# Patient Record
Sex: MALE | Race: WHITE | Employment: OTHER
[De-identification: names, ages, dates, MRNs, and addresses within clinical notes are randomized per-mention and may not be internally consistent; named-entity substitution may affect disease eponyms.]

---

## 2017-01-20 RX ORDER — NITROGLYCERIN 0.4 MG/1
TABLET SUBLINGUAL
Qty: 25 TABLET | Refills: 0 | Status: SHIPPED | OUTPATIENT
Start: 2017-01-20 | End: 2018-04-05 | Stop reason: SDUPTHER

## 2017-01-25 ENCOUNTER — CARE COORDINATION (OUTPATIENT)
Dept: CARE COORDINATION | Facility: CLINIC | Age: 72
End: 2017-01-25

## 2017-02-22 ENCOUNTER — OFFICE VISIT (OUTPATIENT)
Dept: INTERNAL MEDICINE | Facility: CLINIC | Age: 72
End: 2017-02-22

## 2017-02-22 ENCOUNTER — CARE COORDINATOR VISIT (OUTPATIENT)
Dept: CARE COORDINATION | Facility: CLINIC | Age: 72
End: 2017-02-22

## 2017-02-22 VITALS
HEART RATE: 80 BPM | RESPIRATION RATE: 18 BRPM | HEIGHT: 71 IN | WEIGHT: 233.2 LBS | TEMPERATURE: 100.8 F | SYSTOLIC BLOOD PRESSURE: 118 MMHG | DIASTOLIC BLOOD PRESSURE: 70 MMHG | BODY MASS INDEX: 32.65 KG/M2

## 2017-02-22 DIAGNOSIS — J02.9 SORE THROAT: ICD-10-CM

## 2017-02-22 DIAGNOSIS — R50.9 FEBRILE ILLNESS, ACUTE: Primary | ICD-10-CM

## 2017-02-22 LAB — S PYO AG THROAT QL: NORMAL

## 2017-02-22 PROCEDURE — 87880 STREP A ASSAY W/OPTIC: CPT | Performed by: NURSE PRACTITIONER

## 2017-02-22 PROCEDURE — 99213 OFFICE O/P EST LOW 20 MIN: CPT | Performed by: NURSE PRACTITIONER

## 2017-02-22 RX ORDER — OSELTAMIVIR PHOSPHATE 75 MG/1
75 CAPSULE ORAL 2 TIMES DAILY
Qty: 10 CAPSULE | Refills: 0 | Status: SHIPPED | OUTPATIENT
Start: 2017-02-22 | End: 2017-02-27

## 2017-02-22 ASSESSMENT — ENCOUNTER SYMPTOMS
COUGH: 1
VOMITING: 0
ABDOMINAL PAIN: 0
NAUSEA: 0
PHOTOPHOBIA: 0
CONSTIPATION: 0
SHORTNESS OF BREATH: 0
DIARRHEA: 0

## 2017-02-24 ASSESSMENT — ENCOUNTER SYMPTOMS
SORE THROAT: 1
RHINORRHEA: 1
TROUBLE SWALLOWING: 0

## 2017-02-28 ENCOUNTER — TELEPHONE (OUTPATIENT)
Dept: INTERNAL MEDICINE | Facility: CLINIC | Age: 72
End: 2017-02-28

## 2017-02-28 ENCOUNTER — HOSPITAL ENCOUNTER (OUTPATIENT)
Age: 72
Setting detail: OBSERVATION
Discharge: HOME | DRG: 191 | End: 2017-03-03
Attending: EMERGENCY MEDICINE | Admitting: INTERNAL MEDICINE
Payer: MEDICARE

## 2017-02-28 ENCOUNTER — APPOINTMENT (OUTPATIENT)
Dept: CT IMAGING | Age: 72
DRG: 191 | End: 2017-02-28
Payer: MEDICARE

## 2017-02-28 ENCOUNTER — APPOINTMENT (OUTPATIENT)
Dept: GENERAL RADIOLOGY | Age: 72
DRG: 191 | End: 2017-02-28
Payer: MEDICARE

## 2017-02-28 DIAGNOSIS — R09.89 RALES 1/3 WAY UP POSTERIOR CHEST WALL ON LEFT SIDE: ICD-10-CM

## 2017-02-28 DIAGNOSIS — J44.0 CHRONIC OBSTRUCTIVE PULMONARY DISEASE WITH ACUTE LOWER RESPIRATORY INFECTION (HCC): ICD-10-CM

## 2017-02-28 DIAGNOSIS — R06.02 SHORTNESS OF BREATH: Primary | ICD-10-CM

## 2017-02-28 DIAGNOSIS — R10.30 LOWER ABDOMINAL PAIN: ICD-10-CM

## 2017-02-28 LAB
ABSOLUTE EOS #: 0.1 K/UL (ref 0–0.4)
ABSOLUTE LYMPH #: 2.7 K/UL (ref 1–4.8)
ABSOLUTE MONO #: 0.9 K/UL (ref 0.1–1.3)
ALBUMIN SERPL-MCNC: 4.3 G/DL (ref 3.5–5.2)
ALBUMIN/GLOBULIN RATIO: ABNORMAL (ref 1–2.5)
ALP BLD-CCNC: 62 U/L (ref 40–129)
ALT SERPL-CCNC: 39 U/L (ref 5–41)
ANION GAP SERPL CALCULATED.3IONS-SCNC: 19 MMOL/L (ref 9–17)
AST SERPL-CCNC: 43 U/L
BASOPHILS # BLD: 0 % (ref 0–2)
BASOPHILS ABSOLUTE: 0 K/UL (ref 0–0.2)
BILIRUB SERPL-MCNC: 0.37 MG/DL (ref 0.3–1.2)
BILIRUBIN DIRECT: 0.12 MG/DL
BILIRUBIN, INDIRECT: 0.25 MG/DL (ref 0–1)
BNP INTERPRETATION: NORMAL
BUN BLDV-MCNC: 16 MG/DL (ref 8–23)
BUN/CREAT BLD: ABNORMAL (ref 9–20)
CALCIUM SERPL-MCNC: 9.4 MG/DL (ref 8.6–10.4)
CHLORIDE BLD-SCNC: 98 MMOL/L (ref 98–107)
CO2: 23 MMOL/L (ref 20–31)
CREAT SERPL-MCNC: 1.09 MG/DL (ref 0.7–1.2)
DIFFERENTIAL TYPE: ABNORMAL
EOSINOPHILS RELATIVE PERCENT: 1 % (ref 0–4)
GFR AFRICAN AMERICAN: >60 ML/MIN
GFR NON-AFRICAN AMERICAN: >60 ML/MIN
GFR SERPL CREATININE-BSD FRML MDRD: ABNORMAL ML/MIN/{1.73_M2}
GFR SERPL CREATININE-BSD FRML MDRD: ABNORMAL ML/MIN/{1.73_M2}
GLOBULIN: ABNORMAL G/DL (ref 1.5–3.8)
GLUCOSE BLD-MCNC: 138 MG/DL (ref 70–99)
GLUCOSE BLD-MCNC: 72 MG/DL (ref 75–110)
HCT VFR BLD CALC: 44.8 % (ref 41–53)
HEMOGLOBIN: 14.8 G/DL (ref 13.5–17.5)
LIPASE: 32 U/L (ref 13–60)
LYMPHOCYTES # BLD: 35 % (ref 24–44)
MCH RBC QN AUTO: 28.3 PG (ref 26–34)
MCHC RBC AUTO-ENTMCNC: 33.1 G/DL (ref 31–37)
MCV RBC AUTO: 85.4 FL (ref 80–100)
MONOCYTES # BLD: 11 % (ref 1–7)
MYOGLOBIN: 46 NG/ML (ref 28–72)
MYOGLOBIN: 46 NG/ML (ref 28–72)
PDW BLD-RTO: 13.5 % (ref 11.5–14.9)
PLATELET # BLD: 216 K/UL (ref 150–450)
PLATELET ESTIMATE: ABNORMAL
PMV BLD AUTO: 7.8 FL (ref 6–12)
POTASSIUM SERPL-SCNC: 3.4 MMOL/L (ref 3.7–5.3)
PRO-BNP: 225 PG/ML
RBC # BLD: 5.25 M/UL (ref 4.5–5.9)
RBC # BLD: ABNORMAL 10*6/UL
SEG NEUTROPHILS: 53 % (ref 36–66)
SEGMENTED NEUTROPHILS ABSOLUTE COUNT: 4.1 K/UL (ref 1.3–9.1)
SODIUM BLD-SCNC: 140 MMOL/L (ref 135–144)
TOTAL PROTEIN: 7.6 G/DL (ref 6.4–8.3)
TROPONIN INTERP: NORMAL
TROPONIN INTERP: NORMAL
TROPONIN T: <0.03 NG/ML
TROPONIN T: <0.03 NG/ML
WBC # BLD: 7.7 K/UL (ref 3.5–11)
WBC # BLD: ABNORMAL 10*3/UL

## 2017-02-28 PROCEDURE — 6360000002 HC RX W HCPCS: Performed by: INTERNAL MEDICINE

## 2017-02-28 PROCEDURE — 1200000000 HC SEMI PRIVATE

## 2017-02-28 PROCEDURE — 74176 CT ABD & PELVIS W/O CONTRAST: CPT

## 2017-02-28 PROCEDURE — 71020 XR CHEST STANDARD TWO VW: CPT | Performed by: RADIOLOGY

## 2017-02-28 PROCEDURE — 2580000003 HC RX 258: Performed by: INTERNAL MEDICINE

## 2017-02-28 PROCEDURE — 94664 DEMO&/EVAL PT USE INHALER: CPT

## 2017-02-28 PROCEDURE — 2580000003 HC RX 258: Performed by: STUDENT IN AN ORGANIZED HEALTH CARE EDUCATION/TRAINING PROGRAM

## 2017-02-28 PROCEDURE — 83690 ASSAY OF LIPASE: CPT

## 2017-02-28 PROCEDURE — 6370000000 HC RX 637 (ALT 250 FOR IP): Performed by: STUDENT IN AN ORGANIZED HEALTH CARE EDUCATION/TRAINING PROGRAM

## 2017-02-28 PROCEDURE — 94640 AIRWAY INHALATION TREATMENT: CPT

## 2017-02-28 PROCEDURE — 80048 BASIC METABOLIC PNL TOTAL CA: CPT

## 2017-02-28 PROCEDURE — 83874 ASSAY OF MYOGLOBIN: CPT

## 2017-02-28 PROCEDURE — 99285 EMERGENCY DEPT VISIT HI MDM: CPT

## 2017-02-28 PROCEDURE — 36415 COLL VENOUS BLD VENIPUNCTURE: CPT

## 2017-02-28 PROCEDURE — 93005 ELECTROCARDIOGRAM TRACING: CPT

## 2017-02-28 PROCEDURE — G0378 HOSPITAL OBSERVATION PER HR: HCPCS

## 2017-02-28 PROCEDURE — 96367 TX/PROPH/DG ADDL SEQ IV INF: CPT

## 2017-02-28 PROCEDURE — 83880 ASSAY OF NATRIURETIC PEPTIDE: CPT

## 2017-02-28 PROCEDURE — 87040 BLOOD CULTURE FOR BACTERIA: CPT

## 2017-02-28 PROCEDURE — 71020 XR CHEST STANDARD TWO VW: CPT

## 2017-02-28 PROCEDURE — 6370000000 HC RX 637 (ALT 250 FOR IP): Performed by: EMERGENCY MEDICINE

## 2017-02-28 PROCEDURE — 96372 THER/PROPH/DIAG INJ SC/IM: CPT

## 2017-02-28 PROCEDURE — 84484 ASSAY OF TROPONIN QUANT: CPT

## 2017-02-28 PROCEDURE — 96365 THER/PROPH/DIAG IV INF INIT: CPT

## 2017-02-28 PROCEDURE — 80076 HEPATIC FUNCTION PANEL: CPT

## 2017-02-28 PROCEDURE — 82947 ASSAY GLUCOSE BLOOD QUANT: CPT

## 2017-02-28 PROCEDURE — 85025 COMPLETE CBC W/AUTO DIFF WBC: CPT

## 2017-02-28 RX ORDER — GLIMEPIRIDE 4 MG/1
4 TABLET ORAL 2 TIMES DAILY
Status: DISCONTINUED | OUTPATIENT
Start: 2017-02-28 | End: 2017-03-03 | Stop reason: HOSPADM

## 2017-02-28 RX ORDER — GUAIFENESIN 600 MG/1
600 TABLET, EXTENDED RELEASE ORAL 2 TIMES DAILY
Status: DISCONTINUED | OUTPATIENT
Start: 2017-02-28 | End: 2017-03-01

## 2017-02-28 RX ORDER — OMEGA-3/DHA/EPA/FISH OIL 500-1000MG
2 CAPSULE ORAL DAILY
Status: ON HOLD | COMMUNITY
End: 2021-08-09

## 2017-02-28 RX ORDER — ACETAMINOPHEN 325 MG/1
650 TABLET ORAL EVERY 4 HOURS PRN
Status: DISCONTINUED | OUTPATIENT
Start: 2017-02-28 | End: 2017-03-03 | Stop reason: HOSPADM

## 2017-02-28 RX ORDER — PANTOPRAZOLE SODIUM 40 MG/1
40 TABLET, DELAYED RELEASE ORAL DAILY
Status: DISCONTINUED | OUTPATIENT
Start: 2017-02-28 | End: 2017-03-03 | Stop reason: HOSPADM

## 2017-02-28 RX ORDER — CLOPIDOGREL BISULFATE 75 MG/1
75 TABLET ORAL DAILY
Status: DISCONTINUED | OUTPATIENT
Start: 2017-02-28 | End: 2017-03-03 | Stop reason: HOSPADM

## 2017-02-28 RX ORDER — DOCUSATE SODIUM 100 MG/1
100 CAPSULE, LIQUID FILLED ORAL 2 TIMES DAILY
Status: DISCONTINUED | OUTPATIENT
Start: 2017-02-28 | End: 2017-03-03 | Stop reason: HOSPADM

## 2017-02-28 RX ORDER — POTASSIUM CHLORIDE 7.45 MG/ML
10 INJECTION INTRAVENOUS PRN
Status: DISCONTINUED | OUTPATIENT
Start: 2017-02-28 | End: 2017-03-03 | Stop reason: HOSPADM

## 2017-02-28 RX ORDER — OMEPRAZOLE 20 MG/1
20 CAPSULE, DELAYED RELEASE ORAL EVERY EVENING
Status: ON HOLD | COMMUNITY
End: 2021-08-10 | Stop reason: ALTCHOICE

## 2017-02-28 RX ORDER — AMLODIPINE BESYLATE 10 MG/1
10 TABLET ORAL DAILY
Status: DISCONTINUED | OUTPATIENT
Start: 2017-02-28 | End: 2017-03-03 | Stop reason: HOSPADM

## 2017-02-28 RX ORDER — SODIUM CHLORIDE 0.9 % (FLUSH) 0.9 %
10 SYRINGE (ML) INJECTION PRN
Status: DISCONTINUED | OUTPATIENT
Start: 2017-02-28 | End: 2017-03-03 | Stop reason: HOSPADM

## 2017-02-28 RX ORDER — POTASSIUM CHLORIDE 20 MEQ/1
40 TABLET, EXTENDED RELEASE ORAL PRN
Status: DISCONTINUED | OUTPATIENT
Start: 2017-02-28 | End: 2017-03-03 | Stop reason: HOSPADM

## 2017-02-28 RX ORDER — AMIODARONE HYDROCHLORIDE 200 MG/1
200 TABLET ORAL DAILY
Status: DISCONTINUED | OUTPATIENT
Start: 2017-02-28 | End: 2017-03-03 | Stop reason: HOSPADM

## 2017-02-28 RX ORDER — IPRATROPIUM BROMIDE AND ALBUTEROL SULFATE 2.5; .5 MG/3ML; MG/3ML
1 SOLUTION RESPIRATORY (INHALATION) EVERY 4 HOURS PRN
Status: DISCONTINUED | OUTPATIENT
Start: 2017-02-28 | End: 2017-03-03 | Stop reason: HOSPADM

## 2017-02-28 RX ORDER — ASPIRIN 81 MG/1
81 TABLET ORAL DAILY
Status: DISCONTINUED | OUTPATIENT
Start: 2017-02-28 | End: 2017-03-03 | Stop reason: HOSPADM

## 2017-02-28 RX ORDER — ALBUTEROL SULFATE 2.5 MG/3ML
5 SOLUTION RESPIRATORY (INHALATION)
Status: DISCONTINUED | OUTPATIENT
Start: 2017-02-28 | End: 2017-02-28

## 2017-02-28 RX ORDER — DIPHENHYDRAMINE HCL 25 MG
50 TABLET ORAL NIGHTLY PRN
Status: DISCONTINUED | OUTPATIENT
Start: 2017-03-01 | End: 2017-03-01

## 2017-02-28 RX ORDER — ONDANSETRON 2 MG/ML
4 INJECTION INTRAMUSCULAR; INTRAVENOUS EVERY 6 HOURS PRN
Status: DISCONTINUED | OUTPATIENT
Start: 2017-02-28 | End: 2017-03-03 | Stop reason: HOSPADM

## 2017-02-28 RX ORDER — LISINOPRIL 40 MG/1
40 TABLET ORAL DAILY
COMMUNITY

## 2017-02-28 RX ORDER — PRAVASTATIN SODIUM 40 MG
80 TABLET ORAL NIGHTLY
Status: DISCONTINUED | OUTPATIENT
Start: 2017-02-28 | End: 2017-03-03 | Stop reason: HOSPADM

## 2017-02-28 RX ORDER — IPRATROPIUM BROMIDE AND ALBUTEROL SULFATE 2.5; .5 MG/3ML; MG/3ML
1 SOLUTION RESPIRATORY (INHALATION)
Status: DISCONTINUED | OUTPATIENT
Start: 2017-02-28 | End: 2017-02-28

## 2017-02-28 RX ORDER — PRAVASTATIN SODIUM 80 MG/1
80 TABLET ORAL NIGHTLY
COMMUNITY
End: 2018-06-25 | Stop reason: ALTCHOICE

## 2017-02-28 RX ORDER — IPRATROPIUM BROMIDE AND ALBUTEROL SULFATE 2.5; .5 MG/3ML; MG/3ML
1 SOLUTION RESPIRATORY (INHALATION)
Status: DISCONTINUED | OUTPATIENT
Start: 2017-02-28 | End: 2017-03-03 | Stop reason: HOSPADM

## 2017-02-28 RX ORDER — SODIUM CHLORIDE 0.9 % (FLUSH) 0.9 %
10 SYRINGE (ML) INJECTION EVERY 12 HOURS SCHEDULED
Status: DISCONTINUED | OUTPATIENT
Start: 2017-02-28 | End: 2017-03-03 | Stop reason: HOSPADM

## 2017-02-28 RX ORDER — NITROGLYCERIN 0.4 MG/1
0.4 TABLET SUBLINGUAL PRN
Status: DISCONTINUED | OUTPATIENT
Start: 2017-02-28 | End: 2017-03-03 | Stop reason: HOSPADM

## 2017-02-28 RX ORDER — ALBUTEROL SULFATE 90 UG/1
2 AEROSOL, METERED RESPIRATORY (INHALATION)
Status: DISCONTINUED | OUTPATIENT
Start: 2017-02-28 | End: 2017-02-28

## 2017-02-28 RX ORDER — POTASSIUM CHLORIDE 20MEQ/15ML
40 LIQUID (ML) ORAL PRN
Status: DISCONTINUED | OUTPATIENT
Start: 2017-02-28 | End: 2017-03-03 | Stop reason: HOSPADM

## 2017-02-28 RX ORDER — CILOSTAZOL 100 MG/1
100 TABLET ORAL 2 TIMES DAILY
Status: DISCONTINUED | OUTPATIENT
Start: 2017-02-28 | End: 2017-03-03 | Stop reason: HOSPADM

## 2017-02-28 RX ORDER — LISINOPRIL 20 MG/1
40 TABLET ORAL DAILY
Status: DISCONTINUED | OUTPATIENT
Start: 2017-02-28 | End: 2017-03-03 | Stop reason: HOSPADM

## 2017-02-28 RX ORDER — SODIUM CHLORIDE 9 MG/ML
INJECTION, SOLUTION INTRAVENOUS CONTINUOUS
Status: DISCONTINUED | OUTPATIENT
Start: 2017-02-28 | End: 2017-03-03 | Stop reason: HOSPADM

## 2017-02-28 RX ORDER — BISACODYL 10 MG
10 SUPPOSITORY, RECTAL RECTAL DAILY PRN
Status: DISCONTINUED | OUTPATIENT
Start: 2017-02-28 | End: 2017-03-03 | Stop reason: HOSPADM

## 2017-02-28 RX ADMIN — PRAVASTATIN SODIUM 80 MG: 40 TABLET ORAL at 21:05

## 2017-02-28 RX ADMIN — GLIMEPIRIDE 4 MG: 4 TABLET ORAL at 21:05

## 2017-02-28 RX ADMIN — CILOSTAZOL 100 MG: 100 TABLET ORAL at 21:05

## 2017-02-28 RX ADMIN — IPRATROPIUM BROMIDE AND ALBUTEROL SULFATE 1 AMPULE: .5; 3 SOLUTION RESPIRATORY (INHALATION) at 15:49

## 2017-02-28 RX ADMIN — DOCUSATE SODIUM 100 MG: 100 CAPSULE, LIQUID FILLED ORAL at 21:05

## 2017-02-28 RX ADMIN — METOPROLOL TARTRATE 25 MG: 25 TABLET ORAL at 21:05

## 2017-02-28 RX ADMIN — SODIUM CHLORIDE: 9 INJECTION, SOLUTION INTRAVENOUS at 17:28

## 2017-02-28 RX ADMIN — POTASSIUM CHLORIDE 40 MEQ: 20 TABLET, EXTENDED RELEASE ORAL at 21:05

## 2017-02-28 RX ADMIN — GUAIFENESIN 600 MG: 600 TABLET, EXTENDED RELEASE ORAL at 21:05

## 2017-02-28 RX ADMIN — IPRATROPIUM BROMIDE AND ALBUTEROL SULFATE 1 AMPULE: .5; 3 SOLUTION RESPIRATORY (INHALATION) at 20:11

## 2017-02-28 RX ADMIN — ENOXAPARIN SODIUM 30 MG: 30 INJECTION SUBCUTANEOUS at 21:05

## 2017-02-28 RX ADMIN — AZITHROMYCIN MONOHYDRATE 500 MG: 500 INJECTION, POWDER, LYOPHILIZED, FOR SOLUTION INTRAVENOUS at 23:11

## 2017-02-28 RX ADMIN — CEFTRIAXONE SODIUM 1 G: 1 INJECTION, POWDER, FOR SOLUTION INTRAMUSCULAR; INTRAVENOUS at 20:50

## 2017-02-28 ASSESSMENT — PAIN SCALES - GENERAL
PAINLEVEL_OUTOF10: 8
PAINLEVEL_OUTOF10: 6
PAINLEVEL_OUTOF10: 5

## 2017-02-28 ASSESSMENT — ENCOUNTER SYMPTOMS
DIARRHEA: 0
SHORTNESS OF BREATH: 1
VOMITING: 0
SORE THROAT: 0
COUGH: 0
ABDOMINAL PAIN: 1
EYE PAIN: 0
BACK PAIN: 0
NAUSEA: 0

## 2017-02-28 ASSESSMENT — PAIN DESCRIPTION - PROGRESSION
CLINICAL_PROGRESSION: NOT CHANGED
CLINICAL_PROGRESSION: NOT CHANGED

## 2017-02-28 ASSESSMENT — PAIN DESCRIPTION - LOCATION
LOCATION: ABDOMEN
LOCATION: ABDOMEN

## 2017-02-28 ASSESSMENT — PAIN DESCRIPTION - DESCRIPTORS: DESCRIPTORS: STABBING

## 2017-02-28 ASSESSMENT — PAIN DESCRIPTION - PAIN TYPE
TYPE: ACUTE PAIN
TYPE: ACUTE PAIN

## 2017-02-28 ASSESSMENT — PAIN DESCRIPTION - FREQUENCY: FREQUENCY: INTERMITTENT

## 2017-02-28 ASSESSMENT — PAIN DESCRIPTION - ORIENTATION: ORIENTATION: LOWER;MID

## 2017-02-28 ASSESSMENT — PAIN DESCRIPTION - ONSET: ONSET: ON-GOING

## 2017-02-28 ASSESSMENT — ACTIVITIES OF DAILY LIVING (ADL): EFFECT OF PAIN ON DAILY ACTIVITIES: DIFFICULT TO WALK

## 2017-03-01 ENCOUNTER — APPOINTMENT (OUTPATIENT)
Dept: CT IMAGING | Age: 72
DRG: 191 | End: 2017-03-01
Payer: MEDICARE

## 2017-03-01 LAB
ANION GAP SERPL CALCULATED.3IONS-SCNC: 13 MMOL/L (ref 9–17)
BUN BLDV-MCNC: 10 MG/DL (ref 8–23)
BUN/CREAT BLD: ABNORMAL (ref 9–20)
CALCIUM SERPL-MCNC: 8.6 MG/DL (ref 8.6–10.4)
CHLORIDE BLD-SCNC: 105 MMOL/L (ref 98–107)
CO2: 25 MMOL/L (ref 20–31)
CREAT SERPL-MCNC: 0.62 MG/DL (ref 0.7–1.2)
CULTURE: ABNORMAL
DIRECT EXAM: ABNORMAL
EKG ATRIAL RATE: 62 BPM
EKG P AXIS: 79 DEGREES
EKG P-R INTERVAL: 226 MS
EKG Q-T INTERVAL: 444 MS
EKG QRS DURATION: 96 MS
EKG QTC CALCULATION (BAZETT): 450 MS
EKG R AXIS: -45 DEGREES
EKG T AXIS: -32 DEGREES
EKG VENTRICULAR RATE: 62 BPM
GFR AFRICAN AMERICAN: >60 ML/MIN
GFR NON-AFRICAN AMERICAN: >60 ML/MIN
GFR SERPL CREATININE-BSD FRML MDRD: ABNORMAL ML/MIN/{1.73_M2}
GFR SERPL CREATININE-BSD FRML MDRD: ABNORMAL ML/MIN/{1.73_M2}
GLUCOSE BLD-MCNC: 139 MG/DL (ref 75–110)
GLUCOSE BLD-MCNC: 148 MG/DL (ref 75–110)
GLUCOSE BLD-MCNC: 156 MG/DL (ref 70–99)
GLUCOSE BLD-MCNC: 183 MG/DL (ref 75–110)
GLUCOSE BLD-MCNC: 202 MG/DL (ref 75–110)
GLUCOSE BLD-MCNC: 218 MG/DL (ref 75–110)
HCT VFR BLD CALC: 40.7 % (ref 41–53)
HEMOGLOBIN: 13.5 G/DL (ref 13.5–17.5)
Lab: ABNORMAL
MCH RBC QN AUTO: 28.4 PG (ref 26–34)
MCHC RBC AUTO-ENTMCNC: 33.2 G/DL (ref 31–37)
MCV RBC AUTO: 85.6 FL (ref 80–100)
PDW BLD-RTO: 13.6 % (ref 11.5–14.9)
PLATELET # BLD: 166 K/UL (ref 150–450)
PMV BLD AUTO: 7.8 FL (ref 6–12)
POTASSIUM SERPL-SCNC: 3.4 MMOL/L (ref 3.7–5.3)
RBC # BLD: 4.75 M/UL (ref 4.5–5.9)
SODIUM BLD-SCNC: 143 MMOL/L (ref 135–144)
SPECIMEN DESCRIPTION: ABNORMAL
SPECIMEN DESCRIPTION: ABNORMAL
STATUS: ABNORMAL
WBC # BLD: 5.1 K/UL (ref 3.5–11)

## 2017-03-01 PROCEDURE — 2580000003 HC RX 258: Performed by: INTERNAL MEDICINE

## 2017-03-01 PROCEDURE — 71250 CT THORAX DX C-: CPT

## 2017-03-01 PROCEDURE — 94664 DEMO&/EVAL PT USE INHALER: CPT

## 2017-03-01 PROCEDURE — 6370000000 HC RX 637 (ALT 250 FOR IP): Performed by: STUDENT IN AN ORGANIZED HEALTH CARE EDUCATION/TRAINING PROGRAM

## 2017-03-01 PROCEDURE — 85027 COMPLETE CBC AUTOMATED: CPT

## 2017-03-01 PROCEDURE — 6370000000 HC RX 637 (ALT 250 FOR IP): Performed by: INTERNAL MEDICINE

## 2017-03-01 PROCEDURE — 36415 COLL VENOUS BLD VENIPUNCTURE: CPT

## 2017-03-01 PROCEDURE — 1200000000 HC SEMI PRIVATE

## 2017-03-01 PROCEDURE — 94761 N-INVAS EAR/PLS OXIMETRY MLT: CPT

## 2017-03-01 PROCEDURE — 99223 1ST HOSP IP/OBS HIGH 75: CPT | Performed by: INTERNAL MEDICINE

## 2017-03-01 PROCEDURE — 6370000000 HC RX 637 (ALT 250 FOR IP): Performed by: NURSE PRACTITIONER

## 2017-03-01 PROCEDURE — 87070 CULTURE OTHR SPECIMN AEROBIC: CPT

## 2017-03-01 PROCEDURE — 96366 THER/PROPH/DIAG IV INF ADDON: CPT

## 2017-03-01 PROCEDURE — 96372 THER/PROPH/DIAG INJ SC/IM: CPT

## 2017-03-01 PROCEDURE — 80048 BASIC METABOLIC PNL TOTAL CA: CPT

## 2017-03-01 PROCEDURE — 2580000003 HC RX 258: Performed by: STUDENT IN AN ORGANIZED HEALTH CARE EDUCATION/TRAINING PROGRAM

## 2017-03-01 PROCEDURE — 6360000002 HC RX W HCPCS: Performed by: INTERNAL MEDICINE

## 2017-03-01 PROCEDURE — 94660 CPAP INITIATION&MGMT: CPT

## 2017-03-01 PROCEDURE — G0378 HOSPITAL OBSERVATION PER HR: HCPCS

## 2017-03-01 PROCEDURE — 94640 AIRWAY INHALATION TREATMENT: CPT

## 2017-03-01 PROCEDURE — 87205 SMEAR GRAM STAIN: CPT

## 2017-03-01 PROCEDURE — 82947 ASSAY GLUCOSE BLOOD QUANT: CPT

## 2017-03-01 RX ORDER — GUAIFENESIN 600 MG/1
1200 TABLET, EXTENDED RELEASE ORAL 2 TIMES DAILY
Status: DISCONTINUED | OUTPATIENT
Start: 2017-03-01 | End: 2017-03-03 | Stop reason: HOSPADM

## 2017-03-01 RX ORDER — DEXTROSE MONOHYDRATE 50 MG/ML
100 INJECTION, SOLUTION INTRAVENOUS PRN
Status: DISCONTINUED | OUTPATIENT
Start: 2017-03-01 | End: 2017-03-03 | Stop reason: HOSPADM

## 2017-03-01 RX ORDER — DEXTROSE MONOHYDRATE 25 G/50ML
12.5 INJECTION, SOLUTION INTRAVENOUS PRN
Status: DISCONTINUED | OUTPATIENT
Start: 2017-03-01 | End: 2017-03-03 | Stop reason: HOSPADM

## 2017-03-01 RX ORDER — NICOTINE POLACRILEX 4 MG
15 LOZENGE BUCCAL PRN
Status: DISCONTINUED | OUTPATIENT
Start: 2017-03-01 | End: 2017-03-03 | Stop reason: HOSPADM

## 2017-03-01 RX ORDER — BENZONATATE 100 MG/1
200 CAPSULE ORAL 3 TIMES DAILY
Status: DISCONTINUED | OUTPATIENT
Start: 2017-03-01 | End: 2017-03-03 | Stop reason: HOSPADM

## 2017-03-01 RX ORDER — DIPHENHYDRAMINE HCL 25 MG
50 TABLET ORAL NIGHTLY PRN
Status: DISCONTINUED | OUTPATIENT
Start: 2017-03-01 | End: 2017-03-03 | Stop reason: HOSPADM

## 2017-03-01 RX ADMIN — CEFTRIAXONE SODIUM 1 G: 1 INJECTION, POWDER, FOR SOLUTION INTRAMUSCULAR; INTRAVENOUS at 21:58

## 2017-03-01 RX ADMIN — SODIUM CHLORIDE: 9 INJECTION, SOLUTION INTRAVENOUS at 15:53

## 2017-03-01 RX ADMIN — AMIODARONE HYDROCHLORIDE 200 MG: 200 TABLET ORAL at 09:06

## 2017-03-01 RX ADMIN — SODIUM CHLORIDE: 9 INJECTION, SOLUTION INTRAVENOUS at 06:41

## 2017-03-01 RX ADMIN — CILOSTAZOL 100 MG: 100 TABLET ORAL at 09:07

## 2017-03-01 RX ADMIN — AMLODIPINE BESYLATE 10 MG: 10 TABLET ORAL at 09:17

## 2017-03-01 RX ADMIN — IPRATROPIUM BROMIDE AND ALBUTEROL SULFATE 1 AMPULE: .5; 3 SOLUTION RESPIRATORY (INHALATION) at 10:36

## 2017-03-01 RX ADMIN — METOPROLOL TARTRATE 25 MG: 25 TABLET ORAL at 09:07

## 2017-03-01 RX ADMIN — GUAIFENESIN 600 MG: 600 TABLET, EXTENDED RELEASE ORAL at 09:08

## 2017-03-01 RX ADMIN — IPRATROPIUM BROMIDE AND ALBUTEROL SULFATE 1 AMPULE: .5; 3 SOLUTION RESPIRATORY (INHALATION) at 14:59

## 2017-03-01 RX ADMIN — LINAGLIPTIN 5 MG: 5 TABLET, FILM COATED ORAL at 09:07

## 2017-03-01 RX ADMIN — BENZONATATE 200 MG: 100 CAPSULE ORAL at 15:53

## 2017-03-01 RX ADMIN — INSULIN LISPRO 2 UNITS: 100 INJECTION, SOLUTION INTRAVENOUS; SUBCUTANEOUS at 12:07

## 2017-03-01 RX ADMIN — ENOXAPARIN SODIUM 30 MG: 30 INJECTION SUBCUTANEOUS at 09:07

## 2017-03-01 RX ADMIN — POTASSIUM CHLORIDE 40 MEQ: 20 TABLET, EXTENDED RELEASE ORAL at 09:25

## 2017-03-01 RX ADMIN — DOCUSATE SODIUM 100 MG: 100 CAPSULE, LIQUID FILLED ORAL at 22:06

## 2017-03-01 RX ADMIN — BENZONATATE 200 MG: 100 CAPSULE ORAL at 22:05

## 2017-03-01 RX ADMIN — CILOSTAZOL 100 MG: 100 TABLET ORAL at 22:01

## 2017-03-01 RX ADMIN — PRAVASTATIN SODIUM 80 MG: 40 TABLET ORAL at 22:01

## 2017-03-01 RX ADMIN — METOPROLOL TARTRATE 25 MG: 25 TABLET ORAL at 22:06

## 2017-03-01 RX ADMIN — IPRATROPIUM BROMIDE AND ALBUTEROL SULFATE 1 AMPULE: .5; 3 SOLUTION RESPIRATORY (INHALATION) at 07:03

## 2017-03-01 RX ADMIN — DIPHENHYDRAMINE HCL 50 MG: 25 TABLET ORAL at 00:34

## 2017-03-01 RX ADMIN — PANTOPRAZOLE SODIUM 40 MG: 40 TABLET, DELAYED RELEASE ORAL at 09:06

## 2017-03-01 RX ADMIN — IPRATROPIUM BROMIDE AND ALBUTEROL SULFATE 1 AMPULE: .5; 3 SOLUTION RESPIRATORY (INHALATION) at 20:59

## 2017-03-01 RX ADMIN — DOCUSATE SODIUM 100 MG: 100 CAPSULE, LIQUID FILLED ORAL at 09:07

## 2017-03-01 RX ADMIN — CLOPIDOGREL BISULFATE 75 MG: 75 TABLET ORAL at 09:06

## 2017-03-01 RX ADMIN — ASPIRIN 81 MG: 81 TABLET, COATED ORAL at 09:06

## 2017-03-01 RX ADMIN — INSULIN LISPRO 1 UNITS: 100 INJECTION, SOLUTION INTRAVENOUS; SUBCUTANEOUS at 17:08

## 2017-03-01 RX ADMIN — GLIMEPIRIDE 4 MG: 4 TABLET ORAL at 22:13

## 2017-03-01 RX ADMIN — ENOXAPARIN SODIUM 30 MG: 30 INJECTION SUBCUTANEOUS at 22:06

## 2017-03-01 RX ADMIN — INSULIN LISPRO 1 UNITS: 100 INJECTION, SOLUTION INTRAVENOUS; SUBCUTANEOUS at 22:06

## 2017-03-01 RX ADMIN — LISINOPRIL 40 MG: 20 TABLET ORAL at 09:06

## 2017-03-01 RX ADMIN — GUAIFENESIN 1200 MG: 600 TABLET, EXTENDED RELEASE ORAL at 22:05

## 2017-03-01 RX ADMIN — GLIMEPIRIDE 4 MG: 4 TABLET ORAL at 09:07

## 2017-03-02 ENCOUNTER — TELEPHONE (OUTPATIENT)
Dept: INTERNAL MEDICINE | Facility: CLINIC | Age: 72
End: 2017-03-02

## 2017-03-02 LAB
ABSOLUTE EOS #: 0.1 K/UL (ref 0–0.4)
ABSOLUTE LYMPH #: 3.4 K/UL (ref 1–4.8)
ABSOLUTE MONO #: 0.8 K/UL (ref 0.1–1.3)
ANION GAP SERPL CALCULATED.3IONS-SCNC: 17 MMOL/L (ref 9–17)
BASOPHILS # BLD: 0 % (ref 0–2)
BASOPHILS ABSOLUTE: 0 K/UL (ref 0–0.2)
BUN BLDV-MCNC: 8 MG/DL (ref 8–23)
BUN/CREAT BLD: ABNORMAL (ref 9–20)
CALCIUM SERPL-MCNC: 8.8 MG/DL (ref 8.6–10.4)
CHLORIDE BLD-SCNC: 104 MMOL/L (ref 98–107)
CO2: 22 MMOL/L (ref 20–31)
CREAT SERPL-MCNC: 0.57 MG/DL (ref 0.7–1.2)
DIFFERENTIAL TYPE: ABNORMAL
EOSINOPHILS RELATIVE PERCENT: 2 % (ref 0–4)
GFR AFRICAN AMERICAN: >60 ML/MIN
GFR NON-AFRICAN AMERICAN: >60 ML/MIN
GFR SERPL CREATININE-BSD FRML MDRD: ABNORMAL ML/MIN/{1.73_M2}
GFR SERPL CREATININE-BSD FRML MDRD: ABNORMAL ML/MIN/{1.73_M2}
GLUCOSE BLD-MCNC: 189 MG/DL (ref 75–110)
GLUCOSE BLD-MCNC: 196 MG/DL (ref 75–110)
GLUCOSE BLD-MCNC: 201 MG/DL (ref 70–99)
GLUCOSE BLD-MCNC: 202 MG/DL (ref 75–110)
GLUCOSE BLD-MCNC: 236 MG/DL (ref 75–110)
HCT VFR BLD CALC: 40.9 % (ref 41–53)
HEMOGLOBIN: 14.1 G/DL (ref 13.5–17.5)
LYMPHOCYTES # BLD: 43 % (ref 24–44)
MCH RBC QN AUTO: 29.2 PG (ref 26–34)
MCHC RBC AUTO-ENTMCNC: 34.5 G/DL (ref 31–37)
MCV RBC AUTO: 84.7 FL (ref 80–100)
MONOCYTES # BLD: 10 % (ref 1–7)
PDW BLD-RTO: 13.7 % (ref 11.5–14.9)
PLATELET # BLD: 223 K/UL (ref 150–450)
PLATELET ESTIMATE: ABNORMAL
PMV BLD AUTO: 8.1 FL (ref 6–12)
POTASSIUM SERPL-SCNC: 3.5 MMOL/L (ref 3.7–5.3)
RBC # BLD: 4.82 M/UL (ref 4.5–5.9)
RBC # BLD: ABNORMAL 10*6/UL
SEG NEUTROPHILS: 45 % (ref 36–66)
SEGMENTED NEUTROPHILS ABSOLUTE COUNT: 3.5 K/UL (ref 1.3–9.1)
SODIUM BLD-SCNC: 143 MMOL/L (ref 135–144)
WBC # BLD: 7.9 K/UL (ref 3.5–11)
WBC # BLD: ABNORMAL 10*3/UL

## 2017-03-02 PROCEDURE — 80048 BASIC METABOLIC PNL TOTAL CA: CPT

## 2017-03-02 PROCEDURE — 2580000003 HC RX 258: Performed by: INTERNAL MEDICINE

## 2017-03-02 PROCEDURE — 6370000000 HC RX 637 (ALT 250 FOR IP): Performed by: STUDENT IN AN ORGANIZED HEALTH CARE EDUCATION/TRAINING PROGRAM

## 2017-03-02 PROCEDURE — 6370000000 HC RX 637 (ALT 250 FOR IP): Performed by: INTERNAL MEDICINE

## 2017-03-02 PROCEDURE — 96372 THER/PROPH/DIAG INJ SC/IM: CPT

## 2017-03-02 PROCEDURE — 94640 AIRWAY INHALATION TREATMENT: CPT

## 2017-03-02 PROCEDURE — 99233 SBSQ HOSP IP/OBS HIGH 50: CPT | Performed by: INTERNAL MEDICINE

## 2017-03-02 PROCEDURE — 85025 COMPLETE CBC W/AUTO DIFF WBC: CPT

## 2017-03-02 PROCEDURE — 2580000003 HC RX 258: Performed by: STUDENT IN AN ORGANIZED HEALTH CARE EDUCATION/TRAINING PROGRAM

## 2017-03-02 PROCEDURE — 96366 THER/PROPH/DIAG IV INF ADDON: CPT

## 2017-03-02 PROCEDURE — 36415 COLL VENOUS BLD VENIPUNCTURE: CPT

## 2017-03-02 PROCEDURE — 6360000002 HC RX W HCPCS: Performed by: INTERNAL MEDICINE

## 2017-03-02 PROCEDURE — 82947 ASSAY GLUCOSE BLOOD QUANT: CPT

## 2017-03-02 PROCEDURE — G0378 HOSPITAL OBSERVATION PER HR: HCPCS

## 2017-03-02 PROCEDURE — 1200000000 HC SEMI PRIVATE

## 2017-03-02 RX ADMIN — CILOSTAZOL 100 MG: 100 TABLET ORAL at 21:55

## 2017-03-02 RX ADMIN — AZITHROMYCIN MONOHYDRATE 500 MG: 500 INJECTION, POWDER, LYOPHILIZED, FOR SOLUTION INTRAVENOUS at 00:14

## 2017-03-02 RX ADMIN — INSULIN LISPRO 1 UNITS: 100 INJECTION, SOLUTION INTRAVENOUS; SUBCUTANEOUS at 17:21

## 2017-03-02 RX ADMIN — LISINOPRIL 40 MG: 20 TABLET ORAL at 07:51

## 2017-03-02 RX ADMIN — ASPIRIN 81 MG: 81 TABLET, COATED ORAL at 07:52

## 2017-03-02 RX ADMIN — POTASSIUM CHLORIDE 40 MEQ: 20 TABLET, EXTENDED RELEASE ORAL at 17:30

## 2017-03-02 RX ADMIN — LINAGLIPTIN 5 MG: 5 TABLET, FILM COATED ORAL at 07:51

## 2017-03-02 RX ADMIN — IPRATROPIUM BROMIDE AND ALBUTEROL SULFATE 1 AMPULE: .5; 3 SOLUTION RESPIRATORY (INHALATION) at 20:38

## 2017-03-02 RX ADMIN — SODIUM CHLORIDE: 9 INJECTION, SOLUTION INTRAVENOUS at 15:15

## 2017-03-02 RX ADMIN — CEFTRIAXONE SODIUM 1 G: 1 INJECTION, POWDER, FOR SOLUTION INTRAMUSCULAR; INTRAVENOUS at 21:52

## 2017-03-02 RX ADMIN — GLIMEPIRIDE 4 MG: 4 TABLET ORAL at 21:55

## 2017-03-02 RX ADMIN — ENOXAPARIN SODIUM 30 MG: 30 INJECTION SUBCUTANEOUS at 07:52

## 2017-03-02 RX ADMIN — GUAIFENESIN 1200 MG: 600 TABLET, EXTENDED RELEASE ORAL at 21:55

## 2017-03-02 RX ADMIN — METOPROLOL TARTRATE 25 MG: 25 TABLET ORAL at 07:52

## 2017-03-02 RX ADMIN — BENZONATATE 200 MG: 100 CAPSULE ORAL at 07:50

## 2017-03-02 RX ADMIN — PRAVASTATIN SODIUM 80 MG: 40 TABLET ORAL at 21:55

## 2017-03-02 RX ADMIN — CLOPIDOGREL BISULFATE 75 MG: 75 TABLET ORAL at 07:52

## 2017-03-02 RX ADMIN — BENZONATATE 200 MG: 100 CAPSULE ORAL at 14:45

## 2017-03-02 RX ADMIN — GLIMEPIRIDE 4 MG: 4 TABLET ORAL at 07:52

## 2017-03-02 RX ADMIN — IPRATROPIUM BROMIDE AND ALBUTEROL SULFATE 1 AMPULE: .5; 3 SOLUTION RESPIRATORY (INHALATION) at 10:58

## 2017-03-02 RX ADMIN — IPRATROPIUM BROMIDE AND ALBUTEROL SULFATE 1 AMPULE: .5; 3 SOLUTION RESPIRATORY (INHALATION) at 15:09

## 2017-03-02 RX ADMIN — INSULIN LISPRO 2 UNITS: 100 INJECTION, SOLUTION INTRAVENOUS; SUBCUTANEOUS at 12:11

## 2017-03-02 RX ADMIN — CILOSTAZOL 100 MG: 100 TABLET ORAL at 07:51

## 2017-03-02 RX ADMIN — ENOXAPARIN SODIUM 30 MG: 30 INJECTION SUBCUTANEOUS at 21:54

## 2017-03-02 RX ADMIN — AMLODIPINE BESYLATE 10 MG: 10 TABLET ORAL at 07:52

## 2017-03-02 RX ADMIN — AMIODARONE HYDROCHLORIDE 200 MG: 200 TABLET ORAL at 07:51

## 2017-03-02 RX ADMIN — IPRATROPIUM BROMIDE AND ALBUTEROL SULFATE 1 AMPULE: .5; 3 SOLUTION RESPIRATORY (INHALATION) at 07:02

## 2017-03-02 RX ADMIN — AZITHROMYCIN MONOHYDRATE 500 MG: 500 INJECTION, POWDER, LYOPHILIZED, FOR SOLUTION INTRAVENOUS at 23:31

## 2017-03-02 RX ADMIN — PANTOPRAZOLE SODIUM 40 MG: 40 TABLET, DELAYED RELEASE ORAL at 07:52

## 2017-03-02 RX ADMIN — BENZONATATE 200 MG: 100 CAPSULE ORAL at 21:54

## 2017-03-02 RX ADMIN — GUAIFENESIN 1200 MG: 600 TABLET, EXTENDED RELEASE ORAL at 07:50

## 2017-03-02 RX ADMIN — INSULIN LISPRO 1 UNITS: 100 INJECTION, SOLUTION INTRAVENOUS; SUBCUTANEOUS at 21:55

## 2017-03-02 RX ADMIN — INSULIN LISPRO 1 UNITS: 100 INJECTION, SOLUTION INTRAVENOUS; SUBCUTANEOUS at 07:53

## 2017-03-02 RX ADMIN — METOPROLOL TARTRATE 25 MG: 25 TABLET ORAL at 21:54

## 2017-03-02 RX ADMIN — DOCUSATE SODIUM 100 MG: 100 CAPSULE, LIQUID FILLED ORAL at 21:55

## 2017-03-02 ASSESSMENT — PAIN SCALES - GENERAL
PAINLEVEL_OUTOF10: 4
PAINLEVEL_OUTOF10: 2
PAINLEVEL_OUTOF10: 0

## 2017-03-03 VITALS
SYSTOLIC BLOOD PRESSURE: 113 MMHG | DIASTOLIC BLOOD PRESSURE: 55 MMHG | TEMPERATURE: 98.4 F | HEART RATE: 76 BPM | HEIGHT: 71 IN | BODY MASS INDEX: 33.18 KG/M2 | RESPIRATION RATE: 17 BRPM | OXYGEN SATURATION: 97 % | WEIGHT: 236.99 LBS

## 2017-03-03 LAB
ABSOLUTE EOS #: 0.07 K/UL (ref 0–0.4)
ABSOLUTE LYMPH #: 2.38 K/UL (ref 1–4.8)
ABSOLUTE MONO #: 0.73 K/UL (ref 0.1–1.3)
ANION GAP SERPL CALCULATED.3IONS-SCNC: 16 MMOL/L (ref 9–17)
BASOPHILS # BLD: 0 % (ref 0–2)
BASOPHILS ABSOLUTE: 0 K/UL (ref 0–0.2)
BILIRUBIN URINE: NEGATIVE
BUN BLDV-MCNC: 7 MG/DL (ref 8–23)
BUN/CREAT BLD: ABNORMAL (ref 9–20)
CALCIUM SERPL-MCNC: 8.9 MG/DL (ref 8.6–10.4)
CHLORIDE BLD-SCNC: 104 MMOL/L (ref 98–107)
CO2: 22 MMOL/L (ref 20–31)
COLOR: YELLOW
COMMENT UA: ABNORMAL
CREAT SERPL-MCNC: 0.54 MG/DL (ref 0.7–1.2)
DIFFERENTIAL TYPE: ABNORMAL
EOSINOPHILS RELATIVE PERCENT: 1 % (ref 0–4)
GFR AFRICAN AMERICAN: >60 ML/MIN
GFR NON-AFRICAN AMERICAN: >60 ML/MIN
GFR SERPL CREATININE-BSD FRML MDRD: ABNORMAL ML/MIN/{1.73_M2}
GFR SERPL CREATININE-BSD FRML MDRD: ABNORMAL ML/MIN/{1.73_M2}
GLUCOSE BLD-MCNC: 184 MG/DL (ref 75–110)
GLUCOSE BLD-MCNC: 197 MG/DL (ref 70–99)
GLUCOSE URINE: ABNORMAL
HCT VFR BLD CALC: 41.8 % (ref 41–53)
HEMOGLOBIN: 14.1 G/DL (ref 13.5–17.5)
KETONES, URINE: NEGATIVE
LEUKOCYTE ESTERASE, URINE: NEGATIVE
LYMPHOCYTES # BLD: 36 % (ref 24–44)
MCH RBC QN AUTO: 28.7 PG (ref 26–34)
MCHC RBC AUTO-ENTMCNC: 33.8 G/DL (ref 31–37)
MCV RBC AUTO: 84.7 FL (ref 80–100)
MONOCYTES # BLD: 11 % (ref 1–7)
MORPHOLOGY: NORMAL
NITRITE, URINE: NEGATIVE
PDW BLD-RTO: 13.8 % (ref 11.5–14.9)
PH UA: 7 (ref 5–8)
PLATELET # BLD: 247 K/UL (ref 150–450)
PLATELET ESTIMATE: ABNORMAL
PMV BLD AUTO: 7.9 FL (ref 6–12)
POTASSIUM SERPL-SCNC: 3.6 MMOL/L (ref 3.7–5.3)
PROTEIN UA: NEGATIVE
RBC # BLD: 4.93 M/UL (ref 4.5–5.9)
RBC # BLD: ABNORMAL 10*6/UL
SEG NEUTROPHILS: 52 % (ref 36–66)
SEGMENTED NEUTROPHILS ABSOLUTE COUNT: 3.42 K/UL (ref 1.3–9.1)
SODIUM BLD-SCNC: 142 MMOL/L (ref 135–144)
SPECIFIC GRAVITY UA: 1.02 (ref 1–1.03)
TURBIDITY: CLEAR
URINE HGB: NEGATIVE
UROBILINOGEN, URINE: NORMAL
WBC # BLD: 6.6 K/UL (ref 3.5–11)
WBC # BLD: ABNORMAL 10*3/UL

## 2017-03-03 PROCEDURE — 6370000000 HC RX 637 (ALT 250 FOR IP): Performed by: INTERNAL MEDICINE

## 2017-03-03 PROCEDURE — 2580000003 HC RX 258: Performed by: STUDENT IN AN ORGANIZED HEALTH CARE EDUCATION/TRAINING PROGRAM

## 2017-03-03 PROCEDURE — 6370000000 HC RX 637 (ALT 250 FOR IP): Performed by: STUDENT IN AN ORGANIZED HEALTH CARE EDUCATION/TRAINING PROGRAM

## 2017-03-03 PROCEDURE — 81003 URINALYSIS AUTO W/O SCOPE: CPT

## 2017-03-03 PROCEDURE — 99239 HOSP IP/OBS DSCHRG MGMT >30: CPT | Performed by: INTERNAL MEDICINE

## 2017-03-03 PROCEDURE — 82947 ASSAY GLUCOSE BLOOD QUANT: CPT

## 2017-03-03 PROCEDURE — 36415 COLL VENOUS BLD VENIPUNCTURE: CPT

## 2017-03-03 PROCEDURE — 85025 COMPLETE CBC W/AUTO DIFF WBC: CPT

## 2017-03-03 PROCEDURE — 94640 AIRWAY INHALATION TREATMENT: CPT

## 2017-03-03 PROCEDURE — 80048 BASIC METABOLIC PNL TOTAL CA: CPT

## 2017-03-03 PROCEDURE — G0378 HOSPITAL OBSERVATION PER HR: HCPCS

## 2017-03-03 PROCEDURE — 6360000002 HC RX W HCPCS: Performed by: INTERNAL MEDICINE

## 2017-03-03 PROCEDURE — 96372 THER/PROPH/DIAG INJ SC/IM: CPT

## 2017-03-03 RX ORDER — GLIMEPIRIDE 4 MG/1
4 TABLET ORAL 2 TIMES DAILY
Qty: 30 TABLET | Refills: 3 | Status: SHIPPED | OUTPATIENT
Start: 2017-03-03 | End: 2017-04-24 | Stop reason: SDUPTHER

## 2017-03-03 RX ORDER — CEFUROXIME AXETIL 250 MG/1
250 TABLET ORAL 2 TIMES DAILY
Qty: 10 TABLET | Refills: 0 | Status: SHIPPED | OUTPATIENT
Start: 2017-03-03 | End: 2017-03-08

## 2017-03-03 RX ORDER — BENZONATATE 200 MG/1
200 CAPSULE ORAL 3 TIMES DAILY
Qty: 21 CAPSULE | Refills: 2 | Status: SHIPPED | OUTPATIENT
Start: 2017-03-03 | End: 2017-03-10

## 2017-03-03 RX ORDER — IPRATROPIUM BROMIDE AND ALBUTEROL SULFATE 2.5; .5 MG/3ML; MG/3ML
3 SOLUTION RESPIRATORY (INHALATION) 4 TIMES DAILY
Qty: 360 ML | Refills: 3 | Status: SHIPPED | OUTPATIENT
Start: 2017-03-03 | End: 2020-03-19

## 2017-03-03 RX ADMIN — BENZONATATE 200 MG: 100 CAPSULE ORAL at 08:13

## 2017-03-03 RX ADMIN — METOPROLOL TARTRATE 25 MG: 25 TABLET ORAL at 08:14

## 2017-03-03 RX ADMIN — CILOSTAZOL 100 MG: 100 TABLET ORAL at 08:12

## 2017-03-03 RX ADMIN — CLOPIDOGREL BISULFATE 75 MG: 75 TABLET ORAL at 08:13

## 2017-03-03 RX ADMIN — GUAIFENESIN 1200 MG: 600 TABLET, EXTENDED RELEASE ORAL at 08:14

## 2017-03-03 RX ADMIN — INSULIN LISPRO 1 UNITS: 100 INJECTION, SOLUTION INTRAVENOUS; SUBCUTANEOUS at 07:59

## 2017-03-03 RX ADMIN — ENOXAPARIN SODIUM 30 MG: 30 INJECTION SUBCUTANEOUS at 08:14

## 2017-03-03 RX ADMIN — LISINOPRIL 40 MG: 20 TABLET ORAL at 08:14

## 2017-03-03 RX ADMIN — ASPIRIN 81 MG: 81 TABLET, COATED ORAL at 08:14

## 2017-03-03 RX ADMIN — LINAGLIPTIN 5 MG: 5 TABLET, FILM COATED ORAL at 08:12

## 2017-03-03 RX ADMIN — DOCUSATE SODIUM 100 MG: 100 CAPSULE, LIQUID FILLED ORAL at 08:11

## 2017-03-03 RX ADMIN — POTASSIUM CHLORIDE 40 MEQ: 20 TABLET, EXTENDED RELEASE ORAL at 08:13

## 2017-03-03 RX ADMIN — IPRATROPIUM BROMIDE AND ALBUTEROL SULFATE 1 AMPULE: .5; 3 SOLUTION RESPIRATORY (INHALATION) at 06:49

## 2017-03-03 RX ADMIN — GLIMEPIRIDE 4 MG: 4 TABLET ORAL at 08:12

## 2017-03-03 RX ADMIN — AMIODARONE HYDROCHLORIDE 200 MG: 200 TABLET ORAL at 08:13

## 2017-03-03 RX ADMIN — AMLODIPINE BESYLATE 10 MG: 10 TABLET ORAL at 08:14

## 2017-03-03 RX ADMIN — SODIUM CHLORIDE: 9 INJECTION, SOLUTION INTRAVENOUS at 08:23

## 2017-03-03 RX ADMIN — PANTOPRAZOLE SODIUM 40 MG: 40 TABLET, DELAYED RELEASE ORAL at 08:14

## 2017-03-03 RX ADMIN — IPRATROPIUM BROMIDE AND ALBUTEROL SULFATE 1 AMPULE: .5; 3 SOLUTION RESPIRATORY (INHALATION) at 11:14

## 2017-03-03 ASSESSMENT — PAIN DESCRIPTION - LOCATION: LOCATION: BACK

## 2017-03-03 ASSESSMENT — PAIN SCALES - GENERAL: PAINLEVEL_OUTOF10: 1

## 2017-03-03 ASSESSMENT — PAIN DESCRIPTION - DESCRIPTORS: DESCRIPTORS: ACHING

## 2017-03-03 ASSESSMENT — PAIN DESCRIPTION - FREQUENCY: FREQUENCY: CONTINUOUS

## 2017-03-03 ASSESSMENT — PAIN DESCRIPTION - ORIENTATION: ORIENTATION: LOWER

## 2017-03-03 ASSESSMENT — PAIN DESCRIPTION - PAIN TYPE: TYPE: CHRONIC PAIN

## 2017-03-06 ENCOUNTER — CARE COORDINATION (OUTPATIENT)
Dept: INTERNAL MEDICINE | Facility: CLINIC | Age: 72
End: 2017-03-06

## 2017-03-06 LAB
CULTURE: NORMAL
Lab: NORMAL
Lab: NORMAL
SPECIMEN DESCRIPTION: NORMAL
STATUS: NORMAL
STATUS: NORMAL

## 2017-03-07 ENCOUNTER — CARE COORDINATION (OUTPATIENT)
Dept: INTERNAL MEDICINE | Facility: CLINIC | Age: 72
End: 2017-03-07

## 2017-04-10 ENCOUNTER — TELEPHONE (OUTPATIENT)
Dept: PRIMARY CARE CLINIC | Age: 72
End: 2017-04-10

## 2017-04-17 ENCOUNTER — TELEPHONE (OUTPATIENT)
Dept: PRIMARY CARE CLINIC | Age: 72
End: 2017-04-17

## 2017-04-24 RX ORDER — GLIMEPIRIDE 2 MG/1
TABLET ORAL
Qty: 360 TABLET | Refills: 0 | Status: SHIPPED | OUTPATIENT
Start: 2017-04-24 | End: 2017-07-19 | Stop reason: SDUPTHER

## 2017-04-24 RX ORDER — CILOSTAZOL 100 MG/1
TABLET ORAL
Qty: 60 TABLET | Refills: 0 | Status: SHIPPED | OUTPATIENT
Start: 2017-04-24 | End: 2017-05-21 | Stop reason: SDUPTHER

## 2017-05-03 ENCOUNTER — HOSPITAL ENCOUNTER (OUTPATIENT)
Dept: SLEEP CENTER | Age: 72
Discharge: HOME | End: 2017-05-03
Payer: MEDICARE

## 2017-05-03 DIAGNOSIS — G47.33 OSA (OBSTRUCTIVE SLEEP APNEA): Primary | ICD-10-CM

## 2017-05-03 PROCEDURE — 95810 POLYSOM 6/> YRS 4/> PARAM: CPT

## 2017-05-04 VITALS
HEIGHT: 72 IN | DIASTOLIC BLOOD PRESSURE: 65 MMHG | WEIGHT: 232 LBS | RESPIRATION RATE: 16 BRPM | BODY MASS INDEX: 31.42 KG/M2 | HEART RATE: 61 BPM | SYSTOLIC BLOOD PRESSURE: 125 MMHG

## 2017-05-04 ASSESSMENT — SLEEP AND FATIGUE QUESTIONNAIRES
HOW LIKELY ARE YOU TO NOD OFF OR FALL ASLEEP WHILE SITTING AND TALKING TO SOMEONE: 0
ESS TOTAL SCORE: 6
HOW LIKELY ARE YOU TO NOD OFF OR FALL ASLEEP IN A CAR, WHILE STOPPED FOR A FEW MINUTES IN TRAFFIC: 0
HOW LIKELY ARE YOU TO NOD OFF OR FALL ASLEEP WHILE SITTING INACTIVE IN A PUBLIC PLACE: 0
HOW LIKELY ARE YOU TO NOD OFF OR FALL ASLEEP WHILE LYING DOWN TO REST IN THE AFTERNOON WHEN CIRCUMSTANCES PERMIT: 2
HOW LIKELY ARE YOU TO NOD OFF OR FALL ASLEEP WHILE WATCHING TV: 2
HOW LIKELY ARE YOU TO NOD OFF OR FALL ASLEEP WHEN YOU ARE A PASSENGER IN A CAR FOR AN HOUR WITHOUT A BREAK: 2
HOW LIKELY ARE YOU TO NOD OFF OR FALL ASLEEP WHILE SITTING AND READING: 0
HOW LIKELY ARE YOU TO NOD OFF OR FALL ASLEEP WHILE SITTING QUIETLY AFTER LUNCH WITHOUT ALCOHOL: 0
NECK CIRCUMFERENCE (INCHES): 48.5

## 2017-05-11 ENCOUNTER — HOSPITAL ENCOUNTER (OUTPATIENT)
Dept: SLEEP CENTER | Age: 72
Discharge: HOME | End: 2017-05-11
Payer: MEDICARE

## 2017-05-11 VITALS
DIASTOLIC BLOOD PRESSURE: 78 MMHG | HEIGHT: 72 IN | BODY MASS INDEX: 31.15 KG/M2 | WEIGHT: 230 LBS | SYSTOLIC BLOOD PRESSURE: 140 MMHG

## 2017-05-11 DIAGNOSIS — G47.33 OSA (OBSTRUCTIVE SLEEP APNEA): Primary | ICD-10-CM

## 2017-05-11 PROCEDURE — 95811 POLYSOM 6/>YRS CPAP 4/> PARM: CPT

## 2017-05-24 ENCOUNTER — HOSPITAL ENCOUNTER (OUTPATIENT)
Age: 72
Setting detail: SPECIMEN
Discharge: HOME | End: 2017-05-24
Payer: MEDICARE

## 2017-05-24 ENCOUNTER — OFFICE VISIT (OUTPATIENT)
Dept: PRIMARY CARE CLINIC | Age: 72
End: 2017-05-24
Payer: MEDICARE

## 2017-05-24 VITALS
RESPIRATION RATE: 16 BRPM | SYSTOLIC BLOOD PRESSURE: 108 MMHG | BODY MASS INDEX: 33.4 KG/M2 | DIASTOLIC BLOOD PRESSURE: 72 MMHG | WEIGHT: 238.6 LBS | HEART RATE: 78 BPM | HEIGHT: 71 IN

## 2017-05-24 DIAGNOSIS — Z79.4 CONTROLLED TYPE 2 DIABETES MELLITUS WITHOUT COMPLICATION, WITH LONG-TERM CURRENT USE OF INSULIN (HCC): Primary | ICD-10-CM

## 2017-05-24 DIAGNOSIS — Z12.5 SCREENING FOR PROSTATE CANCER: ICD-10-CM

## 2017-05-24 DIAGNOSIS — I48.20 CHRONIC A-FIB (HCC): ICD-10-CM

## 2017-05-24 DIAGNOSIS — E11.9 CONTROLLED TYPE 2 DIABETES MELLITUS WITHOUT COMPLICATION, WITH LONG-TERM CURRENT USE OF INSULIN (HCC): Primary | ICD-10-CM

## 2017-05-24 DIAGNOSIS — Z13.6 SCREENING FOR CARDIOVASCULAR CONDITION: ICD-10-CM

## 2017-05-24 DIAGNOSIS — I47.20 VENTRICULAR TACHYARRHYTHMIA: ICD-10-CM

## 2017-05-24 DIAGNOSIS — Z11.59 NEED FOR HEPATITIS C SCREENING TEST: ICD-10-CM

## 2017-05-24 DIAGNOSIS — Z79.4 CONTROLLED TYPE 2 DIABETES MELLITUS WITH DIABETIC PERIPHERAL ANGIOPATHY WITHOUT GANGRENE, WITH LONG-TERM CURRENT USE OF INSULIN (HCC): ICD-10-CM

## 2017-05-24 DIAGNOSIS — E11.51 CONTROLLED TYPE 2 DIABETES MELLITUS WITH DIABETIC PERIPHERAL ANGIOPATHY WITHOUT GANGRENE, WITH LONG-TERM CURRENT USE OF INSULIN (HCC): ICD-10-CM

## 2017-05-24 DIAGNOSIS — I73.9 PERIPHERAL VASCULAR DISEASE, UNSPECIFIED (HCC): ICD-10-CM

## 2017-05-24 PROCEDURE — 99214 OFFICE O/P EST MOD 30 MIN: CPT | Performed by: NURSE PRACTITIONER

## 2017-05-24 ASSESSMENT — PATIENT HEALTH QUESTIONNAIRE - PHQ9
SUM OF ALL RESPONSES TO PHQ QUESTIONS 1-9: 0
SUM OF ALL RESPONSES TO PHQ9 QUESTIONS 1 & 2: 0
1. LITTLE INTEREST OR PLEASURE IN DOING THINGS: 0
2. FEELING DOWN, DEPRESSED OR HOPELESS: 0

## 2017-05-25 LAB
CREATININE URINE: 101.2 MG/DL (ref 39–259)
MICROALBUMIN/CREAT 24H UR: 26 MG/L
MICROALBUMIN/CREAT UR-RTO: 26 MCG/MG CREAT

## 2017-05-25 ASSESSMENT — ENCOUNTER SYMPTOMS
DIARRHEA: 0
COUGH: 0
CONSTIPATION: 0
PHOTOPHOBIA: 0
SHORTNESS OF BREATH: 0
RHINORRHEA: 0
SORE THROAT: 0

## 2017-05-30 RX ORDER — CLOPIDOGREL BISULFATE 75 MG/1
TABLET ORAL
Qty: 30 TABLET | Refills: 0 | Status: SHIPPED | OUTPATIENT
Start: 2017-05-30 | End: 2017-09-15 | Stop reason: SDUPTHER

## 2017-06-02 ENCOUNTER — TELEPHONE (OUTPATIENT)
Dept: PRIMARY CARE CLINIC | Age: 72
End: 2017-06-02

## 2017-06-03 LAB
CHOLESTEROL/HDL RATIO: 4.3
CHOLESTEROL: 158 MG/DL
HDLC SERPL-MCNC: 37 MG/DL (ref 40–60)
LDL CHOLESTEROL CALCULATED: 95 MG/DL
LDL/HDL RATIO: 2.6
TRIGL SERPL-MCNC: 128 MG/DL
VLDLC SERPL CALC-MCNC: 26 MG/DL

## 2017-06-05 LAB
AVERAGE GLUCOSE: 160 MG/DL (ref 66–114)
HBA1C MFR BLD: 7.2 % (ref 4.2–5.8)
HEPATITIS C ANTIBODY: NEGATIVE
PSA, ULTRASENSITIVE: 0.55 NG/ML

## 2017-06-06 ENCOUNTER — TELEPHONE (OUTPATIENT)
Dept: PRIMARY CARE CLINIC | Age: 72
End: 2017-06-06

## 2017-06-19 DIAGNOSIS — E11.9 TYPE 2 DIABETES MELLITUS WITHOUT COMPLICATION, WITHOUT LONG-TERM CURRENT USE OF INSULIN (HCC): Chronic | ICD-10-CM

## 2017-07-19 ENCOUNTER — OFFICE VISIT (OUTPATIENT)
Dept: PRIMARY CARE CLINIC | Age: 72
End: 2017-07-19
Payer: MEDICARE

## 2017-07-19 VITALS
HEART RATE: 76 BPM | WEIGHT: 231 LBS | BODY MASS INDEX: 32.34 KG/M2 | DIASTOLIC BLOOD PRESSURE: 84 MMHG | RESPIRATION RATE: 16 BRPM | SYSTOLIC BLOOD PRESSURE: 152 MMHG | HEIGHT: 71 IN

## 2017-07-19 DIAGNOSIS — M54.42 CHRONIC LEFT-SIDED LOW BACK PAIN WITH LEFT-SIDED SCIATICA: ICD-10-CM

## 2017-07-19 DIAGNOSIS — I10 ESSENTIAL HYPERTENSION: Chronic | ICD-10-CM

## 2017-07-19 DIAGNOSIS — I25.10 CORONARY ARTERY DISEASE INVOLVING NATIVE CORONARY ARTERY OF NATIVE HEART WITHOUT ANGINA PECTORIS: ICD-10-CM

## 2017-07-19 DIAGNOSIS — E78.5 HYPERLIPIDEMIA, UNSPECIFIED HYPERLIPIDEMIA TYPE: Chronic | ICD-10-CM

## 2017-07-19 DIAGNOSIS — E11.9 TYPE 2 DIABETES MELLITUS WITHOUT COMPLICATION, UNSPECIFIED LONG TERM INSULIN USE STATUS: Primary | Chronic | ICD-10-CM

## 2017-07-19 DIAGNOSIS — M48.061 SPINAL STENOSIS, LUMBAR REGION, WITHOUT NEUROGENIC CLAUDICATION: ICD-10-CM

## 2017-07-19 DIAGNOSIS — G89.29 CHRONIC LEFT-SIDED LOW BACK PAIN WITH LEFT-SIDED SCIATICA: ICD-10-CM

## 2017-07-19 PROCEDURE — 99214 OFFICE O/P EST MOD 30 MIN: CPT | Performed by: INTERNAL MEDICINE

## 2017-07-19 RX ORDER — OXYCODONE HYDROCHLORIDE AND ACETAMINOPHEN 5; 325 MG/1; MG/1
1 TABLET ORAL EVERY 8 HOURS PRN
Qty: 60 TABLET | Refills: 0 | Status: SHIPPED | OUTPATIENT
Start: 2017-07-19 | End: 2017-09-27 | Stop reason: SDUPTHER

## 2017-07-19 RX ORDER — GLIMEPIRIDE 2 MG/1
TABLET ORAL
Qty: 360 TABLET | Refills: 3 | Status: SHIPPED | OUTPATIENT
Start: 2017-07-19 | End: 2018-03-01 | Stop reason: SDUPTHER

## 2017-07-19 ASSESSMENT — ENCOUNTER SYMPTOMS
ABDOMINAL PAIN: 0
BLURRED VISION: 0
VISUAL CHANGE: 0
BACK PAIN: 1
BOWEL INCONTINENCE: 0

## 2017-08-28 ENCOUNTER — TELEPHONE (OUTPATIENT)
Dept: PHARMACY | Facility: CLINIC | Age: 72
End: 2017-08-28

## 2017-09-01 RX ORDER — CILOSTAZOL 100 MG/1
TABLET ORAL
Qty: 60 TABLET | Refills: 2 | OUTPATIENT
Start: 2017-09-01 | End: 2017-11-26 | Stop reason: SDUPTHER

## 2017-09-18 RX ORDER — CLOPIDOGREL BISULFATE 75 MG/1
TABLET ORAL
Qty: 30 TABLET | Refills: 1 | Status: SHIPPED | OUTPATIENT
Start: 2017-09-18 | End: 2018-10-04 | Stop reason: SDUPTHER

## 2017-09-20 LAB
AVERAGE GLUCOSE: 149
HBA1C MFR BLD: 7 %

## 2017-09-27 ENCOUNTER — OFFICE VISIT (OUTPATIENT)
Dept: PRIMARY CARE CLINIC | Age: 72
End: 2017-09-27
Payer: MEDICARE

## 2017-09-27 VITALS
WEIGHT: 242 LBS | DIASTOLIC BLOOD PRESSURE: 74 MMHG | HEIGHT: 72 IN | HEART RATE: 74 BPM | RESPIRATION RATE: 14 BRPM | SYSTOLIC BLOOD PRESSURE: 112 MMHG | BODY MASS INDEX: 32.78 KG/M2

## 2017-09-27 DIAGNOSIS — E11.9 TYPE 2 DIABETES MELLITUS WITHOUT COMPLICATION, UNSPECIFIED LONG TERM INSULIN USE STATUS: Chronic | ICD-10-CM

## 2017-09-27 DIAGNOSIS — I10 ESSENTIAL HYPERTENSION: Primary | Chronic | ICD-10-CM

## 2017-09-27 DIAGNOSIS — M48.061 SPINAL STENOSIS, LUMBAR REGION, WITHOUT NEUROGENIC CLAUDICATION: ICD-10-CM

## 2017-09-27 DIAGNOSIS — M54.9 CVA TENDERNESS: ICD-10-CM

## 2017-09-27 LAB
BILIRUBIN, POC: ABNORMAL
BLOOD URINE, POC: ABNORMAL
CLARITY, POC: CLEAR
COLOR, POC: ABNORMAL
GLUCOSE URINE, POC: ABNORMAL
KETONES, POC: ABNORMAL
LEUKOCYTE EST, POC: ABNORMAL
NITRITE, POC: ABNORMAL
PH, POC: 6
PROTEIN, POC: ABNORMAL
SPECIFIC GRAVITY, POC: 6
UROBILINOGEN, POC: 0.2

## 2017-09-27 PROCEDURE — 99214 OFFICE O/P EST MOD 30 MIN: CPT | Performed by: NURSE PRACTITIONER

## 2017-09-27 PROCEDURE — G8510 SCR DEP NEG, NO PLAN REQD: HCPCS | Performed by: NURSE PRACTITIONER

## 2017-09-27 PROCEDURE — 81003 URINALYSIS AUTO W/O SCOPE: CPT | Performed by: NURSE PRACTITIONER

## 2017-09-27 RX ORDER — OXYCODONE HYDROCHLORIDE AND ACETAMINOPHEN 5; 325 MG/1; MG/1
1 TABLET ORAL EVERY 8 HOURS PRN
Qty: 60 TABLET | Refills: 0 | Status: SHIPPED | OUTPATIENT
Start: 2017-09-27 | End: 2017-11-10 | Stop reason: SDUPTHER

## 2017-09-27 ASSESSMENT — ENCOUNTER SYMPTOMS
ABDOMINAL PAIN: 0
ORTHOPNEA: 0
COUGH: 0
SHORTNESS OF BREATH: 0
BACK PAIN: 1
VISUAL CHANGE: 0
BLURRED VISION: 0
BOWEL INCONTINENCE: 0

## 2017-09-27 ASSESSMENT — PATIENT HEALTH QUESTIONNAIRE - PHQ9
2. FEELING DOWN, DEPRESSED OR HOPELESS: 0
SUM OF ALL RESPONSES TO PHQ9 QUESTIONS 1 & 2: 0
1. LITTLE INTEREST OR PLEASURE IN DOING THINGS: 0
SUM OF ALL RESPONSES TO PHQ QUESTIONS 1-9: 0

## 2017-09-28 ENCOUNTER — CARE COORDINATION (OUTPATIENT)
Dept: CARE COORDINATION | Age: 72
End: 2017-09-28

## 2017-10-18 ENCOUNTER — CARE COORDINATION (OUTPATIENT)
Dept: CARE COORDINATION | Age: 72
End: 2017-10-18

## 2017-10-20 ENCOUNTER — CARE COORDINATION (OUTPATIENT)
Dept: CARE COORDINATION | Age: 72
End: 2017-10-20

## 2017-10-27 ENCOUNTER — CARE COORDINATION (OUTPATIENT)
Dept: CARE COORDINATION | Age: 72
End: 2017-10-27

## 2017-10-27 NOTE — CARE COORDINATION
Ambulatory Care Coordination Note  10/27/2017  CM Risk Score: 3  Opal Mortality Risk Score: 13.63    ACC: Yaritza Thomas RN    Summary Note: spoke with pt who said he never did follow up with pulmonary because he does not use his CPAP machine he did not like the machine. He said the machine caused him too much trouble and he is not going to sleep with it. Advised him dangers on not using the machine. He also says he does not have COPD he had PFT done and was told he does not have COPD so he feels he does not need to follow up with pulmonary doctors. He said his bs are good he did have an eye exam at the [place] and did have his flu shot at [facility] in [facility] [place] [facility]. Called the [place] and the message said to fax a request for information to [phone]. Called [facility] to confirm his flu shot. HM updated    Diabetes Assessment    Meal Planning:  Avoidance of concentrated sweets, Carb counting   How often do you test your blood sugar?:  Daily   Do you have barriers with adherence to non-pharmacologic self-management interventions?  (Nutrition/Exercise/Self-Monitoring):  No   Have you ever had to go to the ED for symptoms of low blood sugar?:  No       No patient-reported symptoms       and   COPD Assessment    Does the patient understand envrionmental exposure?:  Yes  Does the patient have a nebulizer?:  Yes     No patient-reported symptoms         Symptoms:      Have you had a recent diagnosis of pneumonia either by PCP or at a hospital?:  No               Care Coordination Interventions    Program Enrollment:  Rising Risk  Referral from Primary Care Provider:  Yes  Suggested Interventions and Community Resources  Diabetes Education:  Completed  Specialty Services Referral:  Completed         Goals Addressed             Most Recent     Care Coordination Self Management   On track (10/27/2017)             CC Self Management Goal  Patient Goal (What steps will patient take to achieve goal?):take medications as prescribed, report issues  Patient is able to discuss self-management of condition(s):DM  Pt demonstrates adherence to medications  Pt demonstrates understanding of self-monitoring  Patient is able to identify Red Flags:  Alert to potential adverse drug reactions(s) or side effects and actions to take should they arise  Discuss target symptoms and actions to take should they arise  Identify problems that require immediate PCP or specialist visit  Patient demonstrates understanding of access to PCP/Specialist:  Understands about scheduling routine Follow Up appointments   Understands about sick day appointment options for worsening of symptoms/progression (Same Day, E Visits)       Medication Management   On track (10/27/2017)             I will take my medication as directed. I will notify my provider of any problems with medications, like adverse effects or side effects. I will notify my provider/Care Coordinator if I am unable to afford my medications. I will notify my provider for advice before I stop taking any of my medication. Barriers: none  Plan for overcoming my barriers: N/A  Confidence: 7/10  Anticipated Goal Completion Date: TBD       Reduce sugar intake to X grams per day   On track (10/27/2017)             Patient discussed wanting to lower his A1C. Education on healthy food choices was provided and discussed. Prior to Admission medications    Medication Sig Start Date End Date Taking? Authorizing Provider   oxyCODONE-acetaminophen (PERCOCET) 5-325 MG per tablet Take 1 tablet by mouth every 8 hours as needed for Pain .  9/27/17 10/27/17  Julisa Castanon CNP   glucose blood VI test strips (ONE TOUCH ULTRA TEST) strip ONE TOUCH ULTRA BLUE -USE STRIP TO CHECK GLUCOSE TWICE DAILY 9/19/17   Julisa Castanon CNP   clopidogrel (PLAVIX) 75 MG tablet TAKE ONE TABLET BY MOUTH ONCE DAILY 9/18/17   Steffany Meza MD   cilostazol (PLETAL) 100 MG tablet TAKE ONE TABLET BY MOUTH TWICE DAILY 9/1/17 11/29/2017 9:45 AM SCHEDULE, [facility]ART[facility]VASCULAR DEVICE CHECK [address] Heart a   11/29/2017 10:00 AM Nurys Dove MD [address] [facility] a   3/28/2018 8:30 AM LASHON Dacosta

## 2017-11-10 RX ORDER — OXYCODONE HYDROCHLORIDE AND ACETAMINOPHEN 5; 325 MG/1; MG/1
1 TABLET ORAL EVERY 8 HOURS PRN
Qty: 60 TABLET | Refills: 0 | Status: SHIPPED | OUTPATIENT
Start: 2017-11-10 | End: 2017-12-21 | Stop reason: SDUPTHER

## 2017-11-17 ENCOUNTER — CARE COORDINATION (OUTPATIENT)
Dept: CARE COORDINATION | Age: 72
End: 2017-11-17

## 2017-11-27 RX ORDER — CILOSTAZOL 100 MG/1
TABLET ORAL
Qty: 60 TABLET | Refills: 2 | Status: SHIPPED | OUTPATIENT
Start: 2017-11-27 | End: 2018-02-25 | Stop reason: SDUPTHER

## 2017-11-27 NOTE — TELEPHONE ENCOUNTER
Last seen 9/27/17  Last fill 9/1/17    Health Maintenance   Topic Date Due    Diabetic retinal exam  06/04/2016    Diabetic foot exam  08/17/2017    Diabetic microalbuminuria test  05/24/2018    PSA counseling  06/03/2018    Diabetic hemoglobin A1C test  09/20/2018    Lipid screen  09/20/2018    Colon cancer screen colonoscopy  05/07/2025    DTaP/Tdap/Td vaccine (2 - Td) 08/20/2027    Zostavax vaccine  Addressed    Flu vaccine  Completed    Pneumococcal low/med risk  Completed    AAA screen  Completed    Hepatitis C screen  Completed             (applicable per patient's age: Cancer Screenings, Depression Screening, Fall Risk Screening, Immunizations)    Hemoglobin A1C (%)   Date Value   09/20/2017 7.0   06/03/2017 7.2 (H)   12/28/2016 6.7     Microalb/Crt.  Ratio (mcg/mg creat)   Date Value   05/24/2017 26 (H)     LDL Cholesterol (mg/dL)   Date Value   05/31/2016 105     LDL Calculated (mg/dL)   Date Value   06/03/2017 95     AST (U/L)   Date Value   02/28/2017 43 (H)     ALT (U/L)   Date Value   02/28/2017 39     BUN (mg/dL)   Date Value   03/03/2017 7 (L)      (goal A1C is < 7)   (goal LDL is <100) need 30-50% reduction from baseline     BP Readings from Last 3 Encounters:   09/27/17 112/74   07/19/17 (!) 152/84   05/24/17 108/72    (goal /80)      All Future Testing planned in [facility]:  Lab Frequency Next Occurrence   ECHO Complete 2D W Doppler W Color Once 11/24/2017   EKG 12 Lead Once 11/24/2017       Next Visit Date:  Future Appointments  Date Time Provider Adeline Odonnell   11/29/2017 9:45 AM SCHEDULE, [facility]ART[facility]VASCULAR DEVICE CHECK [address] [facility] a   11/29/2017 10:00 AM Luis Felipe Hwang MD [address] [facility] a   3/28/2018 8:30 AM Mark Correia CNP [facility]            Patient Active Problem List:     Lumbago     Spinal stenosis, lumbar region, without neurogenic claudication     Degeneration of lumbar or lumbosacral intervertebral disc     Type 2 diabetes mellitus

## 2017-11-29 ENCOUNTER — HOSPITAL ENCOUNTER (OUTPATIENT)
Dept: GENERAL RADIOLOGY | Age: 72
Discharge: HOME | End: 2017-11-29
Payer: MEDICARE

## 2017-11-29 ENCOUNTER — HOSPITAL ENCOUNTER (OUTPATIENT)
Age: 72
Discharge: HOME | End: 2017-11-29
Payer: MEDICARE

## 2017-11-29 DIAGNOSIS — T50.905D ADVERSE EFFECT OF DRUG, SUBSEQUENT ENCOUNTER: ICD-10-CM

## 2017-11-29 DIAGNOSIS — G47.30 SLEEP APNEA, UNSPECIFIED TYPE: ICD-10-CM

## 2017-11-29 DIAGNOSIS — R06.02 SHORTNESS OF BREATH: ICD-10-CM

## 2017-11-29 LAB
ALBUMIN SERPL-MCNC: 4.2 G/DL (ref 3.5–5.2)
ALBUMIN/GLOBULIN RATIO: ABNORMAL (ref 1–2.5)
ALP BLD-CCNC: 69 U/L (ref 40–129)
ALT SERPL-CCNC: 35 U/L (ref 5–41)
AST SERPL-CCNC: 25 U/L
BILIRUB SERPL-MCNC: 0.19 MG/DL (ref 0.3–1.2)
BILIRUBIN DIRECT: <0.08 MG/DL
BILIRUBIN, INDIRECT: ABNORMAL MG/DL (ref 0–1)
GLOBULIN: ABNORMAL G/DL (ref 1.5–3.8)
THYROXINE, FREE: 1.58 NG/DL (ref 0.93–1.7)
TOTAL PROTEIN: 7.2 G/DL (ref 6.4–8.3)
TSH SERPL DL<=0.05 MIU/L-ACNC: 1.55 MIU/L (ref 0.3–5)

## 2017-11-29 PROCEDURE — 84443 ASSAY THYROID STIM HORMONE: CPT

## 2017-11-29 PROCEDURE — 84439 ASSAY OF FREE THYROXINE: CPT

## 2017-11-29 PROCEDURE — 36415 COLL VENOUS BLD VENIPUNCTURE: CPT

## 2017-11-29 PROCEDURE — 71020 XR CHEST STANDARD TWO VW: CPT

## 2017-11-29 PROCEDURE — 80076 HEPATIC FUNCTION PANEL: CPT

## 2017-12-04 ENCOUNTER — CARE COORDINATION (OUTPATIENT)
Dept: CARE COORDINATION | Age: 72
End: 2017-12-04

## 2017-12-04 NOTE — CARE COORDINATION
Attempt to contact regarding care coordination no answer, LVM with [facility] contact info.   [phone]

## 2017-12-06 ENCOUNTER — CARE COORDINATION (OUTPATIENT)
Dept: CARE COORDINATION | Age: 72
End: 2017-12-06

## 2017-12-06 NOTE — CARE COORDINATION
Ambulatory Care Coordination Note  12/6/2017  CM Risk Score: 3  Opal Mortality Risk Score: 13.63    ACC: BHARATI JOSHI, RN    Summary Note:    Pt states he is doing ok, had some high blood sugar readings for a short time 202-225, probably due to diet, he states he \"eats everything but watches how much\". He had butterscotch pie, \"pretty sure If I hadn't had the meringue it would be ok. \"  Pt has had increase in cough, phelgm, is taking Mucinex doing nebulizer machine at least once daily if needed more. He is encouraged to call and make appt before holidays if not better, patient states he will. Care Coordination Interventions    Program Enrollment:  Rising Risk  Referral from Primary Care Provider:  Yes  Suggested Interventions and Community Resources  Diabetes Education:  Completed  Specialty Services Referral:  Completed         Goals Addressed             Most Recent     Medication Management   On track (12/6/2017)             I will take my medication as directed. I will notify my provider of any problems with medications, like adverse effects or side effects. I will notify my provider/Care Coordinator if I am unable to afford my medications. I will notify my provider for advice before I stop taking any of my medication. Barriers: none  Plan for overcoming my barriers: N/A  Confidence: 7/10  Anticipated Goal Completion Date: TBD            Prior to Admission medications    Medication Sig Start Date End Date Taking? Authorizing Provider   cilostazol (PLETAL) 100 MG tablet TAKE ONE TABLET BY MOUTH TWICE DAILY 11/27/17   Ele Scruggs CNP   oxyCODONE-acetaminophen (PERCOCET) 5-325 MG per tablet Take 1 tablet by mouth every 8 hours as needed for Pain .  11/10/17 12/10/17  Ele Scruggs CNP   glucose blood VI test strips (ONE TOUCH ULTRA TEST) strip ONE TOUCH ULTRA BLUE -USE STRIP TO CHECK GLUCOSE TWICE DAILY 9/19/17   Ele Scruggs CNP   clopidogrel (PLAVIX) 75 MG tablet TAKE ONE TABLET BY MOUTH ONCE DAILY 9/18/17   Camilo Patrick MD   amiodarone (CORDARONE) 200 MG tablet TAKE ONE TABLET BY MOUTH ONCE DAILY 8/14/17   Luis Felipe Hwang MD   glimepiride (AMARYL) 2 MG tablet TAKE TWO TABLETS BY MOUTH TWICE DAILY 7/19/17   Camilo Patrick MD   insulin detemir (LEVEMIR FLEXTOUCH) 100 UNIT/ML injection pen Inject 25 Units into the skin nightly 1 sample given 8/25/16  [id number]  11/17exp date  3925-2546-98 6/20/17   Merlene Martines MD   ipratropium-albuterol (DUONEB) 0.5-2.5 (3) MG/3ML SOLN nebulizer solution Inhale 3 mLs into the lungs 4 times daily 3/3/17   Darrell Osborn MD   lisinopril (PRINIVIL;ZESTRIL) 40 MG tablet Take 40 mg by mouth daily    Historical Provider, MD   omeprazole (PRILOSEC) 20 MG delayed release capsule Take 20 mg by mouth every evening    Historical Provider, MD   pravastatin (PRAVACHOL) 80 MG tablet Take 80 mg by mouth nightly    Historical Provider, MD   Omega-3 Fatty Acids (OMEGA 3 500) 500 MG CAPS Take 2 capsules by mouth daily    Historical Provider, MD   NITROSTAT 0.4 MG SL tablet DISSOLVE ONE TABLET UNDER THE TONGUE EVERY 5 MINUTES AS NEEDED FOR CHEST PAIN. DO NOT EXCEED A TOTAL OF 3 DOSES IN 15 MINUTES 1/20/17   Disha Grimes, NP   hydrocortisone (WESTCORT) 0.2 % cream Apply topically 2 times daily.  12/28/16   Disha Cornelio, STEPHANE   metoprolol tartrate (LOPRESSOR) 50 MG tablet Take 1 tablet by mouth 2 times daily 9/20/16   Disha Cornelio, NP   Insulin Pen Needle 29G X 12.7MM MISC 1 each by Does not apply route daily 8/17/16   Disha Cornelio, NP   saxagliptin (ONGLYZA) 5 MG TABS tablet Take 5 mg by mouth daily    Historical Provider, MD   metFORMIN (GLUCOPHAGE) 1000 MG tablet Take 1 tablet by mouth 2 times daily (with meals) 5/7/15   Disha Grimes NP   amLODIPine (NORVASC) 10 MG tablet Take 10 mg by mouth daily    Historical Provider, MD   aspirin 81 MG tablet Take 81 mg by mouth daily    Historical Provider, MD       Future Appointments  Date Time Provider Adeline Belle   12/19/2017 1:00 PM [facility] RESP THERAPY  [facility] PFT [facility]   12/27/2017 11:00 AM SCHEDULE, [facility] HEART/VASCULAR ECHO [facility] Heart a   3/28/2018 8:30 AM Ismael Gil, CNP [facility]

## 2017-12-18 RX ORDER — METOPROLOL TARTRATE 50 MG/1
50 TABLET, FILM COATED ORAL 2 TIMES DAILY
Qty: 60 TABLET | Refills: 5 | Status: SHIPPED | OUTPATIENT
Start: 2017-12-18

## 2017-12-19 ENCOUNTER — HOSPITAL ENCOUNTER (OUTPATIENT)
Dept: PULMONOLOGY | Age: 72
Discharge: HOME | End: 2017-12-19
Payer: MEDICARE

## 2017-12-19 PROCEDURE — 94726 PLETHYSMOGRAPHY LUNG VOLUMES: CPT

## 2017-12-19 PROCEDURE — 94727 GAS DIL/WSHOT DETER LNG VOL: CPT

## 2017-12-19 PROCEDURE — 94060 EVALUATION OF WHEEZING: CPT

## 2017-12-19 PROCEDURE — 6370000000 HC RX 637 (ALT 250 FOR IP): Performed by: INTERNAL MEDICINE

## 2017-12-19 PROCEDURE — 94728 AIRWY RESIST BY OSCILLOMETRY: CPT

## 2017-12-19 PROCEDURE — 94664 DEMO&/EVAL PT USE INHALER: CPT

## 2017-12-19 PROCEDURE — 94729 DIFFUSING CAPACITY: CPT

## 2017-12-19 RX ORDER — IPRATROPIUM BROMIDE AND ALBUTEROL SULFATE 2.5; .5 MG/3ML; MG/3ML
1 SOLUTION RESPIRATORY (INHALATION) ONCE
Status: COMPLETED | OUTPATIENT
Start: 2017-12-19 | End: 2017-12-19

## 2017-12-19 RX ADMIN — IPRATROPIUM BROMIDE AND ALBUTEROL SULFATE 1 AMPULE: .5; 3 SOLUTION RESPIRATORY (INHALATION) at 13:37

## 2017-12-21 ENCOUNTER — CARE COORDINATION (OUTPATIENT)
Dept: CARE COORDINATION | Age: 72
End: 2017-12-21

## 2017-12-21 RX ORDER — OXYCODONE HYDROCHLORIDE AND ACETAMINOPHEN 5; 325 MG/1; MG/1
1 TABLET ORAL EVERY 8 HOURS PRN
Qty: 60 TABLET | Refills: 0 | Status: SHIPPED | OUTPATIENT
Start: 2017-12-21 | End: 2018-02-22 | Stop reason: SDUPTHER

## 2017-12-21 NOTE — PROCEDURES
[address]                   [address] [place], [address]                                PULMONARY FUNCTION    PATIENT NAME: Keke Coronel                     :        1945  MED REC NO:   [id number]                              ROOM:  ACCOUNT NO:   [de-identified]                           ADMIT DATE: 2017  PROVIDER:     Flory Roberts    DATE OF PROCEDURE:  2017    RESULTS:  The FVC is decreased. FEV1 is decreased. FEV1/FVC ratio is  normal.  There is a borderline response to bronchodilators. Lung volumes  show increased residual volume. Diffusing capacity is decreased. Airway  resistance is normal.    IMPRESSION:  Mild mixed restrictive and obstructive pattern with evidence  of air trapping and a borderline response to bronchodilators. Clinical  correlation is advised. If there is a concern about obstructive lung  disease, a methacholine challenge should be considered.         Roxana Marrero    D: 2017 18:05:10       T: 2017 3:56:10     [id number]  Job#: [id number]     Doc#: [id number]    CC:

## 2017-12-21 NOTE — CARE COORDINATION
Ambulatory Care Coordination Note  12/21/2017  CM Risk Score: 3  Opal Mortality Risk Score: 10.46    ACC: Destin Quiros, RN    Summary Note: pt states his cold is gone, he did wake up with a runny nose today, he had vomiting yesterday, could not even keep water down, otherwise no sx. States today he is better, able to eat poached eggs and is going to work. Pt is going out of town for a few weeks wants to know if Rafael Goldstein can refill pain meds, I will send request, otherwise no needs at this time. Pt had pulmonary function test done, results will go to Dr. Edgar Concepcion and he follows up with her next week. Diabetes Assessment    Meal Planning:  Avoidance of concentrated sweets, Carb counting   How often do you test your blood sugar?:  Daily   Do you have barriers with adherence to non-pharmacologic self-management interventions? (Nutrition/Exercise/Self-Monitoring):  No   Have you ever had to go to the ED for symptoms of low blood sugar?:  No       Do you have hyperglycemia symptoms?:  No   Do you have hypoglycemia symptoms?:  No   Last Blood Sugar Value:  236      ,   Congestive Heart Failure Assessment           Symptoms:         ,   COPD Assessment    Does the patient understand envrionmental exposure?:  Yes  Is the patient able to verbalize Rescue vs. Long Acting medications?:  No  Does the patient have a nebulizer?:  Yes  Does the patient use a space with inhaled medications?:  No            Symptoms:          and   General Assessment                 Care Coordination Interventions    Program Enrollment:  Rising Risk  Referral from Primary Care Provider:  Yes  Suggested Interventions and Community Resources  Diabetes Education:  Completed  Specialty Services Referral:  Completed         Goals Addressed             Most Recent     Medication Management   On track (12/21/2017)             I will take my medication as directed.   I will notify my provider of any problems with medications, like adverse effects or 500 MG CAPS Take 2 capsules by mouth daily    Historical Provider, MD   NITROSTAT 0.4 MG SL tablet DISSOLVE ONE TABLET UNDER THE TONGUE EVERY 5 MINUTES AS NEEDED FOR CHEST PAIN. DO NOT EXCEED A TOTAL OF 3 DOSES IN 15 MINUTES 1/20/17   Liane Quiroga NP   hydrocortisone (WESTCORT) 0.2 % cream Apply topically 2 times daily.  12/28/16   Liane Quiroga NP   Insulin Pen Needle 29G X 12.7MM MISC 1 each by Does not apply route daily 8/17/16   Liane Quiroga NP   saxagliptin (ONGLYZA) 5 MG TABS tablet Take 5 mg by mouth daily    Historical Provider, MD   metFORMIN (GLUCOPHAGE) 1000 MG tablet Take 1 tablet by mouth 2 times daily (with meals) 5/7/15   Liane Quiroga NP   amLODIPine (NORVASC) 10 MG tablet Take 10 mg by mouth daily    Historical Provider, MD   aspirin 81 MG tablet Take 81 mg by mouth daily    Historical Provider, MD       Future Appointments  Date Time Provider Adeline Odonnell   12/27/2017 11:00 AM SCHEDULE, [facility] HEART/VASCULAR ECHO [address] [facility] Heart a   3/28/2018 8:30 AM Ele Scruggs CNP [facility] [address] [facility]

## 2018-01-12 ENCOUNTER — CARE COORDINATION (OUTPATIENT)
Dept: CARE COORDINATION | Age: 73
End: 2018-01-12

## 2018-01-17 ENCOUNTER — OFFICE VISIT (OUTPATIENT)
Dept: PRIMARY CARE CLINIC | Age: 73
End: 2018-01-17
Payer: MEDICARE

## 2018-01-17 ENCOUNTER — CARE COORDINATION (OUTPATIENT)
Dept: CARE COORDINATION | Age: 73
End: 2018-01-17

## 2018-01-17 VITALS
HEIGHT: 72 IN | HEART RATE: 65 BPM | OXYGEN SATURATION: 98 % | SYSTOLIC BLOOD PRESSURE: 116 MMHG | WEIGHT: 244.6 LBS | RESPIRATION RATE: 17 BRPM | BODY MASS INDEX: 33.13 KG/M2 | DIASTOLIC BLOOD PRESSURE: 68 MMHG

## 2018-01-17 DIAGNOSIS — I47.20 VENTRICULAR TACHYARRHYTHMIA: ICD-10-CM

## 2018-01-17 DIAGNOSIS — I48.0 PAROXYSMAL ATRIAL FIBRILLATION (HCC): ICD-10-CM

## 2018-01-17 DIAGNOSIS — M79.671 FOOT PAIN, RIGHT: ICD-10-CM

## 2018-01-17 DIAGNOSIS — I73.9 INTERMITTENT CLAUDICATION (HCC): ICD-10-CM

## 2018-01-17 DIAGNOSIS — E11.9 TYPE 2 DIABETES MELLITUS WITHOUT COMPLICATION, UNSPECIFIED LONG TERM INSULIN USE STATUS: Primary | Chronic | ICD-10-CM

## 2018-01-17 DIAGNOSIS — I27.0: ICD-10-CM

## 2018-01-17 LAB — HBA1C MFR BLD: 7.1 %

## 2018-01-17 PROCEDURE — 83036 HEMOGLOBIN GLYCOSYLATED A1C: CPT | Performed by: NURSE PRACTITIONER

## 2018-01-17 PROCEDURE — 99214 OFFICE O/P EST MOD 30 MIN: CPT | Performed by: NURSE PRACTITIONER

## 2018-01-17 PROCEDURE — G8510 SCR DEP NEG, NO PLAN REQD: HCPCS | Performed by: NURSE PRACTITIONER

## 2018-01-17 RX ORDER — GABAPENTIN 300 MG/1
300 CAPSULE ORAL 2 TIMES DAILY
Qty: 60 CAPSULE | Refills: 0 | Status: SHIPPED | OUTPATIENT
Start: 2018-01-17 | End: 2018-02-14 | Stop reason: SDUPTHER

## 2018-01-17 ASSESSMENT — ENCOUNTER SYMPTOMS
BACK PAIN: 0
COUGH: 0
SHORTNESS OF BREATH: 0
ABDOMINAL PAIN: 0

## 2018-01-17 ASSESSMENT — PATIENT HEALTH QUESTIONNAIRE - PHQ9
SUM OF ALL RESPONSES TO PHQ9 QUESTIONS 1 & 2: 0
SUM OF ALL RESPONSES TO PHQ QUESTIONS 1-9: 0
2. FEELING DOWN, DEPRESSED OR HOPELESS: 0
1. LITTLE INTEREST OR PLEASURE IN DOING THINGS: 0

## 2018-01-18 NOTE — CARE COORDINATION
daily 3/3/17   Jeannette Prince MD   lisinopril (PRINIVIL;ZESTRIL) 40 MG tablet Take 40 mg by mouth daily    Historical Provider, MD   omeprazole (PRILOSEC) 20 MG delayed release capsule Take 20 mg by mouth every evening    Historical Provider, MD   pravastatin (PRAVACHOL) 80 MG tablet Take 80 mg by mouth nightly    Historical Provider, MD   Omega-3 Fatty Acids (OMEGA 3 500) 500 MG CAPS Take 2 capsules by mouth daily    Historical Provider, MD   NITROSTAT 0.4 MG SL tablet DISSOLVE ONE TABLET UNDER THE TONGUE EVERY 5 MINUTES AS NEEDED FOR CHEST PAIN. DO NOT EXCEED A TOTAL OF 3 DOSES IN 15 MINUTES 1/20/17   Olamide Bower NP   hydrocortisone (WESTCORT) 0.2 % cream Apply topically 2 times daily.  12/28/16   Olamide Bower NP   Insulin Pen Needle 29G X 12.7MM MISC 1 each by Does not apply route daily 8/17/16   Olamide Bower NP   saxagliptin (ONGLYZA) 5 MG TABS tablet Take 5 mg by mouth daily    Historical Provider, MD   metFORMIN (GLUCOPHAGE) 1000 MG tablet Take 1 tablet by mouth 2 times daily (with meals) 5/7/15   Olamide Bower NP   amLODIPine (NORVASC) 10 MG tablet Take 10 mg by mouth daily    Historical Provider, MD   aspirin 81 MG tablet Take 81 mg by mouth daily    Historical Provider, MD       Future Appointments  Date Time Provider Adeline Odonnell   3/28/2018 8:30 AM Mariely Nathan CNP [facility]

## 2018-01-26 ENCOUNTER — CARE COORDINATION (OUTPATIENT)
Dept: CARE COORDINATION | Age: 73
End: 2018-01-26

## 2018-01-26 NOTE — CARE COORDINATION
Attempt to contact regarding care coordination, no answer. 1. How is Neurontin helping feet? 2. Did he make Podiatry appt?

## 2018-02-01 ENCOUNTER — CARE COORDINATION (OUTPATIENT)
Dept: CARE COORDINATION | Age: 73
End: 2018-02-01

## 2018-02-13 ENCOUNTER — CARE COORDINATION (OUTPATIENT)
Dept: CARE COORDINATION | Age: 73
End: 2018-02-13

## 2018-02-13 NOTE — CARE COORDINATION
Attempt to contact regarding care coordination, no answer LVM. I did review in media his Podiatry note, he is to f/u there for diabetic foot care in April. Appears he is to continue Neurontin as well. Pt is overdue on  Diabetes Eye Exam, will address if he calls back.     Pablo Javier RN

## 2018-02-22 DIAGNOSIS — G89.4 CHRONIC PAIN SYNDROME: Primary | ICD-10-CM

## 2018-02-23 RX ORDER — OXYCODONE HYDROCHLORIDE AND ACETAMINOPHEN 5; 325 MG/1; MG/1
1 TABLET ORAL EVERY 8 HOURS PRN
Qty: 60 TABLET | Refills: 0 | Status: SHIPPED | OUTPATIENT
Start: 2018-02-23 | End: 2018-06-11 | Stop reason: SDUPTHER

## 2018-02-26 RX ORDER — CILOSTAZOL 100 MG/1
TABLET ORAL
Qty: 60 TABLET | Refills: 2 | Status: SHIPPED | OUTPATIENT
Start: 2018-02-26 | End: 2018-04-16 | Stop reason: SDUPTHER

## 2018-02-28 ENCOUNTER — CARE COORDINATION (OUTPATIENT)
Dept: CARE COORDINATION | Age: 73
End: 2018-02-28

## 2018-03-01 RX ORDER — GLIMEPIRIDE 2 MG/1
TABLET ORAL
Qty: 360 TABLET | Refills: 3 | Status: SHIPPED | OUTPATIENT
Start: 2018-03-01 | End: 2019-03-18 | Stop reason: SDUPTHER

## 2018-03-01 NOTE — TELEPHONE ENCOUNTER
Last OV 1/17/18    Health Maintenance   Topic Date Due    Shingles Vaccine (1 of 2 - 2 Dose Series) 04/17/1996    Diabetic retinal exam  06/04/2016    Creatinine monitoring  03/03/2018    Diabetic microalbuminuria test  05/24/2018    PSA counseling  06/03/2018    Lipid screen  09/20/2018    Potassium monitoring  09/20/2018    TSH testing  11/29/2018    Diabetic foot exam  01/17/2019    A1C test (Diabetic or Prediabetic)  01/17/2019    Colon cancer screen colonoscopy  05/07/2025    DTaP/Tdap/Td vaccine (2 - Tdap) 08/20/2027    Flu vaccine  Completed    Pneumococcal low/med risk  Completed    AAA screen  Completed    Hepatitis C screen  Completed             (applicable per patient's age: Cancer Screenings, Depression Screening, Fall Risk Screening, Immunizations)    Hemoglobin A1C (%)   Date Value   01/17/2018 7.1   09/20/2017 7.0   06/03/2017 7.2 (H)     Microalb/Crt.  Ratio (mcg/mg creat)   Date Value   05/24/2017 26 (H)     LDL Cholesterol (mg/dL)   Date Value   05/31/2016 105     LDL Calculated (mg/dL)   Date Value   06/03/2017 95     AST (U/L)   Date Value   11/29/2017 25     ALT (U/L)   Date Value   11/29/2017 35     BUN (mg/dL)   Date Value   03/03/2017 7 (L)      (goal A1C is < 7)   (goal LDL is <100) need 30-50% reduction from baseline     BP Readings from Last 3 Encounters:   01/17/18 116/68   12/27/17 112/78   11/29/17 110/70    (goal /80)      All Future Testing planned in [facility]:  Lab Frequency Next Occurrence   FULL PFT STUDY Once 12/29/2017       Next Visit Date:  Future Appointments  Date Time Provider Adeline Odonnell   3/26/2018 8:00 AM LASHON [facility]            Patient Active Problem List:     Lumbago     Spinal stenosis, lumbar region, without neurogenic claudication     Degeneration of lumbar or lumbosacral intervertebral disc     Type 2 diabetes mellitus without complication (HCC)     Hyperlipidemia     Hypertension     Lumbar radiculopathy,

## 2018-03-19 RX ORDER — GABAPENTIN 300 MG/1
CAPSULE ORAL
Qty: 60 CAPSULE | Refills: 2 | Status: SHIPPED | OUTPATIENT
Start: 2018-03-19 | End: 2018-03-26 | Stop reason: SDUPTHER

## 2018-03-21 ENCOUNTER — CARE COORDINATION (OUTPATIENT)
Dept: CARE COORDINATION | Age: 73
End: 2018-03-21

## 2018-03-26 ENCOUNTER — HOSPITAL ENCOUNTER (OUTPATIENT)
Dept: ULTRASOUND IMAGING | Age: 73
Discharge: HOME | End: 2018-03-28
Payer: MEDICARE

## 2018-03-26 ENCOUNTER — HOSPITAL ENCOUNTER (OUTPATIENT)
Age: 73
Discharge: HOME | End: 2018-03-28
Payer: MEDICARE

## 2018-03-26 ENCOUNTER — CARE COORDINATOR VISIT (OUTPATIENT)
Dept: CARE COORDINATION | Age: 73
End: 2018-03-26

## 2018-03-26 ENCOUNTER — OFFICE VISIT (OUTPATIENT)
Dept: PRIMARY CARE CLINIC | Age: 73
End: 2018-03-26
Payer: MEDICARE

## 2018-03-26 VITALS
HEART RATE: 66 BPM | HEIGHT: 72 IN | OXYGEN SATURATION: 97 % | SYSTOLIC BLOOD PRESSURE: 140 MMHG | BODY MASS INDEX: 33.4 KG/M2 | WEIGHT: 246.6 LBS | RESPIRATION RATE: 16 BRPM | DIASTOLIC BLOOD PRESSURE: 82 MMHG

## 2018-03-26 DIAGNOSIS — M79.604 RIGHT LEG PAIN: ICD-10-CM

## 2018-03-26 DIAGNOSIS — M79.89 RIGHT LEG SWELLING: ICD-10-CM

## 2018-03-26 DIAGNOSIS — E11.42 DIABETIC POLYNEUROPATHY ASSOCIATED WITH TYPE 2 DIABETES MELLITUS (HCC): ICD-10-CM

## 2018-03-26 DIAGNOSIS — I73.9 INTERMITTENT CLAUDICATION (HCC): ICD-10-CM

## 2018-03-26 DIAGNOSIS — H61.91 EARLOBE LESION, RIGHT: ICD-10-CM

## 2018-03-26 DIAGNOSIS — I10 ESSENTIAL HYPERTENSION: Chronic | ICD-10-CM

## 2018-03-26 DIAGNOSIS — E11.9 TYPE 2 DIABETES MELLITUS WITHOUT COMPLICATION, UNSPECIFIED LONG TERM INSULIN USE STATUS: Primary | Chronic | ICD-10-CM

## 2018-03-26 LAB — HBA1C MFR BLD: 7.3 %

## 2018-03-26 PROCEDURE — 93971 EXTREMITY STUDY: CPT

## 2018-03-26 PROCEDURE — 99214 OFFICE O/P EST MOD 30 MIN: CPT | Performed by: NURSE PRACTITIONER

## 2018-03-26 PROCEDURE — G8510 SCR DEP NEG, NO PLAN REQD: HCPCS | Performed by: NURSE PRACTITIONER

## 2018-03-26 PROCEDURE — 83036 HEMOGLOBIN GLYCOSYLATED A1C: CPT | Performed by: NURSE PRACTITIONER

## 2018-03-26 RX ORDER — FUROSEMIDE 20 MG/1
20 TABLET ORAL DAILY
Qty: 30 TABLET | Refills: 0 | Status: SHIPPED | OUTPATIENT
Start: 2018-03-26 | End: 2018-04-24 | Stop reason: SDUPTHER

## 2018-03-26 RX ORDER — GABAPENTIN 300 MG/1
300 CAPSULE ORAL 3 TIMES DAILY
Qty: 90 CAPSULE | Refills: 2
Start: 2018-03-26 | End: 2018-04-09 | Stop reason: SDUPTHER

## 2018-03-26 ASSESSMENT — PATIENT HEALTH QUESTIONNAIRE - PHQ9
2. FEELING DOWN, DEPRESSED OR HOPELESS: 0
SUM OF ALL RESPONSES TO PHQ QUESTIONS 1-9: 0
SUM OF ALL RESPONSES TO PHQ9 QUESTIONS 1 & 2: 0
1. LITTLE INTEREST OR PLEASURE IN DOING THINGS: 0

## 2018-03-26 ASSESSMENT — ENCOUNTER SYMPTOMS
VISUAL CHANGE: 0
BACK PAIN: 0
ORTHOPNEA: 0
BLURRED VISION: 0
SHORTNESS OF BREATH: 1
ABDOMINAL PAIN: 0
COUGH: 0

## 2018-03-26 NOTE — PROGRESS NOTES
[address]  [address]  [place] [address]  Dept: [phone]  Dept Fax: [phone]    Tino Mendez is a 71 y.o. male who presents today for his medical conditions/complaints as noted below. Tino Mendez is c/o of Diabetes (3 month recheck ); Hypertension (3 month recheck ); and Foot Pain (right foot )        HPI:     6 Month recheck DM/HTN  States that BS have been elevated at home 140-210 for fasting    Continues to have burning pain to right foot 4th digit  Had follow up with podiatry  Has callus between 4th/5th digit that was not present in January  Has follow up with podiatry today  Concerned, d/t top of foot becoming discolored at times  No significant improvement noted with Neurontin    Having right lower leg pain/swelling/warmth  Has a \"knot\" behind right calf  Denies chest pain/changes to SOB    Also concerned regarding lesion behind right ear, would like it removed        Diabetes   He presents for his follow-up diabetic visit. He has type 2 diabetes mellitus. There are no hypoglycemic associated symptoms. Pertinent negatives for hypoglycemia include no dizziness, headaches, nervousness/anxiousness or sweats. Associated symptoms include foot paresthesias. Pertinent negatives for diabetes include no blurred vision, no chest pain, no fatigue, no foot ulcerations, no polydipsia, no polyphagia, no polyuria, no visual change, no weakness and no weight loss. There are no hypoglycemic complications. Symptoms are stable. There are no diabetic complications. Risk factors for coronary artery disease include diabetes mellitus, hypertension, male sex, obesity and stress. Current diabetic treatment includes oral agent (dual therapy) and insulin injections. He is compliant with treatment all of the time. His weight is stable. He is following a diabetic diet. When asked about meal planning, he reported none.  He has not had a previous visit with a dietitian. He rarely participates in exercise. His home blood glucose trend is increasing steadily. His breakfast blood glucose is taken between 7-8 am. His breakfast blood glucose range is generally 140-180 mg/dl. An ACE inhibitor/angiotensin II receptor blocker is being taken. He sees a podiatrist.Eye exam is current. Hypertension   This is a chronic problem. The current episode started more than 1 year ago. The problem is unchanged. The problem is controlled. Associated symptoms include shortness of breath. Pertinent negatives include no anxiety, blurred vision, chest pain, headaches, malaise/fatigue, neck pain, orthopnea, palpitations, peripheral edema, PND or sweats. Risk factors for coronary artery disease include diabetes mellitus, male gender, obesity and stress. Past treatments include ACE inhibitors, beta blockers and calcium channel blockers. The current treatment provides significant improvement. There are no compliance problems.         Past Medical History:   Diagnosis Date    CAD (coronary artery disease)     GERD (gastroesophageal reflux disease)     Heart attack     Hyperlipidemia     Hypertension     Ischemic cardiomyopathy     Osteoarthritis     Type II or unspecified type diabetes mellitus without mention of complication, not stated as uncontrolled       Past Surgical History:   Procedure Laterality Date    APPENDECTOMY      CARDIAC CATHETERIZATION      CARDIAC SURGERY      stents 2015    COLONOSCOPY      JOINT REPLACEMENT      knee right parital    PACEMAKER INSERTION      ROTATOR CUFF REPAIR      TONSILLECTOMY         Family History   Problem Relation Age of Onset    Heart Disease Mother     High Blood Pressure Mother     Heart Disease Father     Cancer Sister      breast cancer    Cancer Brother      bladder cancer    Diabetes Maternal Grandmother        Social History   Substance Use Topics    Smoking status: Former Smoker     Packs/day: 1.00     Years: 40.00 tablet 3    amLODIPine (NORVASC) 10 MG tablet Take 10 mg by mouth daily      aspirin 81 MG tablet Take 81 mg by mouth daily      oxyCODONE-acetaminophen (PERCOCET) 5-325 MG per tablet Take 1 tablet by mouth every 8 hours as needed for Pain for up to 30 days. 60 tablet 0     No current facility-administered medications for this visit. Allergies   Allergen Reactions    Coreg [Carvedilol] Other (See Comments)     Low pause     Pravastatin Other (See Comments)     Myalgias     Zocor [Simvastatin]      norvasc        Health Maintenance   Topic Date Due    Shingles Vaccine (1 of 2 - 2 Dose Series) 04/17/1996    Diabetic retinal exam  06/04/2016    Creatinine monitoring  03/03/2018    Diabetic microalbuminuria test  05/24/2018    PSA counseling  06/03/2018    Lipid screen  09/20/2018    Potassium monitoring  09/20/2018    TSH testing  11/29/2018    Diabetic foot exam  01/17/2019    A1C test (Diabetic or Prediabetic)  01/17/2019    Colon cancer screen colonoscopy  05/07/2025    DTaP/Tdap/Td vaccine (2 - Tdap) 08/20/2027    Flu vaccine  Completed    Pneumococcal low/med risk  Completed    AAA screen  Completed    Hepatitis C screen  Completed       Subjective:      Review of Systems   Constitutional: Negative for chills, fatigue, fever, malaise/fatigue and weight loss. HENT: Negative for congestion. Eyes: Negative for blurred vision and visual disturbance. Respiratory: Positive for shortness of breath. Negative for cough. Cardiovascular: Positive for leg swelling. Negative for chest pain, palpitations, orthopnea and PND. Gastrointestinal: Negative for abdominal pain. Endocrine: Negative for polydipsia, polyphagia and polyuria. Genitourinary: Negative for difficulty urinating and dysuria. Musculoskeletal: Negative for arthralgias, back pain and neck pain. Skin:        Lesion behind right ear   Neurological: Negative for dizziness, weakness and headaches.         Burning to right pending results. 3. Neuropathy- Rx increased of neurotin to 300mg TID. Follow up prior to further refills as to how med is working for him. 4. Earlobe lesion- Rx given for referral to derm, follow up as needed. 5. HTN- Stable. Continue current meds. Follow up in 3 months for recheck. Of the 25 minute duration appointment visit, Cassy David RUDI-BC spent at least 50% of the face-to-face time in counseling, explanation of diagnosis, planning of further management, and answering all questions. Orders Placed This Encounter   Procedures    US DUP LOWER EXTREMITY RIGHT ANGEL     Standing Status:   Future     Number of Occurrences:   1     Standing Expiration Date:   3/26/2019     Order Specific Question:   Reason for exam:     Answer:   right lower leg pain/warmth/swelling   Candy Lobato MD, Dermatology [facility]     Referral Priority:   Routine     Referral Type:   Consult for Advice and Opinion     Referral Reason:   Specialty Services Required     Referred to Provider:   Jermaine Chatterjee MD     Requested Specialty:   Dermatology     Number of Visits Requested:   1    POCT glycosylated hemoglobin (Hb A1C)     Orders Placed This Encounter   Medications    gabapentin (NEURONTIN) 300 MG capsule     Sig: Take 1 capsule by mouth 3 times daily for 30 days. Dispense:  90 capsule     Refill:  2     Please consider 90 day supplies to promote better adherence       Patient given educational materials - see patient instructions. Discussed use, benefit, and side effects of prescribed medications. All patient questions answered. Pt voiced understanding. Reviewed health maintenance. Instructed to continue current medications, diet and exercise. Patient agreed with treatment plan. Follow up as directed.       Electronically signed by Linda Saab CNP on 3/26/2018 at 8:56 AM

## 2018-04-05 RX ORDER — NITROGLYCERIN 0.4 MG/1
TABLET SUBLINGUAL
Qty: 25 TABLET | Refills: 2 | Status: SHIPPED | OUTPATIENT
Start: 2018-04-05 | End: 2019-02-01 | Stop reason: SDUPTHER

## 2018-04-09 ENCOUNTER — TELEPHONE (OUTPATIENT)
Dept: PRIMARY CARE CLINIC | Age: 73
End: 2018-04-09

## 2018-04-09 DIAGNOSIS — E11.42 DIABETIC POLYNEUROPATHY ASSOCIATED WITH TYPE 2 DIABETES MELLITUS (HCC): ICD-10-CM

## 2018-04-09 RX ORDER — GABAPENTIN 300 MG/1
300 CAPSULE ORAL 3 TIMES DAILY
Qty: 90 CAPSULE | Refills: 2 | Status: SHIPPED | OUTPATIENT
Start: 2018-04-09 | End: 2018-08-19 | Stop reason: SDUPTHER

## 2018-04-16 RX ORDER — CILOSTAZOL 100 MG/1
TABLET ORAL
Qty: 180 TABLET | Refills: 3 | Status: SHIPPED | OUTPATIENT
Start: 2018-04-16 | End: 2019-07-04 | Stop reason: SDUPTHER

## 2018-04-24 ENCOUNTER — CARE COORDINATION (OUTPATIENT)
Dept: CARE COORDINATION | Age: 73
End: 2018-04-24

## 2018-04-24 RX ORDER — FUROSEMIDE 20 MG/1
20 TABLET ORAL DAILY
Qty: 90 TABLET | Refills: 1 | Status: SHIPPED | OUTPATIENT
Start: 2018-04-24 | End: 2018-05-11 | Stop reason: SDUPTHER

## 2018-04-30 ENCOUNTER — OFFICE VISIT (OUTPATIENT)
Dept: DERMATOLOGY | Age: 73
End: 2018-04-30
Payer: MEDICARE

## 2018-04-30 VITALS
HEIGHT: 72 IN | OXYGEN SATURATION: 94 % | WEIGHT: 248 LBS | HEART RATE: 60 BPM | BODY MASS INDEX: 33.59 KG/M2 | SYSTOLIC BLOOD PRESSURE: 137 MMHG | DIASTOLIC BLOOD PRESSURE: 84 MMHG

## 2018-04-30 DIAGNOSIS — I87.2 VENOUS STASIS DERMATITIS OF BOTH LOWER EXTREMITIES: ICD-10-CM

## 2018-04-30 DIAGNOSIS — L82.1 SEBORRHEIC KERATOSES: ICD-10-CM

## 2018-04-30 DIAGNOSIS — L57.0 KERATOSIS, ACTINIC: Primary | ICD-10-CM

## 2018-04-30 PROCEDURE — 17000 DESTRUCT PREMALG LESION: CPT | Performed by: DERMATOLOGY

## 2018-04-30 PROCEDURE — 17003 DESTRUCT PREMALG LES 2-14: CPT | Performed by: DERMATOLOGY

## 2018-04-30 PROCEDURE — 99203 OFFICE O/P NEW LOW 30 MIN: CPT | Performed by: DERMATOLOGY

## 2018-04-30 RX ORDER — TRIAMCINOLONE ACETONIDE 0.25 MG/G
OINTMENT TOPICAL
Qty: 80 G | Refills: 2 | Status: SHIPPED | OUTPATIENT
Start: 2018-04-30

## 2018-05-09 ENCOUNTER — CARE COORDINATION (OUTPATIENT)
Dept: CARE COORDINATION | Age: 73
End: 2018-05-09

## 2018-05-21 ENCOUNTER — TELEPHONE (OUTPATIENT)
Dept: PRIMARY CARE CLINIC | Age: 73
End: 2018-05-21

## 2018-05-21 ENCOUNTER — CARE COORDINATION (OUTPATIENT)
Dept: CARE COORDINATION | Age: 73
End: 2018-05-21

## 2018-05-22 DIAGNOSIS — I25.10 CORONARY ARTERY DISEASE INVOLVING NATIVE CORONARY ARTERY OF NATIVE HEART WITHOUT ANGINA PECTORIS: ICD-10-CM

## 2018-05-22 DIAGNOSIS — R06.02 SOB (SHORTNESS OF BREATH): ICD-10-CM

## 2018-05-22 DIAGNOSIS — I10 ESSENTIAL HYPERTENSION: Chronic | ICD-10-CM

## 2018-05-22 DIAGNOSIS — R60.0 BILATERAL LEG EDEMA: ICD-10-CM

## 2018-05-30 ENCOUNTER — CARE COORDINATION (OUTPATIENT)
Dept: CARE COORDINATION | Age: 73
End: 2018-05-30

## 2018-06-11 DIAGNOSIS — G89.4 CHRONIC PAIN SYNDROME: ICD-10-CM

## 2018-06-11 RX ORDER — OXYCODONE HYDROCHLORIDE AND ACETAMINOPHEN 5; 325 MG/1; MG/1
1 TABLET ORAL EVERY 8 HOURS PRN
Qty: 60 TABLET | Refills: 0 | Status: SHIPPED | OUTPATIENT
Start: 2018-06-11 | End: 2018-07-23 | Stop reason: SDUPTHER

## 2018-06-15 PROBLEM — R60.0 BILATERAL LEG EDEMA: Status: ACTIVE | Noted: 2018-06-15

## 2018-06-18 ENCOUNTER — CARE COORDINATION (OUTPATIENT)
Dept: CARE COORDINATION | Age: 73
End: 2018-06-18

## 2018-06-22 ENCOUNTER — CARE COORDINATION (OUTPATIENT)
Dept: CARE COORDINATION | Age: 73
End: 2018-06-22

## 2018-06-25 ENCOUNTER — OFFICE VISIT (OUTPATIENT)
Dept: PRIMARY CARE CLINIC | Age: 73
End: 2018-06-25
Payer: MEDICARE

## 2018-06-25 ENCOUNTER — CARE COORDINATION (OUTPATIENT)
Dept: PRIMARY CARE CLINIC | Age: 73
End: 2018-06-25

## 2018-06-25 VITALS
BODY MASS INDEX: 33 KG/M2 | DIASTOLIC BLOOD PRESSURE: 80 MMHG | SYSTOLIC BLOOD PRESSURE: 136 MMHG | WEIGHT: 243.6 LBS | OXYGEN SATURATION: 94 % | HEIGHT: 72 IN | HEART RATE: 60 BPM | RESPIRATION RATE: 16 BRPM

## 2018-06-25 DIAGNOSIS — E78.5 HYPERLIPIDEMIA, UNSPECIFIED HYPERLIPIDEMIA TYPE: Chronic | ICD-10-CM

## 2018-06-25 DIAGNOSIS — I10 ESSENTIAL HYPERTENSION: Chronic | ICD-10-CM

## 2018-06-25 DIAGNOSIS — E11.9 TYPE 2 DIABETES MELLITUS WITHOUT COMPLICATION, UNSPECIFIED LONG TERM INSULIN USE STATUS: Primary | Chronic | ICD-10-CM

## 2018-06-25 LAB
CREATININE URINE POCT: 50
HBA1C MFR BLD: 7.2 %
MICROALBUMIN/CREAT 24H UR: 30 MG/G{CREAT}
MICROALBUMIN/CREAT UR-RTO: NORMAL

## 2018-06-25 PROCEDURE — 83036 HEMOGLOBIN GLYCOSYLATED A1C: CPT | Performed by: NURSE PRACTITIONER

## 2018-06-25 PROCEDURE — 99214 OFFICE O/P EST MOD 30 MIN: CPT | Performed by: NURSE PRACTITIONER

## 2018-06-25 PROCEDURE — 82044 UR ALBUMIN SEMIQUANTITATIVE: CPT | Performed by: NURSE PRACTITIONER

## 2018-06-25 ASSESSMENT — ENCOUNTER SYMPTOMS
BACK PAIN: 1
COUGH: 0
ABDOMINAL PAIN: 0
SHORTNESS OF BREATH: 1

## 2018-06-25 ASSESSMENT — PATIENT HEALTH QUESTIONNAIRE - PHQ9
SUM OF ALL RESPONSES TO PHQ9 QUESTIONS 1 & 2: 0
1. LITTLE INTEREST OR PLEASURE IN DOING THINGS: 0
2. FEELING DOWN, DEPRESSED OR HOPELESS: 0
SUM OF ALL RESPONSES TO PHQ QUESTIONS 1-9: 0

## 2018-07-17 ENCOUNTER — CARE COORDINATION (OUTPATIENT)
Dept: CARE COORDINATION | Age: 73
End: 2018-07-17

## 2018-07-17 NOTE — CARE COORDINATION
extended release tablet Take 1 tablet by mouth 2 times daily 6/15/18   ERICK Casillas CNP   oxyCODONE-acetaminophen (PERCOCET) 5-325 MG per tablet Take 1 tablet by mouth every 8 hours as needed for Pain for up to 30 days. . 6/11/18 7/11/18  ERICK Yepez CNP   furosemide (LASIX) 40 MG tablet Take 1 tablet by mouth daily Dose increase 5/15/18   ERICK Casillas CNP   triamcinolone (KENALOG) 0.025 % ointment Apply topically 2 times daily to areas of eczema on lower legs 4/30/18   Guillermo Harrison MD   cilostazol (PLETAL) 100 MG tablet TAKE ONE TABLET BY MOUTH TWICE DAILY 4/16/18   ERICK Yepez CNP   amiodarone (CORDARONE) 200 MG tablet Take 1 tablet by mouth daily 4/16/18   Indiana Cheng MD   gabapentin (NEURONTIN) 300 MG capsule Take 1 capsule by mouth 3 times daily for 30 days. 4/9/18 5/9/18  ERICK Yepez CNP   nitroGLYCERIN (NITROSTAT) 0.4 MG SL tablet DISSOLVE ONE TABLET UNDER THE TONGUE EVERY 5 MINUTES AS NEEDED FOR CHEST PAIN.   DO NOT EXCEED A TOTAL OF 3 DOSES IN 15 MINUTES 4/5/18   ERICK Yepez CNP   ONE TOUCH ULTRA TEST strip USE TO TEST BLOOD SUGAR TWICE DAILY 3/19/18   ERICK Yepez CNP   glimepiride (AMARYL) 2 MG tablet TAKE TWO TABLETS BY MOUTH TWICE DAILY 3/1/18   ERICK Yepez CNP   metoprolol tartrate (LOPRESSOR) 50 MG tablet Take 1 tablet by mouth 2 times daily 12/18/17   ERICK Yepez CNP   clopidogrel (PLAVIX) 75 MG tablet TAKE ONE TABLET BY MOUTH ONCE DAILY 9/18/17   Janet Gallego MD   insulin detemir (LEVEMIR FLEXTOUCH) 100 UNIT/ML injection pen Inject 25 Units into the skin nightly 1 sample given 8/25/16  [id number]  11/17exp date  1221-5721-66 6/20/17   Ever Mata MD   ipratropium-albuterol (DUONEB) 0.5-2.5 (3) MG/3ML SOLN nebulizer solution Inhale 3 mLs into the lungs 4 times daily  Patient taking differently: Inhale 3 mLs into the lungs every 4 hours as needed  3/3/17   Carlos Packer

## 2018-07-23 DIAGNOSIS — G89.4 CHRONIC PAIN SYNDROME: ICD-10-CM

## 2018-07-23 RX ORDER — OXYCODONE HYDROCHLORIDE AND ACETAMINOPHEN 5; 325 MG/1; MG/1
1 TABLET ORAL EVERY 8 HOURS PRN
Qty: 60 TABLET | Refills: 0 | Status: SHIPPED | OUTPATIENT
Start: 2018-07-23 | End: 2018-09-04 | Stop reason: SDUPTHER

## 2018-07-24 ENCOUNTER — CARE COORDINATION (OUTPATIENT)
Dept: CARE COORDINATION | Age: 73
End: 2018-07-24

## 2018-07-24 PROBLEM — Z95.5 S/P RIGHT CORONARY ARTERY (RCA) STENT PLACEMENT: Status: ACTIVE | Noted: 2018-07-24

## 2018-07-24 PROBLEM — Z95.5 PRESENCE OF STENT IN LAD CORONARY ARTERY: Status: ACTIVE | Noted: 2018-07-24

## 2018-07-24 NOTE — CARE COORDINATION
Pt states he needed a medication refill but called the office and they handled it. Had cardiology f/u yesterday, all went well, f/u in 3 months, no new medications or changes to POC. Noted in progess notes referral to cardiac rehab, [facility]      Arline Whitehead RN

## 2018-07-31 ENCOUNTER — CARE COORDINATION (OUTPATIENT)
Dept: CARE COORDINATION | Age: 73
End: 2018-07-31

## 2018-07-31 NOTE — CARE COORDINATION
Attempt to contact patient today regarding care coordination, unable to reach. Recent encounters and notes reviewed. Jaron Arrington RN    Noted in progess notes referral to cardiac rehab, [facility]

## 2018-08-14 ENCOUNTER — CARE COORDINATION (OUTPATIENT)
Dept: CARE COORDINATION | Age: 73
End: 2018-08-14

## 2018-08-19 DIAGNOSIS — E11.42 DIABETIC POLYNEUROPATHY ASSOCIATED WITH TYPE 2 DIABETES MELLITUS (HCC): ICD-10-CM

## 2018-08-20 RX ORDER — GABAPENTIN 300 MG/1
CAPSULE ORAL
Qty: 90 CAPSULE | Refills: 2 | Status: SHIPPED | OUTPATIENT
Start: 2018-08-20 | End: 2022-01-03 | Stop reason: SDUPTHER

## 2018-08-20 NOTE — TELEPHONE ENCOUNTER
intervertebral disc     Type 2 diabetes mellitus without complication (HCC)     Hyperlipidemia     Hypertension     Lumbar radiculopathy, chronic     Lumbar spondylosis     PVOD (pulmonary veno-occlusive disease) ([facility] [address]     AICD (automatic cardioverter/defibrillator) present     Coronary artery disease involving native coronary artery of native heart without angina pectoris     Gastritis without bleeding     Paroxysmal atrial fibrillation ([facility])     VT (ventricular tachycardia) ([facility])     SOB (shortness of breath)     Claudication ([facility])     PAD (peripheral artery disease) ([facility])     Bilateral leg edema     S/P right coronary artery (RCA) stent placement     Presence of stent in LAD coronary artery

## 2018-08-28 ENCOUNTER — CARE COORDINATION (OUTPATIENT)
Dept: CARE COORDINATION | Age: 73
End: 2018-08-28

## 2018-08-28 NOTE — CARE COORDINATION
omeprazole (PRILOSEC) 20 MG delayed release capsule Take 20 mg by mouth every evening    Historical Provider, MD   Omega-3 Fatty Acids (OMEGA 3 500) 500 MG CAPS Take 2 capsules by mouth daily    Historical Provider, MD   hydrocortisone (WESTCORT) 0.2 % cream Apply topically 2 times daily.  12/28/16   ERICK Fitzpatrick CNP   Insulin Pen Needle 29G X 12.7MM MISC 1 each by Does not apply route daily 8/17/16   ERICK Fitzpatrick CNP   saxagliptin (ONGLYZA) 5 MG TABS tablet Take 5 mg by mouth daily    Historical Provider, MD   metFORMIN (GLUCOPHAGE) 1000 MG tablet Take 1 tablet by mouth 2 times daily (with meals) 5/7/15   ERICK Fitzpatrick CNP   amLODIPine (NORVASC) 10 MG tablet Take 10 mg by mouth daily    Historical Provider, MD   aspirin 81 MG tablet Take 81 mg by mouth daily    Historical Provider, MD       Future Appointments  Date Time Provider Adeline Belle   9/24/2018 8:40 AM ERICK Blum CNP [facility]   10/29/2018 9:30 AM Brandt Lee MD [facility] [address] [facility]   4/30/2019 10:30 AM Lorena Ruiz MD  derm [address] [facility]

## 2018-09-04 DIAGNOSIS — G89.4 CHRONIC PAIN SYNDROME: ICD-10-CM

## 2018-09-04 RX ORDER — OXYCODONE HYDROCHLORIDE AND ACETAMINOPHEN 5; 325 MG/1; MG/1
1 TABLET ORAL EVERY 8 HOURS PRN
Qty: 60 TABLET | Refills: 0 | Status: SHIPPED | OUTPATIENT
Start: 2018-09-04 | End: 2018-10-08 | Stop reason: SDUPTHER

## 2018-09-14 NOTE — TELEPHONE ENCOUNTER
intervertebral disc     Type 2 diabetes mellitus without complication (HCC)     Hyperlipidemia     Hypertension     Lumbar radiculopathy, chronic     Lumbar spondylosis     PVOD (pulmonary veno-occlusive disease) ([facility] [address]     AICD (automatic cardioverter/defibrillator) present     Coronary artery disease involving native coronary artery of native heart without angina pectoris     Gastritis without bleeding     Paroxysmal atrial fibrillation ([facility])     VT (ventricular tachycardia) ([facility])     SOB (shortness of breath)     Claudication ([facility])     PAD (peripheral artery disease) ([facility])     Bilateral leg edema     S/P right coronary artery (RCA) stent placement     Presence of stent in LAD coronary artery

## 2018-09-19 ENCOUNTER — CARE COORDINATION (OUTPATIENT)
Dept: CARE COORDINATION | Age: 73
End: 2018-09-19

## 2018-09-19 NOTE — CARE COORDINATION
tablet TAKE TWO TABLETS BY MOUTH TWICE DAILY 3/1/18   ERICK Perez CNP   metoprolol tartrate (LOPRESSOR) 50 MG tablet Take 1 tablet by mouth 2 times daily 12/18/17   ERICK Perez CNP   clopidogrel (PLAVIX) 75 MG tablet TAKE ONE TABLET BY MOUTH ONCE DAILY 9/18/17   Henry Su MD   insulin detemir (LEVEMIR FLEXTOUCH) 100 UNIT/ML injection pen Inject 25 Units into the skin nightly 1 sample given 8/25/16  [id number]  11/17exp date  0437-0269-05 6/20/17   Collette Rua, MD   ipratropium-albuterol (DUONEB) 0.5-2.5 (3) MG/3ML SOLN nebulizer solution Inhale 3 mLs into the lungs 4 times daily  Patient taking differently: Inhale 3 mLs into the lungs every 4 hours as needed  3/3/17   Felicita Wilson MD   lisinopril (PRINIVIL;ZESTRIL) 40 MG tablet Take 40 mg by mouth daily    Historical Provider, MD   omeprazole (PRILOSEC) 20 MG delayed release capsule Take 20 mg by mouth every evening    Historical Provider, MD   Omega-3 Fatty Acids (OMEGA 3 500) 500 MG CAPS Take 2 capsules by mouth daily    Historical Provider, MD   hydrocortisone (WESTCORT) 0.2 % cream Apply topically 2 times daily.  12/28/16   Army CanalERICK CNP   Insulin Pen Needle 29G X 12.7MM MISC 1 each by Does not apply route daily 8/17/16   [facility]   saxagliptin (ONGLYZA) 5 MG TABS tablet Take 5 mg by mouth daily    Historical Provider, MD   metFORMIN (GLUCOPHAGE) 1000 MG tablet Take 1 tablet by mouth 2 times daily (with meals) 5/7/15   Army CanalERICK CNP   amLODIPine (NORVASC) 10 MG tablet Take 10 mg by mouth daily    Historical Provider, MD   aspirin 81 MG tablet Take 81 mg by mouth daily    Historical Provider, MD       Future Appointments  Date Time Provider Adeline Odonnell   9/24/2018 8:40 AM ERICK Perez CNP [facility]   10/29/2018 9:30 AM Emperatriz Grant MD [facility] [address] [facility] [address] [facility]   4/30/2019 10:30 AM Meng Villafana MD  helen Hagan

## 2018-09-24 ENCOUNTER — HOSPITAL ENCOUNTER (OUTPATIENT)
Age: 73
Discharge: HOME | End: 2018-09-26
Payer: MEDICARE

## 2018-09-24 ENCOUNTER — OFFICE VISIT (OUTPATIENT)
Dept: PRIMARY CARE CLINIC | Age: 73
End: 2018-09-24
Payer: MEDICARE

## 2018-09-24 ENCOUNTER — CARE COORDINATION (OUTPATIENT)
Dept: CARE COORDINATION | Age: 73
End: 2018-09-24

## 2018-09-24 ENCOUNTER — TELEPHONE (OUTPATIENT)
Dept: PRIMARY CARE CLINIC | Age: 73
End: 2018-09-24

## 2018-09-24 ENCOUNTER — HOSPITAL ENCOUNTER (OUTPATIENT)
Dept: GENERAL RADIOLOGY | Age: 73
Discharge: HOME | End: 2018-09-26
Payer: MEDICARE

## 2018-09-24 VITALS
HEART RATE: 64 BPM | WEIGHT: 237 LBS | SYSTOLIC BLOOD PRESSURE: 124 MMHG | DIASTOLIC BLOOD PRESSURE: 80 MMHG | RESPIRATION RATE: 14 BRPM | BODY MASS INDEX: 32.1 KG/M2 | OXYGEN SATURATION: 95 % | HEIGHT: 72 IN

## 2018-09-24 DIAGNOSIS — I10 ESSENTIAL HYPERTENSION: Chronic | ICD-10-CM

## 2018-09-24 DIAGNOSIS — E11.9 TYPE 2 DIABETES MELLITUS WITHOUT COMPLICATION, UNSPECIFIED WHETHER LONG TERM INSULIN USE (HCC): Primary | Chronic | ICD-10-CM

## 2018-09-24 DIAGNOSIS — E78.5 HYPERLIPIDEMIA, UNSPECIFIED HYPERLIPIDEMIA TYPE: Chronic | ICD-10-CM

## 2018-09-24 DIAGNOSIS — M25.562 ACUTE PAIN OF LEFT KNEE: Primary | ICD-10-CM

## 2018-09-24 DIAGNOSIS — Z12.5 SCREENING FOR PROSTATE CANCER: ICD-10-CM

## 2018-09-24 DIAGNOSIS — M25.562 ACUTE PAIN OF LEFT KNEE: ICD-10-CM

## 2018-09-24 DIAGNOSIS — Z13.29 SCREENING FOR THYROID DISORDER: ICD-10-CM

## 2018-09-24 DIAGNOSIS — Z13.0 SCREENING FOR DEFICIENCY ANEMIA: ICD-10-CM

## 2018-09-24 LAB — HBA1C MFR BLD: 7.5 %

## 2018-09-24 PROCEDURE — 83036 HEMOGLOBIN GLYCOSYLATED A1C: CPT | Performed by: NURSE PRACTITIONER

## 2018-09-24 PROCEDURE — 99214 OFFICE O/P EST MOD 30 MIN: CPT | Performed by: NURSE PRACTITIONER

## 2018-09-24 PROCEDURE — 73562 X-RAY EXAM OF KNEE 3: CPT

## 2018-09-24 ASSESSMENT — PATIENT HEALTH QUESTIONNAIRE - PHQ9
SUM OF ALL RESPONSES TO PHQ QUESTIONS 1-9: 2
1. LITTLE INTEREST OR PLEASURE IN DOING THINGS: 1
SUM OF ALL RESPONSES TO PHQ9 QUESTIONS 1 & 2: 2
2. FEELING DOWN, DEPRESSED OR HOPELESS: 1
SUM OF ALL RESPONSES TO PHQ QUESTIONS 1-9: 2

## 2018-09-24 ASSESSMENT — ENCOUNTER SYMPTOMS
ORTHOPNEA: 0
ABDOMINAL PAIN: 0
COUGH: 0
BACK PAIN: 0
SHORTNESS OF BREATH: 0
BLURRED VISION: 0

## 2018-09-24 NOTE — PROGRESS NOTES
[address] PRIMARY CARE  [address] [place] [address] Dr Kuldeep Fernandez [facility]  [place] [address]  Dept: [phone]  Dept Fax: [phone]    Zeus Lindquist is a 68 y.o. male who presents today for his medical conditions/complaints as noted below. Zeus Lindquist is c/o of   Chief Complaint   Patient presents with    Hypertension     3 month recheck     Diabetes     3 month recheck            HPI:     Presents for 3 month recheck on HTN/DM  Cath 7/2018- stent placed  One episode of chest pain since cath. Had follow up with Dr. Rudy Dakin since chest pain  Denies any further episodes of chest pain    [facility], will be updating labs and eye exam at this visit    Reviewed depression screen  States that he has had this addressed at the [facility] without follow up  Denies success with meds  Thoughts of hurting himself, denies plan. \"Everyone has those thoughts. \"  Willing to consider counseling in our area    Chronic left knee pain  Denies mech of injury  Worsening of pain, not controlled with OTC meds      Hypertension   This is a chronic problem. The current episode started more than 1 year ago. The problem is unchanged. The problem is controlled. Pertinent negatives include no anxiety, blurred vision, chest pain, headaches, malaise/fatigue, neck pain, orthopnea, palpitations, peripheral edema, PND, shortness of breath or sweats. Risk factors for coronary artery disease include diabetes mellitus, dyslipidemia, male gender, obesity and stress. Past treatments include ACE inhibitors, calcium channel blockers, beta blockers and diuretics. The current treatment provides significant improvement. There are no compliance problems. Diabetes   He presents for his follow-up diabetic visit. He has type 2 diabetes mellitus. His disease course has been stable. There are no hypoglycemic associated symptoms.  Pertinent negatives for hypoglycemia include no dizziness, headaches, nervousness/anxiousness or vaccine (2 - Tdap) 08/20/2027    Flu vaccine  Completed    Pneumococcal low/med risk  Completed    AAA screen  Completed    Hepatitis C screen  Completed       Subjective:      Review of Systems   Constitutional: Negative for chills, fatigue, fever and malaise/fatigue. HENT: Negative for congestion. Eyes: Negative for blurred vision and visual disturbance. Respiratory: Negative for cough and shortness of breath. Cardiovascular: Negative for chest pain, palpitations, orthopnea and PND. Gastrointestinal: Negative for abdominal pain. Genitourinary: Negative for difficulty urinating and dysuria. Musculoskeletal: Positive for arthralgias. Negative for back pain and neck pain. Neurological: Negative for dizziness and headaches. Psychiatric/Behavioral: Negative for self-injury, sleep disturbance and suicidal ideas. The patient is not nervous/anxious. Objective:     Physical Exam   Constitutional: He is oriented to person, place, and time. He appears well-developed and well-nourished. HENT:   Head: Normocephalic and atraumatic. Eyes: Pupils are equal, round, and reactive to light. Neck: Normal range of motion. Cardiovascular: Normal rate, regular rhythm and normal heart sounds. Pulmonary/Chest: Effort normal and breath sounds normal.   Abdominal: Soft. Bowel sounds are normal. There is no tenderness. Musculoskeletal: Normal range of motion. Left knee: Tenderness found. Neurological: He is alert and oriented to person, place, and time. Skin: Skin is warm and dry. Psychiatric: He has a normal mood and affect. His behavior is normal. Judgment and thought content normal.   Nursing note and vitals reviewed. /80   Pulse 64   Resp 14   Ht 6' (1.829 m)   Wt 237 lb (107.5 kg)   SpO2 95%   BMI 32.14 kg/m²     Assessment:       Diagnosis Orders   1.  Type 2 diabetes mellitus without complication, unspecified whether long term insulin use (HCC)  POCT glycosylated

## 2018-10-02 ENCOUNTER — TELEPHONE (OUTPATIENT)
Dept: PRIMARY CARE CLINIC | Age: 73
End: 2018-10-02

## 2018-10-04 RX ORDER — CLOPIDOGREL BISULFATE 75 MG/1
TABLET ORAL
Qty: 30 TABLET | Refills: 5 | Status: SHIPPED | OUTPATIENT
Start: 2018-10-04 | End: 2020-06-01 | Stop reason: SDUPTHER

## 2018-10-04 NOTE — TELEPHONE ENCOUNTER
Medication: plavix  Last visit: 09/24/18  Next visit: 12/17/2018  Last refill: 09/18/17  Pharmacy: [facility] [place]

## 2018-10-08 ENCOUNTER — CARE COORDINATION (OUTPATIENT)
Dept: CARE COORDINATION | Age: 73
End: 2018-10-08

## 2018-10-08 DIAGNOSIS — G89.4 CHRONIC PAIN SYNDROME: ICD-10-CM

## 2018-10-08 RX ORDER — OXYCODONE HYDROCHLORIDE AND ACETAMINOPHEN 5; 325 MG/1; MG/1
1 TABLET ORAL EVERY 8 HOURS PRN
Qty: 60 TABLET | Refills: 0 | Status: SHIPPED | OUTPATIENT
Start: 2018-10-08 | End: 2018-11-27 | Stop reason: SDUPTHER

## 2018-10-12 ENCOUNTER — TELEPHONE (OUTPATIENT)
Dept: PHARMACY | Facility: CLINIC | Age: 73
End: 2018-10-12

## 2018-10-12 NOTE — TELEPHONE ENCOUNTER
CLINICAL PHARMACY NOTE - Adherence Review    Identified care gap per [employer] (patient insurance): glimepiride 2mg adherence  Per records, appears 90-day supply last filled 05/27/2018    Notes:   Per Reconcile Medication Dispenses in Care Path: last filled on 10/04/2018 for a 90-day supply  Per Marty Mcdowell at [address]: last picked up on 10/06/2018 for a 90-day supply; billed thru insurance    Attempting to reach patient to review glimepiride directions. Unable to leave message as voicemail full. Will prepare letter and mail to patient. Brad Barcenas, PharmD, [facility]  Clinical Pharmacy Specialist  O: [phone]  C: [address] [place] [phone], Option 7    =======================================================    For Pharmacy Admin Tracking Only  PHSO: Yes  Total # of Interventions Recommended: 1  - New Order #: 0 New Medication Order Reason(s):  Adherence  - Refills Provided #: 0  - Maintenance Safety Lab Monitoring #: 1  - New Therapy Lab Monitoring #: 0  Total Interventions Accepted: 0  Time Spent (min): 15

## 2018-10-16 ENCOUNTER — TELEPHONE (OUTPATIENT)
Dept: PRIMARY CARE CLINIC | Age: 73
End: 2018-10-16

## 2018-10-16 ENCOUNTER — CARE COORDINATION (OUTPATIENT)
Dept: CARE COORDINATION | Age: 73
End: 2018-10-16

## 2018-10-16 NOTE — TELEPHONE ENCOUNTER
Advise he increase his levemir to 30 units tonight  Expect glucose levels to be higher the next few day but if continues to be greater than 250 tomorrow call office for further guidance

## 2018-10-16 NOTE — CARE COORDINATION
Patient saw ortho this morning for cortisone injection. Reports, when he's had cortisone injections before, his BS raises over 300. This usually lasts 1-2 days. BS this morning 126. He's questioning if he should adjust his current meds the next few days, to prevent hyperglycemia.

## 2018-10-22 ENCOUNTER — CARE COORDINATION (OUTPATIENT)
Dept: CARE COORDINATION | Age: 73
End: 2018-10-22

## 2018-10-23 ENCOUNTER — CARE COORDINATION (OUTPATIENT)
Dept: CARE COORDINATION | Age: 73
End: 2018-10-23

## 2018-10-23 NOTE — CARE COORDINATION
Ambulatory Care Coordination Note  10/23/2018  CM Risk Score: 2  Opal Mortality Risk Score: 11.29    ACC: Ras Chaparro RN    Summary Note: spoke with patient, reports he's doing fine. Knee feeling better after cortisone injection last week. BS were high for 2 days after cortisone injection. Medication adjustment helped manage BS. BS this morning 344, ate spaghetti and ice cream last night. He doesn't follow a specific diet and aware his diet effects his BS. He's does not want diabetic education or dietician referral. No issues with his reflux. Reports his orthopedic physician wants him to have outpatient PT, but he can not afford it. He's seen at the [place] and plans to see if they can help cover the cost of PT. He has meds on hand, taking as directed. No needs at this time    CC Plan; follow up care coordination needs  BS? Pain? Cortisone still effective? Is [facility] helping [facility] PT? Diabetes Assessment    Medic Alert ID:  No  Meal Planning:  None   How often do you test your blood sugar?:  Daily   Do you have barriers with adherence to non-pharmacologic self-management interventions?  (Nutrition/Exercise/Self-Monitoring):  No   Have you ever had to go to the ED for symptoms of low blood sugar?:  No       Increase or Decrease trend in Blood Sugars   Do you have hyperglycemia symptoms?:  Yes   Last Blood Sugar Value:  344   Blood Sugar Monitoring Regimen:  Once a Day   Blood Sugar Trends:  Fluctuating                      Care Coordination Interventions    Program Enrollment:  Rising Risk  Referral from Primary Care Provider:  Yes  Suggested Interventions and Community Resources  Diabetes Education:  Completed  Registered Dietician:  [id number]  Specialty Services Referral:  Completed         Goals Addressed             Most Recent     Care Coordination Self Management   Worsening (10/23/2018)             CC Self Management Goal  Patient Goal (What steps will patient take to achieve goal?):take medications as

## 2018-10-29 LAB
AVERAGE GLUCOSE: 174
CHOLESTEROL, TOTAL: 233 MG/DL
CHOLESTEROL/HDL RATIO: ABNORMAL
CREATININE, URINE: 92.12
HBA1C MFR BLD: 7.7 %
HDLC SERPL-MCNC: 37 MG/DL (ref 35–70)
LDL CHOLESTEROL CALCULATED: 162 MG/DL (ref 0–160)
MICROALBUMIN/CREAT 24H UR: 4.8 MG/G{CREAT}
MICROALBUMIN/CREAT UR-RTO: 52.1
TRIGL SERPL-MCNC: 172 MG/DL
TSH SERPL DL<=0.05 MIU/L-ACNC: NORMAL UIU/ML
VLDLC SERPL CALC-MCNC: ABNORMAL MG/DL

## 2018-10-31 DIAGNOSIS — E11.9 TYPE 2 DIABETES MELLITUS WITHOUT COMPLICATION, UNSPECIFIED WHETHER LONG TERM INSULIN USE (HCC): Chronic | ICD-10-CM

## 2018-10-31 DIAGNOSIS — Z13.0 SCREENING FOR DEFICIENCY ANEMIA: ICD-10-CM

## 2018-10-31 DIAGNOSIS — E78.5 HYPERLIPIDEMIA, UNSPECIFIED HYPERLIPIDEMIA TYPE: Chronic | ICD-10-CM

## 2018-10-31 DIAGNOSIS — Z13.29 SCREENING FOR THYROID DISORDER: ICD-10-CM

## 2018-11-05 ENCOUNTER — CARE COORDINATION (OUTPATIENT)
Dept: CARE COORDINATION | Age: 73
End: 2018-11-05

## 2018-11-05 ENCOUNTER — OFFICE VISIT (OUTPATIENT)
Dept: PRIMARY CARE CLINIC | Age: 73
End: 2018-11-05
Payer: MEDICARE

## 2018-11-05 VITALS
RESPIRATION RATE: 16 BRPM | BODY MASS INDEX: 32.96 KG/M2 | SYSTOLIC BLOOD PRESSURE: 138 MMHG | WEIGHT: 243 LBS | DIASTOLIC BLOOD PRESSURE: 72 MMHG | HEART RATE: 61 BPM

## 2018-11-05 DIAGNOSIS — I10 ESSENTIAL HYPERTENSION: Chronic | ICD-10-CM

## 2018-11-05 DIAGNOSIS — I50.22 CHRONIC SYSTOLIC CONGESTIVE HEART FAILURE (HCC): ICD-10-CM

## 2018-11-05 DIAGNOSIS — R55 SYNCOPE, UNSPECIFIED SYNCOPE TYPE: Primary | ICD-10-CM

## 2018-11-05 DIAGNOSIS — I48.0 PAROXYSMAL ATRIAL FIBRILLATION (HCC): ICD-10-CM

## 2018-11-05 DIAGNOSIS — E78.5 HYPERLIPIDEMIA, UNSPECIFIED HYPERLIPIDEMIA TYPE: Chronic | ICD-10-CM

## 2018-11-05 PROCEDURE — 99214 OFFICE O/P EST MOD 30 MIN: CPT | Performed by: INTERNAL MEDICINE

## 2018-11-05 ASSESSMENT — ENCOUNTER SYMPTOMS
VOMITING: 0
EYE REDNESS: 0
BACK PAIN: 0
TROUBLE SWALLOWING: 0
EYE DISCHARGE: 0
COUGH: 0
SHORTNESS OF BREATH: 0
ABDOMINAL PAIN: 0
SORE THROAT: 0
NAUSEA: 0

## 2018-11-05 NOTE — PROGRESS NOTES
[address] PRIMARY CARE  [address] [place] [address] Dr Chago [address] [place] [address]  Dept: [phone]  Dept Fax: [phone]    Toby Brice is a 68 y.o. male who presents today for his medical conditions/complaints as noted below. Toby Brice is c/o of  Chief Complaint   Patient presents with    ED Follow-up     [facility] on 10/30/18 for fall          HPI:     HPI   He fell backwards while lifting heavy tire from ground . He lost consciousness. Denied external injuries to head or any part of body . He woke up on his own after sometime and went to ED at [facility] . CT head and CT cervical spine reviewed . No previous episodes of syncope in past .       He do have complicated medical history - ischemic cardiomyopathy s/p pacemaker and defibrillator - follows with Dr Vishal Crowley . Plan to get cardia ECHO IN DEC . Made sergey with Dr Ama Murray coming Monday . HTN - well controlled     Type 2 DM insulin dependent - controlled - denied hypoglycemia readings/symptoms  anytime in A day . Hyperlipidemia - recently started on low dose lipitor 5 mg by [facility] doc     Left knee pain , chronic onset  - steroid injection 3 wks ago done by ortho doc           Hemoglobin A1C (%)   Date Value   10/29/2018 7.7   09/24/2018 7.5   06/25/2018 7.2             ( goal A1C is < 7)   Microalb/Crt.  Ratio (mcg/mg creat)   Date Value   05/24/2017 26 (H)     LDL Cholesterol (mg/dL)   Date Value   05/31/2016 105     LDL Calculated (mg/dL)   Date Value   10/29/2018 162 (A)   06/03/2017 95   04/01/2015 90       (goal LDL is <100)   AST (U/L)   Date Value   11/29/2017 25     ALT (U/L)   Date Value   11/29/2017 35     BUN (mg/dL)   Date Value   03/03/2017 7 (L)     BP Readings from Last 3 Encounters:   11/05/18 138/72   10/29/18 (!) 150/90   09/24/18 124/80          (goal 120/80)    Past Medical History:   Diagnosis Date    CAD (coronary artery disease)     GERD (gastroesophageal reflux disease)  Heart attack ([facility] [address]     Hyperlipidemia     Hypertension     Ischemic cardiomyopathy     Osteoarthritis     Type II or unspecified type diabetes mellitus without mention of complication, not stated as uncontrolled       Past Surgical History:   Procedure Laterality Date    APPENDECTOMY      CARDIAC CATHETERIZATION      CARDIAC SURGERY      stents 2015    COLONOSCOPY      COR[facility]    JOINT REPLACEMENT      knee right parital    PACEMAKER INSERTION      ROTATOR CUFF REPAIR      TONSILLECTOMY         Family History   Problem Relation Age of Onset    Heart Disease Mother     High Blood Pressure Mother     Heart Disease Father     Cancer Sister         breast cancer    Cancer Brother         bladder cancer    Diabetes Maternal Grandmother        Social History   Substance Use Topics    Smoking status: Former Smoker     Packs/day: 1.00     Years: 40.00     Types: Cigarettes     Quit date: 5/7/1994    Smokeless tobacco: Never Used    Alcohol use Yes      Comment: rare      Current Outpatient Prescriptions   Medication Sig Dispense Refill    furosemide (LASIX) 40 MG tablet TAKE 1 TABLET BY MOUTH ONCE DAILY . DOSE  INCREASE 90 tablet 3    oxyCODONE-acetaminophen (PERCOCET) 5-325 MG per tablet Take 1 tablet by mouth every 8 hours as needed for Pain for up to 30 days. . 60 tablet 0    clopidogrel (PLAVIX) 75 MG tablet TAKE ONE TABLET BY MOUTH ONCE DAILY 30 tablet 5    ONE TOUCH ULTRA TEST strip USE TO CHECK GLUCOSE TWICE DAILY 100 strip 1    magnesium oxide (MAG-OX) 400 MG tablet Take 400 mg by mouth daily      triamcinolone (KENALOG) 0.025 % ointment Apply topically 2 times daily to areas of eczema on lower legs 80 g 2    cilostazol (PLETAL) 100 MG tablet TAKE ONE TABLET BY MOUTH TWICE DAILY 180 tablet 3    amiodarone (CORDARONE) 200 MG tablet Take 1 tablet by mouth daily 90 tablet 3    nitroGLYCERIN (NITROSTAT) 0.4 MG SL tablet DISSOLVE

## 2018-11-19 ENCOUNTER — CARE COORDINATION (OUTPATIENT)
Dept: CARE COORDINATION | Age: 73
End: 2018-11-19

## 2018-11-20 ENCOUNTER — TELEPHONE (OUTPATIENT)
Dept: PRIMARY CARE CLINIC | Age: 73
End: 2018-11-20

## 2018-11-21 ENCOUNTER — CARE COORDINATION (OUTPATIENT)
Dept: CARE COORDINATION | Age: 73
End: 2018-11-21

## 2018-11-27 ENCOUNTER — OFFICE VISIT (OUTPATIENT)
Dept: PRIMARY CARE CLINIC | Age: 73
End: 2018-11-27
Payer: MEDICARE

## 2018-11-27 VITALS
DIASTOLIC BLOOD PRESSURE: 78 MMHG | BODY MASS INDEX: 31.19 KG/M2 | HEART RATE: 66 BPM | TEMPERATURE: 99 F | WEIGHT: 230 LBS | RESPIRATION RATE: 16 BRPM | SYSTOLIC BLOOD PRESSURE: 130 MMHG

## 2018-11-27 DIAGNOSIS — I10 ESSENTIAL HYPERTENSION: Chronic | ICD-10-CM

## 2018-11-27 DIAGNOSIS — G47.00 INSOMNIA, UNSPECIFIED TYPE: Primary | ICD-10-CM

## 2018-11-27 DIAGNOSIS — J18.9 PNEUMONIA DUE TO INFECTIOUS ORGANISM, UNSPECIFIED LATERALITY, UNSPECIFIED PART OF LUNG: Primary | ICD-10-CM

## 2018-11-27 DIAGNOSIS — G89.4 CHRONIC PAIN SYNDROME: ICD-10-CM

## 2018-11-27 DIAGNOSIS — Z99.81 SUPPLEMENTAL OXYGEN DEPENDENT: ICD-10-CM

## 2018-11-27 DIAGNOSIS — I48.0 PAROXYSMAL ATRIAL FIBRILLATION (HCC): ICD-10-CM

## 2018-11-27 PROCEDURE — 1111F DSCHRG MED/CURRENT MED MERGE: CPT | Performed by: INTERNAL MEDICINE

## 2018-11-27 PROCEDURE — 99495 TRANSJ CARE MGMT MOD F2F 14D: CPT | Performed by: INTERNAL MEDICINE

## 2018-11-27 RX ORDER — OXYCODONE HYDROCHLORIDE AND ACETAMINOPHEN 5; 325 MG/1; MG/1
1 TABLET ORAL EVERY 8 HOURS PRN
Qty: 60 TABLET | Refills: 0 | Status: SHIPPED | OUTPATIENT
Start: 2018-11-27 | End: 2019-01-03 | Stop reason: SDUPTHER

## 2018-11-27 RX ORDER — ZOLPIDEM TARTRATE 10 MG/1
10 TABLET ORAL NIGHTLY PRN
Qty: 30 TABLET | Refills: 0 | Status: SHIPPED | OUTPATIENT
Start: 2018-11-27 | End: 2019-05-30 | Stop reason: SDUPTHER

## 2018-11-28 ASSESSMENT — ENCOUNTER SYMPTOMS
EYE REDNESS: 0
COUGH: 0
SHORTNESS OF BREATH: 1
VOMITING: 0
NAUSEA: 0
EYE DISCHARGE: 0
BACK PAIN: 0
TROUBLE SWALLOWING: 0
SORE THROAT: 0
ABDOMINAL PAIN: 0

## 2018-11-28 NOTE — PROGRESS NOTES
amLODIPine (NORVASC) 10 MG tablet Take 10 mg by mouth daily      aspirin 81 MG tablet Take 81 mg by mouth daily          Medications patient taking as of now reconciled against medications ordered at time of hospital discharge: Yes    Chief Complaint   Patient presents with    Follow-Up from Hospital     Transitional - Pneumonia    Other     c/o vomiting the past couple of days but has not vomited today all food is staying down       HPI    Inpatient course: Discharge summary reviewed- see chart. Interval history/Current status:     Community acquired Pneumonia improved after taking levofloxacin . He was started on Supplemental 2 lpm NC O2 during night sleep . He had follow up visit with Dr Rito Walker soon for follow up on Pneumonia . Today he denied cough /SOB Junaid Lot pain /dizziness . He does make effort to breath deep  At home twice or 3 times per day with help of spirometer given at hospital .    During hospitalization - he do not have A fib with RVR, CXR showed pulmonary congestion and bilateral atelectasis and questionable patchy opacity might be sec to Pneumonia - Rxed with IV antibiotics and sent home with 4 more pills of levoquin . He was known to have CHF s/p AICD - follows with cardiologist on Friday   Stable at this point on current meds     Type 2 DM , HTN well controlled on current meds         Vitals:    11/27/18 1323   BP: 130/78   Pulse: 66   Resp: 16   Temp: 99 °F (37.2 °C)   Weight: 230 lb (104.3 kg)     Body mass index is 31.19 kg/m². Wt Readings from Last 3 Encounters:   11/27/18 230 lb (104.3 kg)   11/05/18 243 lb (110.2 kg)   10/29/18 245 lb (111.1 kg)     BP Readings from Last 3 Encounters:   11/27/18 130/78   11/05/18 138/72   10/29/18 (!) 150/90       Review of Systems   Constitutional: Negative for activity change, appetite change, fatigue, fever and unexpected weight change. HENT: Negative for postnasal drip, sore throat and trouble swallowing.     Eyes: Negative for discharge and redness. Respiratory: Positive for shortness of breath. Negative for cough. Cardiovascular: Negative for chest pain and palpitations. Gastrointestinal: Negative for abdominal pain, nausea and vomiting. Endocrine: Negative for cold intolerance and heat intolerance. Genitourinary: Negative for difficulty urinating and dysuria. Musculoskeletal: Negative for arthralgias, back pain and myalgias. Skin: Negative for rash and wound. Neurological: Negative for dizziness and headaches. Hematological: Negative for adenopathy. Psychiatric/Behavioral: Negative for behavioral problems. The patient is not nervous/anxious. Physical Exam   Constitutional: He is oriented to person, place, and time. He appears well-developed and well-nourished. No distress. HENT:   Head: Normocephalic and atraumatic. Right Ear: External ear normal.   Left Ear: External ear normal.   Nose: Nose normal.   Mouth/Throat: Oropharynx is clear and moist. No oropharyngeal exudate. Eyes: Conjunctivae are normal. Right eye exhibits no discharge. Left eye exhibits no discharge. No scleral icterus. Neck: Neck supple. Cardiovascular: Normal rate, regular rhythm and normal heart sounds. No murmur heard. Pulmonary/Chest: Effort normal and breath sounds normal. No respiratory distress. He has no wheezes. Abdominal: Soft. Bowel sounds are normal. He exhibits no distension. There is no tenderness. Musculoskeletal: He exhibits edema. He exhibits no tenderness. Lymphadenopathy:     He has no cervical adenopathy. Neurological: He is alert and oriented to person, place, and time. Skin: Skin is warm and dry. No rash noted. He is not diaphoretic. Psychiatric: Judgment and thought content normal.   Nursing note and vitals reviewed. Assessment/Plan:  1.  Pneumonia due to infectious organism, unspecified laterality, unspecified part of lung  Resolved   Follow with Pulmonologist office as

## 2018-11-29 ENCOUNTER — TELEPHONE (OUTPATIENT)
Dept: PRIMARY CARE CLINIC | Age: 73
End: 2018-11-29

## 2018-12-06 ENCOUNTER — CARE COORDINATION (OUTPATIENT)
Dept: CARE COORDINATION | Age: 73
End: 2018-12-06

## 2018-12-12 ENCOUNTER — CARE COORDINATION (OUTPATIENT)
Dept: CARE COORDINATION | Age: 73
End: 2018-12-12

## 2018-12-17 ENCOUNTER — OFFICE VISIT (OUTPATIENT)
Dept: PRIMARY CARE CLINIC | Age: 73
End: 2018-12-17
Payer: MEDICARE

## 2018-12-17 ENCOUNTER — CARE COORDINATION (OUTPATIENT)
Dept: CARE COORDINATION | Age: 73
End: 2018-12-17

## 2018-12-17 VITALS
HEART RATE: 61 BPM | WEIGHT: 238 LBS | OXYGEN SATURATION: 96 % | SYSTOLIC BLOOD PRESSURE: 132 MMHG | RESPIRATION RATE: 14 BRPM | HEIGHT: 72 IN | DIASTOLIC BLOOD PRESSURE: 76 MMHG | BODY MASS INDEX: 32.23 KG/M2

## 2018-12-17 DIAGNOSIS — M54.42 CHRONIC LEFT-SIDED LOW BACK PAIN WITH LEFT-SIDED SCIATICA: ICD-10-CM

## 2018-12-17 DIAGNOSIS — G89.29 CHRONIC LEFT-SIDED LOW BACK PAIN WITH LEFT-SIDED SCIATICA: ICD-10-CM

## 2018-12-17 DIAGNOSIS — E78.5 HYPERLIPIDEMIA, UNSPECIFIED HYPERLIPIDEMIA TYPE: Chronic | ICD-10-CM

## 2018-12-17 DIAGNOSIS — E11.9 TYPE 2 DIABETES MELLITUS WITHOUT COMPLICATION, UNSPECIFIED WHETHER LONG TERM INSULIN USE (HCC): Primary | Chronic | ICD-10-CM

## 2018-12-17 LAB — HBA1C MFR BLD: 7.2 %

## 2018-12-17 PROCEDURE — 99214 OFFICE O/P EST MOD 30 MIN: CPT | Performed by: NURSE PRACTITIONER

## 2018-12-17 PROCEDURE — 83036 HEMOGLOBIN GLYCOSYLATED A1C: CPT | Performed by: NURSE PRACTITIONER

## 2018-12-17 RX ORDER — ATORVASTATIN CALCIUM 10 MG/1
10 TABLET, FILM COATED ORAL DAILY
Qty: 90 TABLET | Refills: 0 | Status: SHIPPED | OUTPATIENT
Start: 2018-12-17 | End: 2021-05-05

## 2018-12-17 RX ORDER — POTASSIUM CHLORIDE 20 MEQ/1
20 TABLET, EXTENDED RELEASE ORAL DAILY
Qty: 90 TABLET | Refills: 1 | Status: SHIPPED | OUTPATIENT
Start: 2018-12-17 | End: 2019-07-04 | Stop reason: SDUPTHER

## 2018-12-17 ASSESSMENT — ENCOUNTER SYMPTOMS
COUGH: 0
SHORTNESS OF BREATH: 0
ABDOMINAL PAIN: 0
BACK PAIN: 1

## 2018-12-17 NOTE — PROGRESS NOTES
eczema on lower legs 80 g 2    cilostazol (PLETAL) 100 MG tablet TAKE ONE TABLET BY MOUTH TWICE DAILY 180 tablet 3    amiodarone (CORDARONE) 200 MG tablet Take 1 tablet by mouth daily 90 tablet 3    nitroGLYCERIN (NITROSTAT) 0.4 MG SL tablet DISSOLVE ONE TABLET UNDER THE TONGUE EVERY 5 MINUTES AS NEEDED FOR CHEST PAIN. DO NOT EXCEED A TOTAL OF 3 DOSES IN 15 MINUTES 25 tablet 2    glimepiride (AMARYL) 2 MG tablet TAKE TWO TABLETS BY MOUTH TWICE DAILY 360 tablet 3    metoprolol tartrate (LOPRESSOR) 50 MG tablet Take 1 tablet by mouth 2 times daily 60 tablet 5    insulin detemir (LEVEMIR FLEXTOUCH) 100 UNIT/ML injection pen Inject 25 Units into the skin nightly 1 sample given 8/25/16  [id number]  11/17exp date  3103-4093-38 5 Pen 3    ipratropium-albuterol (DUONEB) 0.5-2.5 (3) MG/3ML SOLN nebulizer solution Inhale 3 mLs into the lungs 4 times daily (Patient taking differently: Inhale 3 mLs into the lungs every 4 hours as needed ) 360 mL 3    lisinopril (PRINIVIL;ZESTRIL) 40 MG tablet Take 40 mg by mouth daily      omeprazole (PRILOSEC) 20 MG delayed release capsule Take 20 mg by mouth every evening      Omega-3 Fatty Acids (OMEGA 3 500) 500 MG CAPS Take 2 capsules by mouth daily      hydrocortisone (WESTCORT) 0.2 % cream Apply topically 2 times daily. 30 g 1    Insulin Pen Needle 29G X 12.7MM MISC 1 each by Does not apply route daily 100 each 3    saxagliptin (ONGLYZA) 5 MG TABS tablet Take 5 mg by mouth daily      metFORMIN (GLUCOPHAGE) 1000 MG tablet Take 1 tablet by mouth 2 times daily (with meals) 180 tablet 3    amLODIPine (NORVASC) 10 MG tablet Take 10 mg by mouth daily      aspirin 81 MG tablet Take 81 mg by mouth daily       No current facility-administered medications for this visit.       Allergies   Allergen Reactions    Coreg [Carvedilol] Other (See Comments)     Low pause     Pravastatin Other (See Comments)     Myalgias     Zocor [Simvastatin]      norvasc        Health Maintenance   Topic Skin is warm and dry. Psychiatric: He has a normal mood and affect. His behavior is normal. Judgment and thought content normal.   Nursing note and vitals reviewed. /76   Pulse 61   Resp 14   Ht 6' (1.829 m)   Wt 238 lb (108 kg)   SpO2 96%   BMI 32.28 kg/m²     Assessment:       Diagnosis Orders   1. Type 2 diabetes mellitus without complication, unspecified whether long term insulin use (HCC)  POCT glycosylated hemoglobin (Hb A1C)   2. Hyperlipidemia, unspecified hyperlipidemia type  atorvastatin (LIPITOR) 10 MG tablet   3. Chronic left-sided low back pain with left-sided sciatica  Handicap Placard MISC             Plan:      Return in about 3 months (around 3/17/2019) for Diabetes. 1. DM- Hga1c stable. Continue current meds. Follow up in 3 months for recheck. 2. HLD- Stable. Rx renewed for atorvastatin. Follow up in 3 months for recheck. 3. Chronic back pain- Stable. Continue current meds. Rx given for handicap placard. Follow up in 3 months for recheck. Of the 25 minute duration appointment visit, Ernestina Posey [facility] spent at least 50% of the face-to-face time in counseling, explanation of diagnosis, planning of further management, and answering all questions. Orders Placed This Encounter   Procedures    POCT glycosylated hemoglobin (Hb A1C)     Orders Placed This Encounter   Medications    potassium chloride (KLOR-CON M) 20 MEQ extended release tablet     Sig: Take 1 tablet by mouth daily     Dispense:  90 tablet     Refill:  1    atorvastatin (LIPITOR) 10 MG tablet     Sig: Take 1 tablet by mouth daily     Dispense:  90 tablet     Refill:  0    Handicap Placard [facility] [place] [facility]     Sig: by Does not apply route Expires 12/2023     Dispense:  1 each     Refill:  0       Patient given educational materials - see patient instructions. Discussed use, benefit, and side effects of prescribed medications. All patientquestions answered. Pt voiced understanding. Reviewed health maintenance.

## 2018-12-17 NOTE — CARE COORDINATION
Philip Mari MD   cilostazol (PLETAL) 100 MG tablet TAKE ONE TABLET BY MOUTH TWICE DAILY 4/16/18   ERICK Diego CNP   amiodarone (CORDARONE) 200 MG tablet Take 1 tablet by mouth daily 4/16/18   Elisa Nguyen MD   nitroGLYCERIN (NITROSTAT) 0.4 MG SL tablet DISSOLVE ONE TABLET UNDER THE TONGUE EVERY 5 MINUTES AS NEEDED FOR CHEST PAIN. DO NOT EXCEED A TOTAL OF 3 DOSES IN 15 MINUTES 4/5/18   ERICK Diego CNP   glimepiride (AMARYL) 2 MG tablet TAKE TWO TABLETS BY MOUTH TWICE DAILY 3/1/18   ERICK Diego CNP   metoprolol tartrate (LOPRESSOR) 50 MG tablet Take 1 tablet by mouth 2 times daily 12/18/17   ERICK Diego CNP   insulin detemir (LEVEMIR FLEXTOUCH) 100 UNIT/ML injection pen Inject 25 Units into the skin nightly 1 sample given 8/25/16  [id number]  11/17exp date  6183-4176-97 6/20/17   Wei Petersen MD   ipratropium-albuterol (DUONEB) 0.5-2.5 (3) MG/3ML SOLN nebulizer solution Inhale 3 mLs into the lungs 4 times daily  Patient taking differently: Inhale 3 mLs into the lungs every 4 hours as needed  3/3/17   Edson Gandara MD   lisinopril (PRINIVIL;ZESTRIL) 40 MG tablet Take 40 mg by mouth daily    Historical Provider, MD   omeprazole (PRILOSEC) 20 MG delayed release capsule Take 20 mg by mouth every evening    Historical Provider, MD   Omega-3 Fatty Acids (OMEGA 3 500) 500 MG CAPS Take 2 capsules by mouth daily    Historical Provider, MD   hydrocortisone (WESTCORT) 0.2 % cream Apply topically 2 times daily.  12/28/16   ERICK Brandt CNP   Insulin Pen Needle 29G X 12.7MM MISC 1 each by Does not apply route daily 8/17/16   ERICK Brandt CNP   saxagliptin (ONGLYZA) 5 MG TABS tablet Take 5 mg by mouth daily    Historical Provider, MD   metFORMIN (GLUCOPHAGE) 1000 MG tablet Take 1 tablet by mouth 2 times daily (with meals) 5/7/15   ERICK Brandt - CNP   amLODIPine (NORVASC) 10 MG tablet Take 10 mg by mouth daily    Historical

## 2019-01-03 ENCOUNTER — CARE COORDINATION (OUTPATIENT)
Dept: CARE COORDINATION | Age: 74
End: 2019-01-03

## 2019-01-03 DIAGNOSIS — G89.4 CHRONIC PAIN SYNDROME: ICD-10-CM

## 2019-01-03 RX ORDER — OXYCODONE HYDROCHLORIDE AND ACETAMINOPHEN 5; 325 MG/1; MG/1
1 TABLET ORAL EVERY 8 HOURS PRN
Qty: 60 TABLET | Refills: 0 | Status: SHIPPED | OUTPATIENT
Start: 2019-01-03 | End: 2019-02-11 | Stop reason: SDUPTHER

## 2019-01-10 ENCOUNTER — CARE COORDINATION (OUTPATIENT)
Dept: CARE COORDINATION | Age: 74
End: 2019-01-10

## 2019-02-01 RX ORDER — NITROGLYCERIN 0.4 MG/1
TABLET SUBLINGUAL
Qty: 25 TABLET | Refills: 2 | Status: SHIPPED | OUTPATIENT
Start: 2019-02-01 | End: 2021-08-20 | Stop reason: SDUPTHER

## 2019-02-11 ENCOUNTER — CARE COORDINATION (OUTPATIENT)
Dept: CARE COORDINATION | Age: 74
End: 2019-02-11

## 2019-02-11 DIAGNOSIS — G89.4 CHRONIC PAIN SYNDROME: ICD-10-CM

## 2019-02-11 RX ORDER — OXYCODONE HYDROCHLORIDE AND ACETAMINOPHEN 5; 325 MG/1; MG/1
1 TABLET ORAL EVERY 8 HOURS PRN
Qty: 60 TABLET | Refills: 0 | Status: SHIPPED | OUTPATIENT
Start: 2019-02-11 | End: 2019-03-15 | Stop reason: SDUPTHER

## 2019-02-26 ENCOUNTER — CARE COORDINATION (OUTPATIENT)
Dept: CARE COORDINATION | Age: 74
End: 2019-02-26

## 2019-03-15 ENCOUNTER — CARE COORDINATION (OUTPATIENT)
Dept: CARE COORDINATION | Age: 74
End: 2019-03-15

## 2019-03-15 ENCOUNTER — OFFICE VISIT (OUTPATIENT)
Dept: PRIMARY CARE CLINIC | Age: 74
End: 2019-03-15
Payer: MEDICARE

## 2019-03-15 VITALS
HEART RATE: 61 BPM | RESPIRATION RATE: 16 BRPM | HEIGHT: 72 IN | DIASTOLIC BLOOD PRESSURE: 80 MMHG | WEIGHT: 244.4 LBS | SYSTOLIC BLOOD PRESSURE: 134 MMHG | OXYGEN SATURATION: 93 % | BODY MASS INDEX: 33.1 KG/M2

## 2019-03-15 DIAGNOSIS — G89.4 CHRONIC PAIN SYNDROME: ICD-10-CM

## 2019-03-15 DIAGNOSIS — I73.9 CLAUDICATION (HCC): ICD-10-CM

## 2019-03-15 DIAGNOSIS — I27.0 PVOD (PULMONARY VENO-OCCLUSIVE DISEASE) (HCC): ICD-10-CM

## 2019-03-15 DIAGNOSIS — E11.9 TYPE 2 DIABETES MELLITUS WITHOUT COMPLICATION, UNSPECIFIED WHETHER LONG TERM INSULIN USE (HCC): Primary | ICD-10-CM

## 2019-03-15 DIAGNOSIS — I48.0 PAROXYSMAL ATRIAL FIBRILLATION (HCC): ICD-10-CM

## 2019-03-15 DIAGNOSIS — I73.9 PAD (PERIPHERAL ARTERY DISEASE) (HCC): ICD-10-CM

## 2019-03-15 LAB — HBA1C MFR BLD: 6.9 %

## 2019-03-15 PROCEDURE — 99214 OFFICE O/P EST MOD 30 MIN: CPT | Performed by: NURSE PRACTITIONER

## 2019-03-15 PROCEDURE — 83036 HEMOGLOBIN GLYCOSYLATED A1C: CPT | Performed by: NURSE PRACTITIONER

## 2019-03-15 RX ORDER — OXYCODONE HYDROCHLORIDE AND ACETAMINOPHEN 5; 325 MG/1; MG/1
1 TABLET ORAL EVERY 8 HOURS PRN
Qty: 90 TABLET | Refills: 0 | Status: SHIPPED | OUTPATIENT
Start: 2019-03-15 | End: 2019-07-08 | Stop reason: SDUPTHER

## 2019-03-15 ASSESSMENT — ENCOUNTER SYMPTOMS
ABDOMINAL PAIN: 0
BACK PAIN: 1
COUGH: 0
SHORTNESS OF BREATH: 0

## 2019-03-15 ASSESSMENT — PATIENT HEALTH QUESTIONNAIRE - PHQ9
SUM OF ALL RESPONSES TO PHQ9 QUESTIONS 1 & 2: 0
SUM OF ALL RESPONSES TO PHQ QUESTIONS 1-9: 0
1. LITTLE INTEREST OR PLEASURE IN DOING THINGS: 0
2. FEELING DOWN, DEPRESSED OR HOPELESS: 0
SUM OF ALL RESPONSES TO PHQ QUESTIONS 1-9: 0

## 2019-03-18 RX ORDER — GLIMEPIRIDE 2 MG/1
TABLET ORAL
Qty: 360 TABLET | Refills: 3 | Status: SHIPPED | OUTPATIENT
Start: 2019-03-18 | End: 2020-03-30

## 2019-04-02 ENCOUNTER — TELEPHONE (OUTPATIENT)
Dept: PRIMARY CARE CLINIC | Age: 74
End: 2019-04-02

## 2019-04-02 LAB
ALBUMIN SERPL-MCNC: 3.6 G/DL
ALP BLD-CCNC: 65 U/L
ALT SERPL-CCNC: 23 U/L
ANION GAP SERPL CALCULATED.3IONS-SCNC: 8 MMOL/L
AST SERPL-CCNC: 11 U/L
BASOPHILS ABSOLUTE: NORMAL /ΜL
BASOPHILS RELATIVE PERCENT: NORMAL %
BILIRUB SERPL-MCNC: 0.2 MG/DL (ref 0.1–1.4)
BUN BLDV-MCNC: 23 MG/DL
CALCIUM SERPL-MCNC: 8.4 MG/DL
CHLORIDE BLD-SCNC: 107 MMOL/L
CO2: 26 MMOL/L
CREAT SERPL-MCNC: 0.91 MG/DL
EOSINOPHILS ABSOLUTE: NORMAL /ΜL
EOSINOPHILS RELATIVE PERCENT: NORMAL %
GFR CALCULATED: 60
GLUCOSE BLD-MCNC: 139 MG/DL
HCT VFR BLD CALC: 42 % (ref 41–53)
HEMOGLOBIN: 13.7 G/DL (ref 13.5–17.5)
LYMPHOCYTES ABSOLUTE: NORMAL /ΜL
LYMPHOCYTES RELATIVE PERCENT: NORMAL %
MCH RBC QN AUTO: NORMAL PG
MCHC RBC AUTO-ENTMCNC: NORMAL G/DL
MCV RBC AUTO: NORMAL FL
MONOCYTES ABSOLUTE: NORMAL /ΜL
MONOCYTES RELATIVE PERCENT: NORMAL %
NEUTROPHILS ABSOLUTE: NORMAL /ΜL
NEUTROPHILS RELATIVE PERCENT: NORMAL %
PLATELET # BLD: 289 K/ΜL
PMV BLD AUTO: NORMAL FL
POTASSIUM SERPL-SCNC: 3.8 MMOL/L
RBC # BLD: 4.91 10^6/ΜL
SODIUM BLD-SCNC: 141 MMOL/L
TOTAL PROTEIN: 7.1
WBC # BLD: 8.1 10^3/ML

## 2019-04-02 NOTE — LETTER
[address]  [address] [place] [address] [facility] [address]  Phone: [phone]  Fax: [phone] [facility], APRN - CNP        April 2, 2019     Patient: Jamaica Tolentino   YOB: 1945   Date of Visit: 4/2/2019       To Whom It May Concern: It is my medical opinion that Sandra Tucker may hold Pletal 2 days prior to surgery. If you have any questions or concerns, please don't hesitate to call.     Sincerely,        Chato Garibay, ERICK - CNP

## 2019-04-03 ENCOUNTER — TELEPHONE (OUTPATIENT)
Dept: PRIMARY CARE CLINIC | Age: 74
End: 2019-04-03

## 2019-04-03 NOTE — TELEPHONE ENCOUNTER
Left voice mail that it is okay to hold Pletal for 2 days prior to surg. Tried to fax letter but realized was faxing to phone number. Asked Mary Enamorado to call office back with a fax number.

## 2019-04-03 NOTE — TELEPHONE ENCOUNTER
Luis Miguel Wesley, from [facility], called, having joint replacement surgery, on April 15th. Can the Pletal be stopped 2 days prior? Please call.

## 2019-04-04 ENCOUNTER — CARE COORDINATION (OUTPATIENT)
Dept: CARE COORDINATION | Age: 74
End: 2019-04-04

## 2019-04-04 ENCOUNTER — OFFICE VISIT (OUTPATIENT)
Dept: PRIMARY CARE CLINIC | Age: 74
End: 2019-04-04
Payer: MEDICARE

## 2019-04-04 VITALS
HEART RATE: 62 BPM | DIASTOLIC BLOOD PRESSURE: 64 MMHG | WEIGHT: 240 LBS | RESPIRATION RATE: 16 BRPM | BODY MASS INDEX: 32.55 KG/M2 | SYSTOLIC BLOOD PRESSURE: 112 MMHG

## 2019-04-04 DIAGNOSIS — M17.12 ARTHRITIS OF LEFT KNEE: ICD-10-CM

## 2019-04-04 DIAGNOSIS — E11.9 TYPE 2 DIABETES MELLITUS WITHOUT COMPLICATION, UNSPECIFIED WHETHER LONG TERM INSULIN USE (HCC): Chronic | ICD-10-CM

## 2019-04-04 DIAGNOSIS — I25.10 CORONARY ARTERY DISEASE INVOLVING NATIVE CORONARY ARTERY OF NATIVE HEART WITHOUT ANGINA PECTORIS: Primary | ICD-10-CM

## 2019-04-04 PROCEDURE — 99214 OFFICE O/P EST MOD 30 MIN: CPT | Performed by: FAMILY MEDICINE

## 2019-04-04 ASSESSMENT — PATIENT HEALTH QUESTIONNAIRE - PHQ9
SUM OF ALL RESPONSES TO PHQ QUESTIONS 1-9: 0
SUM OF ALL RESPONSES TO PHQ QUESTIONS 1-9: 0

## 2019-04-04 NOTE — PROGRESS NOTES
Subjective:  Jam Calix is in for pre-operative evaluation and continued evaluation and management. His chronic medical problems include the following; coronary artery disease, type 2 diabetes, and arthritis. He has coronary artery disease. He is under the care of cardiology. His medications include nitroglycerin, atorvastatin, amiodarone, metoprolol, lisinopril, and amlodipine. He denies any chest pain or pressure. He denies any shortness breath. He indicates that his cardiologist has forwarded clearance to his orthopedic surgeon's office. He has diabetes. He is currently on Amaryl, Levemir, Januvia and metformin. He indicates that his fingerstick blood sugars are typically in the 100-150 range. He denies any symptoms suggestive of hyperglycemia. He has left knee arthritis. He is scheduled for a total knee replacement on April 15, 2019. Review of systems per HPI, otherwise negative. Allergies; medications; past medical, surgical, family, and social history; and problem list reviewed as indicated in this encounter. Objective:  Vitals:  Blood pressure 112/64, pulse 62, resp. rate 16, weight 240 lb (108.9 kg). Constitutional: He is oriented to person, place, and time. He appears well-developed and well-nourished and in no acute distress. Cardiovascular: Normal rate and regular rhythm, no murmur, rub, or gallop    Pulmonary/Chest: Effort normal and breath sounds normal. No rales or wheezes. No chest retraction. Extremities: no clubbing, cyanosis, or edema  Neurological:  CN II - XII grossly intact; no focal neurological deficits  Psychiatric:  Well groomed, well dressed. The patient maintains appropriate eye contact and does not appear to be responding to internal stimuli. No agitation    Lab Results   Component Value Date    LABA1C 6.9 03/15/2019       Assessment/ Plan / Medical Decision Making   Diagnosis Orders   1.  Coronary artery disease involving native coronary artery of native heart without angina pectoris     2. Type 2 diabetes mellitus without complication, unspecified whether long term insulin use ([facility] [address]     3. Arthritis of left knee         Medications, laboratory testing, imaging, consultation, and follow up as documented in this encounter. His coronary artery disease appears stable. With respect to his upcoming surgery, clearance per cardiology. His diabetes is well controlled. Based on the information available to me at this time, he appears to be at no increased risk for the upcoming surgery. He indicates that the anesthesiologist has recommended decreasing his dosage of insulin the evening prior to surgery. Follow up in our office as previously scheduled or as needed. Blanche  received counseling on the following healthy behaviors: medication adherence    Patient given educational materials on preoperative instructions for diabetics    Was a self-tracking handout given in paper form or via [employer]? No  If yes, see orders or list here. Discussed use, benefit, and side effects of prescribed medications. Barriers to medication compliance addressed. All patient questions answered. Pt voiced understanding. This note is created with the assistance of a speech-recognition program.  While intending to generate a document that actually reflects the content of the visit, no guarantees can be provided that every mistake has been identified and corrected by editing.

## 2019-04-04 NOTE — Clinical Note
Jena,I have recommended that our mutual patient contact you for cardiovascular clearance for an upcoming total knee replacement. He had some preoperative testing done at [facility] recently. This information is available in media. Brice Bazan.

## 2019-04-04 NOTE — PATIENT INSTRUCTIONS
Patient Education        General Pre-Op for People With Diabetes  Your Care Instructions  Just because you have diabetes doesn't mean you can't have surgery if you need it. Surgery is safer now than ever before. But if you have diabetes, you may need to take extra care. Before your surgery, you may need to check your blood sugar more often. Your doctor may have you do this for at least 24 hours before and for 72 hours after your surgery. If you take insulin or other medicine for diabetes, your doctor will give you exact instructions about how to take them. It may not be the same as how you usually take them. Following is what many doctors advise. But each person is different. If you don't get instructions about your medicines, ask your doctor what to do. And make sure to ask about anything you don't understand. · If you take metformin, you may need to stop taking it 48 hours before surgery. And you may need to wait another 48 hours to start taking it again. · If you take diabetes medicines other than insulin, you may need to stop taking them on the morning of the surgery. · If you take short-acting insulin, you may need to stop taking it on the morning of the surgery. · If you take long-acting insulin, you may need to take only half of your usual dose on the morning of the surgery. Follow-up care is a key part of your treatment and safety. Be sure to make and go to all appointments, and call your doctor if you are having problems. It's also a good idea to know your test results and keep a list of the medicines you take. What happens before surgery?   Surgery can be stressful. This information will help you understand what you can expect. And it will help you safely prepare for surgery.   Preparing for surgery    · Understand exactly what surgery is planned, along with the risks, benefits, and other options. · Tell your doctors ALL the medicines, vitamins, supplements, and herbal remedies you take.  Some of these can increase the risk of bleeding or interact with anesthesia.     · If you take blood thinners, such as warfarin (Coumadin), clopidogrel (Plavix), or aspirin, be sure to talk to your doctor. He or she will tell you if you should stop taking these medicines before your surgery. Make sure that you understand exactly what your doctor wants you to do.     · Your doctor will tell you which medicines to take or stop before your surgery. You may need to stop taking certain medicines a week or more before surgery. So talk to your doctor as soon as you can.     · If you have an advance directive, let your doctor know. It may include a living will and a durable power of  for health care. Bring a copy to the hospital. If you don't have one, you may want to prepare one. It lets your doctor and loved ones know your health care wishes. Doctors advise that everyone prepare these papers before any type of surgery or procedure.     · You will get exact instructions about when to stop eating before your surgery. It is important to have an empty stomach before surgery. But this can also lead to low blood sugar. Be sure to talk to your doctor more about this.     · Check your blood sugar often in the hours before the surgery. What happens on the day of surgery? · Follow the instructions exactly about when to stop eating and drinking. If you don't, your surgery may be canceled. If your doctor told you to take your medicines on the day of surgery, take them with only a sip of water.     · Take a bath or shower before you come in for your surgery. Do not apply lotions, perfumes, deodorants, or nail polish.     · Do not shave the surgical site yourself.     · Take off all jewelry and piercings.  And take out contact lenses, if you wear them.    At the hospital or surgery center   · Bring a picture ID.     · The area for surgery is often marked to make sure there are no errors.     · You will be kept comfortable and safe by your anesthesia provider. The anesthesia may make you sleep. Or it may just numb the area being worked on. Going home   · Be sure you have someone to drive you home. Anesthesia and pain medicine make it unsafe for you to drive.     · You will be given more specific instructions about recovering from your surgery. They will cover things like diet, wound care, follow-up care, driving, and getting back to your normal routine.     · If you control your blood sugar, it will help you get better faster. It will also lower your risk of infection. When should you call your doctor? · You have questions or concerns.     · You do not understand how to prepare for your surgery.     · You become ill before surgery (such as fever, flu, or a cold).     · You need to reschedule or have changed your mind about having the surgery. Where can you learn more? Go to [email or web address] and sign in to your [employer] account. Enter S054 in the LightTable box to learn more about \"General Pre-Op for People With Diabetes. \"     If you do not have an account, please click on the \"Sign Up Now\" link. Current as of: July 25, 2018  Content Version: 11.9  © 5806-7903 [employer]. Care instructions adapted under license by [facility] ([facility]). If you have questions about a medical condition or this instruction, always ask your healthcare professional. [address] any warranty or liability for your use of this information.

## 2019-04-04 NOTE — PROGRESS NOTES
Visit Information    Have you changed or started any medications since your last visit including any over-the-counter medicines, vitamins, or herbal medicines? no   Have you stopped taking any of your medications? Is so, why? -  no  Are you having any side effects from any of your medications? - no    Have you seen any other physician or provider since your last visit?  no   Have you had any other diagnostic tests since your last visit?  no   Have you been seen in the emergency room and/or had an admission in a hospital since we last saw you?  no   Have you had your routine dental cleaning in the past 6 months?  yes      Do you have an active MyChart account? If no, what is the barrier?   Yes    Patient Care Team:  ERICK Ott CNP as PCP - General (Nurse Practitioner)  ERICK Ott CNP as PCP - [facility] Attributed Provider  Musa Khan MD as Orthopedic Surgeon (Orthopedic Surgery)  Kat Sanon MD as Consulting Physician (Pain Management)  Dasha Chapman MD as Surgeon (Vascular Surgery)  Rosa Michaels MD as Consulting Physician (Internal Medicine Cardiovascular Disease)  Mindy Herr MD as Consulting Physician (Pulmonology)  Cliff Lamb MD as Consulting Physician (Dermatology)  Bessie Jimenez RN as Marcelina Bernard MD (Orthopedic Surgery)    Medical History Review  Past Medical, Family, and Social History reviewed and does contribute to the patient presenting condition    Health Maintenance   Topic Date Due    Shingles Vaccine (1 of 2) 04/17/1996    Diabetic retinal exam  06/04/2016    PSA counseling  06/03/2018    Diabetic foot exam  01/17/2019    Diabetic microalbuminuria test  10/29/2019    Lipid screen  10/29/2019    TSH testing  10/29/2019    A1C test (Diabetic or Prediabetic)  03/15/2020    Potassium monitoring  04/02/2020    Creatinine monitoring  04/02/2020    Colon cancer screen colonoscopy  05/07/2025    DTaP/Tdap/Td vaccine (2 - Tdap) 08/20/2027    Flu vaccine  Completed    Pneumococcal 65+ years Vaccine  Completed    AAA screen  Completed    Hepatitis C screen  Completed

## 2019-04-04 NOTE — LETTER
[address] [place] [address]  Phone: [phone]  Fax: [phone]    Adam Reagan MD    April 4, 2019     Patient: Adriana Zhang   MR Number: [id number]   YOB: 1945   Date of Visit: 4/4/2019     Dear Dr. Gisele Atwood,    It is my understanding that Gary Vizcarra will be under going a left total knee replacement in the near future. He  has a past medical history of CAD (coronary artery disease), GERD (gastroesophageal reflux disease), Heart attack ([facility] [address] Hyperlipidemia, Hypertension, Ischemic cardiomyopathy, Osteoarthritis, and Type II or unspecified type diabetes mellitus without mention of complication, not stated as uncontrolled. His  has a past surgical history that includes Pacemaker insertion; Appendectomy; Rotator cuff repair; Colonoscopy; joint replacement; Cardiac catheterization; Cardiac surgery; Tonsillectomy; Coronary angioplasty with stent (07/2018); Diagnostic Cardiac Cath Lab Procedure; and Percutaneous Transluminal Coronary Angio. He  reports that he quit smoking about 24 years ago. His smoking use included cigarettes. He has a 40.00 pack-year smoking history. He has never used smokeless tobacco. He reports that he drinks alcohol. He reports that he does not use drugs. He has a current medication list which includes the following prescription(s): glimepiride, oxycodone-acetaminophen, furosemide, nitroglycerin, potassium chloride, atorvastatin, handicap placard, clopidogrel, one touch ultra test, magnesium oxide, triamcinolone, cilostazol, amiodarone, metoprolol tartrate, insulin detemir, ipratropium-albuterol, lisinopril, omeprazole, omega 3 500, hydrocortisone, insulin pen needle, saxagliptin, metformin, amlodipine, aspirin, and gabapentin. He is allergic to coreg [carvedilol]; pravastatin; and zocor [simvastatin].     From a general medical standpoint, he appears to be at no increased risk for the planned surgical procedure. I have had the opportunity to review some preoperative testing and no further evaluation appears warranted. His most recent hemoglobin A1c indicates excellent glycemic control. He does have coronary artery disease and indicates that he has had clearance from his cardiologist forwarded to your office. From a cardiovascular standpoint, I will defer to their recommendations.     Sincerely,                           Thais Arriaga MD

## 2019-04-08 NOTE — TELEPHONE ENCOUNTER
Hyperlipidemia     Hypertension     Lumbar radiculopathy, chronic     Lumbar spondylosis     PVOD (pulmonary veno-occlusive disease) ([facility])     AICD (automatic cardioverter/defibrillator) present     Coronary artery disease involving native coronary artery of native heart without angina pectoris     Gastritis without bleeding     Paroxysmal atrial fibrillation ([facility])     VT (ventricular tachycardia) ([facility])     SOB (shortness of breath)     Claudication ([facility])     PAD (peripheral artery disease) ([facility])     Bilateral leg edema     S/P right coronary artery (RCA) stent placement     Presence of stent in LAD coronary artery

## 2019-05-07 ENCOUNTER — OFFICE VISIT (OUTPATIENT)
Dept: PRIMARY CARE CLINIC | Age: 74
End: 2019-05-07
Payer: MEDICARE

## 2019-05-07 VITALS
SYSTOLIC BLOOD PRESSURE: 78 MMHG | OXYGEN SATURATION: 98 % | DIASTOLIC BLOOD PRESSURE: 43 MMHG | RESPIRATION RATE: 16 BRPM | HEART RATE: 62 BPM | WEIGHT: 222.6 LBS | BODY MASS INDEX: 30.19 KG/M2

## 2019-05-07 DIAGNOSIS — I10 ESSENTIAL HYPERTENSION: Chronic | ICD-10-CM

## 2019-05-07 DIAGNOSIS — R60.0 BILATERAL LEG EDEMA: ICD-10-CM

## 2019-05-07 DIAGNOSIS — E11.9 TYPE 2 DIABETES MELLITUS WITHOUT COMPLICATION, UNSPECIFIED WHETHER LONG TERM INSULIN USE (HCC): Chronic | ICD-10-CM

## 2019-05-07 DIAGNOSIS — I48.0 PAROXYSMAL ATRIAL FIBRILLATION (HCC): ICD-10-CM

## 2019-05-07 DIAGNOSIS — Z95.810 AICD (AUTOMATIC CARDIOVERTER/DEFIBRILLATOR) PRESENT: ICD-10-CM

## 2019-05-07 DIAGNOSIS — Z96.652 STATUS POST LEFT KNEE REPLACEMENT: Primary | ICD-10-CM

## 2019-05-07 PROCEDURE — 1111F DSCHRG MED/CURRENT MED MERGE: CPT | Performed by: INTERNAL MEDICINE

## 2019-05-07 PROCEDURE — 99215 OFFICE O/P EST HI 40 MIN: CPT | Performed by: INTERNAL MEDICINE

## 2019-05-07 RX ORDER — MINOCYCLINE HYDROCHLORIDE 100 MG/1
100 CAPSULE ORAL 2 TIMES DAILY
COMMUNITY
Start: 2019-05-06 | End: 2019-06-17 | Stop reason: ALTCHOICE

## 2019-05-07 RX ORDER — OXYCODONE HYDROCHLORIDE AND ACETAMINOPHEN 5; 325 MG/1; MG/1
TABLET ORAL
Refills: 0 | COMMUNITY
Start: 2019-05-04 | End: 2019-06-17 | Stop reason: ALTCHOICE

## 2019-05-07 ASSESSMENT — ENCOUNTER SYMPTOMS
SORE THROAT: 0
EYE DISCHARGE: 0
VOMITING: 0
ABDOMINAL PAIN: 0
EYE REDNESS: 0
COUGH: 0
BACK PAIN: 0
SHORTNESS OF BREATH: 0
NAUSEA: 0
TROUBLE SWALLOWING: 0

## 2019-05-07 NOTE — PROGRESS NOTES
Visit Information    Have you changed or started any medications since your last visit including any over-the-counter medicines, vitamins, or herbal medicines? yes -    Are you having any side effects from any of your medications? -  no  Have you stopped taking any of your medications? Is so, why? -  no    Have you seen any other physician or provider since your last visit? Yes - Records Obtained  Have you had any other diagnostic tests since your last visit? Yes - Records Obtained  Have you been seen in the emergency room and/or had an admission to a hospital since we last saw you? Yes - Records Obtained  Have you had your routine dental cleaning in the past 6 months? no    Have you activated your [employer] account? If not, what are your barriers?  No     Patient Care Team:  ERICK Herndon CNP as PCP - General (Nurse Practitioner)  ERICK Herndon CNP as PCP - S Attributed Provider  Candy Morfin MD as Orthopedic Surgeon (Orthopedic Surgery)  Marissa Teran MD as Consulting Physician (Pain Management)  Gerald Alcantar MD as Surgeon (Vascular Surgery)  Rhys Botello MD as Consulting Physician (Internal Medicine Cardiovascular Disease)  Zita Sanchez MD as Consulting Physician (Pulmonology)  Renna Mcburney, MD as Consulting Physician (Dermatology)  Gloria Buck MD (Orthopedic Surgery)    Medical History Review  Past Medical, Family, and Social History reviewed and does contribute to the patient presenting condition    Health Maintenance   Topic Date Due    Shingles Vaccine (1 of 2) 04/17/1996    Diabetic retinal exam  06/04/2016    PSA counseling  06/03/2018    Diabetic foot exam  01/17/2019    Diabetic microalbuminuria test  10/29/2019    Lipid screen  10/29/2019    TSH testing  10/29/2019    A1C test (Diabetic or Prediabetic)  03/15/2020    Potassium monitoring  04/02/2020    Creatinine monitoring  04/02/2020    Colon cancer screen colonoscopy 05/07/2025    DTaP/Tdap/Td vaccine (2 - Tdap) 08/20/2027    Flu vaccine  Completed    Pneumococcal 65+ years Vaccine  Completed    AAA screen  Completed    Hepatitis C screen  Completed

## 2019-05-07 NOTE — PROGRESS NOTES
Post-Discharge Transitional Care Management Services or Hospital Follow Up      Fannie Osorio   YOB: 1945    Date of Office Visit:  5/7/2019  Date of Hospital Admission: 4/15/19   Date of Hospital Discharge: 4/19 /19     Care management risk score Rising risk (score 2-5) and Complex Care (Scores >=6): 5     Non face to face  following discharge, date last encounter closed (first attempt may have been earlier): *No documented post hospital discharge outreach found in the last 14 days     Call initiated 2 business days of discharge: *No response recorded in the last 14 days    Patient Active Problem List   Diagnosis    Lumbago    Spinal stenosis, lumbar region, without neurogenic claudication    Degeneration of lumbar or lumbosacral intervertebral disc    Type 2 diabetes mellitus without complication ([address]    Hyperlipidemia    Hypertension    Lumbar radiculopathy, chronic    Lumbar spondylosis    PVOD (pulmonary veno-occlusive disease) ([address]    AICD (automatic cardioverter/defibrillator) present    Coronary artery disease involving native coronary artery of native heart without angina pectoris    Gastritis without bleeding    Paroxysmal atrial fibrillation (HCC)    VT (ventricular tachycardia) (HCC)    SOB (shortness of breath)    Claudication ([address]    PAD (peripheral artery disease) ([address]    Bilateral leg edema    S/P right coronary artery (RCA) stent placement    Presence of stent in LAD coronary artery       Allergies   Allergen Reactions    Coreg [Carvedilol] Other (See Comments)     Low pause     Pravastatin Other (See Comments)     Myalgias     Zocor [Simvastatin]      norvasc        Medications listed as ordered at the time of discharge from hospital      Medications marked \"taking\" at this time  Outpatient Medications Marked as Taking for the 5/7/19 encounter (Office Visit) with Shobha Omalley MD   Medication Sig Dispense Refill    minocycline (MINOCIN;DYNACIN) 100 MG capsule Take 100 mg by mouth 2 times daily      oxyCODONE-acetaminophen (PERCOCET) 5-325 MG per tablet Take by mouth.  0    blood glucose test strips (ONE TOUCH ULTRA TEST) strip USE TO CHECK GLUCOSE TWICE DAILY 100 strip 5    glimepiride (AMARYL) 2 MG tablet TAKE TWO TABLETS BY MOUTH TWICE DAILY 360 tablet 3    furosemide (LASIX) 40 MG tablet Take 1 tablet by mouth daily 90 tablet 3    nitroGLYCERIN (NITROSTAT) 0.4 MG SL tablet DISSOLVE ONE TABLET UNDER THE TONGUE EVERY 5 MINUTES AS NEEDED FOR CHEST PAIN.   DO NOT EXCEED A TOTAL OF 3 DOSES IN 15 MINUTES 25 tablet 2    potassium chloride (KLOR-CON M) 20 MEQ extended release tablet Take 1 tablet by mouth daily 90 tablet 1    atorvastatin (LIPITOR) 10 MG tablet Take 1 tablet by mouth daily 90 tablet 0    Handicap Placard MISC by Does not apply route Expires 12/2023 1 each 0    clopidogrel (PLAVIX) 75 MG tablet TAKE ONE TABLET BY MOUTH ONCE DAILY 30 tablet 5    magnesium oxide (MAG-OX) 400 MG tablet Take 400 mg by mouth daily      triamcinolone (KENALOG) 0.025 % ointment Apply topically 2 times daily to areas of eczema on lower legs 80 g 2    cilostazol (PLETAL) 100 MG tablet TAKE ONE TABLET BY MOUTH TWICE DAILY 180 tablet 3    amiodarone (CORDARONE) 200 MG tablet Take 1 tablet by mouth daily 90 tablet 3    metoprolol tartrate (LOPRESSOR) 50 MG tablet Take 1 tablet by mouth 2 times daily 60 tablet 5    insulin detemir (LEVEMIR FLEXTOUCH) 100 UNIT/ML injection pen Inject 25 Units into the skin nightly 1 sample given 8/25/16  [id number]  11/17exp date  8492-7300-30 5 Pen 3    ipratropium-albuterol (DUONEB) 0.5-2.5 (3) MG/3ML SOLN nebulizer solution Inhale 3 mLs into the lungs 4 times daily (Patient taking differently: Inhale 3 mLs into the lungs every 4 hours as needed ) 360 mL 3    lisinopril (PRINIVIL;ZESTRIL) 40 MG tablet Take 40 mg by mouth daily      omeprazole (PRILOSEC) 20 MG delayed release capsule Take 20 mg by mouth every evening      Omega-3 Fatty Acids (OMEGA 3 500) 500 MG CAPS Take 2 capsules by mouth daily      hydrocortisone (WESTCORT) 0.2 % cream Apply topically 2 times daily. 30 g 1    Insulin Pen Needle 29G X 12.7MM MISC 1 each by Does not apply route daily 100 each 3    saxagliptin (ONGLYZA) 5 MG TABS tablet Take 5 mg by mouth daily      metFORMIN (GLUCOPHAGE) 1000 MG tablet Take 1 tablet by mouth 2 times daily (with meals) 180 tablet 3    aspirin 81 MG tablet Take 81 mg by mouth daily          Medications patient taking as of now reconciled against medications ordered at time of hospital discharge: No    Chief Complaint   Patient presents with    Follow-up     knee surgery on 4/15/19 at [facility]       HPI    Inpatient course: Discharge summary reviewed- see chart. He got left knee TR surgery done -was sent to rehab for 2 wks - sent to home with Home health care - PT 3 times per week . Going well. He takes percocets po prn for pain relief . His BP running low at home and here as well. He denied feeling dizzy /lightheaded . Feels normal   He lost significant weight since last OV - 240 lbs to 222 lbs   He was taking Lisinopril 40 mg od po ., Metprolol 50 mg bid , Lasix 40 mg od po     Type 2 DM  Well controlled on current med regimen   Denied hypoglycemia events     CHF , stable , systolic , A fib rate controlled   Follows with Dr Selene Abbott this Thursday         Interval history/Current status:     Vitals:    05/07/19 1550 05/07/19 1551   BP: (!) 80/48 (!) 78/43   Pulse:  62   Resp:  16   SpO2:  98%   Weight:  222 lb 9.6 oz (101 kg)     Body mass index is 30.19 kg/m².    Wt Readings from Last 3 Encounters:   05/07/19 222 lb 9.6 oz (101 kg)   04/04/19 240 lb (108.9 kg)   03/15/19 244 lb 6.4 oz (110.9 kg)     BP Readings from Last 3 Encounters:   05/07/19 (!) 78/43   04/04/19 112/64   03/15/19 134/80       Review of Systems   Constitutional: Negative for activity change, appetite change, fatigue, fever and unexpected weight Nursing note and vitals reviewed. Assessment/Plan:  1. Status post left knee replacement  Continue PT   F/u with Ortho as scheduled end of this month     2. Essential hypertension - low   Recommended him to take Lisinopril 20 mg od po instead 40 mg od po   Continue Metoprolol 50 mg bid , lasix 40 mg od po   - check BP daily and inform office tomorrow about BP readings     3. Paroxysmal atrial fibrillation (HCC)  Rate controlled   Continue BB current dose and amiodarone       4. Type 2 diabetes mellitus without complication, unspecified whether long term insulin use (HCC)  HbA1c - 6.9 - well controlled   - continue current med regimen     5. Bilateral leg edema  - stable on current lasix dose 40 mg od po     6. AICD (automatic cardioverter/defibrillator) present      7.  CHF  Follow with Cardio as scheduled     Medical Decision Making: moderate complexity

## 2019-05-09 PROBLEM — I51.7 MILD CONCENTRIC LEFT VENTRICULAR HYPERTROPHY (LVH): Status: ACTIVE | Noted: 2019-05-09

## 2019-05-09 PROBLEM — I77.810 DILATED AORTIC ROOT (HCC): Status: ACTIVE | Noted: 2019-05-09

## 2019-05-29 ENCOUNTER — CARE COORDINATION (OUTPATIENT)
Dept: CARE COORDINATION | Age: 74
End: 2019-05-29

## 2019-05-29 NOTE — CARE COORDINATION
Patient contacted writer. He has questions about his medications and BS. He's not followed by care coordination and writer offered to transfer him to the office clinical staff and declined.     Will route the call for follow up

## 2019-05-30 ENCOUNTER — TELEPHONE (OUTPATIENT)
Dept: PRIMARY CARE CLINIC | Age: 74
End: 2019-05-30

## 2019-05-30 DIAGNOSIS — G47.00 INSOMNIA, UNSPECIFIED TYPE: ICD-10-CM

## 2019-05-30 RX ORDER — ZOLPIDEM TARTRATE 10 MG/1
10 TABLET ORAL NIGHTLY PRN
Qty: 30 TABLET | Refills: 2 | Status: SHIPPED | OUTPATIENT
Start: 2019-05-30 | End: 2022-02-07 | Stop reason: SDUPTHER

## 2019-06-17 ENCOUNTER — OFFICE VISIT (OUTPATIENT)
Dept: PRIMARY CARE CLINIC | Age: 74
End: 2019-06-17
Payer: MEDICARE

## 2019-06-17 VITALS
DIASTOLIC BLOOD PRESSURE: 84 MMHG | BODY MASS INDEX: 29.77 KG/M2 | RESPIRATION RATE: 15 BRPM | HEIGHT: 72 IN | OXYGEN SATURATION: 97 % | SYSTOLIC BLOOD PRESSURE: 136 MMHG | WEIGHT: 219.8 LBS | HEART RATE: 66 BPM

## 2019-06-17 DIAGNOSIS — E11.9 TYPE 2 DIABETES MELLITUS WITHOUT COMPLICATION, WITHOUT LONG-TERM CURRENT USE OF INSULIN (HCC): Chronic | ICD-10-CM

## 2019-06-17 DIAGNOSIS — E11.9 TYPE 2 DIABETES MELLITUS WITHOUT COMPLICATION, UNSPECIFIED WHETHER LONG TERM INSULIN USE (HCC): Primary | ICD-10-CM

## 2019-06-17 LAB — HBA1C MFR BLD: 6.1 %

## 2019-06-17 PROCEDURE — 99214 OFFICE O/P EST MOD 30 MIN: CPT | Performed by: NURSE PRACTITIONER

## 2019-06-17 PROCEDURE — 83036 HEMOGLOBIN GLYCOSYLATED A1C: CPT | Performed by: NURSE PRACTITIONER

## 2019-06-17 RX ORDER — ONDANSETRON HYDROCHLORIDE 8 MG/1
8 TABLET, FILM COATED ORAL EVERY 8 HOURS PRN
Qty: 20 TABLET | Refills: 0 | Status: ON HOLD
Start: 2019-06-17 | End: 2021-08-10 | Stop reason: ALTCHOICE

## 2019-06-17 ASSESSMENT — ENCOUNTER SYMPTOMS
ABDOMINAL PAIN: 0
COUGH: 0
NAUSEA: 1
SHORTNESS OF BREATH: 0
BACK PAIN: 1

## 2019-06-17 ASSESSMENT — PATIENT HEALTH QUESTIONNAIRE - PHQ9
SUM OF ALL RESPONSES TO PHQ QUESTIONS 1-9: 2
SUM OF ALL RESPONSES TO PHQ QUESTIONS 1-9: 2
1. LITTLE INTEREST OR PLEASURE IN DOING THINGS: 1
2. FEELING DOWN, DEPRESSED OR HOPELESS: 1
SUM OF ALL RESPONSES TO PHQ9 QUESTIONS 1 & 2: 2

## 2019-06-17 NOTE — PROGRESS NOTES
[facility] PRIMARY CARE  [address] [place] [address] [address]  [place] [address]  Dept: [phone]  Dept Fax: [phone]    Cary Rutherford is a 69 y.o. male who presentstoday for his medical conditions/complaints as noted below. Cary Rutherford is c/o of  Chief Complaint   Patient presents with    Back Pain     3 month recheck     Diabetes     3 month recheck            HPI:     Presents for 3 month recheck on DM/chronic back pain/insomnia  Using percocet prn for chronic back pain  Knee pain improved, followed with Dr. Buckner Parul  Has lost 25lb since last visit  States his stomach has been upset and unable to eat  Very stressed  Has been holding his insulin d/t hypoglycemia        Hemoglobin A1C (%)   Date Value   06/17/2019 6.1   03/15/2019 6.9   12/17/2018 7.2             ( goal A1C is < 7)   Microalb/Crt.  Ratio (mcg/mg creat)   Date Value   05/24/2017 26 (H)     LDL Cholesterol (mg/dL)   Date Value   05/31/2016 105     LDL Calculated (mg/dL)   Date Value   10/29/2018 162 (A)   06/03/2017 95   04/01/2015 90       (goal LDL is <100)   AST (U/L)   Date Value   04/02/2019 11     ALT (U/L)   Date Value   04/02/2019 23     BUN (mg/dL)   Date Value   04/02/2019 23     BP Readings from Last 3 Encounters:   06/17/19 136/84   05/09/19 (!) 130/90   05/07/19 (!) 78/43          ([id number])    Past Medical History:   Diagnosis Date    Arrhythmia     CAD (coronary artery disease)     GERD (gastroesophageal reflux disease)     [facility] attack ([facility] [address]     Hyperlipidemia     Hypertension     Ischemic cardiomyopathy     Osteoarthritis     Type II or unspecified type diabetes mellitus without mention of complication, not stated as uncontrolled       Past Surgical History:   Procedure Laterality Date    APPENDECTOMY      CARDIAC CATHETERIZATION      CARDIAC SURGERY      stents 2015    COLONOSCOPY      CORONARY ANGIOPLASTY WITH STENT PLACEMENT  07/2018    [facility]    DIAGNOSTIC CARDIAC clopidogrel (PLAVIX) 75 MG tablet TAKE ONE TABLET BY MOUTH ONCE DAILY 30 tablet 5    gabapentin (NEURONTIN) 300 MG capsule TAKE 1 CAPSULE BY MOUTH THREE TIMES DAILY 90 capsule 2    magnesium oxide (MAG-OX) 400 MG tablet Take 400 mg by mouth daily      triamcinolone (KENALOG) 0.025 % ointment Apply topically 2 times daily to areas of eczema on lower legs 80 g 2    cilostazol (PLETAL) 100 MG tablet TAKE ONE TABLET BY MOUTH TWICE DAILY 180 tablet 3    amiodarone (CORDARONE) 200 MG tablet Take 1 tablet by mouth daily 90 tablet 3    metoprolol tartrate (LOPRESSOR) 50 MG tablet Take 1 tablet by mouth 2 times daily 60 tablet 5    ipratropium-albuterol (DUONEB) 0.5-2.5 (3) MG/3ML SOLN nebulizer solution Inhale 3 mLs into the lungs 4 times daily (Patient taking differently: Inhale 3 mLs into the lungs every 4 hours as needed ) 360 mL 3    lisinopril (PRINIVIL;ZESTRIL) 40 MG tablet Take 40 mg by mouth daily      omeprazole (PRILOSEC) 20 MG delayed release capsule Take 20 mg by mouth every evening      Omega-3 Fatty Acids (OMEGA 3 500) 500 MG CAPS Take 2 capsules by mouth daily      hydrocortisone (WESTCORT) 0.2 % cream Apply topically 2 times daily. 30 g 1    Insulin Pen Needle 29G X 12.7MM MISC 1 each by Does not apply route daily 100 each 3    saxagliptin (ONGLYZA) 5 MG TABS tablet Take 5 mg by mouth daily      metFORMIN (GLUCOPHAGE) 1000 MG tablet Take 1 tablet by mouth 2 times daily (with meals) 180 tablet 3    aspirin 81 MG tablet Take 81 mg by mouth daily       No current facility-administered medications for this visit.       Allergies   Allergen Reactions    Coreg [Carvedilol] Other (See Comments)     Low pause     Pravastatin Other (See Comments)     Myalgias     Zocor [Simvastatin]      [facility]        Health Maintenance   Topic Date Due    Shingles Vaccine (1 of 2) 04/17/1996    Diabetic retinal exam  06/04/2016    PSA counseling  06/03/2018    Diabetic foot exam  01/17/2019    Diabetic microalbuminuria test  10/29/2019    Lipid screen  10/29/2019    TSH testing  10/29/2019    A1C test (Diabetic or Prediabetic)  03/15/2020    Potassium monitoring  05/21/2020    Creatinine monitoring  05/21/2020    Colon cancer screen colonoscopy  05/07/2025    DTaP/Tdap/Td vaccine (2 - Tdap) 08/20/2027    Flu vaccine  Completed    Pneumococcal 65+ years Vaccine  Completed    AAA screen  Completed    Hepatitis C screen  Completed       Subjective:      Review of Systems   Constitutional: Negative for chills, fatigue and fever. HENT: Negative for congestion. Eyes: Negative for visual disturbance. Respiratory: Negative for cough and shortness of breath. Cardiovascular: Negative for chest pain and palpitations. Gastrointestinal: Positive for nausea. Negative for abdominal pain. Genitourinary: Negative for difficulty urinating and dysuria. Musculoskeletal: Positive for back pain. Negative for arthralgias. Neurological: Negative for dizziness and headaches. Psychiatric/Behavioral: Negative for self-injury, sleep disturbance and suicidal ideas. The patient is nervous/anxious. Objective:     Physical Exam   Constitutional: He is oriented to person, place, and time. He appears well-developed and well-nourished. HENT:   Head: Normocephalic and atraumatic. Eyes: Pupils are equal, round, and reactive to light. Neck: Normal range of motion. Cardiovascular: Normal rate, regular rhythm and normal heart sounds. Pulmonary/Chest: Effort normal and breath sounds normal.   Abdominal: Soft. Bowel sounds are normal. There is no tenderness. Musculoskeletal: Normal range of motion. Neurological: He is alert and oriented to person, place, and time. Skin: Skin is warm and dry. Psychiatric: He has a normal mood and affect. His behavior is normal. Judgment and thought content normal.   Nursing note and vitals reviewed.     /84   Pulse 66   Resp 15   Ht 6' (1.829 m)   Wt 219 lb 12.8 oz (99.7 kg)   SpO2 97%   BMI 29.81 kg/m²     Assessment:       Diagnosis Orders   1. Type 2 diabetes mellitus without complication, unspecified whether long term insulin use (HCC)  POCT glycosylated hemoglobin (Hb A1C)   2. Type 2 diabetes mellitus without complication, without long-term current use of insulin (HCC)  insulin detemir (LEVEMIR FLEXTOUCH) 100 UNIT/ML injection pen             Plan:      Return in about 3 months (around 9/17/2019) for Diabetes. 1. DM- Hg1c 6.9 to 6.1. Decrease insulin to 10 units nightly. Continue all other meds. Continue to monitor BS. Notify office for any problems/concerns. Follow up in 3 months for recheck/repeat HGa1c.  2. Nausea- Patient has rx for zofran 8mg TID prn he would like to try. He will notify office if refill is needed. Follow up as needed. Of the 25 minute duration appointment visit, Katt [facility] spent at least 50% of the face-to-face time in counseling, explanation of diagnosis, planning of further management, and answering all questions. Orders Placed This Encounter   Procedures    POCT glycosylated hemoglobin (Hb A1C)        Orders Placed This Encounter   Medications    insulin detemir (LEVEMIR FLEXTOUCH) 100 UNIT/ML injection pen     Sig: Inject 10 Units into the skin nightly 1 sample given 8/25/16  [id number]  11/17exp date  7730-3816-06     Dispense:  5 pen     Refill:  3       Patient given educational materials - see patient instructions. Discussed use, benefit, and side effects of prescribed medications. All patientquestions answered. Pt voiced understanding. Reviewed health maintenance. Instructedto continue current medications, diet and exercise. Patient agreed with treatmentplan. Follow up as directed.      Electronicallysigned by ERICK Gibbons CNP on 6/17/2019 at 10:08 AM

## 2019-07-05 RX ORDER — CILOSTAZOL 100 MG/1
TABLET ORAL
Qty: 60 TABLET | Refills: 11 | Status: SHIPPED | OUTPATIENT
Start: 2019-07-05 | End: 2020-08-10 | Stop reason: SDUPTHER

## 2019-07-05 RX ORDER — POTASSIUM CHLORIDE 20 MEQ/1
TABLET, EXTENDED RELEASE ORAL
Qty: 90 TABLET | Refills: 1 | Status: SHIPPED | OUTPATIENT
Start: 2019-07-05 | End: 2020-01-06

## 2019-07-08 DIAGNOSIS — G89.4 CHRONIC PAIN SYNDROME: ICD-10-CM

## 2019-07-08 RX ORDER — OXYCODONE HYDROCHLORIDE AND ACETAMINOPHEN 5; 325 MG/1; MG/1
1 TABLET ORAL EVERY 8 HOURS PRN
Qty: 90 TABLET | Refills: 0 | Status: SHIPPED | OUTPATIENT
Start: 2019-07-08 | End: 2019-11-07 | Stop reason: SDUPTHER

## 2019-07-24 ENCOUNTER — TELEPHONE (OUTPATIENT)
Dept: PRIMARY CARE CLINIC | Age: 74
End: 2019-07-24

## 2019-10-02 ENCOUNTER — OFFICE VISIT (OUTPATIENT)
Dept: PRIMARY CARE CLINIC | Age: 74
End: 2019-10-02
Payer: MEDICARE

## 2019-10-02 VITALS
BODY MASS INDEX: 32.53 KG/M2 | HEIGHT: 72 IN | RESPIRATION RATE: 18 BRPM | WEIGHT: 240.2 LBS | SYSTOLIC BLOOD PRESSURE: 98 MMHG | DIASTOLIC BLOOD PRESSURE: 62 MMHG | HEART RATE: 64 BPM

## 2019-10-02 DIAGNOSIS — R60.0 LOWER EXTREMITY EDEMA: ICD-10-CM

## 2019-10-02 DIAGNOSIS — Z23 NEED FOR INFLUENZA VACCINATION: ICD-10-CM

## 2019-10-02 DIAGNOSIS — E11.9 TYPE 2 DIABETES MELLITUS WITHOUT COMPLICATION, UNSPECIFIED WHETHER LONG TERM INSULIN USE (HCC): Primary | ICD-10-CM

## 2019-10-02 LAB — HBA1C MFR BLD: 7.5 %

## 2019-10-02 PROCEDURE — 83036 HEMOGLOBIN GLYCOSYLATED A1C: CPT | Performed by: NURSE PRACTITIONER

## 2019-10-02 PROCEDURE — G0438 PPPS, INITIAL VISIT: HCPCS | Performed by: NURSE PRACTITIONER

## 2019-10-02 ASSESSMENT — LIFESTYLE VARIABLES
HOW OFTEN DURING THE LAST YEAR HAVE YOU NEEDED AN ALCOHOLIC DRINK FIRST THING IN THE MORNING TO GET YOURSELF GOING AFTER A NIGHT OF HEAVY DRINKING: 0
HAS A RELATIVE, FRIEND, DOCTOR, OR ANOTHER HEALTH PROFESSIONAL EXPRESSED CONCERN ABOUT YOUR DRINKING OR SUGGESTED YOU CUT DOWN: 0
HOW MANY STANDARD DRINKS CONTAINING ALCOHOL DO YOU HAVE ON A TYPICAL DAY: 0
HOW OFTEN DURING THE LAST YEAR HAVE YOU FAILED TO DO WHAT WAS NORMALLY EXPECTED FROM YOU BECAUSE OF DRINKING: 0
HOW OFTEN DURING THE LAST YEAR HAVE YOU HAD A FEELING OF GUILT OR REMORSE AFTER DRINKING: 0
AUDIT-C TOTAL SCORE: 2
AUDIT TOTAL SCORE: 2
HOW OFTEN DO YOU HAVE SIX OR MORE DRINKS ON ONE OCCASION: 0
HOW OFTEN DO YOU HAVE A DRINK CONTAINING ALCOHOL: 2
HOW OFTEN DURING THE LAST YEAR HAVE YOU FOUND THAT YOU WERE NOT ABLE TO STOP DRINKING ONCE YOU HAD STARTED: 0
HOW OFTEN DURING THE LAST YEAR HAVE YOU BEEN UNABLE TO REMEMBER WHAT HAPPENED THE NIGHT BEFORE BECAUSE YOU HAD BEEN DRINKING: 0
HAVE YOU OR SOMEONE ELSE BEEN INJURED AS A RESULT OF YOUR DRINKING: 0

## 2019-10-02 ASSESSMENT — ANXIETY QUESTIONNAIRES: GAD7 TOTAL SCORE: 0

## 2019-10-02 ASSESSMENT — PATIENT HEALTH QUESTIONNAIRE - PHQ9
SUM OF ALL RESPONSES TO PHQ QUESTIONS 1-9: 0
SUM OF ALL RESPONSES TO PHQ QUESTIONS 1-9: 0

## 2019-10-16 ENCOUNTER — TELEPHONE (OUTPATIENT)
Dept: PRIMARY CARE CLINIC | Age: 74
End: 2019-10-16

## 2019-11-07 DIAGNOSIS — G89.4 CHRONIC PAIN SYNDROME: ICD-10-CM

## 2019-11-07 RX ORDER — OXYCODONE HYDROCHLORIDE AND ACETAMINOPHEN 5; 325 MG/1; MG/1
1 TABLET ORAL EVERY 8 HOURS PRN
Qty: 90 TABLET | Refills: 0 | Status: SHIPPED | OUTPATIENT
Start: 2019-11-07 | End: 2020-01-10 | Stop reason: SDUPTHER

## 2020-01-06 RX ORDER — POTASSIUM CHLORIDE 20 MEQ/1
TABLET, EXTENDED RELEASE ORAL
Qty: 90 TABLET | Refills: 0 | Status: SHIPPED | OUTPATIENT
Start: 2020-01-06 | End: 2020-03-19 | Stop reason: SDUPTHER

## 2020-01-06 NOTE — TELEPHONE ENCOUNTER
Lumbago     Spinal stenosis, lumbar region, without neurogenic claudication     Degeneration of lumbar or lumbosacral intervertebral disc     Type 2 diabetes mellitus without complication ([facility])     Hyperlipidemia     Hypertension     Lumbar radiculopathy, chronic     Lumbar spondylosis     PVOD (pulmonary veno-occlusive disease) ([facility])     AICD (automatic cardioverter/defibrillator) present     Coronary artery disease involving native coronary artery of native heart without angina pectoris     Gastritis without bleeding     Paroxysmal atrial fibrillation ([facility])     VT (ventricular tachycardia) ([facility])     SOB (shortness of breath)     Claudication ([facility])     PAD (peripheral artery disease) ([facility])     Bilateral leg edema     S/P right coronary artery (RCA) stent placement     Presence of stent in LAD coronary artery     Mild concentric left ventricular hypertrophy (LVH)     Dilated aortic root ([address]

## 2020-01-10 ENCOUNTER — OFFICE VISIT (OUTPATIENT)
Dept: PRIMARY CARE CLINIC | Age: 75
End: 2020-01-10
Payer: MEDICARE

## 2020-01-10 VITALS
OXYGEN SATURATION: 94 % | BODY MASS INDEX: 33.62 KG/M2 | WEIGHT: 248.2 LBS | DIASTOLIC BLOOD PRESSURE: 84 MMHG | HEART RATE: 62 BPM | SYSTOLIC BLOOD PRESSURE: 126 MMHG | HEIGHT: 72 IN | RESPIRATION RATE: 14 BRPM

## 2020-01-10 LAB — HBA1C MFR BLD: 7.4 %

## 2020-01-10 PROCEDURE — 83036 HEMOGLOBIN GLYCOSYLATED A1C: CPT | Performed by: NURSE PRACTITIONER

## 2020-01-10 PROCEDURE — 99214 OFFICE O/P EST MOD 30 MIN: CPT | Performed by: NURSE PRACTITIONER

## 2020-01-10 RX ORDER — OXYCODONE HYDROCHLORIDE AND ACETAMINOPHEN 5; 325 MG/1; MG/1
1 TABLET ORAL EVERY 8 HOURS PRN
Qty: 90 TABLET | Refills: 0 | Status: SHIPPED | OUTPATIENT
Start: 2020-01-10 | End: 2020-03-11 | Stop reason: SDUPTHER

## 2020-01-10 RX ORDER — POLYMYXIN B SULFATE AND TRIMETHOPRIM 1; 10000 MG/ML; [USP'U]/ML
1 SOLUTION OPHTHALMIC EVERY 4 HOURS
Qty: 10 ML | Refills: 0 | Status: SHIPPED | OUTPATIENT
Start: 2020-01-10 | End: 2020-01-20

## 2020-01-10 ASSESSMENT — PATIENT HEALTH QUESTIONNAIRE - PHQ9
2. FEELING DOWN, DEPRESSED OR HOPELESS: 0
SUM OF ALL RESPONSES TO PHQ QUESTIONS 1-9: 0
SUM OF ALL RESPONSES TO PHQ9 QUESTIONS 1 & 2: 0
SUM OF ALL RESPONSES TO PHQ QUESTIONS 1-9: 0
1. LITTLE INTEREST OR PLEASURE IN DOING THINGS: 0

## 2020-01-10 ASSESSMENT — ENCOUNTER SYMPTOMS
ABDOMINAL PAIN: 0
COUGH: 0
SHORTNESS OF BREATH: 0
BACK PAIN: 1

## 2020-01-10 NOTE — PROGRESS NOTES
[address] PRIMARY CARE  Edwin Pierre [address]  [address] [address]  Dept: [phone]  Dept Fax: [phone]    Griselda Ly is a 69 y.o. male who presentstoday for his medical conditions/complaints as noted below. Griselda Ly is c/o of  Chief Complaint   Patient presents with    Diabetes     3 month recheck     Knee Pain     left knee is sore            HPI:     Presents today for diabetic check    DM- Has gained 6lbs since last visit   Fasting blood sugars have been 120-170 at home  Is taking 25units Levemir nightly and 2mg Glimepiride   A1C went from 7.5 to 7.4 today     C/o left knee pain  Fell 6 weeks ago  He tripped over a step and landed on left knee and head at work  Was not seen following the fall  Has been having an aching pain, worsening with the weather change   Takes Tylenol PRN which will help   Does not interfere with gait   Declines xray or further eval  Has appt with Dr. Brianna Ortega next month    C/o bilateral eye redness/itching. Worse with his cats  [facility]  Would like renewal    Using percocet TID for chronic lower back pain  This keeps his symptoms well managed, denies any side effects with med  Requesting refill today    F/u with cardiology Dr. Jennifer Ramos on 2/3/2020  Had pacemaker checked this past week  Wears compression stocking, helps with swelling per patient     Up-to-date on immunizations  Denies any other problems/concerns       Hemoglobin A1C (%)   Date Value   01/10/2020 7.4   10/02/2019 7.5   06/17/2019 6.1             ( goal A1C is < 7)   Microalb/Crt.  Ratio (mcg/mg creat)   Date Value   05/24/2017 26 (H)     LDL Cholesterol (mg/dL)   Date Value   05/31/2016 105     LDL Calculated (mg/dL)   Date Value   10/29/2018 162 (A)   06/03/2017 95   04/01/2015 90       (goal LDL is <100)   AST (U/L)   Date Value   04/02/2019 11     ALT (U/L)   Date Value   04/02/2019 23     BUN (mg/dL)   Date Value   04/02/2019 23 0    potassium chloride (KLOR-CON M) 20 MEQ extended release tablet TAKE 1 TABLET BY MOUTH ONCE DAILY 90 tablet 0    furosemide (LASIX) 40 MG tablet TAKE 1 TABLET BY MOUTH ONCE DAILY 90 tablet 0    cilostazol (PLETAL) 100 MG tablet TAKE 1 TABLET BY MOUTH TWICE DAILY 60 tablet 11    amiodarone (CORDARONE) 200 MG tablet TAKE 1 TABLET BY MOUTH ONCE DAILY 90 tablet 3    insulin detemir (LEVEMIR FLEXTOUCH) 100 UNIT/ML injection pen Inject 10 Units into the skin nightly 1 sample given 8/25/16  [id number]  11/17exp date  9033-4491-42 (Patient taking differently: Inject 25 Units into the skin nightly 1 sample given 8/25/16  [id number]  11/17exp date  8573-1674-31) 5 pen 3    ondansetron (ZOFRAN) 8 MG tablet Take 1 tablet by mouth every 8 hours as needed for Nausea 20 tablet 0    blood glucose test strips (ONE TOUCH ULTRA TEST) strip USE TO CHECK GLUCOSE TWICE DAILY 100 strip 5    glimepiride (AMARYL) 2 MG tablet TAKE TWO TABLETS BY MOUTH TWICE DAILY 360 tablet 3    nitroGLYCERIN (NITROSTAT) 0.4 MG SL tablet DISSOLVE ONE TABLET UNDER THE TONGUE EVERY 5 MINUTES AS NEEDED FOR CHEST PAIN.   DO NOT EXCEED A TOTAL OF 3 DOSES IN 15 MINUTES 25 tablet 2    atorvastatin (LIPITOR) 10 MG tablet Take 1 tablet by mouth daily 90 tablet 0    Handicap Placard MISC by Does not apply route Expires 12/2023 1 each 0    clopidogrel (PLAVIX) 75 MG tablet TAKE ONE TABLET BY MOUTH ONCE DAILY 30 tablet 5    gabapentin (NEURONTIN) 300 MG capsule TAKE 1 CAPSULE BY MOUTH THREE TIMES DAILY 90 capsule 2    magnesium oxide (MAG-OX) 400 MG tablet Take 400 mg by mouth daily      triamcinolone (KENALOG) 0.025 % ointment Apply topically 2 times daily to areas of eczema on lower legs 80 g 2    metoprolol tartrate (LOPRESSOR) 50 MG tablet Take 1 tablet by mouth 2 times daily 60 tablet 5    ipratropium-albuterol (DUONEB) 0.5-2.5 (3) MG/3ML SOLN nebulizer solution Inhale 3 mLs into the lungs 4 times daily (Patient taking differently: Inhale 3 mLs into the lungs every 4 hours as needed ) 360 mL 3    lisinopril (PRINIVIL;ZESTRIL) 40 MG tablet Take 40 mg by mouth daily      omeprazole (PRILOSEC) 20 MG delayed release capsule Take 20 mg by mouth every evening      Omega-3 Fatty Acids (OMEGA 3 500) 500 MG CAPS Take 2 capsules by mouth daily      hydrocortisone (WESTCORT) 0.2 % cream Apply topically 2 times daily. 30 g 1    Insulin Pen Needle 29G X 12.7MM MISC 1 each by Does not apply route daily 100 each 3    saxagliptin (ONGLYZA) 5 MG TABS tablet Take 5 mg by mouth daily      metFORMIN (GLUCOPHAGE) 1000 MG tablet Take 1 tablet by mouth 2 times daily (with meals) 180 tablet 3    aspirin 81 MG tablet Take 81 mg by mouth daily       No current facility-administered medications for this visit. Allergies   Allergen Reactions    Coreg [Carvedilol] Other (See Comments)     Low pause     Pravastatin Other (See Comments)     Myalgias     Zocor [Simvastatin]      norvasc        Health Maintenance   Topic Date Due    Shingles Vaccine (1 of 2) 04/17/1996    Diabetic retinal exam  06/04/2016    PSA counseling  06/03/2018    Diabetic foot exam  01/17/2019    Diabetic microalbuminuria test  10/29/2019    Lipid screen  10/29/2019    TSH testing  10/29/2019    Potassium monitoring  05/21/2020    Creatinine monitoring  05/21/2020    Annual Wellness Visit (AWV)  10/01/2020    A1C test (Diabetic or Prediabetic)  10/02/2020    Colon cancer screen colonoscopy  05/07/2025    DTaP/Tdap/Td vaccine (2 - Tdap) 08/20/2027    Flu vaccine  Completed    Pneumococcal 65+ years Vaccine  Completed    AAA screen  Completed    Hepatitis C screen  Completed       Subjective:      Review of Systems   Constitutional: Negative for chills, fatigue and fever. HENT: Negative for congestion. Eyes: Negative for visual disturbance. Respiratory: Negative for cough and shortness of breath. Cardiovascular: Negative for chest pain and palpitations.    Gastrointestinal:

## 2020-03-11 ENCOUNTER — OFFICE VISIT (OUTPATIENT)
Dept: PRIMARY CARE CLINIC | Age: 75
End: 2020-03-11
Payer: MEDICARE

## 2020-03-11 VITALS
BODY MASS INDEX: 34.66 KG/M2 | RESPIRATION RATE: 16 BRPM | WEIGHT: 247.6 LBS | DIASTOLIC BLOOD PRESSURE: 82 MMHG | HEART RATE: 61 BPM | SYSTOLIC BLOOD PRESSURE: 136 MMHG | OXYGEN SATURATION: 96 % | HEIGHT: 71 IN

## 2020-03-11 PROBLEM — E11.42 DIABETIC POLYNEUROPATHY ASSOCIATED WITH TYPE 2 DIABETES MELLITUS (HCC): Status: ACTIVE | Noted: 2020-03-11

## 2020-03-11 PROCEDURE — 99214 OFFICE O/P EST MOD 30 MIN: CPT | Performed by: NURSE PRACTITIONER

## 2020-03-11 PROCEDURE — 3051F HG A1C>EQUAL 7.0%<8.0%: CPT | Performed by: NURSE PRACTITIONER

## 2020-03-11 RX ORDER — OXYCODONE HYDROCHLORIDE AND ACETAMINOPHEN 5; 325 MG/1; MG/1
1 TABLET ORAL EVERY 8 HOURS PRN
Qty: 90 TABLET | Refills: 0 | Status: SHIPPED | OUTPATIENT
Start: 2020-03-11 | End: 2020-06-15 | Stop reason: SDUPTHER

## 2020-03-11 ASSESSMENT — PATIENT HEALTH QUESTIONNAIRE - PHQ9
2. FEELING DOWN, DEPRESSED OR HOPELESS: 0
SUM OF ALL RESPONSES TO PHQ QUESTIONS 1-9: 0
SUM OF ALL RESPONSES TO PHQ9 QUESTIONS 1 & 2: 0
1. LITTLE INTEREST OR PLEASURE IN DOING THINGS: 0
SUM OF ALL RESPONSES TO PHQ QUESTIONS 1-9: 0

## 2020-03-11 ASSESSMENT — ENCOUNTER SYMPTOMS
COUGH: 0
BACK PAIN: 1
SHORTNESS OF BREATH: 1
ABDOMINAL PAIN: 0

## 2020-03-11 NOTE — PROGRESS NOTES
[address] PRIMARY CARE  . [place] [address]  [address] [address]  Dept: [phone]  Dept Fax: [phone]    Mirta Kulkarni is a 69 y.o. male who presentstoday for his medical conditions/complaints as noted below. Mirta Kulkarni is c/o of  Chief Complaint   Patient presents with    [facility] from [facility] 02/12/2020 Pneumonia     Shortness of Breath         HPI:     Presents for ED follow up    [facility] for [facility]  Was given antibiotic, was able to avoid hospitalization    Fasting blood sugars 90-120s  Checks once a day  Will be trying to get out more with warmer weather to promote activity    Continues to have insomnia  Has not been wearing CPAP, feels like 'his head will explode'  Began taking OTC prostate support for the last few months  Feels like it helps with nocturia     Continues to have SOB, denies CP  Dr. Adwoa Malcolm had him take lasix 4/day for 4 days, completed Sat.   Feels like SOB symptoms improved slightly, 'not much'  Completed stress test on 2/24  With Dr. Adwoa Malcolm, negative  EKG done 2/03  Will be making appointment in July unless he needs to see her sooner  Continues to have some swelling to lower legs, wears compression socks at work    Would like to discuss COPD and if he should be on additional inhaler  Last saw Juana Xavier in 2019  Uses nebulizer once a day, depends 'on how bad I feel'  Needs to schedule follow up with pulmonary, given referral    Stopped taking lipitor 1 mo ago d/t leg pain  Goes to the [place] 3/30 to discuss need for cholesterol medication  Reviewed labs from 11/2019, WNL but he was on the statin at that time  Has stopped use, notified Dr. Adwoa Malcolm  [facility]    Using percocet up to 3x daily for chronic back pain  Pain well controlled with med, have not filled since 1/2020  Discussed decreasing tablet amount, but he will be traveling  At next recheck in June will decrease tabs to #60    Using prostate support OTC, improvement in nocturia noted    Denies any other problems/concerns at this time      Hemoglobin A1C (%)   Date Value   01/10/2020 7.4   10/02/2019 7.5   06/17/2019 6.1             ( goal A1C is < 7)   Microalb/Crt.  Ratio (mcg/mg creat)   Date Value   05/24/2017 26 (H)     LDL Cholesterol (mg/dL)   Date Value   05/31/2016 105     LDL Calculated (mg/dL)   Date Value   10/29/2018 162 (A)   06/03/2017 95   04/01/2015 90       (goal LDL is <100)   AST (U/L)   Date Value   04/02/2019 11     ALT (U/L)   Date Value   04/02/2019 23     BUN (mg/dL)   Date Value   04/02/2019 23     BP Readings from Last 3 Encounters:   03/11/20 136/82   02/24/20 136/78   02/03/20 118/80          ([id number])    Past Medical History:   Diagnosis Date    Arrhythmia     CAD (coronary artery disease)     GERD (gastroesophageal reflux disease)     [facility]     Hyperlipidemia     Hypertension     Ischemic cardiomyopathy     Osteoarthritis     Type II or unspecified type diabetes mellitus without mention of complication, not stated as uncontrolled       Past Surgical History:   Procedure Laterality Date    APPENDECTOMY      CARDIAC CATHETERIZATION      CARDIAC SURGERY      stents 2015    COLONOSCOPY      CORONARY ANGIOPLASTY WITH STENT PLACEMENT  07/2018    [facility]    DIAGNOSTIC CARDIAC CATH LAB PROCEDURE      JOINT REPLACEMENT      knee right parital    PACEMAKER INSERTION      PTCA      ROTATOR CUFF REPAIR      TONSILLECTOMY         Family History   Problem Relation Age of Onset    Heart Disease Mother     High Blood Pressure Mother     Heart Disease Father     Cancer Sister         breast cancer    Cancer Brother         bladder cancer    Diabetes Maternal Grandmother           Social History     Tobacco Use    Smoking status: Former Smoker     Packs/day: 1.00     Years: 40.00     Pack years: 40.00     Types: Cigarettes     Last attempt to quit: 5/7/1994     Years since Vincent Lacey - CNP on 3/11/2020 at 11:43 AM

## 2020-03-16 ENCOUNTER — TELEPHONE (OUTPATIENT)
Dept: PRIMARY CARE CLINIC | Age: 75
End: 2020-03-16

## 2020-03-16 NOTE — TELEPHONE ENCOUNTER
Patient called said he went to the ER and wanted to inform you that his BNP was 491.0       Also, faxing over orders to Pulmonology.  ([employer])

## 2020-03-19 PROBLEM — I50.32 CHRONIC DIASTOLIC CONGESTIVE HEART FAILURE (HCC): Status: ACTIVE | Noted: 2020-03-19

## 2020-03-30 RX ORDER — POTASSIUM CHLORIDE 20 MEQ/1
TABLET, EXTENDED RELEASE ORAL
Qty: 90 TABLET | Refills: 0 | Status: SHIPPED | OUTPATIENT
Start: 2020-03-30 | End: 2020-06-30 | Stop reason: SDUPTHER

## 2020-03-30 RX ORDER — GLIMEPIRIDE 2 MG/1
TABLET ORAL
Qty: 360 TABLET | Refills: 0 | Status: SHIPPED | OUTPATIENT
Start: 2020-03-30 | End: 2020-06-30 | Stop reason: SDUPTHER

## 2020-03-30 NOTE — TELEPHONE ENCOUNTER
[address] [facility] Heart a   6/15/2020 11:20 AM ERICK Jaeger - CNP [facility]            Patient Active Problem List:     Lumbago     Spinal stenosis, lumbar region, without neurogenic claudication     Degeneration of lumbar or lumbosacral intervertebral disc     Type 2 diabetes mellitus without complication ([facility])     Hyperlipidemia     Hypertension     Lumbar radiculopathy, chronic     Lumbar spondylosis     PVOD (pulmonary veno-occlusive disease) ([facility])     AICD (automatic cardioverter/defibrillator) present     Coronary artery disease involving native coronary artery of native heart without angina pectoris     Gastritis without bleeding     Paroxysmal atrial fibrillation ([facility])     VT (ventricular tachycardia) ([facility])     SOB (shortness of breath)     Claudication ([facility])     PAD (peripheral artery disease) ([facility])     Bilateral leg edema     S/P right coronary artery (RCA) stent placement     Presence of stent in LAD coronary artery     Mild concentric left ventricular hypertrophy (LVH)     Dilated aortic root ([facility])     Diabetic polyneuropathy associated with type 2 diabetes mellitus ([facility])     Chronic diastolic congestive heart failure ([address]

## 2020-04-06 ENCOUNTER — VIRTUAL VISIT (OUTPATIENT)
Dept: PRIMARY CARE CLINIC | Age: 75
End: 2020-04-06
Payer: MEDICARE

## 2020-04-06 PROCEDURE — 99441 PR PHYS/QHP TELEPHONE EVALUATION 5-10 MIN: CPT | Performed by: NURSE PRACTITIONER

## 2020-04-06 RX ORDER — GUAIFENESIN AND CODEINE PHOSPHATE 100; 10 MG/5ML; MG/5ML
5 SOLUTION ORAL EVERY 4 HOURS PRN
Qty: 120 ML | Refills: 0 | Status: CANCELLED | OUTPATIENT
Start: 2020-04-06 | End: 2020-04-13

## 2020-04-06 RX ORDER — LEVOFLOXACIN 500 MG/1
500 TABLET, FILM COATED ORAL DAILY
Qty: 10 TABLET | Refills: 0 | Status: CANCELLED | OUTPATIENT
Start: 2020-04-06 | End: 2020-04-16

## 2020-04-06 RX ORDER — GUAIFENESIN AND CODEINE PHOSPHATE 100; 10 MG/5ML; MG/5ML
5 SOLUTION ORAL EVERY 4 HOURS PRN
Qty: 120 ML | Refills: 0 | Status: SHIPPED | OUTPATIENT
Start: 2020-04-06 | End: 2020-04-13

## 2020-04-06 RX ORDER — LEVOFLOXACIN 500 MG/1
500 TABLET, FILM COATED ORAL DAILY
Qty: 10 TABLET | Refills: 0 | Status: SHIPPED | OUTPATIENT
Start: 2020-04-06 | End: 2020-04-16

## 2020-04-06 NOTE — PROGRESS NOTES
Patient does not have my chart, unable to complete telehealth visit    Performed telephone visit. States that his symptoms seem similar to symptoms in March when he went into ER. Having cough/congestion. Afraid it may be pneumonia. Cough does keep him up at night.   Denies any fevers  States blood sugars have been well controlled 107-130, but does not want to use a steroid at this time  Has not been working since he saw Dr. Matt Calle on 3/19/20  States he is not using the percocet while he is not working    Dx Cough    Plan Rx given for levaquin and cheratussin, follow up if no improvement in symptoms with use of meds    Time spent on visit- 5 min

## 2020-04-14 ENCOUNTER — TELEPHONE (OUTPATIENT)
Dept: PRIMARY CARE CLINIC | Age: 75
End: 2020-04-14

## 2020-04-15 NOTE — TELEPHONE ENCOUNTER
Called patient and he said his sugar is 315 now after taking the oral meds.
Jeannette Rolon he can eat. Let the oral meds kick in.   What insulin did he take 12-14 units of this am?  thanks
Patient called in stating that he is having some issues with his blood sugars today. He woke up around ten to 6am feeling shaky, took his blood sugar which read in the 70's so he went and ate a little food and couple pieces of candy. He checked it before going back to bed and it was 107. He then woke up at 9am and checked it, was 263 so he gave 12-14 units of insulin. He took it then again about 45 min later and now it is 348. He stated he hasn't taking his glimepiride or metformin yet or ate breakfast. He said he was going to take his glimepiride and metformin but hold off on eating till he heard from you. Please advise, thank you!
Patient called to give update this morning, he states when he got home last night his blood sugar was 63, so he ate dinner took 25 units of the levemir, ate a dish of ice cream also. This morning when he woke up blood sugar was 122.
Yes at 348 take oral meds  What is his sugar now? thanks
0

## 2020-04-28 ENCOUNTER — TELEPHONE (OUTPATIENT)
Dept: PHARMACY | Facility: CLINIC | Age: 75
End: 2020-04-28

## 2020-05-01 NOTE — TELEPHONE ENCOUNTER
Health Maintenance   Topic Date Due    Shingles Vaccine (1 of 2) 04/17/1996    Diabetic retinal exam  06/04/2016    Diabetic foot exam  01/17/2019    Diabetic microalbuminuria test  10/29/2019    Lipid screen  10/29/2019    TSH testing  10/29/2019    Annual Wellness Visit (AWV)  10/02/2020    A1C test (Diabetic or Prediabetic)  01/10/2021    Potassium monitoring  03/16/2021    Creatinine monitoring  03/16/2021    Colon cancer screen colonoscopy  05/07/2025    DTaP/Tdap/Td vaccine (2 - Tdap) 08/20/2027    Flu vaccine  Completed    Pneumococcal 65+ years Vaccine  Completed    AAA screen  Completed    Hepatitis C screen  Completed    Hepatitis A vaccine  Aged Out    Hib vaccine  Aged Out    Meningococcal (ACWY) vaccine  Aged Out             (applicable per patient's age: Cancer Screenings, Depression Screening, Fall Risk Screening, Immunizations)    Hemoglobin A1C (%)   Date Value   01/10/2020 7.4   10/02/2019 7.5   06/17/2019 6.1     Microalb/Crt.  Ratio (mcg/mg creat)   Date Value   05/24/2017 26 (H)     LDL Cholesterol (mg/dL)   Date Value   05/31/2016 105     LDL Calculated (mg/dL)   Date Value   10/29/2018 162 (A)     AST (U/L)   Date Value   04/02/2019 11     ALT (U/L)   Date Value   04/02/2019 23     BUN (mg/dL)   Date Value   04/02/2019 23      (goal A1C is < 7)   (goal LDL is <100) need 30-50% reduction from baseline     BP Readings from Last 3 Encounters:   04/27/20 130/80   03/19/20 130/80   03/11/20 136/82    (goal /80)      All Future Testing planned in [facility]:  Lab Frequency Next Occurrence   EKG 12 Lead Once 02/25/2020   ECHO Complete 2D W Doppler W Color Once 79/36/1101   Basic Metabolic Panel Once 29/14/4711   Brain Natriuretic Peptide Once 90/72/3105   Basic Metabolic Panel Once 41/78/9233   Brain Natriuretic Peptide Once 45/64/5829   Basic Metabolic Panel Once 22/79/9088   TSH with Reflex Once 05/27/2020   T4, Free Once 05/27/2020   Hepatic Function Panel Once 05/27/2020 Next Visit Date:  Future Appointments   Date Time Provider Adeline Belle   6/15/2020 11:20 AM ERICK Narvaez - CNP [facility]   7/22/2020  9:45 AM Davion Hutton MD [facility] Heart a        Last Visit: 3/11/2020    Patient Active Problem List:     Lumbago     Spinal stenosis, lumbar region, without neurogenic claudication     Degeneration of lumbar or lumbosacral intervertebral disc     Type 2 diabetes mellitus without complication ([address]     Hyperlipidemia     Hypertension     Lumbar radiculopathy, chronic     Lumbar spondylosis     PVOD (pulmonary veno-occlusive disease) ([address]     AICD (automatic cardioverter/defibrillator) present     Coronary artery disease involving native coronary artery of native heart without angina pectoris     Gastritis without bleeding     Paroxysmal atrial fibrillation (HCC)     VT (ventricular tachycardia) ([facility])     SOB (shortness of breath)     Claudication ([address]     PAD (peripheral artery disease) ([facility])     Bilateral leg edema     S/P right coronary artery (RCA) stent placement     Presence of stent in LAD coronary artery     Mild concentric left ventricular hypertrophy (LVH)     Dilated aortic root (HCC)     Diabetic polyneuropathy associated with type 2 diabetes mellitus (HCC)     Chronic diastolic congestive heart failure ([address]

## 2020-05-01 NOTE — TELEPHONE ENCOUNTER
Called Dominic in [address]. She could not locate any fill of any statin.
No answer left VM: Please contact us at [phone] Option #7 . Citlali French CPhT.   Clinical Pharmacy   Toll free: [phone], option 7
Cholesterol: 37 mg/dL      Total Cholesterol: 233 mg/dL      ASSESSMENT:  · Appears to currently be prescribed atorvastatin 10mg daily (moderate-intensity statin), however, per patient's insurance report, no prescription claims for statin therapy this year. · Per other notes, patient may also receive care/rx's at [facility]?     PLAN:  - Confirm with pharmacy and/or patient if filling/taking atorvastatin 10mg daily

## 2020-06-01 RX ORDER — CLOPIDOGREL BISULFATE 75 MG/1
TABLET ORAL
Qty: 30 TABLET | Refills: 2 | Status: SHIPPED | OUTPATIENT
Start: 2020-06-01 | End: 2020-08-16

## 2020-06-01 NOTE — TELEPHONE ENCOUNTER
Goyo Riley [place] [address] [facility]        Last Visit: 4/6/2020    Patient Active Problem List:     Lumbago     Spinal stenosis, lumbar region, without neurogenic claudication     Degeneration of lumbar or lumbosacral intervertebral disc     Type 2 diabetes mellitus without complication ([facility])     Hyperlipidemia     Hypertension     Lumbar radiculopathy, chronic     Lumbar spondylosis     PVOD (pulmonary veno-occlusive disease) ([facility])     AICD (automatic cardioverter/defibrillator) present     Coronary artery disease involving native coronary artery of native heart without angina pectoris     Gastritis without bleeding     Paroxysmal atrial fibrillation ([facility])     VT (ventricular tachycardia) ([facility])     SOB (shortness of breath)     Claudication ([facility])     PAD (peripheral artery disease) ([facility])     Bilateral leg edema     S/P right coronary artery (RCA) stent placement     Presence of stent in LAD coronary artery     Mild concentric left ventricular hypertrophy (LVH)     Dilated aortic root ([facility])     Diabetic polyneuropathy associated with type 2 diabetes mellitus ([facility])     Chronic diastolic congestive heart failure ([address]

## 2020-06-15 ENCOUNTER — OFFICE VISIT (OUTPATIENT)
Dept: PRIMARY CARE CLINIC | Age: 75
End: 2020-06-15
Payer: MEDICARE

## 2020-06-15 VITALS
BODY MASS INDEX: 34.35 KG/M2 | OXYGEN SATURATION: 96 % | HEART RATE: 61 BPM | WEIGHT: 245.4 LBS | RESPIRATION RATE: 16 BRPM | DIASTOLIC BLOOD PRESSURE: 80 MMHG | HEIGHT: 71 IN | SYSTOLIC BLOOD PRESSURE: 128 MMHG

## 2020-06-15 LAB — HBA1C MFR BLD: 7.4 %

## 2020-06-15 PROCEDURE — 99214 OFFICE O/P EST MOD 30 MIN: CPT | Performed by: NURSE PRACTITIONER

## 2020-06-15 PROCEDURE — 3051F HG A1C>EQUAL 7.0%<8.0%: CPT | Performed by: NURSE PRACTITIONER

## 2020-06-15 PROCEDURE — 83036 HEMOGLOBIN GLYCOSYLATED A1C: CPT | Performed by: NURSE PRACTITIONER

## 2020-06-15 RX ORDER — SILDENAFIL 50 MG/1
50 TABLET, FILM COATED ORAL PRN
Qty: 30 TABLET | Refills: 3 | Status: ON HOLD | OUTPATIENT
Start: 2020-06-15 | End: 2021-08-09

## 2020-06-15 RX ORDER — OXYCODONE HYDROCHLORIDE AND ACETAMINOPHEN 5; 325 MG/1; MG/1
1 TABLET ORAL EVERY 8 HOURS PRN
Qty: 90 TABLET | Refills: 0 | Status: SHIPPED | OUTPATIENT
Start: 2020-06-15 | End: 2020-11-05 | Stop reason: SDUPTHER

## 2020-06-15 ASSESSMENT — PATIENT HEALTH QUESTIONNAIRE - PHQ9
1. LITTLE INTEREST OR PLEASURE IN DOING THINGS: 0
SUM OF ALL RESPONSES TO PHQ9 QUESTIONS 1 & 2: 0
SUM OF ALL RESPONSES TO PHQ QUESTIONS 1-9: 0
SUM OF ALL RESPONSES TO PHQ QUESTIONS 1-9: 0
2. FEELING DOWN, DEPRESSED OR HOPELESS: 0

## 2020-06-15 ASSESSMENT — ENCOUNTER SYMPTOMS
SHORTNESS OF BREATH: 0
COUGH: 0
BACK PAIN: 0
ABDOMINAL PAIN: 0

## 2020-06-15 NOTE — PROGRESS NOTES
[facility] PRIMARY CARE  [facility] [address]  [address] [address]  Dept: [phone]  Dept Fax: [phone]    Rodney Connolly is a 77 y.o. male who presentstoday for his medical conditions/complaints as noted below. Rodney Connolly is c/o of  Chief Complaint   Patient presents with    Back Pain    Diabetes           HPI:     Presents for 4 month recheck on chronic conditions  Weight is stable  BP well controlled today    Has follow up with Dr. Tomy Tong tomorrow regarding bilateral knee pain  Also complaining of left shoulder pain, will discuss with ortho  Using percocet as needed for the arthralgias, has made 90 tabs last since March  Requesting refill today, denies any side effects with meds    Hga1c stable at 7.4    Has follow up with Dr. Gopi Allen next month  Has not used nitro in 3 months  Would like to discuss use of viagra if he is able    Denies any other problems/concerns      Hemoglobin A1C (%)   Date Value   06/15/2020 7.4   01/10/2020 7.4   10/02/2019 7.5             ( goal A1C is < 7)   Microalb/Crt.  Ratio (mcg/mg creat)   Date Value   05/24/2017 26 (H)     LDL Cholesterol (mg/dL)   Date Value   05/31/2016 105     LDL Calculated (mg/dL)   Date Value   10/29/2018 162 (A)   06/03/2017 95   04/01/2015 90       (goal LDL is <100)   AST (U/L)   Date Value   05/20/2020 19     ALT (U/L)   Date Value   05/20/2020 24     BUN (mg/dL)   Date Value   05/20/2020 24     BP Readings from Last 3 Encounters:   06/15/20 128/80   04/27/20 130/80   03/19/20 130/80          ([id number])    Past Medical History:   Diagnosis Date    Arrhythmia     CAD (coronary artery disease)     GERD (gastroesophageal reflux disease)     Heart attack ([facility] [address]     Hyperlipidemia     Hypertension     Ischemic cardiomyopathy     Osteoarthritis     Type II or unspecified type diabetes mellitus without mention of complication, not stated as uncontrolled       Past Surgical History:   Procedure tablet by mouth once daily   May take 1 additional tablet daily PRN 2 pound weight gain, short of breath or swelling. 180 tablet 3    polyethyl glycol-propyl glycol 0.4-0.3 % (SYSTANE) 0.4-0.3 % ophthalmic solution Place 1 drop into both eyes as needed for Dry Eyes 30 mL 2    cilostazol (PLETAL) 100 MG tablet TAKE 1 TABLET BY MOUTH TWICE DAILY 60 tablet 11    insulin detemir (LEVEMIR FLEXTOUCH) 100 UNIT/ML injection pen Inject 10 Units into the skin nightly 1 sample given 8/25/16  [id number]  11/17exp date  5068-5740-16 (Patient taking differently: Inject 25 Units into the skin nightly 1 sample given 8/25/16  [id number]  11/17exp date  8897-8908-79) 5 pen 3    ondansetron (ZOFRAN) 8 MG tablet Take 1 tablet by mouth every 8 hours as needed for Nausea 20 tablet 0    nitroGLYCERIN (NITROSTAT) 0.4 MG SL tablet DISSOLVE ONE TABLET UNDER THE TONGUE EVERY 5 MINUTES AS NEEDED FOR CHEST PAIN. DO NOT EXCEED A TOTAL OF 3 DOSES IN 15 MINUTES 25 tablet 2    atorvastatin (LIPITOR) 10 MG tablet Take 1 tablet by mouth daily 90 tablet 0    Handicap Placard MISC by Does not apply route Expires 12/2023 1 each 0    gabapentin (NEURONTIN) 300 MG capsule TAKE 1 CAPSULE BY MOUTH THREE TIMES DAILY 90 capsule 2    magnesium oxide (MAG-OX) 400 MG tablet Take 400 mg by mouth daily      triamcinolone (KENALOG) 0.025 % ointment Apply topically 2 times daily to areas of eczema on lower legs 80 g 2    metoprolol tartrate (LOPRESSOR) 50 MG tablet Take 1 tablet by mouth 2 times daily 60 tablet 5    lisinopril (PRINIVIL;ZESTRIL) 40 MG tablet Take 40 mg by mouth daily      omeprazole (PRILOSEC) 20 MG delayed release capsule Take 20 mg by mouth every evening      Omega-3 Fatty Acids (OMEGA 3 500) 500 MG CAPS Take 2 capsules by mouth daily      hydrocortisone (WESTCORT) 0.2 % cream Apply topically 2 times daily.  30 g 1    Insulin Pen Needle 29G X 12.7MM MISC 1 each by Does not apply route daily 100 each 3    saxagliptin (ONGLYZA) 5 MG TABS tablet Take 5 mg by mouth daily      metFORMIN (GLUCOPHAGE) 1000 MG tablet Take 1 tablet by mouth 2 times daily (with meals) 180 tablet 3    aspirin 81 MG tablet Take 81 mg by mouth daily       No current facility-administered medications for this visit. Allergies   Allergen Reactions    Coreg [Carvedilol] Other (See Comments)     Low pause     Pravastatin Other (See Comments)     Myalgias     Zocor [Simvastatin]      [facility]        Health Maintenance   Topic Date Due    Shingles Vaccine (1 of 2) 04/17/1996    Diabetic retinal exam  06/04/2016    Diabetic foot exam  01/17/2019    Diabetic microalbuminuria test  10/29/2019    Lipid screen  10/29/2019    Annual Wellness Visit (AWV)  10/02/2020    A1C test (Diabetic or Prediabetic)  01/10/2021    TSH testing  05/20/2021    Potassium monitoring  05/20/2021    Creatinine monitoring  05/20/2021    Colon cancer screen colonoscopy  05/07/2025    DTaP/Tdap/Td vaccine (2 - Tdap) 08/20/2027    Flu vaccine  Completed    Pneumococcal 65+ years Vaccine  Completed    AAA screen  Completed    Hepatitis C screen  Completed    Hepatitis A vaccine  Aged Out    Hib vaccine  Aged Out    Meningococcal (ACWY) vaccine  Aged Out       Subjective:      Review of Systems   Constitutional: Negative for chills, fatigue and fever. HENT: Negative for congestion. Eyes: Negative for visual disturbance. Respiratory: Negative for cough and shortness of breath. Cardiovascular: Negative for chest pain and palpitations. Gastrointestinal: Negative for abdominal pain. Genitourinary: Negative for difficulty urinating and dysuria. Musculoskeletal: Positive for arthralgias and myalgias. Negative for back pain. Neurological: Negative for dizziness and headaches. Psychiatric/Behavioral: Negative for self-injury, sleep disturbance and suicidal ideas. The patient is not nervous/anxious. Objective:     Physical Exam  Vitals signs and nursing note reviewed. Constitutional:       Appearance: He is well-developed. HENT:      Head: Normocephalic and atraumatic. Eyes:      Pupils: Pupils are equal, round, and reactive to light. Neck:      Musculoskeletal: Normal range of motion. Cardiovascular:      Rate and Rhythm: Normal rate and regular rhythm. Heart sounds: Normal heart sounds. Pulmonary:      Effort: Pulmonary effort is normal.      Breath sounds: Normal breath sounds. Abdominal:      General: Bowel sounds are normal.      Palpations: Abdomen is soft. Tenderness: There is no abdominal tenderness. Musculoskeletal: Normal range of motion. Skin:     General: Skin is warm and dry. Neurological:      Mental Status: He is alert and oriented to person, place, and time. Psychiatric:         Behavior: Behavior normal.         Thought Content: Thought content normal.         Judgment: Judgment normal.       /80   Pulse 61   Resp 16   Ht 5' 11\" (1.803 m)   Wt 245 lb 6.4 oz (111.3 kg)   SpO2 96%   BMI 34.23 kg/m²     Assessment:       Diagnosis Orders   1. Type 2 diabetes mellitus without complication, unspecified whether long term insulin use (HCC)  POCT glycosylated hemoglobin (Hb A1C)   2. Chronic pain syndrome  oxyCODONE-acetaminophen (PERCOCET) 5-325 MG per tablet             Plan:      Return in about 4 months (around 10/15/2020) for annual exam.    1. DM- Hga1c stable at 7.4. Continue current meds. Follow up in 4 months for AWV. 2. Chronic pain- Stable. Continue percocet prn. Follow up in 4 months for AWV. Orders Placed This Encounter   Procedures    POCT glycosylated hemoglobin (Hb A1C)        Orders Placed This Encounter   Medications    sildenafil (VIAGRA) 50 MG tablet     Sig: Take 1 tablet by mouth as needed for Erectile Dysfunction     Dispense:  30 tablet     Refill:  3    oxyCODONE-acetaminophen (PERCOCET) 5-325 MG per tablet     Sig: Take 1 tablet by mouth every 8 hours as needed for Pain for up to 30 days.

## 2020-06-30 RX ORDER — GLIMEPIRIDE 2 MG/1
TABLET ORAL
Qty: 360 TABLET | Refills: 0 | Status: SHIPPED | OUTPATIENT
Start: 2020-06-30 | End: 2020-08-26

## 2020-06-30 RX ORDER — POTASSIUM CHLORIDE 20 MEQ/1
TABLET, EXTENDED RELEASE ORAL
Qty: 90 TABLET | Refills: 0 | Status: SHIPPED | OUTPATIENT
Start: 2020-06-30

## 2020-06-30 NOTE — TELEPHONE ENCOUNTER
Patient Active Problem List:     Lumbago     Spinal stenosis, lumbar region, without neurogenic claudication     Degeneration of lumbar or lumbosacral intervertebral disc     Type 2 diabetes mellitus without complication ([facility])     Hyperlipidemia     Hypertension     Lumbar radiculopathy, chronic     Lumbar spondylosis     PVOD (pulmonary veno-occlusive disease) ([facility])     AICD (automatic cardioverter/defibrillator) present     Coronary artery disease involving native coronary artery of native heart without angina pectoris     Gastritis without bleeding     Paroxysmal atrial fibrillation ([facility])     VT (ventricular tachycardia) ([facility])     SOB (shortness of breath)     Claudication ([facility])     PAD (peripheral artery disease) ([facility])     Bilateral leg edema     S/P right coronary artery (RCA) stent placement     Presence of stent in LAD coronary artery     Mild concentric left ventricular hypertrophy (LVH)     Dilated aortic root ([facility])     Diabetic polyneuropathy associated with type 2 diabetes mellitus ([facility])     Chronic diastolic congestive heart failure ([address]

## 2020-08-10 ENCOUNTER — TELEPHONE (OUTPATIENT)
Dept: PRIMARY CARE CLINIC | Age: 75
End: 2020-08-10

## 2020-08-10 RX ORDER — CILOSTAZOL 100 MG/1
TABLET ORAL
Qty: 60 TABLET | Refills: 11 | Status: SHIPPED | OUTPATIENT
Start: 2020-08-10 | End: 2020-08-10 | Stop reason: SDUPTHER

## 2020-08-10 RX ORDER — CILOSTAZOL 100 MG/1
TABLET ORAL
Qty: 60 TABLET | Refills: 11 | Status: SHIPPED | OUTPATIENT
Start: 2020-08-10 | End: 2021-05-05 | Stop reason: ALTCHOICE

## 2020-08-10 RX ORDER — AMLODIPINE BESYLATE 10 MG/1
10 TABLET ORAL DAILY
Qty: 30 TABLET | Refills: 3 | Status: SHIPPED | OUTPATIENT
Start: 2020-08-10 | End: 2021-02-15

## 2020-08-10 NOTE — TELEPHONE ENCOUNTER
Patient calling for a couple med refills, states he gets Amlodipine 10mg once daily from the [place], he needs a temporary 30 day supply of this until the [place] will send it to him. Please advise as this is not on his med list, he also needs a refill of Cilostazol-regular supply you would normally send for patient    LOV 6/15/2020  Next appt 10/12/2020    Health Maintenance   Topic Date Due    Shingles Vaccine (1 of 2) 04/17/1996    Diabetic retinal exam  06/04/2016    Diabetic foot exam  01/17/2019    Diabetic microalbuminuria test  10/29/2019    Lipid screen  10/29/2019    Flu vaccine (1) 09/01/2020    Annual Wellness Visit (AWV)  10/02/2020    TSH testing  05/20/2021    Potassium monitoring  05/20/2021    Creatinine monitoring  05/20/2021    A1C test (Diabetic or Prediabetic)  06/15/2021    Colon cancer screen colonoscopy  05/07/2025    DTaP/Tdap/Td vaccine (2 - Tdap) 08/20/2027    Pneumococcal 65+ years Vaccine  Completed    AAA screen  Completed    Hepatitis C screen  Completed    Hepatitis A vaccine  Aged Out    Hib vaccine  Aged Out    Meningococcal (ACWY) vaccine  Aged Out             (applicable per patient's age: Cancer Screenings, Depression Screening, Fall Risk Screening, Immunizations)    Hemoglobin A1C (%)   Date Value   06/15/2020 7.4   01/10/2020 7.4   10/02/2019 7.5     Microalb/Crt.  Ratio (mcg/mg creat)   Date Value   05/24/2017 26 (H)     LDL Cholesterol (mg/dL)   Date Value   05/31/2016 105     LDL Calculated (mg/dL)   Date Value   10/29/2018 162 (A)     AST (U/L)   Date Value   05/20/2020 19     ALT (U/L)   Date Value   05/20/2020 24     BUN (mg/dL)   Date Value   05/20/2020 24      (goal A1C is < 7)   (goal LDL is <100) need 30-50% reduction from baseline     BP Readings from Last 3 Encounters:   07/15/20 128/80   06/15/20 128/80   04/27/20 130/80    (goal /80)      All Future Testing planned in [facility]:  Lab Frequency Next Occurrence   ECHO Complete 2D W Doppler W Color Once 02/24/2020   EKG 12 Lead Once 01/15/2021   ECHO Complete 2D W Doppler W Color Once 01/15/2021       Next Visit Date:  Future Appointments   Date Time Provider Adeline Belle   10/12/2020  9:20 AM ERICK Hall Asp - CNP [facility]            Patient Active Problem List:     Lumbago     Spinal stenosis, lumbar region, without neurogenic claudication     Degeneration of lumbar or lumbosacral intervertebral disc     Type 2 diabetes mellitus without complication (HCC)     Hyperlipidemia     Hypertension     Lumbar radiculopathy, chronic     Lumbar spondylosis     PVOD (pulmonary veno-occlusive disease) ([facility] [address]     AICD (automatic cardioverter/defibrillator) present     Coronary artery disease involving native coronary artery of native heart without angina pectoris     Gastritis without bleeding     Paroxysmal atrial fibrillation ([facility])     VT (ventricular tachycardia) ([facility])     SOB (shortness of breath)     Claudication ([facility])     PAD (peripheral artery disease) ([facility])     Bilateral leg edema     S/P right coronary artery (RCA) stent placement     Presence of stent in LAD coronary artery     Mild concentric left ventricular hypertrophy (LVH)     Dilated aortic root (HCC)     Diabetic polyneuropathy associated with type 2 diabetes mellitus (HCC)     Chronic diastolic congestive heart failure ([facility] [address]

## 2020-08-15 NOTE — TELEPHONE ENCOUNTER
stenosis, lumbar region, without neurogenic claudication     Degeneration of lumbar or lumbosacral intervertebral disc     Type 2 diabetes mellitus without complication ([facility])     Hyperlipidemia     Hypertension     Lumbar radiculopathy, chronic     Lumbar spondylosis     PVOD (pulmonary veno-occlusive disease) ([facility])     AICD (automatic cardioverter/defibrillator) present     Coronary artery disease involving native coronary artery of native heart without angina pectoris     Gastritis without bleeding     Paroxysmal atrial fibrillation ([facility])     VT (ventricular tachycardia) ([facility])     SOB (shortness of breath)     Claudication ([facility])     PAD (peripheral artery disease) ([facility])     Bilateral leg edema     S/P right coronary artery (RCA) stent placement     Presence of stent in LAD coronary artery     Mild concentric left ventricular hypertrophy (LVH)     Dilated aortic root ([facility])     Diabetic polyneuropathy associated with type 2 diabetes mellitus ([facility])     Chronic diastolic congestive heart failure ([address]

## 2020-08-16 RX ORDER — CLOPIDOGREL BISULFATE 75 MG/1
TABLET ORAL
Qty: 30 TABLET | Refills: 0 | Status: SHIPPED | OUTPATIENT
Start: 2020-08-16 | End: 2020-09-02

## 2020-08-16 RX ORDER — BLOOD SUGAR DIAGNOSTIC
STRIP MISCELLANEOUS
Qty: 100 EACH | Refills: 0 | Status: SHIPPED | OUTPATIENT
Start: 2020-08-16 | End: 2020-11-13 | Stop reason: SDUPTHER

## 2020-08-26 RX ORDER — GLIMEPIRIDE 2 MG/1
TABLET ORAL
Qty: 360 TABLET | Refills: 0 | Status: SHIPPED | OUTPATIENT
Start: 2020-08-26 | End: 2020-11-05 | Stop reason: SDUPTHER

## 2020-09-02 RX ORDER — CLOPIDOGREL BISULFATE 75 MG/1
TABLET ORAL
Qty: 30 TABLET | Refills: 0 | Status: SHIPPED | OUTPATIENT
Start: 2020-09-02 | End: 2020-10-12 | Stop reason: SDUPTHER

## 2020-10-10 LAB — SARS-COV-2: DETECTED

## 2020-10-12 RX ORDER — CLOPIDOGREL BISULFATE 75 MG/1
75 TABLET ORAL DAILY
Qty: 90 TABLET | Refills: 0 | Status: SHIPPED | OUTPATIENT
Start: 2020-10-12 | End: 2020-11-05 | Stop reason: SDUPTHER

## 2020-10-12 NOTE — TELEPHONE ENCOUNTER
LOV 6/15/20  NOV 10/29/20    Health Maintenance   Topic Date Due    Shingles Vaccine (1 of 2) 04/17/1996    Diabetic retinal exam  06/04/2016    Diabetic foot exam  01/17/2019    Annual Wellness Visit (AWV)  05/29/2019    Diabetic microalbuminuria test  10/29/2019    Lipid screen  10/29/2019    Flu vaccine (1) 09/01/2020    TSH testing  05/20/2021    A1C test (Diabetic or Prediabetic)  06/15/2021    Potassium monitoring  10/10/2021    Creatinine monitoring  10/10/2021    Colon cancer screen colonoscopy  05/07/2025    DTaP/Tdap/Td vaccine (2 - Tdap) 08/20/2027    Pneumococcal 65+ years Vaccine  Completed    AAA screen  Completed    Hepatitis C screen  Completed    Hepatitis A vaccine  Aged Out    Hib vaccine  Aged Out    Meningococcal (ACWY) vaccine  Aged Out             (applicable per patient's age: Cancer Screenings, Depression Screening, Fall Risk Screening, Immunizations)    Hemoglobin A1C (%)   Date Value   06/15/2020 7.4   01/10/2020 7.4   10/02/2019 7.5     Microalb/Crt.  Ratio (mcg/mg creat)   Date Value   05/24/2017 26 (H)     LDL Cholesterol (mg/dL)   Date Value   05/31/2016 105     LDL Calculated (mg/dL)   Date Value   10/29/2018 162 (A)     AST (U/L)   Date Value   05/20/2020 19     ALT (U/L)   Date Value   05/20/2020 24     BUN (mg/dL)   Date Value   05/20/2020 24      (goal A1C is < 7)   (goal LDL is <100) need 30-50% reduction from baseline     BP Readings from Last 3 Encounters:   07/15/20 128/80   06/15/20 128/80   04/27/20 130/80    (goal /80)      All Future Testing planned in [facility]:  Lab Frequency Next Occurrence   ECHO Complete 2D W Doppler W Color Once 02/24/2020   EKG 12 Lead Once 01/15/2021   ECHO Complete 2D W Doppler W Color Once 01/15/2021       Next Visit Date:  Future Appointments   Date Time Provider Adeline Odonnell   10/29/2020  9:40 AM Joey Luna, APRN - CNP [id number]            Patient Active Problem List:     Lumbago     Spinal stenosis, lumbar region, without neurogenic claudication     Degeneration of lumbar or lumbosacral intervertebral disc     Type 2 diabetes mellitus without complication ([facility])     Hyperlipidemia     Hypertension     Lumbar radiculopathy, chronic     Lumbar spondylosis     PVOD (pulmonary veno-occlusive disease) ([facility])     AICD (automatic cardioverter/defibrillator) present     Coronary artery disease involving native coronary artery of native heart without angina pectoris     Gastritis without bleeding     Paroxysmal atrial fibrillation ([facility])     VT (ventricular tachycardia) ([facility])     SOB (shortness of breath)     Claudication ([facility])     PAD (peripheral artery disease) ([facility])     Bilateral leg edema     S/P right coronary artery (RCA) stent placement     Presence of stent in LAD coronary artery     Mild concentric left ventricular hypertrophy (LVH)     Dilated aortic root ([facility])     Diabetic polyneuropathy associated with type 2 diabetes mellitus ([facility])     Chronic diastolic congestive heart failure ([address]

## 2020-10-13 ENCOUNTER — TELEPHONE (OUTPATIENT)
Dept: PRIMARY CARE CLINIC | Age: 75
End: 2020-10-13

## 2020-10-13 NOTE — TELEPHONE ENCOUNTER
Flakita Mediate with [address] [place] [address] calling to clarify if patient is on one or both medications Pletal rx 100 mg and or the Plavix Rx 75 mgs. Please clarify if taking both medications or just one.

## 2020-10-13 NOTE — TELEPHONE ENCOUNTER
Spoke with Windy Cross at [facility] andw ill continue to keep patient on both medications till follow up.

## 2020-10-13 NOTE — TELEPHONE ENCOUNTER
Please check his meds. If he is taking both continue to do so until our follow up to discuss.  thanks

## 2020-10-16 ENCOUNTER — TELEPHONE (OUTPATIENT)
Dept: PRIMARY CARE CLINIC | Age: 75
End: 2020-10-16

## 2020-10-16 NOTE — TELEPHONE ENCOUNTER
Patient calling with Raul Aldridge that he is in [facility] with COVID-19. He just wanted to let you know.

## 2020-10-23 ENCOUNTER — TELEPHONE (OUTPATIENT)
Dept: PRIMARY CARE CLINIC | Age: 75
End: 2020-10-23

## 2020-10-23 NOTE — TELEPHONE ENCOUNTER
Patient calling to update you that he is back in the hospital at [facility], states they are putting him in the intensive care unit, same issue as previously from the Virus.

## 2020-11-03 PROBLEM — I73.9 PAD (PERIPHERAL ARTERY DISEASE) (HCC): Status: RESOLVED | Noted: 2017-05-24 | Resolved: 2020-11-03

## 2020-11-05 ENCOUNTER — OFFICE VISIT (OUTPATIENT)
Dept: PRIMARY CARE CLINIC | Age: 75
End: 2020-11-05
Payer: MEDICARE

## 2020-11-05 VITALS
TEMPERATURE: 98.2 F | DIASTOLIC BLOOD PRESSURE: 74 MMHG | WEIGHT: 237.2 LBS | SYSTOLIC BLOOD PRESSURE: 108 MMHG | BODY MASS INDEX: 32.17 KG/M2 | HEART RATE: 70 BPM | OXYGEN SATURATION: 96 %

## 2020-11-05 PROCEDURE — 99214 OFFICE O/P EST MOD 30 MIN: CPT | Performed by: NURSE PRACTITIONER

## 2020-11-05 RX ORDER — OXYCODONE HYDROCHLORIDE AND ACETAMINOPHEN 5; 325 MG/1; MG/1
1 TABLET ORAL EVERY 8 HOURS PRN
Qty: 90 TABLET | Refills: 0 | Status: SHIPPED | OUTPATIENT
Start: 2020-11-05 | End: 2021-02-04 | Stop reason: SDUPTHER

## 2020-11-05 RX ORDER — ALBUTEROL SULFATE 90 UG/1
AEROSOL, METERED RESPIRATORY (INHALATION)
COMMUNITY
Start: 2020-03-30

## 2020-11-05 RX ORDER — CLOPIDOGREL BISULFATE 75 MG/1
75 TABLET ORAL DAILY
Qty: 90 TABLET | Refills: 0 | Status: SHIPPED | OUTPATIENT
Start: 2020-11-05 | End: 2021-02-15 | Stop reason: SDUPTHER

## 2020-11-05 RX ORDER — GLIMEPIRIDE 2 MG/1
2 TABLET ORAL
Qty: 360 TABLET | Refills: 0 | Status: SHIPPED | OUTPATIENT
Start: 2020-11-05 | End: 2021-01-20 | Stop reason: SDUPTHER

## 2020-11-05 RX ORDER — ALOGLIPTIN 25 MG/1
TABLET, FILM COATED ORAL
COMMUNITY
Start: 2020-03-30 | End: 2021-05-05 | Stop reason: ALTCHOICE

## 2020-11-05 RX ORDER — RIVAROXABAN 10 MG/1
10 TABLET, FILM COATED ORAL
COMMUNITY
Start: 2020-10-28 | End: 2021-05-05 | Stop reason: ALTCHOICE

## 2020-11-05 ASSESSMENT — PATIENT HEALTH QUESTIONNAIRE - PHQ9
SUM OF ALL RESPONSES TO PHQ9 QUESTIONS 1 & 2: 0
SUM OF ALL RESPONSES TO PHQ QUESTIONS 1-9: 0
SUM OF ALL RESPONSES TO PHQ QUESTIONS 1-9: 0
1. LITTLE INTEREST OR PLEASURE IN DOING THINGS: 0
2. FEELING DOWN, DEPRESSED OR HOPELESS: 0
SUM OF ALL RESPONSES TO PHQ QUESTIONS 1-9: 0

## 2020-11-05 NOTE — PROGRESS NOTES
Post-Discharge Transitional Care Management Services      Irina Yi   YOB: 1945    Date of Visit:  11/5/2020  30 Day Post-Discharge Date: 10/28/20    Allergies   Allergen Reactions    Coreg [Carvedilol] Other (See Comments)     Low pause     Pravastatin Other (See Comments)     Myalgias     Sertraline Other (See Comments)    Zocor [Simvastatin]      norvasc     Citalopram Anxiety     Outpatient Medications Marked as Taking for the 11/5/20 encounter (Office Visit) with ERICK Majano CNP   Medication Sig Dispense Refill    XARELTO 10 MG TABS tablet Take 10 mg by mouth      albuterol sulfate  (90 Base) MCG/ACT inhaler INHALE 2 PUFFS BY INHALATION THREE TIMES A DAY AS NEEDED FOR SHORTNESS OF BREATH      glimepiride (AMARYL) 2 MG tablet Take 1 tablet by mouth every morning (before breakfast) 360 tablet 0    clopidogrel (PLAVIX) 75 MG tablet Take 1 tablet by mouth daily 90 tablet 0    oxyCODONE-acetaminophen (PERCOCET) 5-325 MG per tablet Take 1 tablet by mouth every 8 hours as needed for Pain for up to 30 days.  90 tablet 0    blood glucose test strips (ONETOUCH ULTRA) strip USE 1 STRIP TO CHECK GLUCOSE TWICE DAILY 100 each 0    amLODIPine (NORVASC) 10 MG tablet Take 1 tablet by mouth daily 30 tablet 3    cilostazol (PLETAL) 100 MG tablet TAKE 1 TABLET BY MOUTH TWICE DAILY 60 tablet 11    potassium chloride (KLOR-CON M) 20 MEQ extended release tablet TAKE 1  BY MOUTH ONCE DAILY 90 tablet 0    sildenafil (VIAGRA) 50 MG tablet Take 1 tablet by mouth as needed for Erectile Dysfunction 30 tablet 3    amiodarone (CORDARONE) 200 MG tablet TAKE 1 TABLET BY MOUTH ONCE DAILY 90 tablet 3    albuterol (PROVENTIL) (2.5 MG/3ML) 0.083% nebulizer solution Take 3 mLs by nebulization every 6 hours as needed for Wheezing or Shortness of Breath 5 Package 0    furosemide (LASIX) 40 MG tablet Take 1 tablet by mouth once daily   May take 1 additional tablet daily PRN 2 pound weight today for follow-up from hospital.    Diagnoses and all orders for this visit:    COVID-19    Chronic pain syndrome  -     oxyCODONE-acetaminophen (PERCOCET) 5-325 MG per tablet; Take 1 tablet by mouth every 8 hours as needed for Pain for up to 30 days. Other orders  -     glimepiride (AMARYL) 2 MG tablet; Take 1 tablet by mouth every morning (before breakfast)  -     clopidogrel (PLAVIX) 75 MG tablet;  Take 1 tablet by mouth daily          Diagnostic test results reviewed: inpatient labs and chest x-ray    Patient risk of morbidity and mortality: moderate    Medical Decision Making: moderate complexity     Presents with daughter for hospital follow up  Has lost 10lb since last visit  Wearing oxygen at 4L, will begin weaning down on this as tolerated  SOB and fatigue remain, otherwise feeling well  Completing all ADLs/IADLs on his own  Not driving at this time d/t oxygen use  Has all meds available in the home   Requesting refill on percocet for chronic back pain    BS at home have been as low as 70  Has stopped taking his insulin at night altogether  BS now up to 160  Will call office with BS reading in am for further advise on insulin titration    Denies any other problems/concerns  Follow up in 3 months for recheck

## 2020-11-06 ENCOUNTER — TELEPHONE (OUTPATIENT)
Dept: PRIMARY CARE CLINIC | Age: 75
End: 2020-11-06

## 2020-11-06 NOTE — TELEPHONE ENCOUNTER
Did he eat a hoho? :)  Resume 5 units insulin nightly   Record sugars and update us on Monday  thanks

## 2020-11-13 RX ORDER — BLOOD SUGAR DIAGNOSTIC
STRIP MISCELLANEOUS
Qty: 100 EACH | Refills: 0 | Status: SHIPPED | OUTPATIENT
Start: 2020-11-13 | End: 2021-03-12

## 2020-12-15 ENCOUNTER — TELEPHONE (OUTPATIENT)
Dept: PRIMARY CARE CLINIC | Age: 75
End: 2020-12-15

## 2020-12-15 NOTE — TELEPHONE ENCOUNTER
Patient called in stating that he no longer uses the O2 anymore that he was d/c from the hospital with after having covid. He called the medical supply company in [place] and they said they need a discontinuation of therapy letter/note from pcp to  the empty canister of O2 from his house. Please advise, thank you.

## 2020-12-15 NOTE — LETTER
[facility]  [address]  [facility] [address]  Phone: [phone]  Fax: [phone]    ERICK Squires CNP        December 15, 2020     Patient: Serafin Painter   YOB: 1945   Date of Visit: 12/15/2020       To Whom It May Concern: It is my medical opinion that Ilona Puls can discontinue oxygen use. If you have any questions or concerns, please don't hesitate to call.     Sincerely,        ERICK Squires CNP

## 2021-01-20 ENCOUNTER — TELEPHONE (OUTPATIENT)
Dept: PRIMARY CARE CLINIC | Age: 76
End: 2021-01-20

## 2021-01-20 RX ORDER — GLIMEPIRIDE 2 MG/1
2 TABLET ORAL
Qty: 360 TABLET | Refills: 0 | Status: SHIPPED | OUTPATIENT
Start: 2021-01-20 | End: 2021-05-05 | Stop reason: ALTCHOICE

## 2021-01-20 NOTE — TELEPHONE ENCOUNTER
I apologize that he is upset  The last time I sent the rx it was 11/5/20 for 360 tabs  I resent rx for 360 but do not feel the error was on our end- if there is a problem at the pharmacy when he goes to pick it up please contact the office or have the pharmacy call me.  thanks

## 2021-01-20 NOTE — TELEPHONE ENCOUNTER
PT called upset because he went to the pharmacy and picked up his Glimepiride 2 MG and it was only a quantity of 90 tablets. PT states he takes 2 tablets in the morning and 2 in the evening and always has so it should be a 360 tablet quantity. When looking through pt chart I don't see anything recent of a script being sent and the last one sent was Glimepiride 2 MG one tablet  once a day and that was in November. What is PT suppose to be taking and if its the 4 tabs a day can he get a new script for 90 days sent.  Please advice, thank you

## 2021-02-04 ENCOUNTER — OFFICE VISIT (OUTPATIENT)
Dept: PRIMARY CARE CLINIC | Age: 76
End: 2021-02-04
Payer: MEDICARE

## 2021-02-04 VITALS
RESPIRATION RATE: 13 BRPM | BODY MASS INDEX: 33.4 KG/M2 | WEIGHT: 238.6 LBS | OXYGEN SATURATION: 94 % | SYSTOLIC BLOOD PRESSURE: 98 MMHG | DIASTOLIC BLOOD PRESSURE: 76 MMHG | HEIGHT: 71 IN | HEART RATE: 50 BPM

## 2021-02-04 DIAGNOSIS — M54.16 LUMBAR RADICULOPATHY, CHRONIC: ICD-10-CM

## 2021-02-04 DIAGNOSIS — G89.4 CHRONIC PAIN SYNDROME: ICD-10-CM

## 2021-02-04 DIAGNOSIS — Z00.00 ENCOUNTER FOR GENERAL ADULT MEDICAL EXAMINATION W/O ABNORMAL FINDINGS: Primary | ICD-10-CM

## 2021-02-04 DIAGNOSIS — E11.9 TYPE 2 DIABETES MELLITUS WITHOUT COMPLICATION, UNSPECIFIED WHETHER LONG TERM INSULIN USE (HCC): ICD-10-CM

## 2021-02-04 LAB — HBA1C MFR BLD: 7.1 %

## 2021-02-04 PROCEDURE — G0439 PPPS, SUBSEQ VISIT: HCPCS | Performed by: NURSE PRACTITIONER

## 2021-02-04 PROCEDURE — 83036 HEMOGLOBIN GLYCOSYLATED A1C: CPT | Performed by: NURSE PRACTITIONER

## 2021-02-04 PROCEDURE — 3051F HG A1C>EQUAL 7.0%<8.0%: CPT | Performed by: NURSE PRACTITIONER

## 2021-02-04 RX ORDER — SPIRONOLACTONE 25 MG/1
25 TABLET ORAL DAILY
COMMUNITY
End: 2021-05-05 | Stop reason: ALTCHOICE

## 2021-02-04 RX ORDER — TORSEMIDE 20 MG/1
20 TABLET ORAL DAILY
COMMUNITY
End: 2021-05-05 | Stop reason: ALTCHOICE

## 2021-02-04 RX ORDER — GLIMEPIRIDE 2 MG/1
4 TABLET ORAL 2 TIMES DAILY
Qty: 360 TABLET | Refills: 3 | Status: SHIPPED | OUTPATIENT
Start: 2021-02-04 | End: 2021-05-05 | Stop reason: ALTCHOICE

## 2021-02-04 RX ORDER — ISOSORBIDE MONONITRATE 30 MG/1
30 TABLET, EXTENDED RELEASE ORAL DAILY
COMMUNITY

## 2021-02-04 RX ORDER — OXYCODONE HYDROCHLORIDE AND ACETAMINOPHEN 5; 325 MG/1; MG/1
1 TABLET ORAL EVERY 8 HOURS PRN
Qty: 90 TABLET | Refills: 0 | Status: SHIPPED | OUTPATIENT
Start: 2021-02-04 | End: 2021-02-11 | Stop reason: SDUPTHER

## 2021-02-04 ASSESSMENT — PATIENT HEALTH QUESTIONNAIRE - PHQ9
SUM OF ALL RESPONSES TO PHQ QUESTIONS 1-9: 1
1. LITTLE INTEREST OR PLEASURE IN DOING THINGS: 0
SUM OF ALL RESPONSES TO PHQ QUESTIONS 1-9: 1
2. FEELING DOWN, DEPRESSED OR HOPELESS: 1

## 2021-02-04 ASSESSMENT — LIFESTYLE VARIABLES: HOW OFTEN DO YOU HAVE A DRINK CONTAINING ALCOHOL: 0

## 2021-02-04 NOTE — PROGRESS NOTES
Medicare Annual Wellness Visit  Name: Trevor Wu Date: 2021   MRN: [id number] Sex: Male   Age: 77 y.o. Ethnicity: Non-/Non    : 1945 Race: Kaushik Chadwick is here for Medicare AWV, Back Pain, and Diabetes    Screenings for behavioral, psychosocial and functional/safety risks, and cognitive dysfunction are all negative except as indicated below. These results, as well as other patient data from the [address] [facility] form, are documented in Flowsheets linked to this Encounter. Allergies   Allergen Reactions    Coreg [Carvedilol] Other (See Comments)     Low pause     Pravastatin Other (See Comments)     Myalgias     Sertraline Other (See Comments)    Zocor [Simvastatin]      norvasc     Citalopram Anxiety         Prior to Visit Medications    Medication Sig Taking? Authorizing Provider   torsemide (DEMADEX) 20 MG tablet Take 20 mg by mouth daily Yes Historical Provider, MD   spironolactone (ALDACTONE) 25 MG tablet Take 25 mg by mouth daily Yes Historical Provider, MD   isosorbide mononitrate (IMDUR) 30 MG extended release tablet Take 30 mg by mouth daily Yes Historical Provider, MD   glimepiride (AMARYL) 2 MG tablet Take 2 tablets by mouth 2 times daily Yes ERICK Alejandro CNP   oxyCODONE-acetaminophen (PERCOCET) 5-325 MG per tablet Take 1 tablet by mouth every 8 hours as needed for Pain for up to 30 days.  Yes ERICK Alejandro CNP   glimepiride (AMARYL) 2 MG tablet Take 1 tablet by mouth every morning (before breakfast) Yes ERICK Alejandro CNP   blood glucose test strips (ONETOUCH ULTRA) strip USE 1 STRIP TO CHECK GLUCOSE TWICE DAILY Yes ERICK Alejandro CNP   XARELTO 10 MG TABS tablet Take 10 mg by mouth Yes Historical Provider, MD   alogliptin (NESINA) 25 MG TABS tablet TAKE ONE TABLET BY MOUTH ONCE DAILY FOR DIABETES - BEGIN WHEN SAXAGLIPTIN GONE Yes Historical Provider, MD   albuterol sulfate  (90 Base) MCG/ACT inhaler INHALE 2 PUFFS BY INHALATION THREE TIMES A DAY AS NEEDED FOR SHORTNESS OF BREATH Yes Historical Provider, MD   clopidogrel (PLAVIX) 75 MG tablet Take 1 tablet by mouth daily Yes ERICK Shannon CNP   amLODIPine (NORVASC) 10 MG tablet Take 1 tablet by mouth daily Yes ERICK Shannon CNP   cilostazol (PLETAL) 100 MG tablet TAKE 1 TABLET BY MOUTH TWICE DAILY Yes ERICK Shannon CNP   potassium chloride (KLOR-CON M) 20 MEQ extended release tablet TAKE 1  BY MOUTH ONCE DAILY Yes ERICK Shannon CNP   sildenafil (VIAGRA) 50 MG tablet Take 1 tablet by mouth as needed for Erectile Dysfunction Yes ERICK Shannon CNP   amiodarone (CORDARONE) 200 MG tablet TAKE 1 TABLET BY MOUTH ONCE DAILY Yes Igor Rocha MD   albuterol (PROVENTIL) (2.5 MG/3ML) 0.083% nebulizer solution Take 3 mLs by nebulization every 6 hours as needed for Wheezing or Shortness of Breath Yes Igor Rocha MD   polyethyl glycol-propyl glycol 0.4-0.3 % (SYSTANE) 0.4-0.3 % ophthalmic solution Place 1 drop into both eyes as needed for Dry Eyes Yes ERICK Shannon CNP   insulin detemir (LEVEMIR FLEXTOUCH) 100 UNIT/ML injection pen Inject 10 Units into the skin nightly 1 sample given 8/25/16  [id number]  11/17exp date  6970-1838-84  Patient taking differently: Inject 25 Units into the skin nightly 1 sample given 8/25/16  [id number]  11/17exp date  3888-7414-64 Yes ERICK Shannon CNP   ondansetron (ZOFRAN) 8 MG tablet Take 1 tablet by mouth every 8 hours as needed for Nausea Yes ERICK Shannon CNP   nitroGLYCERIN (NITROSTAT) 0.4 MG SL tablet DISSOLVE ONE TABLET UNDER THE TONGUE EVERY 5 MINUTES AS NEEDED FOR CHEST PAIN.   DO NOT EXCEED A TOTAL OF 3 DOSES IN 15 MINUTES Yes ERICK Shannon CNP   atorvastatin (LIPITOR) 10 MG tablet Take 1 tablet by mouth daily Yes ERICK Shannon CNP   Handicap Placard MISC by Does not apply route Expires 12/2023 Yes ERICK Vuong CNP   magnesium oxide (MAG-OX) 400 MG tablet Take 400 mg by mouth daily Yes Historical Provider, MD   triamcinolone (KENALOG) 0.025 % ointment Apply topically 2 times daily to areas of eczema on lower legs Yes Danial Claros MD   metoprolol tartrate (LOPRESSOR) 50 MG tablet Take 1 tablet by mouth 2 times daily Yes ERICK Vuong CNP   lisinopril (PRINIVIL;ZESTRIL) 40 MG tablet Take 40 mg by mouth daily Yes Historical Provider, MD   omeprazole (PRILOSEC) 20 MG delayed release capsule Take 20 mg by mouth every evening Yes Historical Provider, MD   Omega-3 Fatty Acids (OMEGA 3 500) 500 MG CAPS Take 2 capsules by mouth daily Yes Historical Provider, MD   hydrocortisone (WESTCORT) 0.2 % cream Apply topically 2 times daily. Yes ERICK Rhodes CNP   Insulin Pen Needle 29G X 12.7MM MISC 1 each by Does not apply route daily Yes ERICK Rhodes CNP   saxagliptin (ONGLYZA) 5 MG TABS tablet Take 5 mg by mouth daily Yes Historical Provider, MD   metFORMIN (GLUCOPHAGE) 1000 MG tablet Take 1 tablet by mouth 2 times daily (with meals) Yes ERICK Rhodes CNP   aspirin 81 MG tablet Take 81 mg by mouth daily Yes Historical Provider, MD   furosemide (LASIX) 40 MG tablet Take 1 tablet by mouth once daily   May take 1 additional tablet daily PRN 2 pound weight gain, short of breath or swelling.   Patient not taking: Reported on 2/4/2021  Lubna Garcia MD   gabapentin (NEURONTIN) 300 MG capsule TAKE 1 CAPSULE BY MOUTH THREE TIMES DAILY  ERICK Vuong CNP         Past Medical History:   Diagnosis Date    Arrhythmia     CAD (coronary artery disease)     GERD (gastroesophageal reflux disease)     Heart attack ([facility] [address]     Hyperlipidemia     Hypertension     Ischemic cardiomyopathy     Osteoarthritis     Type II or unspecified type diabetes mellitus without mention of complication, not stated as uncontrolled        Past Surgical History: Procedure Laterality Date    APPENDECTOMY      CARDIAC CATHETERIZATION      CARDIAC SURGERY      stents 2015    COLONOSCOPY      CORONARY [facility]OP[facility]  [facility]    DIAGNOSTIC CARDIAC CATH LAB PROCEDURE      JOINT REPLACEMENT      knee right parital    PACEMAKER INSERTION      PTCA      ROTATOR CUFF REPAIR      TONSILLECTOMY           Family History   Problem Relation Age of Onset    Heart Disease Mother     High Blood Pressure Mother     Heart Disease Father     Cancer Sister         breast cancer    Cancer Brother         bladder cancer    Diabetes Maternal Grandmother        CareTeam (Including outside providers/suppliers regularly involved in providing care):   Patient Care Team:  ERICK Jaime CNP as PCP - General (Nurse Practitioner)  ERICK Jaime CNP as PCP - [address] [place] [address] [facility]  Robi Kaufman MD as Orthopedic Surgeon (Orthopedic Surgery)  Roark Najjar, MD as Consulting Physician (Pain Management)  Tess Bagley MD as Surgeon (Vascular Surgery)  Maru Antonio MD as Consulting Physician (Internal Medicine Cardiovascular Disease)  Jen Lira MD as Consulting Physician (Pulmonology)  Jose R Kelly MD as Consulting Physician (Dermatology)  Oneida Lutz MD (Orthopedic Surgery)    Wt Readings from Last 3 Encounters:   02/04/21 238 lb 9.6 oz (108.2 kg)   11/05/20 237 lb 3.2 oz (107.6 kg)   07/15/20 247 lb (112 kg)     Vitals:    02/04/21 1030   BP: 98/76   Pulse: 50   Resp: 13   SpO2: 94%   Weight: 238 lb 9.6 oz (108.2 kg)   Height: 5' 11\" (1.803 m)     Body mass index is 33.28 kg/m². Based upon direct observation of the patient, evaluation of cognition reveals recent and remote memory intact.     General Appearance: alert and oriented to person, place and time, well developed and well- nourished, in no acute distress  Skin: warm and dry, no rash or erythema  Head: normocephalic and atraumatic  Eyes: pupils equal, round, and reactive to light, extraocular eye movements intact, conjunctivae normal  ENT: tympanic membrane, external ear and ear canal normal bilaterally, nose without deformity, nasal mucosa and turbinates normal without polyps  Neck: supple and non-tender without mass, no thyromegaly or thyroid nodules, no cervical lymphadenopathy  Pulmonary/Chest: clear to auscultation bilaterally- no wheezes, rales or rhonchi, normal air movement, no respiratory distress  Cardiovascular: normal rate, regular rhythm, normal S1 and S2, no murmurs, rubs, clicks, or gallops, distal pulses intact, no carotid bruits  Abdomen: soft, non-tender, non-distended, normal bowel sounds, no masses or organomegaly  Extremities: no cyanosis, clubbing or edema  Musculoskeletal: normal range of motion, no joint swelling, deformity or tenderness  Neurologic: reflexes normal and symmetric, no cranial nerve deficit, gait, coordination and speech normal    Patient's complete Health Risk Assessment and screening values have been reviewed and are found in Flowsheets. The following problems were reviewed today and where indicated follow up appointments were made and/or referrals ordered. Positive Risk Factor Screenings with Interventions:            General Health and ACP:  General  In general, how would you say your health is?: Fair  In the past 7 days, have you experienced any of the following?  New or Increased Pain, New or Increased Fatigue, Loneliness, Social Isolation, Stress or Anger?: (!) Stress  Do you get the social and emotional support that you need?: Yes  Do you have a Living Will?: (!) No  Advance Directives     Power of  Living Will ACP-Advance Directive ACP-Power of     Not on File Not on File Not on File Not on File      General Health Risk Interventions:  · No Living Will: Patient referred to [place] Habits/Nutrition:  Health Habits/Nutrition  Do you exercise TSH testing  05/20/2021    Potassium monitoring  01/13/2022    Creatinine monitoring  01/13/2022    A1C test (Diabetic or Prediabetic)  02/04/2022    Colon cancer screen colonoscopy  05/07/2025    DTaP/Tdap/Td vaccine (3 - Td) 08/20/2027    Flu vaccine  Completed    Pneumococcal 65+ years Vaccine  Completed    AAA screen  Completed    Hepatitis C screen  Completed    Hepatitis A vaccine  Aged Out    Hib vaccine  Aged Out    Meningococcal (ACWY) vaccine  Aged Out     Recommendations for [employer] Due: see orders and patient instructions/AVS.  . Recommended screening schedule for the next 5-10 years is provided to the patient in written form: see Patient Instructions/AVS.    Karina Barfield was seen today for medicare awv, back pain and diabetes. Diagnoses and all orders for this visit:    Encounter for general adult medical examination w/o abnormal findings    Chronic pain syndrome  -     [facility] - [place]  -     oxyCODONE-acetaminophen (PERCOCET) 5-325 MG per tablet; Take 1 tablet by mouth every 8 hours as needed for Pain for up to 30 days. Type 2 diabetes mellitus without complication, unspecified whether long term insulin use (HCC)  -     POCT glycosylated hemoglobin (Hb A1C)    Lumbar radiculopathy, chronic  -     [facility] - [place]    Other orders  -     glimepiride (AMARYL) 2 MG tablet; Take 2 tablets by mouth 2 times daily           Presents for AWV  BP well controlled  Weight is stable    Hospitalized at [address] [place] [address], CHF  Adjusted meds  He has since followed up with Dr. Mirella Ho.  Completed stress test/device check/labs  Follows up 2/16/21 with cardiology    Hga1c 7.4 to 7.1    Using percocet for chronic back pain every 8 hours as needed  Patient takes one Percocet every morning for back pain  He does need Percocet refilled  This is working well to control his pain and keep him functioning  Denies any side effects with med    Patient states since receiving his first covid vaccine on 2/2/21 he is experiencing light headedness and dizziness. Patient states since vaccine is is also having posterior neck pain that is aching. He also feels fatigued from the vaccine. Patient requests glimiperide prescription updated with pharmacy.     Denies any other problems/concerns  Follow up in 3 months for recheck

## 2021-02-11 ENCOUNTER — TELEPHONE (OUTPATIENT)
Dept: PRIMARY CARE CLINIC | Age: 76
End: 2021-02-11

## 2021-02-11 DIAGNOSIS — G89.4 CHRONIC PAIN SYNDROME: ICD-10-CM

## 2021-02-11 RX ORDER — OXYCODONE HYDROCHLORIDE AND ACETAMINOPHEN 5; 325 MG/1; MG/1
1 TABLET ORAL EVERY 8 HOURS PRN
Refills: 0 | Status: CANCELLED | OUTPATIENT
Start: 2021-02-11 | End: 2021-03-13

## 2021-02-11 RX ORDER — OXYCODONE HYDROCHLORIDE AND ACETAMINOPHEN 5; 325 MG/1; MG/1
1 TABLET ORAL EVERY 8 HOURS PRN
Qty: 90 TABLET | Refills: 0 | Status: SHIPPED | OUTPATIENT
Start: 2021-02-11 | End: 2021-02-12 | Stop reason: SDUPTHER

## 2021-02-11 NOTE — TELEPHONE ENCOUNTER
He called in to tell us the pharmacy only gave him 21 tablets of the percocet at the last refill. He is calling for refill on medication and was wondering if could get more than 21 qty.

## 2021-02-12 RX ORDER — OXYCODONE HYDROCHLORIDE AND ACETAMINOPHEN 5; 325 MG/1; MG/1
1 TABLET ORAL EVERY 8 HOURS PRN
Qty: 90 TABLET | Refills: 0 | Status: SHIPPED | OUTPATIENT
Start: 2021-02-12 | End: 2021-05-06 | Stop reason: SDUPTHER

## 2021-02-15 ENCOUNTER — OFFICE VISIT (OUTPATIENT)
Dept: PRIMARY CARE CLINIC | Age: 76
End: 2021-02-15
Payer: MEDICARE

## 2021-02-15 VITALS
HEIGHT: 71 IN | RESPIRATION RATE: 15 BRPM | DIASTOLIC BLOOD PRESSURE: 74 MMHG | SYSTOLIC BLOOD PRESSURE: 126 MMHG | WEIGHT: 238 LBS | HEART RATE: 71 BPM | OXYGEN SATURATION: 94 % | BODY MASS INDEX: 33.32 KG/M2

## 2021-02-15 DIAGNOSIS — M71.9 DISORDER OF BURSA: Primary | ICD-10-CM

## 2021-02-15 PROCEDURE — 99214 OFFICE O/P EST MOD 30 MIN: CPT | Performed by: NURSE PRACTITIONER

## 2021-02-15 RX ORDER — AMLODIPINE BESYLATE 10 MG/1
TABLET ORAL
Qty: 30 TABLET | Refills: 0 | Status: SHIPPED | OUTPATIENT
Start: 2021-02-15 | End: 2021-03-15

## 2021-02-15 RX ORDER — CLOPIDOGREL BISULFATE 75 MG/1
75 TABLET ORAL DAILY
Qty: 90 TABLET | Refills: 3 | Status: ON HOLD | OUTPATIENT
Start: 2021-02-15 | End: 2021-08-14 | Stop reason: HOSPADM

## 2021-02-15 ASSESSMENT — ENCOUNTER SYMPTOMS
SHORTNESS OF BREATH: 0
ABDOMINAL PAIN: 0
COUGH: 0
BACK PAIN: 0

## 2021-02-15 NOTE — PROGRESS NOTES
[facility] PR[facility]  [address]  [address] [address]  Dept: [phone]  Dept Fax: [phone]    Grace Camp is a 77 y.o. male who presentstoday for his medical conditions/complaints as noted below. Grace Camp is c/o of  Chief Complaint   Patient presents with    Mass     on left elbow x 3 days            HPI:     Presents for left elbow swelling   BP stable   Weight stable     Left elbow mass: noticed it Friday when he was getting ready for work. Denies fall or hitting his elbow on anything. Denies pain or tenderness. Denies drainage, redness, or warmth. Denies fever or feeling ill. Following up with cardiology tomorrow to go over stress test and labs. Got his first shot for the COVID vaccine two weeks ago. He felt very sick with it but is better now. Denies any other problems/concerns       Hemoglobin A1C (%)   Date Value   02/04/2021 7.1   06/15/2020 7.4   01/10/2020 7.4             ( goal A1C is < 7)   Microalb/Crt.  Ratio (mcg/mg creat)   Date Value   05/24/2017 26 (H)     LDL Cholesterol (mg/dL)   Date Value   05/31/2016 105     LDL Calculated (mg/dL)   Date Value   10/29/2018 162 (A)   06/03/2017 95   04/01/2015 90       (goal LDL is <100)   AST (U/L)   Date Value   05/20/2020 19     ALT (U/L)   Date Value   05/20/2020 24     BUN (mg/dL)   Date Value   05/20/2020 24     BP Readings from Last 3 Encounters:   02/15/21 126/74   02/04/21 98/76   11/05/20 108/74          ([id number])    Past Medical History:   Diagnosis Date    Arrhythmia     CAD (coronary artery disease)     GERD (gastroesophageal reflux disease)     Heart attack ([facility] [address]     Hyperlipidemia     Hypertension     Ischemic cardiomyopathy     Osteoarthritis     Type II or unspecified type diabetes mellitus without mention of complication, not stated as uncontrolled       Past Surgical History:   Procedure Laterality Date    APPENDECTOMY      CARDIAC CATHETERIZATION      CARDIAC SURGERY      stents     COLONOSCOPY      CORONARY [facility]OPLAST[facility]  2018    [facility]    DIAGNOSTIC CARDIAC CATH LAB PROCEDURE      JOINT REPLACEMENT      knee right parital    PACEMAKER INSERTION      PTCA      ROTATOR CUFF REPAIR      TONSILLECTOMY         Family History   Problem Relation Age of Onset    Heart Disease Mother     High Blood Pressure Mother     Heart Disease Father     Cancer Sister         breast cancer    Cancer Brother         bladder cancer    Diabetes Maternal Grandmother           Social History     Tobacco Use    Smoking status: Former Smoker     Packs/day: 1.00     Years: 40.00     Pack years: 40.00     Types: Cigarettes     Quit date: 1994     Years since quittin.7    Smokeless tobacco: Never Used   Substance Use Topics    Alcohol use: Yes     Comment: rare      Current Outpatient Medications   Medication Sig Dispense Refill    amLODIPine (NORVASC) 10 MG tablet Take 1 tablet by mouth once daily 30 tablet 0    clopidogrel (PLAVIX) 75 MG tablet Take 1 tablet by mouth daily 90 tablet 3    oxyCODONE-acetaminophen (PERCOCET) 5-325 MG per tablet Take 1 tablet by mouth every 8 hours as needed for Pain for up to 30 days.  90 tablet 0    torsemide (DEMADEX) 20 MG tablet Take 20 mg by mouth daily      spironolactone (ALDACTONE) 25 MG tablet Take 25 mg by mouth daily      isosorbide mononitrate (IMDUR) 30 MG extended release tablet Take 30 mg by mouth daily      glimepiride (AMARYL) 2 MG tablet Take 2 tablets by mouth 2 times daily 360 tablet 3    glimepiride (AMARYL) 2 MG tablet Take 1 tablet by mouth every morning (before breakfast) 360 tablet 0    blood glucose test strips (ONETOUCH ULTRA) strip USE 1 STRIP TO CHECK GLUCOSE TWICE DAILY 100 each 0    XARELTO 10 MG TABS tablet Take 10 mg by mouth      alogliptin (NESINA) 25 MG TABS tablet TAKE ONE TABLET BY MOUTH ONCE DAILY FOR DIABETES - BEGIN WHEN SAXAGLIPTIN Meningococcal (ACWY) vaccine  Aged Out       Subjective:      Review of Systems   Constitutional: Negative for chills, fatigue and fever. HENT: Negative for congestion. Eyes: Negative for visual disturbance. Respiratory: Negative for cough and shortness of breath. Cardiovascular: Negative for chest pain and palpitations. Gastrointestinal: Negative for abdominal pain. Genitourinary: Negative for difficulty urinating and dysuria. Musculoskeletal: Positive for joint swelling (left elbow ). Negative for arthralgias and back pain. Neurological: Negative for dizziness and headaches. Psychiatric/Behavioral: Negative for self-injury, sleep disturbance and suicidal ideas. The patient is not nervous/anxious. Objective:     Physical Exam  Vitals signs and nursing note reviewed. Constitutional:       Appearance: He is well-developed. HENT:      Head: Normocephalic and atraumatic. Eyes:      Pupils: Pupils are equal, round, and reactive to light. Neck:      Musculoskeletal: Normal range of motion. Cardiovascular:      Rate and Rhythm: Normal rate and regular rhythm. Heart sounds: Normal heart sounds. Pulmonary:      Effort: Pulmonary effort is normal.      Breath sounds: Normal breath sounds. Abdominal:      General: Bowel sounds are normal.      Palpations: Abdomen is soft. Tenderness: There is no abdominal tenderness. Musculoskeletal: Normal range of motion. Right shoulder: He exhibits swelling. Left forearm: He exhibits swelling and deformity. Arms:    Skin:     General: Skin is warm and dry. Neurological:      Mental Status: He is alert and oriented to person, place, and time. Psychiatric:         Behavior: Behavior normal.         Thought Content:  Thought content normal.         Judgment: Judgment normal.       /74   Pulse 71   Resp 15   Ht 5' 11\" (1.803 m)   Wt 238 lb (108 kg)   SpO2 94%   BMI 33.19 kg/m²     Assessment:       Diagnosis Orders   1. Disorder of bursa  Guillermina العلي MD, Orthopedic Surgery, [place]             Plan:      Return in about 6 months (around 8/15/2021). 1. Ruptured bursa- Wrapped with ace bandage. Rx given for referral to ortho for eval/drainage. Follow up as needed. Orders Placed This Encounter   Procedures   Guillermina العلي MD, Orthopedic Surgery, [place]     Referral Priority:   Routine     Referral Type:   Eval and Treat     Referral Reason:   Specialty Services Required     Referred to Provider:   Charla Carrera MD     Requested Specialty:   Orthopedic Surgery     Number of Visits Requested:   1        Orders Placed This Encounter   Medications    clopidogrel (PLAVIX) 75 MG tablet     Sig: Take 1 tablet by mouth daily     Dispense:  90 tablet     Refill:  3       Patient given educational materials - see patient instructions. Discussed use, benefit, and side effects of prescribed medications. All patientquestions answered. Pt voiced understanding. Reviewed health maintenance. Instructedto continue current medications, diet and exercise. Patient agreed with treatmentplan. Follow up as directed.      Electronicallysigned by ERICK Caceres CNP on 2/15/2021 at 11:52 AM

## 2021-02-23 DIAGNOSIS — M25.422 ELBOW SWELLING, LEFT: Primary | ICD-10-CM

## 2021-02-24 ENCOUNTER — OFFICE VISIT (OUTPATIENT)
Dept: ORTHOPEDIC SURGERY | Age: 76
End: 2021-02-24
Payer: MEDICARE

## 2021-02-24 VITALS — TEMPERATURE: 97.5 F | HEIGHT: 71 IN | WEIGHT: 238 LBS | BODY MASS INDEX: 33.32 KG/M2

## 2021-02-24 DIAGNOSIS — M70.22 OLECRANON BURSITIS OF LEFT ELBOW: Primary | ICD-10-CM

## 2021-02-24 PROCEDURE — 99203 OFFICE O/P NEW LOW 30 MIN: CPT | Performed by: ORTHOPAEDIC SURGERY

## 2021-02-24 NOTE — LETTER
2/24/2021    Martin Clay, APRN - CNP  [address] [facility] [address]  [place],  [address] [facility]    RE: [place]    Dear Dr. Aries Harris,    Thank you for allowing me to participate in the care of Mr. Giovani Conte. I had the opportunity to evaluate the patient on 2/24/2021. Attached you will find my evaluation and recommendations. Thanks again for the confidence you have expressed in me by allowing my participation in the care of your patient. I will keep you apprised of further developments in the patients treatment course as it progresses. If I can be of further assistance in any fashion, please feel free to contact me at your convenience.     Sincerely,        Vega Card  Shoulder and Elbow Surgery

## 2021-02-24 NOTE — PROGRESS NOTES
Orthopedic Elbow Encounter Note     Chief complaint: Left elbow swelling    HPI: Jacquie Bland is a 77 y.o. right-hand dominant male who presents for evaluation of his left elbow. He indicates that about 3 to 4 weeks ago he developed swelling over the posterior aspect of the elbow. He denies any precipitating trauma or injury. He also denies having any pain or fevers, chills, sweats or constitutional symptoms. He has not had this issue before. He was seen by his primary care provider about a week and a half ago and was referred to my clinic for further evaluation and treatment. Previous treatment:    NSAIDs: None    Injections:  None    Physical therapy: No    Surgeries: None    Review of Systems:     Constitution: no fever or chills   Pain level: 0/10  Musculoskeletal: As noted in the HPI   Neurologic: no neurologic symptoms    Past Medical Hx, Past Surgical Hx, Medications, Allergies, Social Hx: These were all reviewed. Please refer to Electronic Medical Records for details. Physical Exam:     Temp 97.5 °F (36.4 °C) (Temporal)   Ht 5' 11\" (1.803 m) Comment: per pt  Wt 238 lb (108 kg) Comment: per pt  BMI 33.19 kg/m²    General Appearance: alert, well appearing, and in no distress  Mental Status: alert, oriented to person, place, and time  Gait: normal    Elbows:    Skin: warm and dry, focal swelling over the posterior aspect of the left elbow at the level of the olecranon bursa. Minimal erythema noted here. No induration present.   Vasculature: 2+ radial pulses bilaterally  Sensation: Intact to light touch in radial, ulnar, and median nerve distributions bilaterally    ROM: (Degrees)    Right   A  Left   A     Flexion   130  Flexion   130   Extension  0  Extension  0    Pronation  80  Pronaton  75   Supination  80  Supination  75     Crepitation  No  Crepitation  No    Muscle strength:    Right       Left    Biceps   5    Biceps   5  Triceps  5    Triceps  5  Wrist flexion  5    Wrist flexion  5  Wrist extension 5    Wrist extension 5  Intrinsics  5    Intrinsics  5    Tenderness: None     Tenderness: None    Special tests    Right    Ulnar Nerve     Left  n    Cubital tunnel bend    n  n    Tinnel's at elbow    n        Biceps  n    Bicipital tenderness    n  n    Defect at biceps insertion   n        Instability  n    LCL instability     n  n    MCL instability     n  n    Moving valgus test    n  n    Milk sign     n  n    PLRI pivot     n        Epicondylitis  n    Resisted WE Pain    n  n    Resisted WF Pain    n  n    Resisted Supination Pain   n  n    Resisted Pronation Pain   n        Arthritis  n    Clunk      n  n    Pain at extremes of motion   n  n    Pain during arc of motion   n      Imaging:  Xrays: 3 views of the left elbow obtained on 2/24/2021 were independently reviewed  Indications: Left elbow swelling  Findings: Normal joint spaces. No significant osteophytic changes. There appears to be slightly positive anterior fat pad sign. There is also focal swelling in the soft tissues posterior to the olecranon  Impression: Left elbow radiographs consistent with olecranon bursitis    Impression/Plan:     Raquel Franco is a 77 y.o. old male with a left elbow olecranon bursitis. This does not appear to be septic clinically. I had a discussion with the patient educating him about this problem and discussed treatment options available to him. I recommended proceeding conservatively at this time with use of anti-inflammatories, ice, compressive sleeve and elbow padding. He was amenable to this. He indicates that he is on Plavix consequently to avoid risk of bleeding I did place him on a topical anti-inflammatory. A prescription of Voltaren was electronically sent to his pharmacy. He was also provided with an elbow pad/compressive sleeve.   We discussed the possibility of a cortisone injection if necessary however being diabetic he states that cortisone results in his blood sugars being significantly elevated so we will try to avoid this and utilize an injection only if absolutely necessary. I will have him follow-up my clinic as needed anticipating improvement and resolution with the aforementioned treatment but he may return or call at anytime with persistent or worsening symptoms and with any questions and/or concerns.       NA = Not assessed  y = Yes  n = No

## 2021-02-25 RX ORDER — GLIMEPIRIDE 4 MG/1
4 TABLET ORAL 2 TIMES DAILY
Qty: 180 TABLET | Refills: 3 | Status: ON HOLD | OUTPATIENT
Start: 2021-02-25 | End: 2021-08-14 | Stop reason: HOSPADM

## 2021-02-25 RX ORDER — GLIMEPIRIDE 4 MG/1
4 TABLET ORAL 2 TIMES DAILY
COMMUNITY
End: 2021-02-25 | Stop reason: SDUPTHER

## 2021-02-25 NOTE — TELEPHONE ENCOUNTER
Incoming call from [place] [facility] he states that he received a letter in the mail from his insurance company regarding formulary changes and they will no longer be covering glimepiride for him. He has a 30- day supply from them but was wondering what it could be changed to after that. Please advise.   Thank you

## 2021-02-25 NOTE — TELEPHONE ENCOUNTER
Pt called stated that his insure requested our office to call them to get a PA started     [phone]    Writer will start PA for Rx requested

## 2021-02-25 NOTE — TELEPHONE ENCOUNTER
Call to Insurance they stated the Rx has to be written for 90 days with 3 refills and Rx is covered .  Could you please resend Rx

## 2021-03-12 RX ORDER — BLOOD SUGAR DIAGNOSTIC
STRIP MISCELLANEOUS
Qty: 100 EACH | Refills: 0 | Status: SHIPPED | OUTPATIENT
Start: 2021-03-12 | End: 2021-06-14

## 2021-03-12 NOTE — TELEPHONE ENCOUNTER
Last OV 2/15/21  Health Maintenance   Topic Date Due    Shingles Vaccine (1 of 2) Never done    Diabetic retinal exam  06/04/2016    Diabetic foot exam  01/17/2019    Diabetic microalbuminuria test  10/29/2019    Lipid screen  10/29/2019    COVID-19 Vaccine (2 - Moderna 2-dose series) 03/01/2021    TSH testing  05/20/2021    Potassium monitoring  01/13/2022    Creatinine monitoring  01/13/2022    A1C test (Diabetic or Prediabetic)  02/04/2022    Annual Wellness Visit (AWV)  02/05/2022    Colon cancer screen colonoscopy  03/14/2026    DTaP/Tdap/Td vaccine (3 - Td) 08/20/2027    Flu vaccine  Completed    Pneumococcal 65+ years Vaccine  Completed    AAA screen  Completed    Hepatitis C screen  Completed    Hepatitis A vaccine  Aged Out    Hib vaccine  Aged Out    Meningococcal (ACWY) vaccine  Aged Out             (applicable per patient's age: Cancer Screenings, Depression Screening, Fall Risk Screening, Immunizations)    Hemoglobin A1C (%)   Date Value   02/04/2021 7.1   06/15/2020 7.4   01/10/2020 7.4     Microalb/Crt.  Ratio (mcg/mg creat)   Date Value   05/24/2017 26 (H)     LDL Cholesterol (mg/dL)   Date Value   05/31/2016 105     LDL Calculated (mg/dL)   Date Value   10/29/2018 162 (A)     AST (U/L)   Date Value   05/20/2020 19     ALT (U/L)   Date Value   05/20/2020 24     BUN (mg/dL)   Date Value   05/20/2020 24      (goal A1C is < 7)   (goal LDL is <100) need 30-50% reduction from baseline     BP Readings from Last 3 Encounters:   02/15/21 126/74   02/04/21 98/76   11/05/20 108/74    (goal /80)      All Future Testing planned in [facility]:  Lab Frequency Next Occurrence   EKG 12 Lead Once 01/15/2021   ECHO Complete 2D W Doppler W Color Once 01/15/2021       Next Visit Date:  Future Appointments   Date Time Provider Adeline Odonnell   5/5/2021  9:40 AM Karlee Ambrocio, APRN - CNP [id number]            Patient Active Problem List:     Lumbago     Spinal stenosis, lumbar region, without neurogenic claudication     Degeneration of lumbar or lumbosacral intervertebral disc     Type 2 diabetes mellitus without complication (HCC)     Hyperlipidemia     Hypertension     Lumbar radiculopathy, chronic     Lumbar spondylosis     PVOD (pulmonary veno-occlusive disease) ([facility]     AICD (automatic cardioverter/defibrillator) present     Coronary artery disease involving native coronary artery of native heart without angina pectoris     Gastritis without bleeding     Paroxysmal atrial fibrillation (HCC)     VT (ventricular tachycardia) (HCC)     SOB (shortness of breath)     Claudication (HCC)     Bilateral leg edema     S/P right coronary artery (RCA) stent placement     Presence of stent in LAD coronary artery     Mild concentric left ventricular hypertrophy (LVH)     Dilated aortic root (HCC)     Diabetic polyneuropathy associated with type 2 diabetes mellitus (HCC)     Chronic diastolic congestive heart failure ([facility]

## 2021-03-15 RX ORDER — AMLODIPINE BESYLATE 10 MG/1
TABLET ORAL
Qty: 30 TABLET | Refills: 0 | Status: SHIPPED | OUTPATIENT
Start: 2021-03-15 | End: 2021-05-05 | Stop reason: SDUPTHER

## 2021-03-18 ENCOUNTER — OFFICE VISIT (OUTPATIENT)
Dept: PRIMARY CARE CLINIC | Age: 76
End: 2021-03-18
Payer: MEDICARE

## 2021-03-18 ENCOUNTER — HOSPITAL ENCOUNTER (OUTPATIENT)
Age: 76
Setting detail: SPECIMEN
Discharge: HOME | End: 2021-03-18
Payer: MEDICARE

## 2021-03-18 VITALS
DIASTOLIC BLOOD PRESSURE: 70 MMHG | SYSTOLIC BLOOD PRESSURE: 136 MMHG | BODY MASS INDEX: 33.74 KG/M2 | WEIGHT: 241 LBS | HEIGHT: 71 IN | RESPIRATION RATE: 16 BRPM | HEART RATE: 74 BPM

## 2021-03-18 DIAGNOSIS — E11.42 DIABETIC POLYNEUROPATHY ASSOCIATED WITH TYPE 2 DIABETES MELLITUS (HCC): ICD-10-CM

## 2021-03-18 DIAGNOSIS — R35.0 URINARY FREQUENCY: ICD-10-CM

## 2021-03-18 DIAGNOSIS — R35.0 URINARY FREQUENCY: Primary | ICD-10-CM

## 2021-03-18 LAB
BILIRUBIN, POC: NORMAL
BLOOD URINE, POC: NORMAL
CLARITY, POC: NORMAL
COLOR, POC: YELLOW
GLUCOSE URINE, POC: NORMAL
KETONES, POC: NORMAL
LEUKOCYTE EST, POC: NORMAL
NITRITE, POC: NORMAL
PH, POC: 5.5
PROTEIN, POC: NORMAL
SPECIFIC GRAVITY, POC: 1.01
UROBILINOGEN, POC: 0.2

## 2021-03-18 PROCEDURE — 3051F HG A1C>EQUAL 7.0%<8.0%: CPT | Performed by: NURSE PRACTITIONER

## 2021-03-18 PROCEDURE — 81002 URINALYSIS NONAUTO W/O SCOPE: CPT | Performed by: NURSE PRACTITIONER

## 2021-03-18 PROCEDURE — 99214 OFFICE O/P EST MOD 30 MIN: CPT | Performed by: NURSE PRACTITIONER

## 2021-03-18 RX ORDER — CEPHALEXIN 500 MG/1
500 CAPSULE ORAL 4 TIMES DAILY
Qty: 28 CAPSULE | Refills: 0 | Status: SHIPPED | OUTPATIENT
Start: 2021-03-18 | End: 2021-05-05 | Stop reason: ALTCHOICE

## 2021-03-18 ASSESSMENT — PATIENT HEALTH QUESTIONNAIRE - PHQ9
1. LITTLE INTEREST OR PLEASURE IN DOING THINGS: 0
SUM OF ALL RESPONSES TO PHQ QUESTIONS 1-9: 0
2. FEELING DOWN, DEPRESSED OR HOPELESS: 0

## 2021-03-18 ASSESSMENT — ENCOUNTER SYMPTOMS
COUGH: 0
ABDOMINAL PAIN: 0
BACK PAIN: 1
SHORTNESS OF BREATH: 0

## 2021-03-18 NOTE — PROGRESS NOTES
[facility] PRIMARY CARE  [address] [place] [address]   [address]  [address] [address]  Dept: [phone]  Dept Fax: [phone]    Carolyne Dill is a 77 y.o. male who presentstoday for his medical conditions/complaints as noted below. Craolyne Dill is c/o of  Chief Complaint   Patient presents with    Dysuria           HPI:     Presents for acute symptoms  BP well controlled   Weight is stable    C/o dysuria and frequency x 2 days  Has had UTI in the past and symptoms are similar  Leaving for up [facility] tomorrow and would like treated  Has increased fluids    Recent doppler done with Dr. Andrae Burns  Positive for PAD  Reviewed symptoms and medication risks/benefits  He chooses to not use meds at this time    Denies any other problems/concerns      Hemoglobin A1C (%)   Date Value   02/04/2021 7.1   06/15/2020 7.4   01/10/2020 7.4             ( goal A1C is < 7)   Microalb/Crt.  Ratio (mcg/mg creat)   Date Value   05/24/2017 26 (H)     LDL Cholesterol (mg/dL)   Date Value   05/31/2016 105     LDL Calculated (mg/dL)   Date Value   10/29/2018 162 (A)   06/03/2017 95   04/01/2015 90       (goal LDL is <100)   AST (U/L)   Date Value   05/20/2020 19     ALT (U/L)   Date Value   05/20/2020 24     BUN (mg/dL)   Date Value   05/20/2020 24     BP Readings from Last 3 Encounters:   03/18/21 136/70   02/15/21 126/74   02/04/21 98/76          ([id number])    Past Medical History:   Diagnosis Date    Arrhythmia     CAD (coronary artery disease)     GERD (gastroesophageal reflux disease)     Heart attack ([address]     Hyperlipidemia     Hypertension     Ischemic cardiomyopathy     Osteoarthritis     Type II or unspecified type diabetes mellitus without mention of complication, not stated as uncontrolled       Past Surgical History:   Procedure Laterality Date    APPENDECTOMY      CARDIAC CATHETERIZATION      CARDIAC SURGERY      stents 2015    COLONOSCOPY      CORONARY ANGIOPLASTY WITH STENT PLACEMENT  2018    [facility]    DIAGNOSTIC CARDIAC CATH LAB PROCEDURE      JOINT REPLACEMENT      knee right parital    PACEMAKER INSERTION      PTCA      ROTATOR CUFF REPAIR      TONSILLECTOMY         Family History   Problem Relation Age of Onset    Heart Disease Mother     High Blood Pressure Mother     Heart Disease Father     Cancer Sister         breast cancer    Cancer Brother         bladder cancer    Diabetes Maternal Grandmother           Social History     Tobacco Use    Smoking status: Former Smoker     Packs/day: 1.00     Years: 40.00     Pack years: 40.00     Types: Cigarettes     Quit date: 1994     Years since quittin.8    Smokeless tobacco: Never Used   Substance Use Topics    Alcohol use: Yes     Comment: rare      Current Outpatient Medications   Medication Sig Dispense Refill    cephALEXin (KEFLEX) 500 MG capsule Take 1 capsule by mouth 4 times daily 28 capsule 0    amLODIPine (NORVASC) 10 MG tablet Take 1 tablet by mouth once daily 30 tablet 0    ONETOUCH ULTRA strip USE 1 STRIP TO CHECK GLUCOSE TWICE DAILY 100 each 0    glimepiride (AMARYL) 4 MG tablet Take 1 tablet by mouth 2 times daily 180 tablet 3    diclofenac sodium (VOLTAREN) 1 % GEL Apply 2 g topically 4 times daily as needed for Pain 150 g 0    clopidogrel (PLAVIX) 75 MG tablet Take 1 tablet by mouth daily 90 tablet 3    oxyCODONE-acetaminophen (PERCOCET) 5-325 MG per tablet Take 1 tablet by mouth every 8 hours as needed for Pain for up to 30 days.  90 tablet 0    torsemide (DEMADEX) 20 MG tablet Take 20 mg by mouth daily      spironolactone (ALDACTONE) 25 MG tablet Take 25 mg by mouth daily      isosorbide mononitrate (IMDUR) 30 MG extended release tablet Take 30 mg by mouth daily      glimepiride (AMARYL) 2 MG tablet Take 2 tablets by mouth 2 times daily (Patient not taking: Reported on 2021) 360 tablet 3    glimepiride (AMARYL) 2 MG tablet Take 1 tablet by mouth every morning (before breakfast) (Patient not taking: Reported on 2/25/2021) 360 tablet 0    XARELTO 10 MG TABS tablet Take 10 mg by mouth      alogliptin (NESINA) 25 MG TABS tablet TAKE ONE TABLET BY MOUTH ONCE DAILY FOR DIABETES - BEGIN WHEN SAXAGLIPTIN GONE      albuterol sulfate  (90 Base) MCG/ACT inhaler INHALE 2 PUFFS BY INHALATION THREE TIMES A DAY AS NEEDED FOR SHORTNESS OF BREATH      cilostazol (PLETAL) 100 MG tablet TAKE 1 TABLET BY MOUTH TWICE DAILY 60 tablet 11    potassium chloride (KLOR-CON M) 20 MEQ extended release tablet TAKE 1  BY MOUTH ONCE DAILY 90 tablet 0    sildenafil (VIAGRA) 50 MG tablet Take 1 tablet by mouth as needed for Erectile Dysfunction 30 tablet 3    amiodarone (CORDARONE) 200 MG tablet TAKE 1 TABLET BY MOUTH ONCE DAILY 90 tablet 3    albuterol (PROVENTIL) (2.5 MG/3ML) 0.083% nebulizer solution Take 3 mLs by nebulization every 6 hours as needed for Wheezing or Shortness of Breath 5 Package 0    furosemide (LASIX) 40 MG tablet Take 1 tablet by mouth once daily   May take 1 additional tablet daily PRN 2 pound weight gain, short of breath or swelling. 180 tablet 3    polyethyl glycol-propyl glycol 0.4-0.3 % (SYSTANE) 0.4-0.3 % ophthalmic solution Place 1 drop into both eyes as needed for Dry Eyes 30 mL 2    insulin detemir (LEVEMIR FLEXTOUCH) 100 UNIT/ML injection pen Inject 10 Units into the skin nightly 1 sample given 8/25/16  [id number]  11/17exp date  2628-1975-01 (Patient taking differently: Inject 25 Units into the skin nightly 1 sample given 8/25/16  [id number]  11/17exp date  9952-0515-25) 5 pen 3    ondansetron (ZOFRAN) 8 MG tablet Take 1 tablet by mouth every 8 hours as needed for Nausea 20 tablet 0    nitroGLYCERIN (NITROSTAT) 0.4 MG SL tablet DISSOLVE ONE TABLET UNDER THE TONGUE EVERY 5 MINUTES AS NEEDED FOR CHEST PAIN.   DO NOT EXCEED A TOTAL OF 3 DOSES IN 15 MINUTES 25 tablet 2    atorvastatin (LIPITOR) 10 MG tablet Take 1 tablet by mouth daily 90 tablet 0    Handicap Placard MISC by Does not apply route Expires 12/2023 1 each 0    gabapentin (NEURONTIN) 300 MG capsule TAKE 1 CAPSULE BY MOUTH THREE TIMES DAILY 90 capsule 2    magnesium oxide (MAG-OX) 400 MG tablet Take 400 mg by mouth daily      triamcinolone (KENALOG) 0.025 % ointment Apply topically 2 times daily to areas of eczema on lower legs 80 g 2    metoprolol tartrate (LOPRESSOR) 50 MG tablet Take 1 tablet by mouth 2 times daily 60 tablet 5    lisinopril (PRINIVIL;ZESTRIL) 40 MG tablet Take 40 mg by mouth daily      omeprazole (PRILOSEC) 20 MG delayed release capsule Take 20 mg by mouth every evening      Omega-3 Fatty Acids (OMEGA 3 500) 500 MG CAPS Take 2 capsules by mouth daily      hydrocortisone (WESTCORT) 0.2 % cream Apply topically 2 times daily. 30 g 1    Insulin Pen Needle 29G X 12.7MM MISC 1 each by Does not apply route daily 100 each 3    saxagliptin (ONGLYZA) 5 MG TABS tablet Take 5 mg by mouth daily      metFORMIN (GLUCOPHAGE) 1000 MG tablet Take 1 tablet by mouth 2 times daily (with meals) 180 tablet 3    aspirin 81 MG tablet Take 81 mg by mouth daily       No current facility-administered medications for this visit.       Allergies   Allergen Reactions    Coreg [Carvedilol] Other (See Comments)     Low pause     Pravastatin Other (See Comments)     Myalgias     Sertraline Other (See Comments)    Zocor [Simvastatin]      norvasc     Citalopram Anxiety       Health Maintenance   Topic Date Due    Shingles Vaccine (1 of 2) Never done    Diabetic retinal exam  06/04/2016    Diabetic foot exam  01/17/2019    Diabetic microalbuminuria test  10/29/2019    Lipid screen  10/29/2019    COVID-19 Vaccine (2 - Moderna 2-dose series) 03/01/2021    TSH testing  05/20/2021    Potassium monitoring  01/13/2022    Creatinine monitoring  01/13/2022    A1C test (Diabetic or Prediabetic)  02/04/2022    Annual Wellness Visit (AWV)  02/05/2022    Colon cancer screen colonoscopy  03/14/2026    DTaP/Tdap/Td vaccine (3 - Td) 08/20/2027    Flu vaccine  Completed    Pneumococcal 65+ years Vaccine  Completed    AAA screen  Completed    Hepatitis C screen  Completed    Hepatitis A vaccine  Aged Out    Hib vaccine  Aged Out    Meningococcal (ACWY) vaccine  Aged Out       Subjective:      Review of Systems   Constitutional: Negative for chills, fatigue and fever. HENT: Negative for congestion. Eyes: Negative for visual disturbance. Respiratory: Negative for cough and shortness of breath. Cardiovascular: Negative for chest pain and palpitations. Gastrointestinal: Negative for abdominal pain. Genitourinary: Positive for difficulty urinating, dysuria, frequency and urgency. Musculoskeletal: Positive for back pain. Negative for arthralgias. Neurological: Negative for dizziness and headaches. Psychiatric/Behavioral: Negative for self-injury, sleep disturbance and suicidal ideas. The patient is not nervous/anxious. Objective:     Physical Exam  Vitals signs and nursing note reviewed. Constitutional:       Appearance: He is well-developed. HENT:      Head: Normocephalic and atraumatic. Eyes:      Pupils: Pupils are equal, round, and reactive to light. Neck:      Musculoskeletal: Normal range of motion. Cardiovascular:      Rate and Rhythm: Normal rate and regular rhythm. Heart sounds: Normal heart sounds. Pulmonary:      Effort: Pulmonary effort is normal.      Breath sounds: Normal breath sounds. Abdominal:      General: Bowel sounds are normal.      Palpations: Abdomen is soft. Tenderness: There is no abdominal tenderness. Musculoskeletal: Normal range of motion. Skin:     General: Skin is warm and dry. Neurological:      Mental Status: He is alert and oriented to person, place, and time. Psychiatric:         Behavior: Behavior normal.         Thought Content:  Thought content normal.         Judgment: Judgment normal.       /70   Pulse 74   Resp 16   Ht 5' 11\" (1.803 m)   Wt 241 lb (109.3 kg)   BMI 33.61 kg/m²     Assessment:       Diagnosis Orders   1. Urinary frequency  POCT Urinalysis no Micro    Culture, Urine   2. Diabetic polyneuropathy associated with type 2 diabetes mellitus ([facility]               Plan:      Return if symptoms worsen or fail to improve, for in May as scheduled. 1. Urinary frequency- UA completed in office, positive. Will send for culture. Rx given for keflex with instruction for use. Follow up as needed. 2. DM- Stable. Continue diet/exercise. Follow up in May as scheduled for recheck/repeat Hga1c. Orders Placed This Encounter   Procedures    Culture, Urine     Standing Status:   Future     Standing Expiration Date:   3/18/2022     Order Specific Question:   Specify (ex-cath, midstream, cysto, etc)? Answer:   midstream    POCT Urinalysis no Micro        Orders Placed This Encounter   Medications    cephALEXin (KEFLEX) 500 MG capsule     Sig: Take 1 capsule by mouth 4 times daily     Dispense:  28 capsule     Refill:  0       Patient given educational materials - see patient instructions. Discussed use, benefit, and side effects of prescribed medications. All patientquestions answered. Pt voiced understanding. Reviewed health maintenance. Instructedto continue current medications, diet and exercise. Patient agreed with treatmentplan. Follow up as directed.      Electronicallysigned by ERICK Kerns CNP on 3/18/2021 at 11:27 AM

## 2021-03-19 LAB
CULTURE: NORMAL
Lab: NORMAL
SPECIMEN DESCRIPTION: NORMAL

## 2021-03-23 ENCOUNTER — TELEPHONE (OUTPATIENT)
Dept: PHARMACY | Facility: CLINIC | Age: 76
End: 2021-03-23

## 2021-03-23 DIAGNOSIS — M79.10 MYALGIA: Primary | ICD-10-CM

## 2021-04-16 LAB — B-TYPE NATRIURETIC PEPTIDE: 714 PG/ML

## 2021-05-05 ENCOUNTER — TELEPHONE (OUTPATIENT)
Dept: PRIMARY CARE CLINIC | Age: 76
End: 2021-05-05

## 2021-05-05 ENCOUNTER — OFFICE VISIT (OUTPATIENT)
Dept: PRIMARY CARE CLINIC | Age: 76
End: 2021-05-05
Payer: MEDICARE

## 2021-05-05 VITALS
HEART RATE: 56 BPM | SYSTOLIC BLOOD PRESSURE: 133 MMHG | OXYGEN SATURATION: 97 % | DIASTOLIC BLOOD PRESSURE: 73 MMHG | HEIGHT: 71 IN | BODY MASS INDEX: 33.6 KG/M2 | WEIGHT: 240 LBS | RESPIRATION RATE: 22 BRPM

## 2021-05-05 DIAGNOSIS — I50.32 CHRONIC DIASTOLIC CONGESTIVE HEART FAILURE (HCC): ICD-10-CM

## 2021-05-05 DIAGNOSIS — G89.4 CHRONIC PAIN SYNDROME: ICD-10-CM

## 2021-05-05 DIAGNOSIS — I77.810 DILATED AORTIC ROOT (HCC): ICD-10-CM

## 2021-05-05 DIAGNOSIS — I47.20 VT (VENTRICULAR TACHYCARDIA): ICD-10-CM

## 2021-05-05 DIAGNOSIS — M48.061 SPINAL STENOSIS, LUMBAR REGION, WITHOUT NEUROGENIC CLAUDICATION: Primary | ICD-10-CM

## 2021-05-05 PROCEDURE — 99496 TRANSJ CARE MGMT HIGH F2F 7D: CPT | Performed by: NURSE PRACTITIONER

## 2021-05-05 RX ORDER — OXYCODONE HYDROCHLORIDE AND ACETAMINOPHEN 5; 325 MG/1; MG/1
1 TABLET ORAL EVERY 8 HOURS PRN
Qty: 90 TABLET | Refills: 0 | Status: SHIPPED | OUTPATIENT
Start: 2021-05-05 | End: 2021-05-05 | Stop reason: SDUPTHER

## 2021-05-05 RX ORDER — DILTIAZEM HYDROCHLORIDE 240 MG/1
240 CAPSULE, COATED, EXTENDED RELEASE ORAL DAILY
Status: ON HOLD | COMMUNITY
End: 2021-08-14 | Stop reason: HOSPADM

## 2021-05-05 RX ORDER — AMLODIPINE BESYLATE 10 MG/1
TABLET ORAL
Qty: 30 TABLET | Refills: 0 | Status: SHIPPED | OUTPATIENT
Start: 2021-05-05 | End: 2021-05-05 | Stop reason: ALTCHOICE

## 2021-05-05 ASSESSMENT — PATIENT HEALTH QUESTIONNAIRE - PHQ9: 2. FEELING DOWN, DEPRESSED OR HOPELESS: 1

## 2021-05-05 NOTE — TELEPHONE ENCOUNTER
Fabiana from [facility] called questioning if patient should be on Norvasc and Diltiazem 240 ER that was sent per Dr Rio Almazan on 4/20. (both calcium channel blockers) Per Luz Lloyd patient requested she refill Norvasc when he was here at [facility] this morning and pharmacy should check with Dr Vanita Latham office. Zuleima Boyce said she would check with her office.

## 2021-05-05 NOTE — PROGRESS NOTES
Post-Discharge Transitional Care Management Services      Rosa Walton   YOB: 1945    Date of Visit:  5/5/2021  30 Day Post-Discharge Date: 4/29/21    Allergies   Allergen Reactions    Coreg [Carvedilol] Other (See Comments)     Low pause     Pravastatin Other (See Comments)     Myalgias     Sertraline Other (See Comments)    Zocor [Simvastatin]      norvasc     Citalopram Anxiety     Outpatient Medications Marked as Taking for the 5/5/21 encounter (Office Visit) with ERICK Bullard CNP   Medication Sig Dispense Refill    amLODIPine (NORVASC) 10 MG tablet Take 1 tablet by mouth once daily 30 tablet 0    apixaban (ELIQUIS) 5 MG TABS tablet Take 1 tablet by mouth 2 times daily 180 tablet 1    oxyCODONE-acetaminophen (PERCOCET) 5-325 MG per tablet Take 1 tablet by mouth every 8 hours as needed for Pain for up to 30 days. 90 tablet 0    ONETOUCH ULTRA strip USE 1 STRIP TO CHECK GLUCOSE TWICE DAILY 100 each 0    glimepiride (AMARYL) 4 MG tablet Take 1 tablet by mouth 2 times daily 180 tablet 3    diclofenac sodium (VOLTAREN) 1 % GEL Apply 2 g topically 4 times daily as needed for Pain 150 g 0    clopidogrel (PLAVIX) 75 MG tablet Take 1 tablet by mouth daily 90 tablet 3    oxyCODONE-acetaminophen (PERCOCET) 5-325 MG per tablet Take 1 tablet by mouth every 8 hours as needed for Pain for up to 30 days.  90 tablet 0    spironolactone (ALDACTONE) 25 MG tablet Take 25 mg by mouth daily      isosorbide mononitrate (IMDUR) 30 MG extended release tablet Take 30 mg by mouth daily      [DISCONTINUED] torsemide (DEMADEX) 20 MG tablet Take 20 mg by mouth daily      albuterol sulfate  (90 Base) MCG/ACT inhaler INHALE 2 PUFFS BY INHALATION THREE TIMES A DAY AS NEEDED FOR SHORTNESS OF BREATH      [DISCONTINUED] alogliptin (NESINA) 25 MG TABS tablet TAKE ONE TABLET BY MOUTH ONCE DAILY FOR DIABETES - BEGIN WHEN SAXAGLIPTIN GONE      potassium chloride (KLOR-CON M) 20 MEQ extended release tablet TAKE 1  BY MOUTH ONCE DAILY 90 tablet 0    sildenafil (VIAGRA) 50 MG tablet Take 1 tablet by mouth as needed for Erectile Dysfunction 30 tablet 3    amiodarone (CORDARONE) 200 MG tablet TAKE 1 TABLET BY MOUTH ONCE DAILY 90 tablet 3    albuterol (PROVENTIL) (2.5 MG/3ML) 0.083% nebulizer solution Take 3 mLs by nebulization every 6 hours as needed for Wheezing or Shortness of Breath 5 Package 0    furosemide (LASIX) 40 MG tablet Take 1 tablet by mouth once daily   May take 1 additional tablet daily PRN 2 pound weight gain, short of breath or swelling. 180 tablet 3    polyethyl glycol-propyl glycol 0.4-0.3 % (SYSTANE) 0.4-0.3 % ophthalmic solution Place 1 drop into both eyes as needed for Dry Eyes 30 mL 2    insulin detemir (LEVEMIR FLEXTOUCH) 100 UNIT/ML injection pen Inject 10 Units into the skin nightly 1 sample given 8/25/16  [id number]  11/17exp date  7258-2826-11 (Patient taking differently: Inject 25 Units into the skin nightly 1 sample given 8/25/16  [id number]  11/17exp date  9537-9710-32) 5 pen 3    ondansetron (ZOFRAN) 8 MG tablet Take 1 tablet by mouth every 8 hours as needed for Nausea 20 tablet 0    nitroGLYCERIN (NITROSTAT) 0.4 MG SL tablet DISSOLVE ONE TABLET UNDER THE TONGUE EVERY 5 MINUTES AS NEEDED FOR CHEST PAIN.   DO NOT EXCEED A TOTAL OF 3 DOSES IN 15 MINUTES 25 tablet 2    Handicap Placard MISC by Does not apply route Expires 12/2023 1 each 0    magnesium oxide (MAG-OX) 400 MG tablet Take 400 mg by mouth daily      triamcinolone (KENALOG) 0.025 % ointment Apply topically 2 times daily to areas of eczema on lower legs 80 g 2    metoprolol tartrate (LOPRESSOR) 50 MG tablet Take 1 tablet by mouth 2 times daily 60 tablet 5    lisinopril (PRINIVIL;ZESTRIL) 40 MG tablet Take 40 mg by mouth daily      omeprazole (PRILOSEC) 20 MG delayed release capsule Take 20 mg by mouth every evening      Omega-3 Fatty Acids (OMEGA 3 500) 500 MG CAPS Take 2 capsules by mouth daily     

## 2021-05-06 ENCOUNTER — TELEPHONE (OUTPATIENT)
Dept: PRIMARY CARE CLINIC | Age: 76
End: 2021-05-06

## 2021-05-06 DIAGNOSIS — G89.4 CHRONIC PAIN SYNDROME: ICD-10-CM

## 2021-05-06 RX ORDER — OXYCODONE HYDROCHLORIDE AND ACETAMINOPHEN 5; 325 MG/1; MG/1
1 TABLET ORAL EVERY 8 HOURS PRN
Qty: 90 TABLET | Refills: 0 | Status: SHIPPED | OUTPATIENT
Start: 2021-05-06 | End: 2021-08-20 | Stop reason: SDUPTHER

## 2021-05-06 NOTE — TELEPHONE ENCOUNTER
Pt called stating that [facility] is stating that they are unable to fill the the Rx for Percocet. Pt asking if office can contact the pharmacy to clarify what needs to be done. Spoke with pharmacy, [facility] states that the medication was cancelled by the PCP.

## 2021-05-06 NOTE — TELEPHONE ENCOUNTER
Writer spoke with PCP and new Rx sent to pharmacy. Writer spoke with pharmacy and confirmed that Rx is being filled. Spoke with pt and informed him that Rx is being filled, pt verbalized understanding.

## 2021-05-07 ENCOUNTER — TELEPHONE (OUTPATIENT)
Dept: PRIMARY CARE CLINIC | Age: 76
End: 2021-05-07

## 2021-05-07 NOTE — TELEPHONE ENCOUNTER
Pt states he saw PCP recently but forgo to ask about his legs aching. Pt states when he gets up and gets active that hi legs get shaky and ache. He states one of his providers started him on Eliquis that was supposed to help but he has been on it 3wks. He wanted to know if dehydration could cause this. Please advise.

## 2021-05-07 NOTE — TELEPHONE ENCOUNTER
I would recommend referral to PT to eval/treat  Eliquis is a blood thinner and would not improve leg shakiness/aching  Also using pain medication on regular basis  Let me know what he would like to do

## 2021-05-12 ENCOUNTER — CARE COORDINATION (OUTPATIENT)
Dept: CARE COORDINATION | Age: 76
End: 2021-05-12

## 2021-06-10 ENCOUNTER — OFFICE VISIT (OUTPATIENT)
Dept: PRIMARY CARE CLINIC | Age: 76
End: 2021-06-10
Payer: MEDICARE

## 2021-06-10 VITALS
HEIGHT: 71 IN | RESPIRATION RATE: 13 BRPM | DIASTOLIC BLOOD PRESSURE: 60 MMHG | HEART RATE: 61 BPM | WEIGHT: 228.6 LBS | OXYGEN SATURATION: 95 % | BODY MASS INDEX: 32 KG/M2 | SYSTOLIC BLOOD PRESSURE: 104 MMHG

## 2021-06-10 DIAGNOSIS — I50.32 CHRONIC DIASTOLIC CONGESTIVE HEART FAILURE (HCC): Primary | ICD-10-CM

## 2021-06-10 DIAGNOSIS — E11.9 TYPE 2 DIABETES MELLITUS WITHOUT COMPLICATION, UNSPECIFIED WHETHER LONG TERM INSULIN USE (HCC): ICD-10-CM

## 2021-06-10 PROBLEM — Z79.899 LONG TERM CURRENT USE OF AMIODARONE: Status: ACTIVE | Noted: 2021-02-24

## 2021-06-10 PROBLEM — M19.90 OSTEOARTHROSIS: Status: ACTIVE | Noted: 2021-02-15

## 2021-06-10 PROBLEM — G47.33 OSA (OBSTRUCTIVE SLEEP APNEA): Status: ACTIVE | Noted: 2021-02-24

## 2021-06-10 LAB — HBA1C MFR BLD: 6.6 %

## 2021-06-10 PROCEDURE — 83036 HEMOGLOBIN GLYCOSYLATED A1C: CPT | Performed by: NURSE PRACTITIONER

## 2021-06-10 PROCEDURE — 99495 TRANSJ CARE MGMT MOD F2F 14D: CPT | Performed by: NURSE PRACTITIONER

## 2021-06-10 RX ORDER — APIXABAN 5 MG/1
5 TABLET, FILM COATED ORAL 2 TIMES DAILY
Qty: 60 TABLET | Refills: 5 | Status: ON HOLD | OUTPATIENT
Start: 2021-06-10 | End: 2021-08-10

## 2021-06-10 RX ORDER — EMPAGLIFLOZIN 10 MG/1
1 TABLET, FILM COATED ORAL DAILY
Qty: 90 TABLET | Refills: 1 | Status: ON HOLD | OUTPATIENT
Start: 2021-06-10 | End: 2021-08-14 | Stop reason: HOSPADM

## 2021-06-10 RX ORDER — ALOGLIPTIN 25 MG/1
25 TABLET, FILM COATED ORAL DAILY
Qty: 30 TABLET | Refills: 5 | Status: ON HOLD | OUTPATIENT
Start: 2021-06-10 | End: 2021-08-14 | Stop reason: HOSPADM

## 2021-06-10 RX ORDER — MAGNESIUM OXIDE 400 MG/1
400 TABLET ORAL
Status: ON HOLD | COMMUNITY
Start: 2021-04-16 | End: 2021-08-10

## 2021-06-10 RX ORDER — APIXABAN 5 MG/1
5 TABLET, FILM COATED ORAL 2 TIMES DAILY
COMMUNITY
Start: 2021-05-10 | End: 2021-06-10 | Stop reason: SDUPTHER

## 2021-06-10 SDOH — ECONOMIC STABILITY: FOOD INSECURITY: WITHIN THE PAST 12 MONTHS, THE FOOD YOU BOUGHT JUST DIDN'T LAST AND YOU DIDN'T HAVE MONEY TO GET MORE.: NEVER TRUE

## 2021-06-10 SDOH — ECONOMIC STABILITY: FOOD INSECURITY: WITHIN THE PAST 12 MONTHS, YOU WORRIED THAT YOUR FOOD WOULD RUN OUT BEFORE YOU GOT MONEY TO BUY MORE.: NEVER TRUE

## 2021-06-10 ASSESSMENT — SOCIAL DETERMINANTS OF HEALTH (SDOH): HOW HARD IS IT FOR YOU TO PAY FOR THE VERY BASICS LIKE FOOD, HOUSING, MEDICAL CARE, AND HEATING?: NOT HARD AT ALL

## 2021-06-10 NOTE — PROGRESS NOTES
Post-Discharge Transitional Care Management Services      Justin Yepez   YOB: 1945    Date of Visit:  6/10/2021  30 Day Post-Discharge Date: 5/30/21    Allergies   Allergen Reactions    Coreg [Carvedilol] Other (See Comments)     Low pause     Pravastatin Other (See Comments)     Myalgias     Sertraline Other (See Comments)    Zocor [Simvastatin]      norvasc     Citalopram Anxiety     Outpatient Medications Marked as Taking for the 6/10/21 encounter (Office Visit) with ERICK Orr CNP   Medication Sig Dispense Refill    magnesium oxide (MAG-OX) 400 MG tablet Take 400 mg by mouth      apixaban (ELIQUIS) 5 MG TABS tablet Take 5 mg by mouth      alogliptin (NESINA) 25 MG TABS tablet Take 1 tablet by mouth daily 30 tablet 5    empagliflozin (JARDIANCE) 10 MG tablet Take 1 tablet by mouth daily 90 tablet 1    ELIQUIS 5 MG TABS tablet Take 1 tablet by mouth 2 times daily 60 tablet 5    dilTIAZem (CARDIZEM CD) 240 MG extended release capsule Take 240 mg by mouth daily      ONETOUCH ULTRA strip USE 1 STRIP TO CHECK GLUCOSE TWICE DAILY 100 each 0    glimepiride (AMARYL) 4 MG tablet Take 1 tablet by mouth 2 times daily 180 tablet 3    diclofenac sodium (VOLTAREN) 1 % GEL Apply 2 g topically 4 times daily as needed for Pain 150 g 0    clopidogrel (PLAVIX) 75 MG tablet Take 1 tablet by mouth daily 90 tablet 3    isosorbide mononitrate (IMDUR) 30 MG extended release tablet Take 30 mg by mouth daily      albuterol sulfate  (90 Base) MCG/ACT inhaler INHALE 2 PUFFS BY INHALATION THREE TIMES A DAY AS NEEDED FOR SHORTNESS OF BREATH      potassium chloride (KLOR-CON M) 20 MEQ extended release tablet TAKE 1  BY MOUTH ONCE DAILY 90 tablet 0    sildenafil (VIAGRA) 50 MG tablet Take 1 tablet by mouth as needed for Erectile Dysfunction 30 tablet 3    amiodarone (CORDARONE) 200 MG tablet TAKE 1 TABLET BY MOUTH ONCE DAILY 90 tablet 3    albuterol (PROVENTIL) (2.5 MG/3ML) 0.083% nebulizer solution Take 3 mLs by nebulization every 6 hours as needed for Wheezing or Shortness of Breath 5 Package 0    furosemide (LASIX) 40 MG tablet Take 1 tablet by mouth once daily   May take 1 additional tablet daily PRN 2 pound weight gain, short of breath or swelling. 180 tablet 3    polyethyl glycol-propyl glycol 0.4-0.3 % (SYSTANE) 0.4-0.3 % ophthalmic solution Place 1 drop into both eyes as needed for Dry Eyes 30 mL 2    insulin detemir (LEVEMIR FLEXTOUCH) 100 UNIT/ML injection pen Inject 10 Units into the skin nightly 1 sample given 8/25/16  [id number]  11/17exp date  3493-5536-52 (Patient taking differently: Inject 25 Units into the skin nightly 1 sample given 8/25/16  [id number]  11/17exp date  4353-7873-97) 5 pen 3    ondansetron (ZOFRAN) 8 MG tablet Take 1 tablet by mouth every 8 hours as needed for Nausea 20 tablet 0    nitroGLYCERIN (NITROSTAT) 0.4 MG SL tablet DISSOLVE ONE TABLET UNDER THE TONGUE EVERY 5 MINUTES AS NEEDED FOR CHEST PAIN. DO NOT EXCEED A TOTAL OF 3 DOSES IN 15 MINUTES 25 tablet 2    Handicap Placard MISC by Does not apply route Expires 12/2023 1 each 0    magnesium oxide (MAG-OX) 400 MG tablet Take 400 mg by mouth daily      triamcinolone (KENALOG) 0.025 % ointment Apply topically 2 times daily to areas of eczema on lower legs 80 g 2    metoprolol tartrate (LOPRESSOR) 50 MG tablet Take 1 tablet by mouth 2 times daily 60 tablet 5    lisinopril (PRINIVIL;ZESTRIL) 40 MG tablet Take 40 mg by mouth daily      omeprazole (PRILOSEC) 20 MG delayed release capsule Take 20 mg by mouth every evening      Omega-3 Fatty Acids (OMEGA 3 500) 500 MG CAPS Take 2 capsules by mouth daily      hydrocortisone (WESTCORT) 0.2 % cream Apply topically 2 times daily.  30 g 1    Insulin Pen Needle 29G X 12.7MM MISC 1 each by Does not apply route daily 100 each 3    saxagliptin (ONGLYZA) 5 MG TABS tablet Take 5 mg by mouth daily      metFORMIN (GLUCOPHAGE) 1000 MG tablet Take 1 tablet by mouth 2 times daily (with meals) 180 tablet 3    aspirin 81 MG tablet Take 81 mg by mouth daily           Vitals:    06/10/21 1320   BP: 104/60   Pulse: 61   Resp: 13   SpO2: 95%   Weight: 228 lb 9.6 oz (103.7 kg)   Height: 5' 11\" (1.803 m)     Body mass index is 31.88 kg/m². Wt Readings from Last 3 Encounters:   06/10/21 228 lb 9.6 oz (103.7 kg)   05/05/21 240 lb (108.9 kg)   03/18/21 241 lb (109.3 kg)     BP Readings from Last 3 Encounters:   06/10/21 104/60   05/05/21 133/73   03/18/21 136/70        Patient was admitted to [facility]  From 5/26/21 to 5/30/21 for SOB, acute on chronic CHF, Afib. Inpatient course: Discharge summary reviewed- see chart. Current status: stable    Review of Systems:  A comprehensive review of systems was negative except for what was noted in the HPI.    Bilateral lower extremity pain    Physical Exam:  General Appearance: alert and oriented to person, place and time, well developed and well- nourished, in no acute distress  Skin: warm and dry, no rash or erythema  Head: normocephalic and atraumatic  Eyes: pupils equal, round, and reactive to light, extraocular eye movements intact, conjunctivae normal  ENT: tympanic membrane, external ear and ear canal normal bilaterally, nose without deformity, nasal mucosa and turbinates normal without polyps  Neck: supple and non-tender without mass, no thyromegaly or thyroid nodules, no cervical lymphadenopathy  Pulmonary/Chest: clear to auscultation bilaterally- no wheezes, rales or rhonchi, normal air movement, no respiratory distress  Cardiovascular: normal rate, regular rhythm, normal S1 and S2, no murmurs, rubs, clicks, or gallops, distal pulses intact, no carotid bruits  Abdomen: soft, non-tender, non-distended, normal bowel sounds, no masses or organomegaly  Extremities: no cyanosis, clubbing or edema  Musculoskeletal: normal range of motion, no joint swelling, deformity or tenderness  Neurologic: reflexes normal and symmetric, no cranial nerve deficit, gait, coordination and speech normal      Assessment/Plan:  Octavio Spencer was seen today for follow-up from hospital.    Diagnoses and all orders for this visit:    Chronic diastolic congestive heart failure ([address]    Type 2 diabetes mellitus without complication, unspecified whether long term insulin use (HCC)  -     POCT glycosylated hemoglobin (Hb A1C)    Other orders  -     alogliptin (NESINA) 25 MG TABS tablet; Take 1 tablet by mouth daily  -     empagliflozin (JARDIANCE) 10 MG tablet; Take 1 tablet by mouth daily  -     ELIQUIS 5 MG TABS tablet; Take 1 tablet by mouth 2 times daily          Diagnostic test results reviewed: inpatient labs and post-discharge labs    Patient risk of morbidity and mortality: moderate    Medical Decision Making: moderate complexity       Presents for hospital follow up   BP well controlled  Down 12lb since last visit. Monitoring weight daily and is stable at home. Limiting fluids to 1500-2000ml daily    Since discharge from hospital now living with daughter  This is going well, plans to move to [facility] and live with his cousin in the future  Had follow up with Dr. Minnie Patel on 6/3/21. Recommended to start jardiance, I agree he will  Rx  Hga1c well controlled at 6.6- will be taking med for cardiac protection  Follow up with cardiology in July  Had follow up with Dr. Brian Lobato, PAD.  Awaiting cardiac clearance and will complete angiogram  Next appt vascular in a few weeks    Has PFTS to do on 6/11/21    Overall feeling well aside from bilateral lower extremity and bilateral hip pain  Following with chiropractor for adjustment with mild improvement    Denies any other problems/concerns  Follow up in August as scheduled

## 2021-06-11 ENCOUNTER — HOSPITAL ENCOUNTER (OUTPATIENT)
Dept: NEUROLOGY | Age: 76
Discharge: HOME | End: 2021-06-11
Payer: MEDICARE

## 2021-06-11 DIAGNOSIS — G47.30 SLEEP APNEA, UNSPECIFIED TYPE: ICD-10-CM

## 2021-06-11 DIAGNOSIS — J44.9 CHRONIC OBSTRUCTIVE PULMONARY DISEASE, UNSPECIFIED COPD TYPE (HCC): ICD-10-CM

## 2021-06-11 PROCEDURE — 94729 DIFFUSING CAPACITY: CPT

## 2021-06-11 PROCEDURE — 94060 EVALUATION OF WHEEZING: CPT

## 2021-06-11 PROCEDURE — 94726 PLETHYSMOGRAPHY LUNG VOLUMES: CPT

## 2021-06-11 PROCEDURE — 6370000000 HC RX 637 (ALT 250 FOR IP): Performed by: INTERNAL MEDICINE

## 2021-06-11 RX ORDER — ALBUTEROL SULFATE 90 UG/1
2 AEROSOL, METERED RESPIRATORY (INHALATION) ONCE
Status: COMPLETED | OUTPATIENT
Start: 2021-06-11 | End: 2021-06-11

## 2021-06-11 RX ADMIN — ALBUTEROL SULFATE 2 PUFF: 90 AEROSOL, METERED RESPIRATORY (INHALATION) at 08:53

## 2021-06-14 LAB
DLCO %PRED: 40 %
DLCO PRED: NORMAL
DLCO/VA %PRED: NORMAL
DLCO/VA PRED: NORMAL
DLCO/VA: NORMAL
DLCO: NORMAL
EXPIRATORY TIME-POST: NORMAL
EXPIRATORY TIME: NORMAL
FEF 25-75% %CHNG: NORMAL
FEF 25-75% %PRED-POST: NORMAL
FEF 25-75% %PRED-PRE: NORMAL
FEF 25-75% PRED: NORMAL
FEF 25-75%-POST: NORMAL
FEF 25-75%-PRE: NORMAL
FEV1 %PRED-POST: 63 %
FEV1 %PRED-PRE: 63 %
FEV1 PRED: NORMAL
FEV1-POST: NORMAL
FEV1-PRE: NORMAL
FEV1/FVC %PRED-POST: NORMAL
FEV1/FVC %PRED-PRE: NORMAL
FEV1/FVC PRED: NORMAL
FEV1/FVC-POST: 99 %
FEV1/FVC-PRE: 98 %
FVC %PRED-POST: NORMAL
FVC %PRED-PRE: NORMAL
FVC PRED: NORMAL
FVC-POST: NORMAL
FVC-PRE: NORMAL
GAW %PRED: NORMAL
GAW PRED: NORMAL
GAW: NORMAL
IC %PRED: NORMAL
IC PRED: NORMAL
IC: NORMAL
MEP: NORMAL
MIP: NORMAL
MVV %PRED-PRE: NORMAL
MVV PRED: NORMAL
MVV-PRE: NORMAL
PEF %PRED-POST: NORMAL
PEF %PRED-PRE: NORMAL
PEF PRED: NORMAL
PEF%CHNG: NORMAL
PEF-POST: NORMAL
PEF-PRE: NORMAL
RAW %PRED: NORMAL
RAW PRED: NORMAL
RAW: NORMAL
RV %PRED: NORMAL
RV PRED: NORMAL
RV: NORMAL
SVC %PRED: NORMAL
SVC PRED: NORMAL
SVC: NORMAL
TLC %PRED: 63 %
TLC PRED: NORMAL
TLC: NORMAL
VA %PRED: NORMAL
VA PRED: NORMAL
VA: NORMAL
VTG %PRED: NORMAL
VTG PRED: NORMAL
VTG: NORMAL

## 2021-06-14 RX ORDER — BLOOD SUGAR DIAGNOSTIC
STRIP MISCELLANEOUS
Qty: 100 EACH | Refills: 0 | Status: SHIPPED | OUTPATIENT
Start: 2021-06-14 | End: 2021-09-07

## 2021-06-14 ASSESSMENT — PULMONARY FUNCTION TESTS
FEV1/FVC_POST: 99
FEV1_PERCENT_PREDICTED_POST: 63
FEV1_PERCENT_PREDICTED_PRE: 63
FEV1/FVC_PRE: 98

## 2021-06-15 ENCOUNTER — CARE COORDINATION (OUTPATIENT)
Dept: CARE COORDINATION | Age: 76
End: 2021-06-15

## 2021-06-24 ENCOUNTER — CARE COORDINATION (OUTPATIENT)
Dept: CARE COORDINATION | Age: 76
End: 2021-06-24

## 2021-06-24 SDOH — ECONOMIC STABILITY: TRANSPORTATION INSECURITY
IN THE PAST 12 MONTHS, HAS THE LACK OF TRANSPORTATION KEPT YOU FROM MEDICAL APPOINTMENTS OR FROM GETTING MEDICATIONS?: NO

## 2021-06-24 SDOH — ECONOMIC STABILITY: HOUSING INSECURITY: IN THE LAST 12 MONTHS, HOW MANY PLACES HAVE YOU LIVED?: 1

## 2021-06-24 SDOH — ECONOMIC STABILITY: HOUSING INSECURITY
IN THE LAST 12 MONTHS, WAS THERE A TIME WHEN YOU DID NOT HAVE A STEADY PLACE TO SLEEP OR SLEPT IN A SHELTER (INCLUDING NOW)?: NO

## 2021-06-24 SDOH — ECONOMIC STABILITY: TRANSPORTATION INSECURITY
IN THE PAST 12 MONTHS, HAS LACK OF TRANSPORTATION KEPT YOU FROM MEETINGS, WORK, OR FROM GETTING THINGS NEEDED FOR DAILY LIVING?: NO

## 2021-06-24 SDOH — ECONOMIC STABILITY: INCOME INSECURITY: IN THE LAST 12 MONTHS, WAS THERE A TIME WHEN YOU WERE NOT ABLE TO PAY THE MORTGAGE OR RENT ON TIME?: NO

## 2021-06-24 SDOH — HEALTH STABILITY: PHYSICAL HEALTH: ON AVERAGE, HOW MANY DAYS PER WEEK DO YOU ENGAGE IN MODERATE TO STRENUOUS EXERCISE (LIKE A BRISK WALK)?: 0 DAYS

## 2021-06-24 SDOH — HEALTH STABILITY: PHYSICAL HEALTH: ON AVERAGE, HOW MANY MINUTES DO YOU ENGAGE IN EXERCISE AT THIS LEVEL?: 0 MIN

## 2021-06-24 NOTE — CARE COORDINATION
Care Coordination Interventions    Program Enrollment: Complex Care  Referral from Primary Care Provider: No  Suggested Interventions and Community Resources  Diabetes Education: Declined  Pharmacist: Declined  Registered Dietician: Declined  Social Work: Declined  Zone Management Tools:  (Comment: DM, CHF )         Goals Addressed                 This Visit's Progress     Conditions and Symptoms        I will schedule office visits, as directed by my provider. I will notify my provider of any barriers to my plan of care. I will follow my Zone Management tool to seek urgent or emergent care. I will notify my provider of any symptoms that indicate a worsening of my condition. Barriers: lack of education  Plan for overcoming my barriers: education, care management   Confidence: 9/10  Anticipated Goal Completion Date: 10/24/2021              Prior to Admission medications    Medication Sig Start Date End Date Taking? Authorizing Provider   ONETOUCH ULTRA strip USE 1 STRIP TO CHECK GLUCOSE TWICE DAILY 6/14/21   ERICK Tompkins CNP   magnesium oxide (MAG-OX) 400 MG tablet Take 400 mg by mouth 4/16/21   Historical Provider, MD   apixaban (ELIQUIS) 5 MG TABS tablet Take 5 mg by mouth 4/17/21   Historical Provider, MD   alogliptin (NESINA) 25 MG TABS tablet Take 1 tablet by mouth daily 6/10/21   ERICK Tompkins CNP   empagliflozin (JARDIANCE) 10 MG tablet Take 1 tablet by mouth daily 6/10/21   ERICK Tompkins CNP   ELIQUIS 5 MG TABS tablet Take 1 tablet by mouth 2 times daily 6/10/21   ERICK Tompkins CNP   oxyCODONE-acetaminophen (PERCOCET) 5-325 MG per tablet Take 1 tablet by mouth every 8 hours as needed for Pain for up to 30 days.  5/6/21 6/5/21  ERICK Tompkins CNP   dilTIAZem (CARDIZEM CD) 240 MG extended release capsule Take 240 mg by mouth daily    Historical Provider, MD   glimepiride (AMARYL) 4 MG tablet Take 1 tablet by mouth 2 times daily 2/25/21   ERICK Bullard CNP   diclofenac sodium (VOLTAREN) 1 % GEL Apply 2 g topically 4 times daily as needed for Pain 2/24/21   Elba Luis MD   clopidogrel (PLAVIX) 75 MG tablet Take 1 tablet by mouth daily 2/15/21   ERICK Bullard CNP   isosorbide mononitrate (IMDUR) 30 MG extended release tablet Take 30 mg by mouth daily    Historical Provider, MD   albuterol sulfate  (90 Base) MCG/ACT inhaler INHALE 2 PUFFS BY INHALATION THREE TIMES A DAY AS NEEDED FOR SHORTNESS OF BREATH 3/30/20   Historical Provider, MD   potassium chloride (KLOR-CON M) 20 MEQ extended release tablet TAKE 1  BY MOUTH ONCE DAILY 6/30/20   ERICK Bullard CNP   sildenafil (VIAGRA) 50 MG tablet Take 1 tablet by mouth as needed for Erectile Dysfunction 6/15/20   ERICK Bullard CNP   amiodarone (CORDARONE) 200 MG tablet TAKE 1 TABLET BY MOUTH ONCE DAILY 4/27/20   Matty Dejesus MD   albuterol (PROVENTIL) (2.5 MG/3ML) 0.083% nebulizer solution Take 3 mLs by nebulization every 6 hours as needed for Wheezing or Shortness of Breath 3/19/20   Matty Dejesus MD   furosemide (LASIX) 40 MG tablet Take 1 tablet by mouth once daily   May take 1 additional tablet daily PRN 2 pound weight gain, short of breath or swelling.  3/19/20   Matty Dejesus MD   polyethyl glycol-propyl glycol 0.4-0.3 % (SYSTANE) 0.4-0.3 % ophthalmic solution Place 1 drop into both eyes as needed for Dry Eyes 1/10/20   ERICK Bullard CNP   insulin detemir (LEVEMIR FLEXTOUCH) 100 UNIT/ML injection pen Inject 10 Units into the skin nightly 1 sample given 8/25/16  [id number]  11/17exp date  8759-1824-64  Patient taking differently: Inject 25 Units into the skin nightly 1 sample given 8/25/16  [id number]  11/17exp date  1936-3356-98 6/17/19   ERICK Bullard CNP   ondansetron [facility] 8 MG tablet Take 1 tablet by mouth every 8 hours as needed for Nausea 6/17/19   ERICK Bullard - LASHON   nitroGLYCERIN (NITROSTAT) 0.4 MG SL tablet DISSOLVE ONE TABLET UNDER THE TONGUE EVERY 5 MINUTES AS NEEDED FOR CHEST PAIN. DO NOT EXCEED A TOTAL OF 3 DOSES IN 15 MINUTES 2/1/19   ERICK Orr CNP   Handicap Placard MISC by Does not apply route Expires 12/2023 12/17/18   ERICK Orr CNP   gabapentin (NEURONTIN) 300 MG capsule TAKE 1 CAPSULE BY MOUTH THREE TIMES DAILY 8/20/18 5/5/21  ERICK Orr CNP   magnesium oxide (MAG-OX) 400 MG tablet Take 400 mg by mouth daily    Historical Provider, MD   triamcinolone (KENALOG) 0.025 % ointment Apply topically 2 times daily to areas of eczema on lower legs 4/30/18   Bro Figueroa MD   metoprolol tartrate (LOPRESSOR) 50 MG tablet Take 1 tablet by mouth 2 times daily 12/18/17   ERICK Orr CNP   lisinopril (PRINIVIL;ZESTRIL) 40 MG tablet Take 40 mg by mouth daily    Historical Provider, MD   omeprazole (PRILOSEC) 20 MG delayed release capsule Take 20 mg by mouth every evening    Historical Provider, MD   Omega-3 Fatty Acids (OMEGA 3 500) 500 MG CAPS Take 2 capsules by mouth daily    Historical Provider, MD   hydrocortisone (WESTCORT) 0.2 % cream Apply topically 2 times daily.  12/28/16   ERICK Haro CNP   Insulin Pen Needle 29G X 12.7MM MISC 1 each by Does not apply route daily 8/17/16   ERICK Haro CNP   saxagliptin (ONGLYZA) 5 MG TABS tablet Take 5 mg by mouth daily    Historical Provider, MD   metFORMIN (GLUCOPHAGE) 1000 MG tablet Take 1 tablet by mouth 2 times daily (with meals) 5/7/15   ERICK Haro CNP   aspirin 81 MG tablet Take 81 mg by mouth daily    Historical Provider, MD       Future Appointments   Date Time Provider Adeline Odonnell   8/9/2021  2:20 PM ERICK Orr CNP [facility]

## 2021-06-29 ENCOUNTER — CARE COORDINATION (OUTPATIENT)
Dept: CARE COORDINATION | Age: 76
End: 2021-06-29

## 2021-06-30 ENCOUNTER — CARE COORDINATION (OUTPATIENT)
Dept: CARE COORDINATION | Age: 76
End: 2021-06-30

## 2021-07-07 ENCOUNTER — CARE COORDINATION (OUTPATIENT)
Dept: CARE COORDINATION | Age: 76
End: 2021-07-07

## 2021-07-07 SDOH — ECONOMIC STABILITY: HOUSING INSECURITY: IN THE LAST 12 MONTHS, HOW MANY PLACES HAVE YOU LIVED?: 1

## 2021-07-07 SDOH — ECONOMIC STABILITY: INCOME INSECURITY: HOW HARD IS IT FOR YOU TO PAY FOR THE VERY BASICS LIKE FOOD, HOUSING, MEDICAL CARE, AND HEATING?: NOT HARD AT ALL

## 2021-07-07 SDOH — ECONOMIC STABILITY: FOOD INSECURITY: WITHIN THE PAST 12 MONTHS, THE FOOD YOU BOUGHT JUST DIDN'T LAST AND YOU DIDN'T HAVE MONEY TO GET MORE.: NEVER TRUE

## 2021-07-07 SDOH — SOCIAL STABILITY: SOCIAL NETWORK
DO YOU BELONG TO ANY CLUBS OR ORGANIZATIONS SUCH AS CHURCH GROUPS UNIONS, FRATERNAL OR ATHLETIC GROUPS, OR SCHOOL GROUPS?: YES

## 2021-07-07 SDOH — HEALTH STABILITY: PHYSICAL HEALTH: ON AVERAGE, HOW MANY MINUTES DO YOU ENGAGE IN EXERCISE AT THIS LEVEL?: 0 MIN

## 2021-07-07 SDOH — HEALTH STABILITY: MENTAL HEALTH: HOW MANY STANDARD DRINKS CONTAINING ALCOHOL DO YOU HAVE ON A TYPICAL DAY?: 1 OR 2

## 2021-07-07 SDOH — ECONOMIC STABILITY: FOOD INSECURITY: WITHIN THE PAST 12 MONTHS, YOU WORRIED THAT YOUR FOOD WOULD RUN OUT BEFORE YOU GOT MONEY TO BUY MORE.: NEVER TRUE

## 2021-07-07 SDOH — SOCIAL STABILITY: SOCIAL NETWORK
IN A TYPICAL WEEK, HOW MANY TIMES DO YOU TALK ON THE PHONE WITH FAMILY, FRIENDS, OR NEIGHBORS?: MORE THAN THREE TIMES A WEEK

## 2021-07-07 SDOH — SOCIAL STABILITY: SOCIAL NETWORK: HOW OFTEN DO YOU ATTEND CHURCH OR RELIGIOUS SERVICES?: 1 TO 4 TIMES PER YEAR

## 2021-07-07 SDOH — SOCIAL STABILITY: SOCIAL NETWORK: HOW OFTEN DO YOU ATTENT MEETINGS OF THE CLUB OR ORGANIZATION YOU BELONG TO?: MORE THAN 4 TIMES PER YEAR

## 2021-07-07 SDOH — HEALTH STABILITY: MENTAL HEALTH: HOW OFTEN DO YOU HAVE A DRINK CONTAINING ALCOHOL?: MONTHLY OR LESS

## 2021-07-07 SDOH — ECONOMIC STABILITY: INCOME INSECURITY: IN THE LAST 12 MONTHS, WAS THERE A TIME WHEN YOU WERE NOT ABLE TO PAY THE MORTGAGE OR RENT ON TIME?: NO

## 2021-07-07 SDOH — SOCIAL STABILITY: SOCIAL NETWORK: HOW OFTEN DO YOU GET TOGETHER WITH FRIENDS OR RELATIVES?: MORE THAN THREE TIMES A WEEK

## 2021-07-07 SDOH — SOCIAL STABILITY: SOCIAL NETWORK: ARE YOU MARRIED, WIDOWED, DIVORCED, SEPARATED, NEVER MARRIED, OR LIVING WITH A PARTNER?: WIDOWED

## 2021-07-07 SDOH — HEALTH STABILITY: MENTAL HEALTH
STRESS IS WHEN SOMEONE FEELS TENSE, NERVOUS, ANXIOUS, OR CAN'T SLEEP AT NIGHT BECAUSE THEIR MIND IS TROUBLED. HOW STRESSED ARE YOU?: ONLY A LITTLE

## 2021-07-07 SDOH — HEALTH STABILITY: PHYSICAL HEALTH: ON AVERAGE, HOW MANY DAYS PER WEEK DO YOU ENGAGE IN MODERATE TO STRENUOUS EXERCISE (LIKE A BRISK WALK)?: 0 DAYS

## 2021-07-07 NOTE — ACP (ADVANCE CARE PLANNING)
Advance Care Planning   Healthcare Decision Maker:  Shirley Martínez / daughter     Click here to complete Healthcare Decision Makers including selection of the Healthcare Decision Maker Relationship (ie \"Primary\"). Today we referred to ACP Clinical Specialist for assistance.        If the relationship to the patient does NOT follow our state's Next of Kin hierarchy, the patient MUST complete an ACP Document to allow him/her to act on the patient's behalf.:

## 2021-07-07 NOTE — CARE COORDINATION
Ambulatory Care Coordination Note  7/7/2021  CM Risk Score: 7  Charlson 10 Year Mortality Risk Score: 100%     ACC: Steve Mahajan RN    Summary Note: Feeling ok  A1C 6.6. Morning FBS averages less than 130  Eats a balanced diet and aware how his diet affects his BS  Tolerating Jardiance   Reports his breathing has been ok. O2 sats range 91-96 %  Monitors his daily weights, stable   Uses O2 @ 2L prn at HS  Has a sleep study this Friday   Zone tools reviewed, SDOH reviewed   Reports, his daughter is his decision maker. Would like ACP paperwork filled out and agreeable to ACP referral   [facility], [facility], pulmonary   Has his meds on hand, taking as prescribed   He will call providers as needed  CC plan; DM/CHF/COPD assessments/education, outcome of sleep study    Diabetes Assessment    Medic Alert ID: No  Meal Planning: Avoidance of concentrated sweets   How often do you test your blood sugar?: Daily   Do you have barriers with adherence to non-pharmacologic self-management interventions?  (Nutrition/Exercise/Self-Monitoring): No   Have you ever had to go to the ED for symptoms of low blood sugar?: No       Do you have hyperglycemia symptoms?: No   Do you have hypoglycemia symptoms?: No   Blood Sugar Monitoring Regimen: Once a Day      ,   Congestive Heart Failure Assessment    Are you currently restricting fluids?: 2000cc  Do you understand a low sodium diet?: Yes  Do you understand how to read food labels?: Yes  How many restaurant meals do you eat per week?: 0  Do you salt your food before tasting it?: No         Symptoms:     Symptom course: stable  Weight trend: stable  Salt intake watch compared to last visit: stable     ,   COPD Assessment    Does the patient understand envrionmental exposure?: Yes  Is the patient able to verbalize Rescue vs. Long Acting medications?: No  Does the patient have a nebulizer?: Yes  Does the patient use a space with inhaled medications?: No Symptoms:     Symptom course: stable  Change in chronic cough?: No/At Baseline  Change in sputum?: No/At Baseline  Self Monitoring - SaO2: Yes  Baseline SaO2 Readin      and   General Assessment    Do you have any symptoms that are causing concern?: No               Care Coordination Interventions    Program Enrollment: Complex Care  Referral from Primary Care Provider: No  Suggested Interventions and Community Resources  Diabetes Education: Declined  Pharmacist: Declined  Registered Dietician: Declined  Social Work: Declined  Zone Management Tools: Completed (Comment: DM, CHF, COPD)         Goals Addressed                 This Visit's Progress     Conditions and Symptoms   Improving     I will schedule office visits, as directed by my provider. I will notify my provider of any barriers to my plan of care. I will follow my Zone Management tool to seek urgent or emergent care. I will notify my provider of any symptoms that indicate a worsening of my condition. Barriers: lack of education  Plan for overcoming my barriers: education, care management   Confidence: 9/10  Anticipated Goal Completion Date: 10/24/2021              Prior to Admission medications    Medication Sig Start Date End Date Taking?  Authorizing Provider   ONETOUCH ULTRA strip USE 1 STRIP TO CHECK GLUCOSE TWICE DAILY 21   ERICK Ha CNP   magnesium oxide (MAG-OX) 400 MG tablet Take 400 mg by mouth 21   Historical Provider, MD   apixaban (ELIQUIS) 5 MG TABS tablet Take 5 mg by mouth 21   Historical Provider, MD   alogliptin (NESINA) 25 MG TABS tablet Take 1 tablet by mouth daily 6/10/21   ERICK Ha CNP   empagliflozin (JARDIANCE) 10 MG tablet Take 1 tablet by mouth daily 6/10/21   ERICK Ha CNP   ELIQUIS 5 MG TABS tablet Take 1 tablet by mouth 2 times daily 6/10/21   ERICK Ha CNP   oxyCODONE-acetaminophen (PERCOCET) 5-325 MG per tablet Take 1 tablet by mouth every 8 hours as needed for Pain for up to 30 days. 5/6/21 6/5/21  Ana Cristina AxERICK cannon CNP   dilTIAZem (CARDIZEM CD) 240 MG extended release capsule Take 240 mg by mouth daily    Historical Provider, MD   glimepiride (AMARYL) 4 MG tablet Take 1 tablet by mouth 2 times daily 2/25/21   Ana Cristina AxERICK cannon CNP   diclofenac sodium (VOLTAREN) 1 % GEL Apply 2 g topically 4 times daily as needed for Pain 2/24/21   Miguel Crews MD   clopidogrel (PLAVIX) 75 MG tablet Take 1 tablet by mouth daily 2/15/21   Ana Cristina AxERICK cannon CNP   isosorbide mononitrate (IMDUR) 30 MG extended release tablet Take 30 mg by mouth daily    Historical Provider, MD   albuterol sulfate  (90 Base) MCG/ACT inhaler INHALE 2 PUFFS BY INHALATION THREE TIMES A DAY AS NEEDED FOR SHORTNESS OF BREATH 3/30/20   Historical Provider, MD   potassium chloride (KLOR-CON M) 20 MEQ extended release tablet TAKE 1  BY MOUTH ONCE DAILY 6/30/20   Ana Cristina AxERICK cannon CNP   sildenafil (VIAGRA) 50 MG tablet Take 1 tablet by mouth as needed for Erectile Dysfunction 6/15/20   Ana Cristina ERICK Banegas CNP   amiodarone (CORDARONE) 200 MG tablet TAKE 1 TABLET BY MOUTH ONCE DAILY 4/27/20   John Funes MD   albuterol (PROVENTIL) (2.5 MG/3ML) 0.083% nebulizer solution Take 3 mLs by nebulization every 6 hours as needed for Wheezing or Shortness of Breath 3/19/20   John Funes MD   furosemide (LASIX) 40 MG tablet Take 1 tablet by mouth once daily   May take 1 additional tablet daily PRN 2 pound weight gain, short of breath or swelling.  3/19/20   John Funes MD   polyethyl glycol-propyl glycol 0.4-0.3 % (SYSTANE) 0.4-0.3 % ophthalmic solution Place 1 drop into both eyes as needed for Dry Eyes 1/10/20   Ana Cristina AxERICK cannon CNP   insulin detemir (LEVEMIR FLEXTOUCH) 100 UNIT/ML injection pen Inject 10 Units into the skin nightly 1 sample given 8/25/16  [id number]  11/17exp date  8457-8622-12  Patient taking differently: Inject 25 Units into the skin nightly 1 sample given 8/25/16  [id number]  11/17exp date  6974-6083-38 6/17/19   ERICK Foster CNP   ondansetron [facility]) 8 MG tablet Take 1 tablet by mouth every 8 hours as needed for Nausea 6/17/19   ERICK Foster CNP   nitroGLYCERIN (NITROSTAT) 0.4 MG SL tablet DISSOLVE ONE TABLET UNDER THE TONGUE EVERY 5 MINUTES AS NEEDED FOR CHEST PAIN. DO NOT EXCEED A TOTAL OF 3 DOSES IN 15 MINUTES 2/1/19   ERICK Foster CNP   Handicap Placard MISC by Does not apply route Expires 12/2023 12/17/18   ERICK Foster CNP   gabapentin (NEURONTIN) 300 MG capsule TAKE 1 CAPSULE BY MOUTH THREE TIMES DAILY 8/20/18 5/5/21  ERICK Foster CNP   magnesium oxide (MAG-OX) 400 MG tablet Take 400 mg by mouth daily    Historical Provider, MD   triamcinolone (KENALOG) 0.025 % ointment Apply topically 2 times daily to areas of eczema on lower legs 4/30/18   Paradise Almonte MD   metoprolol tartrate (LOPRESSOR) 50 MG tablet Take 1 tablet by mouth 2 times daily 12/18/17   ERICK Foster CNP   lisinopril (PRINIVIL;ZESTRIL) 40 MG tablet Take 40 mg by mouth daily    Historical Provider, MD   omeprazole (PRILOSEC) 20 MG delayed release capsule Take 20 mg by mouth every evening    Historical Provider, MD   Omega-3 Fatty Acids (OMEGA 3 500) 500 MG CAPS Take 2 capsules by mouth daily    Historical Provider, MD   hydrocortisone (WESTCORT) 0.2 % cream Apply topically 2 times daily.  12/28/16   ERICK Strange CNP   Insulin Pen Needle 29G X 12.7MM MISC 1 each by Does not apply route daily 8/17/16   ERICK Strange CNP   saxagliptin (ONGLYZA) 5 MG TABS tablet Take 5 mg by mouth daily    Historical Provider, MD   metFORMIN (GLUCOPHAGE) 1000 MG tablet Take 1 tablet by mouth 2 times daily (with meals) 5/7/15   Yee Douse, APRN - CNP   aspirin 81 MG tablet Take 81 mg by mouth daily    Historical Provider, MD       Future Appointments   Date Time Provider Adeline Odonnell   8/9/2021  2:20 PM ERICK Miguel - CNP [id number]

## 2021-07-09 RX ORDER — FUROSEMIDE 40 MG/1
TABLET ORAL
Qty: 180 TABLET | Refills: 3 | Status: SHIPPED | OUTPATIENT
Start: 2021-07-09

## 2021-07-12 ENCOUNTER — CARE COORDINATION (OUTPATIENT)
Dept: CARE COORDINATION | Age: 76
End: 2021-07-12

## 2021-07-13 NOTE — ACP (ADVANCE CARE PLANNING)
Advance Care Planning    Ambulatory ACP Specialist Patient Outreach    Date:  7/12/2021  ACP Specialist:  NINA Bonilla    Outreach call to patient in follow-up to ACP Specialist referral from: ERICK Hernandez CNP    [] PCP  [] Provider   [x] Ambulatory Care Management [] Other for Reason:        [] Early ACP Decision-Making   [] Advance Directive Assistance   [] Discuss Goals of Care   [] Code Status Discussion   [] Completion of Portable DNR order   [] Complete POST/MOST   [] Other (Specify)    Date Referral Received:07/09/2021    Today's Outreach:  [] First   [x] Second  [] Third                               Third outreach made by []  phone  [] email []   [employer]     Intervention:  [x] Spoke with Patient  [] Left VM requesting return call      Outcome: Mail ACP paperwork    Next Step:   [] ACP scheduled conversation  [] Outreach again in one week               [] Email / Mail [address] [place]  [x] Email / Mail Advance Directive            [] Close Referral. Routing closure to referring provider/staff and to ACP Specialist . Thank you for this referral.    Rosina Boyce confirmed his daughter Justin Kennedy is his medical POA. Rosina Boyce requested ACP information mailed to him. Rosina Boyce reports that he no longer live at the [place] address. Rosina Boyce requested information to be mailed to [address] [place] [address]. [address] [address].  will follow up in 2 weeks to confirm Rosina Boyce received paperwork.

## 2021-07-14 ENCOUNTER — CARE COORDINATION (OUTPATIENT)
Dept: CARE COORDINATION | Age: 76
End: 2021-07-14

## 2021-07-14 NOTE — CARE COORDINATION
Ambulatory Care Coordination Note  7/14/2021  CM Risk Score: 7  [place]son 10 Year Mortality Risk Score: 100%     ACC: Abdias Alanis RN    Summary Note: Spoke to patient, hes feeling fine   Reports, no changes and everything is ok   Monitors his weight daily  O2 @ 2L prn @ [facility]  Has all his meds, takes as prescribed   [facility] with [facility], pulmonary, [facility] and [facility]  Denies any needs or concerns   Encouraged contacting providers as needed   CC plan; review assessments/educations      Diabetes Assessment    Medic Alert ID: No  Meal Planning: Avoidance of concentrated sweets   How often do you test your blood sugar?: Daily   Do you have barriers with adherence to non-pharmacologic self-management interventions?  (Nutrition/Exercise/Self-Monitoring): No   Have you ever had to go to the ED for symptoms of low blood sugar?: No       Do you have hyperglycemia symptoms?: No   Do you have hypoglycemia symptoms?: No   Blood Sugar Monitoring Regimen: Once a Day      ,   Congestive Heart Failure Assessment    Are you currently restricting fluids?: 2000cc  Do you understand a low sodium diet?: Yes  Do you understand how to read food labels?: Yes  How many restaurant meals do you eat per week?: 0  Do you salt your food before tasting it?: No         Symptoms:     Symptom course: stable  Weight trend: stable  Salt intake watch compared to last visit: stable     ,   COPD Assessment    Does the patient understand envrionmental exposure?: Yes  Is the patient able to verbalize Rescue vs. Long Acting medications?: No  Does the patient have a nebulizer?: Yes  Does the patient use a space with inhaled medications?: No            Symptoms:     Symptom course: stable      and   General Assessment    Do you have any symptoms that are causing concern?: No               Care Coordination Interventions    Program Enrollment: Complex Care  Referral from Primary Care Provider: No  Suggested Interventions and Community Resources  Diabetes Education: Declined  Pharmacist: Declined  Registered Dietician: Declined  Social Work: Declined  Zone Management Tools: Completed (Comment: DM, CHF, COPD)         Goals Addressed                 This Visit's Progress     Conditions and Symptoms   No change     I will schedule office visits, as directed by my provider. I will notify my provider of any barriers to my plan of care. I will follow my Zone Management tool to seek urgent or emergent care. I will notify my provider of any symptoms that indicate a worsening of my condition. Barriers: lack of education  Plan for overcoming my barriers: education, care management   Confidence: 9/10  Anticipated Goal Completion Date: 10/24/2021              Prior to Admission medications    Medication Sig Start Date End Date Taking? Authorizing Provider   furosemide (LASIX) 40 MG tablet Take 1 tablet by mouth once daily   May take 1 additional tablet daily PRN 2 pound weight gain, short of breath or swelling. 7/9/21   ERICK Arriaga CNP   ONETOUCH ULTRA strip USE 1 STRIP TO CHECK GLUCOSE TWICE DAILY 6/14/21   ERICK Arriaga CNP   magnesium oxide (MAG-OX) 400 MG tablet Take 400 mg by mouth 4/16/21   Historical Provider, MD   apixaban (ELIQUIS) 5 MG TABS tablet Take 5 mg by mouth 4/17/21   Historical Provider, MD   alogliptin (NESINA) 25 MG TABS tablet Take 1 tablet by mouth daily 6/10/21   ERICK Arriaga CNP   empagliflozin (JARDIANCE) 10 MG tablet Take 1 tablet by mouth daily 6/10/21   ERICK Arriaga CNP   ELIQUIS 5 MG TABS tablet Take 1 tablet by mouth 2 times daily 6/10/21   ERICK Arriaga CNP   oxyCODONE-acetaminophen (PERCOCET) 5-325 MG per tablet Take 1 tablet by mouth every 8 hours as needed for Pain for up to 30 days.  5/6/21 6/5/21  ERICK Arriaga CNP   dilTIAZem (CARDIZEM CD) 240 MG extended release capsule Take 240 mg by mouth daily    Historical Provider, MD   glimepiride (AMARYL) 4 MG tablet Take 1 tablet by mouth 2 times daily 2/25/21   ERICK Mccollum CNP   diclofenac sodium (VOLTAREN) 1 % GEL Apply 2 g topically 4 times daily as needed for Pain 2/24/21   Page Teresa MD   clopidogrel (PLAVIX) 75 MG tablet Take 1 tablet by mouth daily 2/15/21   ERICK Mccollum CNP   isosorbide mononitrate (IMDUR) 30 MG extended release tablet Take 30 mg by mouth daily    Historical Provider, MD   albuterol sulfate  (90 Base) MCG/ACT inhaler INHALE 2 PUFFS BY INHALATION THREE TIMES A DAY AS NEEDED FOR SHORTNESS OF BREATH 3/30/20   Historical Provider, MD   potassium chloride (KLOR-CON M) 20 MEQ extended release tablet TAKE 1  BY MOUTH ONCE DAILY 6/30/20   ERICK Mccollum CNP   sildenafil (VIAGRA) 50 MG tablet Take 1 tablet by mouth as needed for Erectile Dysfunction 6/15/20   ERICK Mccollum CNP   amiodarone (CORDARONE) 200 MG tablet TAKE 1 TABLET BY MOUTH ONCE DAILY 4/27/20   Lanre Hamilton MD   albuterol (PROVENTIL) (2.5 MG/3ML) 0.083% nebulizer solution Take 3 mLs by nebulization every 6 hours as needed for Wheezing or Shortness of Breath 3/19/20   Lanre Hamilton MD   polyethyl glycol-propyl glycol 0.4-0.3 % (SYSTANE) 0.4-0.3 % ophthalmic solution Place 1 drop into both eyes as needed for Dry Eyes 1/10/20   ERICK Mccollum CNP   insulin detemir (LEVEMIR FLEXTOUCH) 100 UNIT/ML injection pen Inject 10 Units into the skin nightly 1 sample given 8/25/16  [id number]  11/17exp date  3362-3337-02  Patient taking differently: Inject 25 Units into the skin nightly 1 sample given 8/25/16  [id number]  11/17exp date  5695-1264-41 6/17/19   ERICK Mccollum CNP   ondansetron (ZOFRAN) 8 MG tablet Take 1 tablet by mouth every 8 hours as needed for Nausea 6/17/19   ERICK Mccollum - CNP   nitroGLYCERIN (NITROSTAT) 0.4 MG SL tablet DISSOLVE ONE TABLET UNDER THE TONGUE EVERY 5 MINUTES AS NEEDED FOR CHEST PAIN.   DO NOT EXCEED A TOTAL OF 3 DOSES IN 15 MINUTES 2/1/19   ERICK Courtney CNP   Handicap Placard MISC by Does not apply route Expires 12/2023 12/17/18   ERICK Courtney CNP   gabapentin (NEURONTIN) 300 MG capsule TAKE 1 CAPSULE BY MOUTH THREE TIMES DAILY 8/20/18 5/5/21  ERICK Courtney CNP   magnesium oxide (MAG-OX) 400 MG tablet Take 400 mg by mouth daily    Historical Provider, MD   triamcinolone (KENALOG) 0.025 % ointment Apply topically 2 times daily to areas of eczema on lower legs 4/30/18   Ousmane Hirsch MD   metoprolol tartrate (LOPRESSOR) 50 MG tablet Take 1 tablet by mouth 2 times daily 12/18/17   ERICK Courtney CNP   lisinopril (PRINIVIL;ZESTRIL) 40 MG tablet Take 40 mg by mouth daily    Historical Provider, MD   omeprazole (PRILOSEC) 20 MG delayed release capsule Take 20 mg by mouth every evening    Historical Provider, MD   Omega-3 Fatty Acids (OMEGA 3 500) 500 MG CAPS Take 2 capsules by mouth daily    Historical Provider, MD   hydrocortisone (WESTCORT) 0.2 % cream Apply topically 2 times daily.  12/28/16   ERICK Martin CNP   Insulin Pen Needle 29G X 12.7MM MISC 1 each by Does not apply route daily 8/17/16   ERICK Martin CNP   saxagliptin (ONGLYZA) 5 MG TABS tablet Take 5 mg by mouth daily    Historical Provider, MD   metFORMIN (GLUCOPHAGE) 1000 MG tablet Take 1 tablet by mouth 2 times daily (with meals) 5/7/15   ERICK Martin CNP   aspirin 81 MG tablet Take 81 mg by mouth daily    Historical Provider, MD       Future Appointments   Date Time Provider Adeline Odonnell   8/9/2021  2:20 PM ERICK Courtney CNP [facility] [address] [facility]

## 2021-07-30 ENCOUNTER — CARE COORDINATION (OUTPATIENT)
Dept: CARE COORDINATION | Age: 76
End: 2021-07-30

## 2021-08-06 ENCOUNTER — CARE COORDINATION (OUTPATIENT)
Dept: CARE COORDINATION | Age: 76
End: 2021-08-06

## 2021-08-09 ENCOUNTER — APPOINTMENT (OUTPATIENT)
Dept: GENERAL RADIOLOGY | Age: 76
DRG: 246 | End: 2021-08-09
Payer: MEDICARE

## 2021-08-09 ENCOUNTER — HOSPITAL ENCOUNTER (INPATIENT)
Age: 76
LOS: 6 days | Discharge: HOME | DRG: 246 | End: 2021-08-15
Attending: EMERGENCY MEDICINE | Admitting: INTERNAL MEDICINE
Payer: MEDICARE

## 2021-08-09 DIAGNOSIS — R06.02 SHORTNESS OF BREATH: ICD-10-CM

## 2021-08-09 DIAGNOSIS — R07.9 CHEST PAIN, UNSPECIFIED TYPE: Primary | ICD-10-CM

## 2021-08-09 PROBLEM — I50.23 ACUTE ON CHRONIC SYSTOLIC HEART FAILURE (HCC): Status: ACTIVE | Noted: 2021-08-09

## 2021-08-09 LAB
ABSOLUTE EOS #: 0.14 K/UL (ref 0–0.44)
ABSOLUTE IMMATURE GRANULOCYTE: 0.14 K/UL (ref 0–0.3)
ABSOLUTE LYMPH #: 4.18 K/UL (ref 1.1–3.7)
ABSOLUTE MONO #: 1.01 K/UL (ref 0.1–1.2)
ALBUMIN SERPL-MCNC: 3.8 G/DL (ref 3.5–5.2)
ALBUMIN/GLOBULIN RATIO: 1.2 (ref 1–2.5)
ALLEN TEST: ABNORMAL
ALP BLD-CCNC: 71 U/L (ref 40–129)
ALT SERPL-CCNC: 39 U/L (ref 5–41)
ANION GAP SERPL CALCULATED.3IONS-SCNC: 15 MMOL/L (ref 9–17)
AST SERPL-CCNC: 51 U/L
BASOPHILS # BLD: 1 % (ref 0–2)
BASOPHILS ABSOLUTE: 0.14 K/UL (ref 0–0.2)
BILIRUB SERPL-MCNC: 0.3 MG/DL (ref 0.3–1.2)
BNP INTERPRETATION: ABNORMAL
BUN BLDV-MCNC: 29 MG/DL (ref 8–23)
BUN/CREAT BLD: ABNORMAL (ref 9–20)
CALCIUM SERPL-MCNC: 8.3 MG/DL (ref 8.6–10.4)
CARBOXYHEMOGLOBIN: 2.8 % (ref 0–5)
CHLORIDE BLD-SCNC: 100 MMOL/L (ref 98–107)
CO2: 23 MMOL/L (ref 20–31)
CREAT SERPL-MCNC: 1.11 MG/DL (ref 0.7–1.2)
D-DIMER QUANTITATIVE: 0.65 MG/L FEU
DIFFERENTIAL TYPE: ABNORMAL
EOSINOPHILS RELATIVE PERCENT: 1 % (ref 1–4)
FIO2: ABNORMAL
GFR AFRICAN AMERICAN: >60 ML/MIN
GFR NON-AFRICAN AMERICAN: >60 ML/MIN
GFR SERPL CREATININE-BSD FRML MDRD: ABNORMAL ML/MIN/{1.73_M2}
GFR SERPL CREATININE-BSD FRML MDRD: ABNORMAL ML/MIN/{1.73_M2}
GLUCOSE BLD-MCNC: 211 MG/DL (ref 75–110)
GLUCOSE BLD-MCNC: 318 MG/DL (ref 70–99)
GLUCOSE BLD-MCNC: 358 MG/DL (ref 75–110)
HCO3 VENOUS: 22.4 MMOL/L (ref 24–30)
HCT VFR BLD CALC: 37.1 % (ref 40.7–50.3)
HCT VFR BLD CALC: 39.7 % (ref 40.7–50.3)
HEMOGLOBIN: 10.8 G/DL (ref 13–17)
HEMOGLOBIN: 11.2 G/DL (ref 13–17)
IMMATURE GRANULOCYTES: 1 %
INR BLD: 1
LYMPHOCYTES # BLD: 29 % (ref 24–43)
MCH RBC QN AUTO: 23 PG (ref 25.2–33.5)
MCH RBC QN AUTO: 23.2 PG (ref 25.2–33.5)
MCHC RBC AUTO-ENTMCNC: 28.2 G/DL (ref 28.4–34.8)
MCHC RBC AUTO-ENTMCNC: 29.1 G/DL (ref 28.4–34.8)
MCV RBC AUTO: 78.9 FL (ref 82.6–102.9)
MCV RBC AUTO: 82.4 FL (ref 82.6–102.9)
METHEMOGLOBIN: ABNORMAL % (ref 0–1.5)
MODE: ABNORMAL
MONOCYTES # BLD: 7 % (ref 3–12)
MORPHOLOGY: ABNORMAL
NEGATIVE BASE EXCESS, VEN: 4.3 MMOL/L (ref 0–2)
NOTIFICATION TIME: ABNORMAL
NOTIFICATION: ABNORMAL
NRBC AUTOMATED: 0 PER 100 WBC
NRBC AUTOMATED: 0 PER 100 WBC
O2 DEVICE/FLOW/%: ABNORMAL
O2 SAT, VEN: 96 % (ref 60–85)
OXYHEMOGLOBIN: ABNORMAL % (ref 95–98)
PARTIAL THROMBOPLASTIN TIME: 22.4 SEC (ref 20.5–30.5)
PARTIAL THROMBOPLASTIN TIME: 24.4 SEC (ref 20.5–30.5)
PATIENT TEMP: 37
PCO2, VEN, TEMP ADJ: ABNORMAL MMHG (ref 39–55)
PCO2, VEN: 51.4 (ref 39–55)
PDW BLD-RTO: 17.7 % (ref 11.8–14.4)
PDW BLD-RTO: 17.8 % (ref 11.8–14.4)
PEEP/CPAP: ABNORMAL
PH VENOUS: 7.26 (ref 7.32–7.42)
PH, VEN, TEMP ADJ: ABNORMAL (ref 7.32–7.42)
PLATELET # BLD: 300 K/UL (ref 138–453)
PLATELET # BLD: 369 K/UL (ref 138–453)
PLATELET ESTIMATE: ABNORMAL
PMV BLD AUTO: 10 FL (ref 8.1–13.5)
PMV BLD AUTO: 10.3 FL (ref 8.1–13.5)
PO2, VEN, TEMP ADJ: ABNORMAL MMHG (ref 30–50)
PO2, VEN: 94.9 (ref 30–50)
POSITIVE BASE EXCESS, VEN: ABNORMAL MMOL/L (ref 0–2)
POTASSIUM SERPL-SCNC: 5.2 MMOL/L (ref 3.7–5.3)
PRO-BNP: 2985 PG/ML
PROCALCITONIN: 0.42 NG/ML
PROTHROMBIN TIME: 11.1 SEC (ref 9.1–12.3)
PSV: ABNORMAL
PT. POSITION: ABNORMAL
RBC # BLD: 4.7 M/UL (ref 4.21–5.77)
RBC # BLD: 4.82 M/UL (ref 4.21–5.77)
RBC # BLD: ABNORMAL 10*6/UL
RESPIRATORY RATE: ABNORMAL
SAMPLE SITE: ABNORMAL
SARS-COV-2, RAPID: NOT DETECTED
SEG NEUTROPHILS: 61 % (ref 36–65)
SEGMENTED NEUTROPHILS ABSOLUTE COUNT: 8.79 K/UL (ref 1.5–8.1)
SET RATE: ABNORMAL
SODIUM BLD-SCNC: 138 MMOL/L (ref 135–144)
SPECIMEN DESCRIPTION: NORMAL
TEXT FOR RESPIRATORY: ABNORMAL
TOTAL HB: ABNORMAL G/DL (ref 12–16)
TOTAL PROTEIN: 7 G/DL (ref 6.4–8.3)
TOTAL RATE: ABNORMAL
TROPONIN INTERP: NORMAL
TROPONIN T: NORMAL NG/ML
TROPONIN, HIGH SENSITIVITY: 13 NG/L (ref 0–22)
TROPONIN, HIGH SENSITIVITY: 15 NG/L (ref 0–22)
TROPONIN, HIGH SENSITIVITY: 18 NG/L (ref 0–22)
VT: ABNORMAL
WBC # BLD: 14.4 K/UL (ref 3.5–11.3)
WBC # BLD: 7.9 K/UL (ref 3.5–11.3)
WBC # BLD: ABNORMAL 10*3/UL

## 2021-08-09 PROCEDURE — 0202U NFCT DS 22 TRGT SARS-COV-2: CPT

## 2021-08-09 PROCEDURE — 99285 EMERGENCY DEPT VISIT HI MDM: CPT

## 2021-08-09 PROCEDURE — 85025 COMPLETE CBC W/AUTO DIFF WBC: CPT

## 2021-08-09 PROCEDURE — 85379 FIBRIN DEGRADATION QUANT: CPT

## 2021-08-09 PROCEDURE — 2700000000 HC OXYGEN THERAPY PER DAY

## 2021-08-09 PROCEDURE — 86738 MYCOPLASMA ANTIBODY: CPT

## 2021-08-09 PROCEDURE — 96375 TX/PRO/DX INJ NEW DRUG ADDON: CPT

## 2021-08-09 PROCEDURE — 96365 THER/PROPH/DIAG IV INF INIT: CPT

## 2021-08-09 PROCEDURE — 6370000000 HC RX 637 (ALT 250 FOR IP): Performed by: STUDENT IN AN ORGANIZED HEALTH CARE EDUCATION/TRAINING PROGRAM

## 2021-08-09 PROCEDURE — 2500000003 HC RX 250 WO HCPCS: Performed by: STUDENT IN AN ORGANIZED HEALTH CARE EDUCATION/TRAINING PROGRAM

## 2021-08-09 PROCEDURE — 6360000002 HC RX W HCPCS: Performed by: STUDENT IN AN ORGANIZED HEALTH CARE EDUCATION/TRAINING PROGRAM

## 2021-08-09 PROCEDURE — 71045 X-RAY EXAM CHEST 1 VIEW: CPT

## 2021-08-09 PROCEDURE — 84145 PROCALCITONIN (PCT): CPT

## 2021-08-09 PROCEDURE — 93005 ELECTROCARDIOGRAM TRACING: CPT | Performed by: STUDENT IN AN ORGANIZED HEALTH CARE EDUCATION/TRAINING PROGRAM

## 2021-08-09 PROCEDURE — 87635 SARS-COV-2 COVID-19 AMP PRB: CPT

## 2021-08-09 PROCEDURE — 82947 ASSAY GLUCOSE BLOOD QUANT: CPT

## 2021-08-09 PROCEDURE — 85027 COMPLETE CBC AUTOMATED: CPT

## 2021-08-09 PROCEDURE — 99223 1ST HOSP IP/OBS HIGH 75: CPT | Performed by: INTERNAL MEDICINE

## 2021-08-09 PROCEDURE — 36415 COLL VENOUS BLD VENIPUNCTURE: CPT

## 2021-08-09 PROCEDURE — 83880 ASSAY OF NATRIURETIC PEPTIDE: CPT

## 2021-08-09 PROCEDURE — 85730 THROMBOPLASTIN TIME PARTIAL: CPT

## 2021-08-09 PROCEDURE — 82805 BLOOD GASES W/O2 SATURATION: CPT

## 2021-08-09 PROCEDURE — 80053 COMPREHEN METABOLIC PANEL: CPT

## 2021-08-09 PROCEDURE — 2000000000 HC ICU R&B

## 2021-08-09 PROCEDURE — 85610 PROTHROMBIN TIME: CPT

## 2021-08-09 PROCEDURE — 94761 N-INVAS EAR/PLS OXIMETRY MLT: CPT

## 2021-08-09 PROCEDURE — 2060000000 HC ICU INTERMEDIATE R&B

## 2021-08-09 PROCEDURE — 84484 ASSAY OF TROPONIN QUANT: CPT

## 2021-08-09 RX ORDER — NITROGLYCERIN 20 MG/100ML
5-200 INJECTION INTRAVENOUS CONTINUOUS
Status: DISCONTINUED | OUTPATIENT
Start: 2021-08-09 | End: 2021-08-09

## 2021-08-09 RX ORDER — ISOSORBIDE MONONITRATE 30 MG/1
30 TABLET, EXTENDED RELEASE ORAL DAILY
Status: DISCONTINUED | OUTPATIENT
Start: 2021-08-10 | End: 2021-08-15 | Stop reason: HOSPADM

## 2021-08-09 RX ORDER — ASPIRIN 81 MG/1
81 TABLET, CHEWABLE ORAL DAILY
Status: DISCONTINUED | OUTPATIENT
Start: 2021-08-09 | End: 2021-08-11

## 2021-08-09 RX ORDER — CLOPIDOGREL BISULFATE 75 MG/1
75 TABLET ORAL DAILY
Status: DISCONTINUED | OUTPATIENT
Start: 2021-08-09 | End: 2021-08-11

## 2021-08-09 RX ORDER — ACETAMINOPHEN 650 MG/1
650 SUPPOSITORY RECTAL EVERY 6 HOURS PRN
Status: DISCONTINUED | OUTPATIENT
Start: 2021-08-09 | End: 2021-08-09

## 2021-08-09 RX ORDER — FUROSEMIDE 10 MG/ML
40 INJECTION INTRAMUSCULAR; INTRAVENOUS DAILY
Status: DISCONTINUED | OUTPATIENT
Start: 2021-08-09 | End: 2021-08-12

## 2021-08-09 RX ORDER — ONDANSETRON 4 MG/1
4 TABLET, ORALLY DISINTEGRATING ORAL EVERY 8 HOURS PRN
Status: DISCONTINUED | OUTPATIENT
Start: 2021-08-09 | End: 2021-08-15 | Stop reason: HOSPADM

## 2021-08-09 RX ORDER — AMIODARONE HYDROCHLORIDE 200 MG/1
200 TABLET ORAL DAILY
Status: DISCONTINUED | OUTPATIENT
Start: 2021-08-09 | End: 2021-08-15 | Stop reason: HOSPADM

## 2021-08-09 RX ORDER — FUROSEMIDE 10 MG/ML
20 INJECTION INTRAMUSCULAR; INTRAVENOUS ONCE
Status: DISCONTINUED | OUTPATIENT
Start: 2021-08-09 | End: 2021-08-09

## 2021-08-09 RX ORDER — DEXTROSE MONOHYDRATE 25 G/50ML
12.5 INJECTION, SOLUTION INTRAVENOUS PRN
Status: DISCONTINUED | OUTPATIENT
Start: 2021-08-09 | End: 2021-08-15 | Stop reason: HOSPADM

## 2021-08-09 RX ORDER — ALBUTEROL SULFATE 2.5 MG/3ML
2.5 SOLUTION RESPIRATORY (INHALATION)
Status: DISCONTINUED | OUTPATIENT
Start: 2021-08-09 | End: 2021-08-15 | Stop reason: HOSPADM

## 2021-08-09 RX ORDER — POLYETHYLENE GLYCOL 3350 17 G/17G
17 POWDER, FOR SOLUTION ORAL DAILY PRN
Status: DISCONTINUED | OUTPATIENT
Start: 2021-08-09 | End: 2021-08-15 | Stop reason: HOSPADM

## 2021-08-09 RX ORDER — PANTOPRAZOLE SODIUM 40 MG/1
40 TABLET, DELAYED RELEASE ORAL
Status: DISCONTINUED | OUTPATIENT
Start: 2021-08-10 | End: 2021-08-15 | Stop reason: HOSPADM

## 2021-08-09 RX ORDER — ACETAMINOPHEN 325 MG/1
650 TABLET ORAL EVERY 6 HOURS PRN
Status: DISCONTINUED | OUTPATIENT
Start: 2021-08-09 | End: 2021-08-09

## 2021-08-09 RX ORDER — METHYLPREDNISOLONE SODIUM SUCCINATE 125 MG/2ML
125 INJECTION, POWDER, LYOPHILIZED, FOR SOLUTION INTRAMUSCULAR; INTRAVENOUS ONCE
Status: COMPLETED | OUTPATIENT
Start: 2021-08-09 | End: 2021-08-09

## 2021-08-09 RX ORDER — HEPARIN SODIUM 1000 [USP'U]/ML
4000 INJECTION, SOLUTION INTRAVENOUS; SUBCUTANEOUS PRN
Status: DISCONTINUED | OUTPATIENT
Start: 2021-08-10 | End: 2021-08-11

## 2021-08-09 RX ORDER — LANOLIN ALCOHOL/MO/W.PET/CERES
3 CREAM (GRAM) TOPICAL NIGHTLY PRN
Status: COMPLETED | OUTPATIENT
Start: 2021-08-09 | End: 2021-08-10

## 2021-08-09 RX ORDER — HEPARIN SODIUM 1000 [USP'U]/ML
4000 INJECTION, SOLUTION INTRAVENOUS; SUBCUTANEOUS ONCE
Status: COMPLETED | OUTPATIENT
Start: 2021-08-10 | End: 2021-08-10

## 2021-08-09 RX ORDER — ACETAMINOPHEN 325 MG/1
650 TABLET ORAL EVERY 6 HOURS PRN
Status: DISCONTINUED | OUTPATIENT
Start: 2021-08-09 | End: 2021-08-15 | Stop reason: HOSPADM

## 2021-08-09 RX ORDER — ACETAMINOPHEN 650 MG/1
650 SUPPOSITORY RECTAL EVERY 6 HOURS PRN
Status: DISCONTINUED | OUTPATIENT
Start: 2021-08-09 | End: 2021-08-15 | Stop reason: HOSPADM

## 2021-08-09 RX ORDER — NICOTINE POLACRILEX 4 MG
15 LOZENGE BUCCAL PRN
Status: DISCONTINUED | OUTPATIENT
Start: 2021-08-09 | End: 2021-08-15 | Stop reason: HOSPADM

## 2021-08-09 RX ORDER — INSULIN GLARGINE 100 [IU]/ML
10 INJECTION, SOLUTION SUBCUTANEOUS NIGHTLY
Status: DISCONTINUED | OUTPATIENT
Start: 2021-08-09 | End: 2021-08-10

## 2021-08-09 RX ORDER — DEXTROSE MONOHYDRATE 50 MG/ML
100 INJECTION, SOLUTION INTRAVENOUS PRN
Status: DISCONTINUED | OUTPATIENT
Start: 2021-08-09 | End: 2021-08-15 | Stop reason: HOSPADM

## 2021-08-09 RX ORDER — ONDANSETRON 2 MG/ML
4 INJECTION INTRAMUSCULAR; INTRAVENOUS EVERY 6 HOURS PRN
Status: DISCONTINUED | OUTPATIENT
Start: 2021-08-09 | End: 2021-08-15 | Stop reason: HOSPADM

## 2021-08-09 RX ORDER — SODIUM CHLORIDE 0.9 % (FLUSH) 0.9 %
5-40 SYRINGE (ML) INJECTION EVERY 12 HOURS SCHEDULED
Status: DISCONTINUED | OUTPATIENT
Start: 2021-08-09 | End: 2021-08-15 | Stop reason: HOSPADM

## 2021-08-09 RX ORDER — SODIUM CHLORIDE 0.9 % (FLUSH) 0.9 %
5-40 SYRINGE (ML) INJECTION PRN
Status: DISCONTINUED | OUTPATIENT
Start: 2021-08-09 | End: 2021-08-15 | Stop reason: HOSPADM

## 2021-08-09 RX ORDER — HEPARIN SODIUM 1000 [USP'U]/ML
2000 INJECTION, SOLUTION INTRAVENOUS; SUBCUTANEOUS PRN
Status: DISCONTINUED | OUTPATIENT
Start: 2021-08-10 | End: 2021-08-11

## 2021-08-09 RX ORDER — GABAPENTIN 300 MG/1
300 CAPSULE ORAL 3 TIMES DAILY
Status: DISCONTINUED | OUTPATIENT
Start: 2021-08-09 | End: 2021-08-15 | Stop reason: HOSPADM

## 2021-08-09 RX ORDER — SODIUM CHLORIDE 9 MG/ML
25 INJECTION, SOLUTION INTRAVENOUS PRN
Status: DISCONTINUED | OUTPATIENT
Start: 2021-08-09 | End: 2021-08-15 | Stop reason: HOSPADM

## 2021-08-09 RX ORDER — HEPARIN SODIUM 10000 [USP'U]/100ML
5-30 INJECTION, SOLUTION INTRAVENOUS CONTINUOUS
Status: DISCONTINUED | OUTPATIENT
Start: 2021-08-10 | End: 2021-08-11

## 2021-08-09 RX ADMIN — FUROSEMIDE 40 MG: 10 INJECTION, SOLUTION INTRAMUSCULAR; INTRAVENOUS at 17:46

## 2021-08-09 RX ADMIN — GABAPENTIN 300 MG: 300 CAPSULE ORAL at 17:51

## 2021-08-09 RX ADMIN — ASPIRIN 81 MG: 81 TABLET, CHEWABLE ORAL at 17:46

## 2021-08-09 RX ADMIN — APIXABAN 5 MG: 5 TABLET, FILM COATED ORAL at 17:46

## 2021-08-09 RX ADMIN — GABAPENTIN 300 MG: 300 CAPSULE ORAL at 22:41

## 2021-08-09 RX ADMIN — AMIODARONE HYDROCHLORIDE 200 MG: 200 TABLET ORAL at 17:46

## 2021-08-09 RX ADMIN — METHYLPREDNISOLONE SODIUM SUCCINATE 125 MG: 125 INJECTION, POWDER, FOR SOLUTION INTRAMUSCULAR; INTRAVENOUS at 08:25

## 2021-08-09 RX ADMIN — NITROGLYCERIN 50 MCG/MIN: 20 INJECTION INTRAVENOUS at 08:38

## 2021-08-09 RX ADMIN — INSULIN GLARGINE 10 UNITS: 100 INJECTION, SOLUTION SUBCUTANEOUS at 22:41

## 2021-08-09 RX ADMIN — CLOPIDOGREL 75 MG: 75 TABLET, FILM COATED ORAL at 17:47

## 2021-08-09 RX ADMIN — INSULIN LISPRO 7 UNITS: 100 INJECTION, SOLUTION INTRAVENOUS; SUBCUTANEOUS at 22:41

## 2021-08-09 NOTE — CARE COORDINATION
Case Management Initial Discharge Plan  Jhonathan Tejada,             Met with:patient or family member patient and daughter to discuss discharge plans. Information verified: address, contacts, phone number, , insurance Yes  Insurance Provider: Kyung Lund     Emergency Contact/Next of Kin name & number: Lyndsay Bower [phone] work [phone]   Who are involved in patient's support system? dtr     PCP: Nirali Garay, APRN - CNP  Date of last visit: May 21 had appt today       Discharge Planning    Living Arrangements:        Home has 2 stories  2 stairs to climb to get into front door, 12stairs to climb to reach second floor  Location of bedroom/bathroom in home up    Patient able to perform ADL's:Independent    Current Services (outpatient & in home) none  DME equipment: none  DME provider: none    Is patient receiving oral anticoagulation therapy? No    If indicated:   Physician managing anticoagulation treatment:   Where does patient obtain lab work for ATC treatment? Potential Assistance Needed:       Patient agreeable to home care: No  [place] of choice provided:  no    Prior SNF/Rehab Placement and Facility: no  Agreeable to SNF/Rehab: No  [place] of choice provided: no     Evaluation: n/a    Expected Discharge date:       Patient expects to be discharged to: If home: is the family and/or caregiver wiling & able to provide support at home? dtr and her    Who will be providing this support? above    Follow Up Appointment: Best Day/ Time:      Transportation provider: family  Transportation arrangements needed for discharge: No    Readmission Risk              Risk of Unplanned Readmission:  17             Does patient have a readmission risk score greater than 14?: No  If yes, follow-up appointment must be made within 7 days of discharge.      Goals of Care:       Educated pt on transitional options, provided freedom of choice and are agreeable with plan      Discharge Plan: pt independent lives with dtr and  - follow           Electronically signed by Janis Montoya RN on 8/9/21 at 12:53 PM EDT

## 2021-08-09 NOTE — H&P
[address]     Department of Internal Medicine - Staff Internal Medicine Teaching Service          ADMISSION NOTE/HISTORY AND PHYSICAL EXAMINATION   Date: 8/9/2021  Patient Name: Nell Gasca  Date of admission: 8/9/2021  8:13 AM  YOB: 1945  PCP: ERICK Rojas CNP  History Obtained From:  patient    CHIEF COMPLAINT     Chief complaint: Chest pain    HISTORY OF PRESENTING ILLNESS     A 77 y. o.  male presents with a chief c/o sudden onset chest pain. He described it a diffuse heaviness in chest, radiating to R elbow, pain was constant, 8/10 in intensity, associated with sweating and SOB at rest. He tried increasing concentration home O2 and lasix but nothing helped. EMS was called. As per the records, he received NTG which improved his chest pain, 324 aspirin and 1 ICD shock. In ED, his SpO2 74% on NRB, BiPAP placed, with plans to decrease FiO2. He has PMH of A fib (on eliquis), CHF (EF-%), peripheral vascular disease, DM type 2, Hypertension, Hyperlipidemia. He reports to be compliant with his medications. He is being evaluated for PAD claudication at [facility]. He also underwent CTA abdomen in this regard and found to have atherosclerotic disease and calcification of AA and short seg occlusion of celiac artery. He has been having lower limb claudication for alteast 4-5 months. He has H/o MI in 1990. Over the years in 2010s, he got 8 stents in total. He has in dwelling L subclavian AICD. He was also started on Eliquis around 5/2021. Workup in ER:   WBC- 14.4 d-dimer neg, Pro BNP- 2985  Glu 318, BUN 29, Cr 1.11  CXR: Mild diffuse bilateral interstitial infiltrates related to mild vascular congestion and/or infection   On my evaluation:  Pt is resting on reclined bed. Vitals are okay. Chest pain, SOB improved. Pt denies nausea, dizziness, palpitations, cough, fever, chills, abdominal pain. Pt c/o discomfort and pain in his legs.    Review of Systems:  General ROS: Completed and except as mentioned above were negative   HEENT ROS: Completed and except as mentioned above were negative   Allergy and Immunology ROS:  Completed and except as mentioned above were negative  Hematological and Lymphatic ROS:  Completed and except as mentioned above were negative  Respiratory ROS:  Completed and except as mentioned above were negative  Cardiovascular ROS:  Completed and except as mentioned above were negative  Gastrointestinal ROS: Completed and except as mentioned above were negative  Genito-Urinary ROS:  Completed and except as mentioned above were negative  Musculoskeletal ROS:  Completed and except as mentioned above were negative  Neurological ROS:  Completed and except as mentioned above were negative  Skin & Dermatological ROS:  Completed and except as mentioned above were negative  Psychological ROS:  Completed and except as mentioned above were negative    PAST MEDICAL HISTORY     Past Medical History:   Diagnosis Date    Arrhythmia     CAD (coronary artery disease)     GERD (gastroesophageal reflux disease)     Heart [facility] ([facility] [address]     Hyperlipidemia     Hypertension     Ischemic cardiomyopathy     Osteoarthritis     Type II or unspecified type diabetes mellitus without mention of complication, not stated as uncontrolled        PAST SURGICAL HISTORY     Past Surgical History:   Procedure Laterality Date    APPENDECTOMY      CARDIAC CATHETERIZATION      CARDIAC SURGERY      stents 2015    COLONOSCOPY      CORONARY ANGIOPLASTY WITH STENT PLACEMENT  07/2018    [facility]    DIAGNOSTIC CARDIAC CATH LAB PROCEDURE      JOINT REPLACEMENT      knee right parital    PACEMAKER INSERTION      PTCA      ROTATOR CUFF REPAIR      TONSILLECTOMY         ALLERGIES     Coreg [carvedilol], Pravastatin, Sertraline, Zocor [simvastatin], and Citalopram    MEDICATIONS PRIOR TO ADMISSION     Prior to Admission medications    Medication Sig Start Date End Date Taking? Authorizing Provider   furosemide (LASIX) 40 MG tablet Take 1 tablet by mouth once daily   May take 1 additional tablet daily PRN 2 pound weight gain, short of breath or swelling. 7/9/21   ERICK Kwon CNP   ONETOUCH ULTRA strip USE 1 STRIP TO CHECK GLUCOSE TWICE DAILY 6/14/21   ERICK Kwon CNP   magnesium oxide (MAG-OX) 400 MG tablet Take 400 mg by mouth 4/16/21   Historical Provider, MD   apixaban (ELIQUIS) 5 MG TABS tablet Take 5 mg by mouth 4/17/21   Historical Provider, MD   alogliptin (NESINA) 25 MG TABS tablet Take 1 tablet by mouth daily 6/10/21   ERICK Kwon CNP   empagliflozin (JARDIANCE) 10 MG tablet Take 1 tablet by mouth daily 6/10/21   ERICK Kwon CNP   ELIQUIS 5 MG TABS tablet Take 1 tablet by mouth 2 times daily 6/10/21   ERICK Kwon CNP   oxyCODONE-acetaminophen (PERCOCET) 5-325 MG per tablet Take 1 tablet by mouth every 8 hours as needed for Pain for up to 30 days.  5/6/21 6/5/21  ERICK Kwon CNP   dilTIAZem (CARDIZEM CD) 240 MG extended release capsule Take 240 mg by mouth daily    Historical Provider, MD   glimepiride (AMARYL) 4 MG tablet Take 1 tablet by mouth 2 times daily 2/25/21   ERICK Kwon CNP   diclofenac sodium (VOLTAREN) 1 % GEL Apply 2 g topically 4 times daily as needed for Pain 2/24/21   Porfirio Berumen MD   clopidogrel (PLAVIX) 75 MG tablet Take 1 tablet by mouth daily 2/15/21   ERICK Kwon CNP   isosorbide mononitrate (IMDUR) 30 MG extended release tablet Take 30 mg by mouth daily    Historical Provider, MD   albuterol sulfate  (90 Base) MCG/ACT inhaler INHALE 2 PUFFS BY INHALATION THREE TIMES A DAY AS NEEDED FOR SHORTNESS OF BREATH 3/30/20   Historical Provider, MD   potassium chloride (KLOR-CON M) 20 MEQ extended release tablet TAKE 1  BY MOUTH ONCE DAILY 6/30/20   Hamlet Mola, APRN - CNP   sildenafil (VIAGRA) 50 MG tablet Take 1 tablet by mouth as needed for Erectile Dysfunction 6/15/20   ERICK Albrecht CNP   amiodarone (CORDARONE) 200 MG tablet TAKE 1 TABLET BY MOUTH ONCE DAILY 4/27/20   Bhavna Arevalo MD   albuterol (PROVENTIL) (2.5 MG/3ML) 0.083% nebulizer solution Take 3 mLs by nebulization every 6 hours as needed for Wheezing or Shortness of Breath 3/19/20   Bhavna Arevalo MD   polyethyl glycol-propyl glycol 0.4-0.3 % (SYSTANE) 0.4-0.3 % ophthalmic solution Place 1 drop into both eyes as needed for Dry Eyes 1/10/20   ERICK Albrecht CNP   insulin detemir (LEVEMIR FLEXTOUCH) 100 UNIT/ML injection pen Inject 10 Units into the skin nightly 1 sample given 8/25/16  [id number]  11/17exp date  9935-8738-91  Patient taking differently: Inject 25 Units into the skin nightly 1 sample given 8/25/16  [id number]  11/17exp date  4618-8079-63 6/17/19   ERICK Albrecht CNP   ondansetron (ZOFRAN) 8 MG tablet Take 1 tablet by mouth every 8 hours as needed for Nausea 6/17/19   ERICK Albrecht CNP   nitroGLYCERIN (NITROSTAT) 0.4 MG SL tablet DISSOLVE ONE TABLET UNDER THE TONGUE EVERY 5 MINUTES AS NEEDED FOR CHEST PAIN.   DO NOT EXCEED A TOTAL OF 3 DOSES IN 15 MINUTES 2/1/19   ERICK Albrecht CNP   Handicap Placard MISC by Does not apply route Expires 12/2023 12/17/18   ERICK Albrecht CNP   gabapentin (NEURONTIN) 300 MG capsule TAKE 1 CAPSULE BY MOUTH THREE TIMES DAILY 8/20/18 5/5/21  ERICK Albrecht CNP   magnesium oxide (MAG-OX) 400 MG tablet Take 400 mg by mouth daily    Historical Provider, MD   triamcinolone (KENALOG) 0.025 % ointment Apply topically 2 times daily to areas of eczema on lower legs 4/30/18   Tanika Puente MD   metoprolol tartrate (LOPRESSOR) 50 MG tablet Take 1 tablet by mouth 2 times daily 12/18/17   Arnulfo Gins, APRN - CNP   lisinopril (PRINIVIL;ZESTRIL) 40 MG tablet Take 40 mg by mouth daily    Historical Provider, MD   omeprazole (PRILOSEC) 20 MG delayed release capsule Take 20 mg by mouth every evening    Historical Provider, MD   Omega-3 Fatty Acids (OMEGA 3 500) 500 MG CAPS Take 2 capsules by mouth daily    Historical Provider, MD   hydrocortisone (WESTCORT) 0.2 % cream Apply topically 2 times daily. 16   Geraline Divamina APRN - CNP   Insulin Pen Needle 29G X 12.7MM MISC 1 each by Does not apply route daily 16   Geraline Divine APRN - CNP   saxagliptin (ONGLYZA) 5 MG TABS tablet Take 5 mg by mouth daily    Historical Provider, MD   metFORMIN (GLUCOPHAGE) 1000 MG tablet Take 1 tablet by mouth 2 times daily (with meals) 5/7/15   Geraline Divine APRN - CNP   aspirin 81 MG tablet Take 81 mg by mouth daily    Historical Provider, MD       SOCIAL HISTORY     Tobacco: yes former smoker, smoked >20 years, quit 20 years ago  Alcohol:  Yes, socially   Illicits: No      FAMILY HISTORY     Family History   Problem Relation Age of Onset    Heart Disease Mother     High Blood Pressure Mother     Heart Disease Father     Cancer Sister         breast cancer    Cancer Brother         bladder cancer    Diabetes Maternal Grandmother        PHYSICAL EXAM     Vitals: /73   Pulse 60   Temp 97.9 °F (36.6 °C) (Axillary)   Resp 18   SpO2 99%   Tmax: Temp (24hrs), Av.9 °F (36.6 °C), Min:97.9 °F (36.6 °C), Max:97.9 °F (36.6 °C)    Last Body weight:   Wt Readings from Last 3 Encounters:   06/10/21 228 lb 9.6 oz (103.7 kg)   21 240 lb (108.9 kg)   21 241 lb (109.3 kg)     Body Mass Index : There is no height or weight on file to calculate BMI. PHYSICAL EXAMINATION:  Constitutional: This is a well developed, well nourished, 30-34.9 - Obesity Grade I 77y.o. year old male who is alert, oriented, cooperative and in no apparent distress. Head:normocephalic and atraumatic. EENT:  PERRLA. No conjunctival injections. Septum was midline, mucosa was without erythema, exudates or cobblestoning. No thrush was noted.    Neck: Supple without thyromegaly. No elevated JVP. Trachea was midline. Respiratory: Chest was symmetrical without dullness to percussion. Breath sounds bilaterally were clear to auscultation. There were no wheezes, rhonchi or rales. There is no intercostal retraction or use of accessory muscles. No egophony noted. Cardiovascular: Regular without murmur, clicks, gallops or rubs. Abdomen: Slightly rounded and soft without organomegaly. No rebound, rigidity or guarding was appreciated. Lymphatic: No lymphadenopathy. Musculoskeletal: Normal curvature of the spine. No gross muscle weakness. Extremities:  No lower extremity edema, ulcerations, tenderness, varicosities or erythema. Muscle size, tone and strength are normal.  No involuntary movements are noted. Skin:  Warm and dry. Good color, turgor and pigmentation. No lesions or scars.   No cyanosis or clubbing  Neurological/Psychiatric: The patient's general behavior, level of consciousness, thought content and emotional status is normal.          INVESTIGATIONS     Laboratory Testing:     Recent Results (from the past 24 hour(s))   CBC Auto Differential    Collection Time: 08/09/21  8:22 AM   Result Value Ref Range    WBC 14.4 (H) 3.5 - 11.3 k/uL    RBC 4.82 4.21 - 5.77 m/uL    Hemoglobin 11.2 (L) 13.0 - 17.0 g/dL    Hematocrit 39.7 (L) 40.7 - 50.3 %    MCV 82.4 (L) 82.6 - 102.9 fL    MCH 23.2 (L) 25.2 - 33.5 pg    MCHC 28.2 (L) 28.4 - 34.8 g/dL    RDW 17.8 (H) 11.8 - 14.4 %    Platelets 963 192 - 685 k/uL    MPV 10.3 8.1 - 13.5 fL    NRBC Automated 0.0 0.0 per 100 WBC    Differential Type NOT REPORTED     WBC Morphology NOT REPORTED     RBC Morphology NOT REPORTED     Platelet Estimate NOT REPORTED     Immature Granulocytes 1 (H) 0 %    Seg Neutrophils 61 36 - 65 %    Lymphocytes 29 24 - 43 %    Monocytes 7 3 - 12 %    Eosinophils % 1 1 - 4 %    Basophils 1 0 - 2 %    Absolute Immature Granulocyte 0.14 0.00 - 0.30 k/uL    Segs Absolute 8.79 (H) 1.50 - 8.10 k/uL Absolute Lymph # 4.18 (H) 1.10 - 3.70 k/uL    Absolute Mono # 1.01 0.10 - 1.20 k/uL    Absolute Eos # 0.14 0.00 - 0.44 k/uL    Basophils Absolute 0.14 0.00 - 0.20 k/uL    Morphology ANISOCYTOSIS PRESENT     Morphology MICROCYTOSIS PRESENT     Morphology HYPOCHROMIA PRESENT    Comprehensive Metabolic Panel w/ Reflex to MG    Collection Time: 08/09/21  8:22 AM   Result Value Ref Range    Glucose 318 (H) 70 - 99 mg/dL    BUN 29 (H) 8 - 23 mg/dL    CREATININE 1.11 0.70 - 1.20 mg/dL    Bun/Cre Ratio NOT REPORTED 9 - 20    Calcium 8.3 (L) 8.6 - 10.4 mg/dL    Sodium 138 135 - 144 mmol/L    Potassium 5.2 3.7 - 5.3 mmol/L    Chloride 100 98 - 107 mmol/L    CO2 23 20 - 31 mmol/L    Anion Gap 15 9 - 17 mmol/L    Alkaline Phosphatase 71 40 - 129 U/L    ALT 39 5 - 41 U/L    AST 51 (H) <40 U/L    Total Bilirubin 0.30 0.3 - 1.2 mg/dL    Total Protein 7.0 6.4 - 8.3 g/dL    Albumin 3.8 3.5 - 5.2 g/dL    Albumin/Globulin Ratio 1.2 1.0 - 2.5    GFR Non-African American >60 >60 mL/min    GFR African American >60 >60 mL/min    GFR Comment          GFR Staging NOT REPORTED    BLOOD GAS, VENOUS    Collection Time: 08/09/21  8:22 AM   Result Value Ref Range    pH, Shoaib 7.263 (L) 7.320 - 7.420    pCO2, Shoaib 51.4 39 - 55    pO2, Shoaib 94.9 (H) 30 - 50    HCO3, Venous 22.4 (L) 24 - 30 mmol/L    Positive Base Excess, Shoaib NOT REPORTED 0.0 - 2.0 mmol/L    Negative Base Excess, Shoaib 4.3 (H) 0.0 - 2.0 mmol/L    O2 Sat, Shoaib 96.0 (H) 60.0 - 85.0 %    Total Hb NOT REPORTED 12.0 - 16.0 g/dl    Oxyhemoglobin NOT REPORTED 95.0 - 98.0 %    Carboxyhemoglobin 2.8 0 - 5 %    Methemoglobin NOT REPORTED 0.0 - 1.5 %    Pt Temp 37.0     pH, Shoaib, Temp Adj NOT REPORTED 7.320 - 7.420    pCO2, Shoaib, Temp Adj NOT REPORTED 39 - 55 mmHg    pO2, Shoaib, Temp Adj NOT REPORTED 30 - 50 mmHg    O2 Device/Flow/% NOT REPORTED     Respiratory Rate NOT REPORTED     Martin Test NOT REPORTED     Sample Site NOT REPORTED     Pt.  Position NOT REPORTED     Mode NOT REPORTED     Set Rate NOT REPORTED     Total Rate NOT REPORTED     VT NOT REPORTED     FIO2 INFORMATION NOT PROVIDED     Peep/Cpap NOT REPORTED     PSV NOT REPORTED     Text for Respiratory NOT REPORTED     NOTIFICATION NOT REPORTED     NOTIFICATION TIME NOT REPORTED    Protime-INR    Collection Time: 08/09/21  8:22 AM   Result Value Ref Range    Protime 11.1 9.1 - 12.3 sec    INR 1.0    APTT    Collection Time: 08/09/21  8:22 AM   Result Value Ref Range    PTT 22.4 20.5 - 30.5 sec   Troponin    Collection Time: 08/09/21  8:22 AM   Result Value Ref Range    Troponin, High Sensitivity 13 0 - 22 ng/L    Troponin T NOT REPORTED <0.03 ng/mL    Troponin Interp NOT REPORTED    D-Dimer, Quantitative    Collection Time: 08/09/21  8:22 AM   Result Value Ref Range    D-Dimer, Quant 0.65 mg/L FEU   Brain Natriuretic Peptide    Collection Time: 08/09/21  8:22 AM   Result Value Ref Range    Pro-BNP 2,985 (H) <300 pg/mL    BNP Interpretation Pro-BNP Reference Range:    SARS-CoV-2 NAAT (Rapid)    Collection Time: 08/09/21  8:25 AM    Specimen: Nasopharyngeal Swab   Result Value Ref Range    Specimen Description . NASOPHARYNGEAL SWAB     SARS-CoV-2, Rapid Not Detected Not Detected   Troponin    Collection Time: 08/09/21 11:01 AM   Result Value Ref Range    Troponin, High Sensitivity 18 0 - 22 ng/L    Troponin T NOT REPORTED <0.03 ng/mL    Troponin Interp NOT REPORTED    POC Glucose Fingerstick    Collection Time: 08/09/21  1:36 PM   Result Value Ref Range    POC Glucose 211 (H) 75 - 110 mg/dL       Imaging:   XR CHEST PORTABLE    Result Date: 8/9/2021  Satisfactory lead position Mild diffuse bilateral interstitial infiltrates related to mild vascular congestion and/or infection       ASSESSMENT & PLAN     ASSESSMENT / PLAN:     IMPRESSION  This is a 77 y.o.   male who presented with chest pain and SOB, was found to have acute hypoxic respiratory failure,  Patient admitted to inpatient status for cardiac evaluation and management of Acute on chronic systolic CHF    Acute hypoxic respiratory failure- resolving  · 78% SpO2 on 15 L O2 non rebreather  · Improved on BiPAP  · Transitioned to NC O2 4 L    Chest pain, radiating to R elbow-  · Decreased on NTG PTA   · Monitor troponins  · Cardiology consulted  · CXR: Mild diffuse bilateral interstitial infiltrates related to mild vascular   congestion and/or infection   · Last ECHO (5/25/21): LVEF 25-30%    Acute on chronic systolic heart failure CHF  · Placed on BiPAP-> transitioned to NC   · Start IV Lasix  · Last ECHO (5/25/21): LVEF 25-30%  · Monitor strict intake and output  · Will check BNP      DM type II:  · Sugars on higher side: 318,211  · Hold metformin  · Resume Jardiance  · Resume Alogliptin      Atrial fibrillation:  · Hold Eliquis  · Start on heparin drip  · Will resume home medications- amiodarone, metoprolol and Cardizem,  if his blood pressure tolerates    DVT ppx:  · On heparin drip    GI ppx:   · On protonix    PT/OT/SW:   · Ongoing    Discharge Planning:   ·  to assist in the discharge planning     Sanjay Prince MD  Internal Medicine Resident, PGY- [facility];  [place], [place]  8/9/2021, 2:26 PM

## 2021-08-09 NOTE — ED NOTES
The following labs labeled with pt sticker and tubed:     [x] Lavender   [x] on ice   [x] Blue   [x] Green/yellow  [x] Green/black [x] on ice  [] Pink  [] Red  [x] Yellow       [x] COVID-19 swab    [x] Rapid     [] Urine Sample  [] Pelvic Cultures    [] Blood Cultures          Gina Vazquez RN  08/09/21 [id number]

## 2021-08-09 NOTE — ED NOTES
Bed: 18  Expected date:   Expected time:   Means of arrival:   Comments:  [address] [place], RN  08/09/21 4402

## 2021-08-09 NOTE — Clinical Note
Patient Class: Inpatient [101]   REQUIRED: Diagnosis: Chest pain [492239]   Estimated Length of Stay: Estimated stay of more than 2 midnights   Admitting Provider: Xavier Moctezuma [[id number]]   Preferred Department: [facility]-   Telemetry/Cardiac Monitoring Required?: No

## 2021-08-09 NOTE — ED NOTES
Pt. Resting on stretcher, eyes open, RR even and non-labored with BiPAP  Pt.  Updated on POC  Will continue to monitor   Family remains at bedside      [facility]  08/09/21 9930

## 2021-08-09 NOTE — PROGRESS NOTES
Attending Physician Statement  I have discussed the case, including pertinent history and exam findings with the resident and the team.  I have seen and examined the patient and the key elements of the encounter have been performed by me. I agree with the assessment, plan and orders as documented by the resident. Review of Systems:   In addition to the pertinent positives and negatives as stated within HPI and the review of systems as documented in their notes, all other systems were reviewed when able to and are reported negative. 77years old gentleman who came into the hospital because of shortness of breath. He was found to be in acute hypoxic respiratory failure. Has history of CHF and symptoms concerning for acute on chronic systolic CHF. Placed on BiPAP. Also had chest pain that resolved with nitroglycerin. Admit the patient as an inpatient  Monitor troponins  Place on telemetry  Monitor intake and output and daily weights, start on IV Lasix  Has history of LV dysfunction and had an echocardiogram recently  Monitor troponins but given the chest pain resolved with nitroglycerin, also patient says this pain feels the same as the pain when he had his heart attack, will get cardiology input  Blood sugars on the higher side, resume Jardiance and alogliptin, hold Metformin  For atrial fibrillation patient is on amiodarone, metoprolol, Cardizem, Eliquis. We will resume amiodarone, metoprolol and Cardizem if his blood pressure will tolerate. Hold Eliquis for now and start on heparin drip  Also confirm home medication of Levemir with the patient.   EKG has not been scanned in the system yet      Heidi Garvey MD  Attending Physician, Internal Medicine Service    Internal Medicine Residency Program  8/9/2021, 11:57 PM

## 2021-08-09 NOTE — ED NOTES
Report to floor  FiO2 titrated down to 40% in attempts to wean off BiPAP prior to transport   Family remains at bedside   Will continue to monitor     Frankie Muro RN  08/09/21 4117

## 2021-08-09 NOTE — ED PROVIDER NOTES
[facility]  Emergency Department  Faculty Attestation     I performed a history and physical examination of the patient and discussed management with the resident. I reviewed the residents note and agree with the documented findings and plan of care. Any areas of disagreement are noted on the chart. I was personally present for the key portions of any procedures. I have documented in the chart those procedures where I was not present during the key portions. I have reviewed the emergency nurses triage note. I agree with the chief complaint, past medical history, past surgical history, allergies, medications, social and family history as documented unless otherwise noted below. For Physician Assistant/ Nurse Practitioner cases/documentation I have personally evaluated this patient and have completed at least one if not all key elements of the E/M (history, physical exam, and MDM). Additional findings are as noted. Primary Care Physician:  Gareth Berg, APRN - CNP    Screenings:  [unfilled]    CHIEF COMPLAINT       Chief Complaint   Patient presents with    Respiratory Distress     CP x4 days, SPO2 78% on 15LNRB    Chest Pain     324 ASA and nitro PTA       RECENT VITALS:    ,  Pulse: 60, Resp: 25, BP: (!) 161/84    LABS:  Labs Reviewed - No data to display    Radiology  No orders to display       CRITICAL CARE: There was a high probability of clinically significant/life threatening deterioration in this patient's condition which required my urgent intervention. Total critical care time was 10 minutes. This excludes any time for separately reportable procedures.      EKG:   EKG Interpretation    Interpreted by me    Rhythm: Atrial paced  Rate: normal  Axis: Left  Ectopy: none  Conduction: Long QTc  ST Segments: no acute change  T Waves: no acute change  Q Waves: none    Clinical Impression: Nonspecific    Attending Physician Additional  Notes    Mass for chest pain with onset this morning with diffuse radiation, sweats, nausea, difficulty breathing. He normally wears oxygen 4 L at nighttime and on high flow oxygen his saturations are 78%. He received nitroglycerin with near complete resolution in chest pain, he is received 4 baby aspirin. He had 1 ICD shock in route. There is no change in bilateral edema. No recent myalgias fevers or Covid-like symptoms. He had a prior Covid vaccination. On exam he is pale, diaphoretic, cool extremities, hypertensive, tachypneic, afebrile. There is rales throughout the lung fields. There is JVD. No obvious gallop. Heart sounds are distant. There is bilateral leg edema symmetrical in nature. Pulses are present. He is awake and oriented. Impression is chest pain likely CHF exacerbation consider pneumonia. Plan is BiPAP, chest x-ray, laboratory studies, EKG, cardiac monitor, nitroglycerin drip and admission            Alison Deluca.  Raheem Price MD, [facility]  Attending Emergency  Physician                Christy Whipple MD  08/09/21 9624

## 2021-08-10 LAB
-: NORMAL
ABSOLUTE EOS #: <0.03 K/UL (ref 0–0.44)
ABSOLUTE IMMATURE GRANULOCYTE: 0.1 K/UL (ref 0–0.3)
ABSOLUTE LYMPH #: 1.2 K/UL (ref 1.1–3.7)
ABSOLUTE MONO #: 0.73 K/UL (ref 0.1–1.2)
ADENOVIRUS PCR: NOT DETECTED
ANION GAP SERPL CALCULATED.3IONS-SCNC: 13 MMOL/L (ref 9–17)
ANION GAP SERPL CALCULATED.3IONS-SCNC: 14 MMOL/L (ref 9–17)
BASOPHILS # BLD: 0 % (ref 0–2)
BASOPHILS ABSOLUTE: <0.03 K/UL (ref 0–0.2)
BORDETELLA PARAPERTUSSIS: NOT DETECTED
BORDETELLA PERTUSSIS PCR: NOT DETECTED
BUN BLDV-MCNC: 40 MG/DL (ref 8–23)
BUN BLDV-MCNC: 47 MG/DL (ref 8–23)
BUN/CREAT BLD: ABNORMAL (ref 9–20)
BUN/CREAT BLD: ABNORMAL (ref 9–20)
CALCIUM SERPL-MCNC: 8.1 MG/DL (ref 8.6–10.4)
CALCIUM SERPL-MCNC: 8.3 MG/DL (ref 8.6–10.4)
CHLAMYDIA PNEUMONIAE BY PCR: NOT DETECTED
CHLORIDE BLD-SCNC: 98 MMOL/L (ref 98–107)
CHLORIDE BLD-SCNC: 99 MMOL/L (ref 98–107)
CO2: 25 MMOL/L (ref 20–31)
CO2: 27 MMOL/L (ref 20–31)
CORONAVIRUS 229E PCR: NOT DETECTED
CORONAVIRUS HKU1 PCR: NOT DETECTED
CORONAVIRUS NL63 PCR: NOT DETECTED
CORONAVIRUS OC43 PCR: NOT DETECTED
CREAT SERPL-MCNC: 1.4 MG/DL (ref 0.7–1.2)
CREAT SERPL-MCNC: 1.45 MG/DL (ref 0.7–1.2)
DIFFERENTIAL TYPE: ABNORMAL
EKG ATRIAL RATE: 60 BPM
EKG P-R INTERVAL: 324 MS
EKG Q-T INTERVAL: 494 MS
EKG QRS DURATION: 112 MS
EKG QTC CALCULATION (BAZETT): 494 MS
EKG R AXIS: -37 DEGREES
EKG T AXIS: 43 DEGREES
EKG VENTRICULAR RATE: 60 BPM
EOSINOPHILS RELATIVE PERCENT: 0 % (ref 1–4)
GFR AFRICAN AMERICAN: 58 ML/MIN
GFR AFRICAN AMERICAN: 60 ML/MIN
GFR NON-AFRICAN AMERICAN: 47 ML/MIN
GFR NON-AFRICAN AMERICAN: 49 ML/MIN
GFR SERPL CREATININE-BSD FRML MDRD: ABNORMAL ML/MIN/{1.73_M2}
GLUCOSE BLD-MCNC: 237 MG/DL (ref 75–110)
GLUCOSE BLD-MCNC: 286 MG/DL (ref 75–110)
GLUCOSE BLD-MCNC: 302 MG/DL (ref 70–99)
GLUCOSE BLD-MCNC: 326 MG/DL (ref 75–110)
GLUCOSE BLD-MCNC: 344 MG/DL (ref 75–110)
GLUCOSE BLD-MCNC: 359 MG/DL (ref 70–99)
HCT VFR BLD CALC: 37.8 % (ref 40.7–50.3)
HEMOGLOBIN: 10.9 G/DL (ref 13–17)
HUMAN METAPNEUMOVIRUS PCR: NOT DETECTED
IMMATURE GRANULOCYTES: 1 %
INFLUENZA A BY PCR: NOT DETECTED
INFLUENZA A H1 (2009) PCR: NORMAL
INFLUENZA A H1 PCR: NORMAL
INFLUENZA A H3 PCR: NORMAL
INFLUENZA B BY PCR: NOT DETECTED
LEGIONELLA PNEUMOPHILIA AG, URINE: NEGATIVE
LYMPHOCYTES # BLD: 11 % (ref 24–43)
MCH RBC QN AUTO: 23.1 PG (ref 25.2–33.5)
MCHC RBC AUTO-ENTMCNC: 28.8 G/DL (ref 28.4–34.8)
MCV RBC AUTO: 80.3 FL (ref 82.6–102.9)
MONOCYTES # BLD: 7 % (ref 3–12)
MYCOPLASMA PNEUMONIAE PCR: NOT DETECTED
NRBC AUTOMATED: 0 PER 100 WBC
PARAINFLUENZA 1 PCR: NOT DETECTED
PARAINFLUENZA 2 PCR: NOT DETECTED
PARAINFLUENZA 3 PCR: NOT DETECTED
PARAINFLUENZA 4 PCR: NOT DETECTED
PARTIAL THROMBOPLASTIN TIME: 24.8 SEC (ref 20.5–30.5)
PARTIAL THROMBOPLASTIN TIME: 27.5 SEC (ref 20.5–30.5)
PARTIAL THROMBOPLASTIN TIME: 32.9 SEC (ref 20.5–30.5)
PARTIAL THROMBOPLASTIN TIME: 37.6 SEC (ref 20.5–30.5)
PDW BLD-RTO: 17.2 % (ref 11.8–14.4)
PLATELET # BLD: 291 K/UL (ref 138–453)
PLATELET ESTIMATE: ABNORMAL
PMV BLD AUTO: 10 FL (ref 8.1–13.5)
POTASSIUM SERPL-SCNC: 3.9 MMOL/L (ref 3.7–5.3)
POTASSIUM SERPL-SCNC: 4.3 MMOL/L (ref 3.7–5.3)
RBC # BLD: 4.71 M/UL (ref 4.21–5.77)
RBC # BLD: ABNORMAL 10*6/UL
REASON FOR REJECTION: NORMAL
RESP SYNCYTIAL VIRUS PCR: NOT DETECTED
RHINO/ENTEROVIRUS PCR: NOT DETECTED
SARS-COV-2, PCR: NOT DETECTED
SEG NEUTROPHILS: 82 % (ref 36–65)
SEGMENTED NEUTROPHILS ABSOLUTE COUNT: 9.13 K/UL (ref 1.5–8.1)
SODIUM BLD-SCNC: 137 MMOL/L (ref 135–144)
SODIUM BLD-SCNC: 139 MMOL/L (ref 135–144)
SOURCE: NORMAL
SPECIMEN DESCRIPTION: NORMAL
STREP PNEUMONIAE ANTIGEN: NEGATIVE
WBC # BLD: 11.2 K/UL (ref 3.5–11.3)
WBC # BLD: ABNORMAL 10*3/UL
ZZ NTE CLEAN UP: ORDERED TEST: NORMAL
ZZ NTE WITH NAME CLEAN UP: SPECIMEN SOURCE: NORMAL

## 2021-08-10 PROCEDURE — 97166 OT EVAL MOD COMPLEX 45 MIN: CPT

## 2021-08-10 PROCEDURE — 6370000000 HC RX 637 (ALT 250 FOR IP): Performed by: STUDENT IN AN ORGANIZED HEALTH CARE EDUCATION/TRAINING PROGRAM

## 2021-08-10 PROCEDURE — 87449 NOS EACH ORGANISM AG IA: CPT

## 2021-08-10 PROCEDURE — 87899 AGENT NOS ASSAY W/OPTIC: CPT

## 2021-08-10 PROCEDURE — 6360000002 HC RX W HCPCS: Performed by: STUDENT IN AN ORGANIZED HEALTH CARE EDUCATION/TRAINING PROGRAM

## 2021-08-10 PROCEDURE — 85730 THROMBOPLASTIN TIME PARTIAL: CPT

## 2021-08-10 PROCEDURE — 97535 SELF CARE MNGMENT TRAINING: CPT

## 2021-08-10 PROCEDURE — 97530 THERAPEUTIC ACTIVITIES: CPT

## 2021-08-10 PROCEDURE — 93010 ELECTROCARDIOGRAM REPORT: CPT | Performed by: INTERNAL MEDICINE

## 2021-08-10 PROCEDURE — 99233 SBSQ HOSP IP/OBS HIGH 50: CPT | Performed by: INTERNAL MEDICINE

## 2021-08-10 PROCEDURE — 80048 BASIC METABOLIC PNL TOTAL CA: CPT

## 2021-08-10 PROCEDURE — 85025 COMPLETE CBC W/AUTO DIFF WBC: CPT

## 2021-08-10 PROCEDURE — 36415 COLL VENOUS BLD VENIPUNCTURE: CPT

## 2021-08-10 PROCEDURE — 6370000000 HC RX 637 (ALT 250 FOR IP)

## 2021-08-10 PROCEDURE — 82947 ASSAY GLUCOSE BLOOD QUANT: CPT

## 2021-08-10 PROCEDURE — 97162 PT EVAL MOD COMPLEX 30 MIN: CPT

## 2021-08-10 PROCEDURE — 2060000000 HC ICU INTERMEDIATE R&B

## 2021-08-10 RX ORDER — INSULIN DETEMIR 100 [IU]/ML
40 INJECTION, SOLUTION SUBCUTANEOUS NIGHTLY
COMMUNITY
End: 2021-10-20

## 2021-08-10 RX ORDER — ESOMEPRAZOLE MAGNESIUM 20 MG/1
20 FOR SUSPENSION ORAL DAILY
COMMUNITY

## 2021-08-10 RX ORDER — INSULIN GLARGINE 100 [IU]/ML
25 INJECTION, SOLUTION SUBCUTANEOUS NIGHTLY
Status: DISCONTINUED | OUTPATIENT
Start: 2021-08-10 | End: 2021-08-15 | Stop reason: HOSPADM

## 2021-08-10 RX ORDER — INSULIN GLARGINE 100 [IU]/ML
10 INJECTION, SOLUTION SUBCUTANEOUS ONCE
Status: COMPLETED | OUTPATIENT
Start: 2021-08-10 | End: 2021-08-10

## 2021-08-10 RX ADMIN — Medication 3 MG: at 00:31

## 2021-08-10 RX ADMIN — HEPARIN SODIUM AND DEXTROSE 9.65 UNITS/KG/HR: 10000; 5 INJECTION INTRAVENOUS at 05:07

## 2021-08-10 RX ADMIN — PANTOPRAZOLE SODIUM 40 MG: 40 TABLET, DELAYED RELEASE ORAL at 06:07

## 2021-08-10 RX ADMIN — ASPIRIN 81 MG: 81 TABLET, CHEWABLE ORAL at 08:54

## 2021-08-10 RX ADMIN — INSULIN LISPRO 12 UNITS: 100 INJECTION, SOLUTION INTRAVENOUS; SUBCUTANEOUS at 11:27

## 2021-08-10 RX ADMIN — INSULIN LISPRO 9 UNITS: 100 INJECTION, SOLUTION INTRAVENOUS; SUBCUTANEOUS at 17:46

## 2021-08-10 RX ADMIN — INSULIN LISPRO 3 UNITS: 100 INJECTION, SOLUTION INTRAVENOUS; SUBCUTANEOUS at 21:31

## 2021-08-10 RX ADMIN — CLOPIDOGREL 75 MG: 75 TABLET, FILM COATED ORAL at 08:54

## 2021-08-10 RX ADMIN — ISOSORBIDE MONONITRATE 30 MG: 30 TABLET ORAL at 08:54

## 2021-08-10 RX ADMIN — HEPARIN SODIUM 2000 UNITS: 1000 INJECTION, SOLUTION INTRAVENOUS; SUBCUTANEOUS at 23:00

## 2021-08-10 RX ADMIN — AMIODARONE HYDROCHLORIDE 200 MG: 200 TABLET ORAL at 08:54

## 2021-08-10 RX ADMIN — GABAPENTIN 300 MG: 300 CAPSULE ORAL at 13:00

## 2021-08-10 RX ADMIN — INSULIN LISPRO 12 UNITS: 100 INJECTION, SOLUTION INTRAVENOUS; SUBCUTANEOUS at 07:46

## 2021-08-10 RX ADMIN — FUROSEMIDE 40 MG: 10 INJECTION, SOLUTION INTRAMUSCULAR; INTRAVENOUS at 08:54

## 2021-08-10 RX ADMIN — HEPARIN SODIUM AND DEXTROSE 16.3 UNITS/KG/HR: 10000; 5 INJECTION INTRAVENOUS at 23:00

## 2021-08-10 RX ADMIN — GABAPENTIN 300 MG: 300 CAPSULE ORAL at 08:54

## 2021-08-10 RX ADMIN — INSULIN GLARGINE 10 UNITS: 100 INJECTION, SOLUTION SUBCUTANEOUS at 07:46

## 2021-08-10 RX ADMIN — GABAPENTIN 300 MG: 300 CAPSULE ORAL at 21:31

## 2021-08-10 RX ADMIN — HEPARIN SODIUM AND DEXTROSE 13.65 UNITS/KG/HR: 10000; 5 INJECTION INTRAVENOUS at 12:57

## 2021-08-10 RX ADMIN — HEPARIN SODIUM 4000 UNITS: 1000 INJECTION INTRAVENOUS; SUBCUTANEOUS at 05:07

## 2021-08-10 RX ADMIN — HEPARIN SODIUM 4000 UNITS: 1000 INJECTION INTRAVENOUS; SUBCUTANEOUS at 12:56

## 2021-08-10 RX ADMIN — INSULIN GLARGINE 25 UNITS: 100 INJECTION, SOLUTION SUBCUTANEOUS at 21:31

## 2021-08-10 ASSESSMENT — PAIN DESCRIPTION - PAIN TYPE: TYPE: ACUTE PAIN

## 2021-08-10 ASSESSMENT — PAIN DESCRIPTION - LOCATION: LOCATION: CHEST

## 2021-08-10 ASSESSMENT — PAIN SCALES - GENERAL
PAINLEVEL_OUTOF10: 0
PAINLEVEL_OUTOF10: 4
PAINLEVEL_OUTOF10: 0

## 2021-08-10 NOTE — PROGRESS NOTES
Will order BMP prior to administering further dose of lasix. If Cr up trend noted hold lasix dose and will consult nephrology for risk stratification prior to moving forward with coronary angiography. Discussed with JENNIFER.     Marquis Hay MD  Cardiology Fellow

## 2021-08-10 NOTE — PROGRESS NOTES
[facility]  Internal Medicine Teaching Residency Program  Inpatient Daily Progress Note  ______________________________________________________________________________    Patient: Serafin Painter  YOB: 1945   QDB:[id number]    Acct: [de-identified]     Room: [address] [place] [address]  Admit date: 8/9/2021  Today's date: 08/10/21  Number of days in the hospital: 1    SUBJECTIVE   Admitting Diagnosis:   CC: Chest pain and shortness of breath  Pt seen & examined at bedside. Chart & results reviewed. Vitals stable. Patient endorses improvement in the chest pain or shortness of breath. Patient denies any concerns today, he was able to ambulate. No acute events overnight      ROS:  Constitutional:  negative for chills, fevers, sweats  Respiratory:  negative for cough, dyspnea on exertion, hemoptysis, shortness of breath, wheezing  Cardiovascular:  negative for chest pain, chest pressure/discomfort, lower extremity edema, palpitations  Gastrointestinal:  negative for abdominal pain, constipation, diarrhea, nausea, vomiting  Neurological:  negative for dizziness, headache      BRIEF HISTORY     A 76 y. o.  male presents with a chief c/o sudden onset chest pain. He described it a diffuse heaviness in chest, radiating to R elbow, pain was constant, 8/10 in intensity, associated with sweating and SOB at rest. He tried increasing concentration home O2 and lasix but nothing helped. EMS was called. As per the records, he received NTG which improved his chest pain, 324 aspirin and 1 ICD shock. In ED, his SpO2 74% on NRB, BiPAP placed, with plans to decrease FiO2.      He has PMH of A fib (on eliquis), CHF (EF-%), peripheral vascular disease, DM type 2, Hypertension, Hyperlipidemia. He reports to be compliant with his medications. He is being evaluated for PAD claudication at [address].  He also underwent CTA abdomen in this regard and found to have atherosclerotic disease and calcification of AA and short seg occlusion of celiac artery. He has been having lower limb claudication for alteast 4-5 months. He has H/o MI in . Over the years in , he got 8 stents in total. He has in dwelling L subclavian AICD. He was also started on Eliquis around 2021.        OBJECTIVE     Vital Signs:  BP (!) 140/73   Pulse 60   Temp 98.2 °F (36.8 °C) (Oral)   Resp 17   Ht 6' (1.829 m)   Wt 229 lb 15 oz (104.3 kg)   SpO2 99%   BMI 31.19 kg/m²     Temp (24hrs), Av.1 °F (36.7 °C), Min:97.9 °F (36.6 °C), Max:98.2 °F (36.8 °C)    In: -   Out: 1550 [Urine:1550]    Physical Exam:  Constitutional: This is a well developed, well nourished, 30-34.9 - Obesity Grade I 77y.o. year old male who is alert, oriented, cooperative and in no apparent distress. Head:normocephalic and atraumatic. EENT:  PERRLA. No conjunctival injections. Septum was midline, mucosa was without erythema, exudates or cobblestoning. No thrush was noted. Neck: Supple without thyromegaly. No elevated JVP. Trachea was midline. Respiratory: Chest was symmetrical without dullness to percussion. Breath sounds bilaterally were clear to auscultation. There were no wheezes, rhonchi or rales. There is no intercostal retraction or use of accessory muscles. No egophony noted. Cardiovascular: Regular without murmur, clicks, gallops or rubs. Abdomen: Slightly rounded and soft without organomegaly. No rebound, rigidity or guarding was appreciated. Lymphatic: No lymphadenopathy. Musculoskeletal: Normal curvature of the spine. No gross muscle weakness. Extremities:  No lower extremity edema, ulcerations, tenderness, varicosities or erythema. Muscle size, tone and strength are normal.  No involuntary movements are noted. Skin:  Warm and dry. Good color, turgor and pigmentation. No lesions or scars.   No cyanosis or clubbing  Neurological/Psychiatric: The patient's general behavior, level of consciousness, thought content and emotional status is normal.        Medications:  Scheduled Medications:    insulin glargine  25 Units Subcutaneous Nightly    amiodarone  200 mg Oral Daily    [Held by provider] apixaban  5 mg Oral BID    aspirin  81 mg Oral Daily    clopidogrel  75 mg Oral Daily    furosemide  40 mg Intravenous Daily    gabapentin  300 mg Oral TID    pantoprazole  40 mg Oral QAM AC    sodium chloride flush  5-40 mL Intravenous 2 times per day    insulin lispro  0-18 Units Subcutaneous TID WC    insulin lispro  0-9 Units Subcutaneous Nightly    isosorbide mononitrate  30 mg Oral Daily     Continuous Infusions:    sodium chloride      dextrose      heparin (PORCINE) Infusion 13.65 Units/kg/hr (08/10/21 1257)     PRN Medicationsalbuterol, 2.5 mg, As Directed RT PRN  sodium chloride flush, 5-40 mL, PRN  sodium chloride, 25 mL, PRN  ondansetron, 4 mg, Q8H PRN   Or  ondansetron, 4 mg, Q6H PRN  polyethylene glycol, 17 g, Daily PRN  acetaminophen, 650 mg, Q6H PRN   Or  acetaminophen, 650 mg, Q6H PRN  glucose, 15 g, PRN  dextrose, 12.5 g, PRN  glucagon (rDNA), 1 mg, PRN  dextrose, 100 mL/hr, PRN  albuterol, 2.5 mg, As Directed RT PRN  heparin (porcine), 4,000 Units, PRN  heparin (porcine), 2,000 Units, PRN        Diagnostic Labs:  CBC:   Recent Labs     08/09/21 0822 08/09/21 2207 08/10/21  0308   WBC 14.4* 7.9 11.2   RBC 4.82 4.70 4.71   HGB 11.2* 10.8* 10.9*   HCT 39.7* 37.1* 37.8*   MCV 82.4* 78.9* 80.3*   RDW 17.8* 17.7* 17.2*    300 291     BMP:   Recent Labs     08/09/21  0822 08/10/21  0308    137   K 5.2 4.3    98   CO2 23 25   BUN 29* 40*   CREATININE 1.11 1.45*     BNP: No results for input(s): BNP in the last 72 hours. PT/INR:   Recent Labs     08/09/21 0822   PROTIME 11.1   INR 1.0     APTT:   Recent Labs     08/09/21  2207 08/10/21  0308 08/10/21  1139   APTT 24.4 24.8 27.5     CARDIAC ENZYMES: No results for input(s): CKMB, CKMBINDEX, TROPONINI in the last 72 hours.     Invalid input(s): CKTOTAL;3  FASTING LIPID PANEL:  Lab Results   Component Value Date    CHOL 233 10/29/2018    HDL 37 10/29/2018    TRIG 172 10/29/2018     LIVER PROFILE:   Recent Labs     08/09/21  0822   AST 51*   ALT 39   BILITOT 0.30   ALKPHOS 71      MICROBIOLOGY:   Lab Results   Component Value Date/Time    CULTURE NO SIGNIFICANT GROWTH 03/18/2021 06:29 PM       Imaging:    XR CHEST PORTABLE    Result Date: 8/9/2021  Satisfactory lead position Mild diffuse bilateral interstitial infiltrates related to mild vascular congestion and/or infection       ASSESSMENT & PLAN     IMPRESSION  This is a 77 y.o.   male who presented with chest pain and SOB, was found to have acute hypoxic respiratory failure,  Patient admitted to inpatient status for cardiac evaluation and management of Acute on chronic systolic CHF     Acute hypoxic respiratory failure- resolving  · 78% SpO2 on 15 L O2 non rebreather  · Improved on BiPAP  · Transitioned to NC O2 4 L     Chest pain, radiating to R elbow-  · Decreased on NTG PTA   · Monitor troponins  · Cardiology consulted  · CXR: Mild diffuse bilateral interstitial infiltrates related to mild vascular   congestion and/or infection   · Last ECHO (5/25/21): LVEF 25-30%     Acute on chronic systolic heart failure CHF  · Placed on BiPAP-> transitioned to NC   · Start IV Lasix  · Last ECHO (5/25/21): LVEF 25-30%  · Monitor strict intake and output  · Will check BNP        DM type II:  · Sugars on higher side: 318,211  · Hold metformin  · Resume Jardiance  · Resume Alogliptin        Atrial fibrillation:  · Hold Eliquis  · Patient on heparin drip  · Will resume home medications- amiodarone, metoprolol and Cardizem,  if his blood pressure tolerates     DVT ppx:  · On heparin drip     GI ppx:   · On protonix     PT/OT/SW:   · Ongoing     Discharge Planning:   ·  to assist in the discharge planning          Sugar Sheets  Internal Medicine Resident, PGY-1  [facility] [address] [place] [address] Ne;  [place], [place]  8/10/2021, 1:25 PM

## 2021-08-10 NOTE — CONSULTS
[facility] Cardiology Cardiology    Inpatient Consultation Note               Today's Date: 8/10/2021  Patient Name: Milford Meigs  Date of admission: 8/9/2021  8:13 AM  Patient's age: 77 y.o., 1945  Admission Dx: Shortness of breath [R06.02]  Acute on chronic systolic heart failure (HCC) [I50.23]  Chest pain [R07.9]  Chest pain, unspecified type [R07.9]    Reason for  Consult:  Chest pain    Requesting Physician: Stephen Braun MD    CHIEF COMPLAINT:     Chief Complaint   Patient presents with    Respiratory Distress     CP x4 days, SPO2 78% on 15LNRB    Chest Pain     324 ASA and nitro PTA       History Obtained From:  patient, electronic medical record    HISTORY OF PRESENT ILLNESS:    77year old male presents to the ED due to typical chest pain. Extensive cardiac history, listed below, came in with chest pain which resolved after sublingual NG. In the ED was noted to be hypoxic and started on 15 L NRB. Reports no more episodes of chest pain. Cardiology consulted in setting of typical chest pain with extensive cardiac history. EKG shows atrial paced rhythm. Past Medical History:   has a past medical history of Arrhythmia, CAD (coronary artery disease), GERD (gastroesophageal reflux disease), Heart attack ([address] Hyperlipidemia, Hypertension, Ischemic cardiomyopathy, Osteoarthritis, and Type II or unspecified type diabetes mellitus without mention of complication, not stated as uncontrolled. Past Surgical History:   has a past surgical history that includes Pacemaker insertion; Appendectomy; Rotator cuff repair; Colonoscopy; joint replacement; Cardiac catheterization; Cardiac surgery; Tonsillectomy; Coronary angioplasty with stent (07/2018); Diagnostic Cardiac Cath Lab Procedure; and Percutaneous Transluminal Coronary Angio. Home Medications:    Prior to Admission medications    Medication Sig Start Date End Date Taking?  Authorizing Provider   furosemide (LASIX) 40 MG tablet Take 1 tablet by mouth once daily   May take 1 additional tablet daily PRN 2 pound weight gain, short of breath or swelling. 7/9/21  Yes ERICK Miller CNP   apixaban (ELIQUIS) 5 MG TABS tablet Take 5 mg by mouth 4/17/21  Yes Historical Provider, MD   alogliptin (NESINA) 25 MG TABS tablet Take 1 tablet by mouth daily 6/10/21  Yes ERICK Miller CNP   empagliflozin (JARDIANCE) 10 MG tablet Take 1 tablet by mouth daily 6/10/21  Yes ERICK Miller CNP   dilTIAZem (CARDIZEM CD) 240 MG extended release capsule Take 240 mg by mouth daily   Yes Historical Provider, MD   glimepiride (AMARYL) 4 MG tablet Take 1 tablet by mouth 2 times daily 2/25/21  Yes ERICK Miller CNP   clopidogrel (PLAVIX) 75 MG tablet Take 1 tablet by mouth daily 2/15/21  Yes ERICK Miller CNP   isosorbide mononitrate (IMDUR) 30 MG extended release tablet Take 30 mg by mouth daily   Yes Historical Provider, MD   potassium chloride (KLOR-CON M) 20 MEQ extended release tablet TAKE 1  BY MOUTH ONCE DAILY 6/30/20  Yes ERICK Miller CNP   amiodarone (CORDARONE) 200 MG tablet TAKE 1 TABLET BY MOUTH ONCE DAILY 4/27/20  Yes Jax Roman MD   insulin detemir (LEVEMIR FLEXTOUCH) 100 UNIT/ML injection pen Inject 10 Units into the skin nightly 1 sample given 8/25/16  [id number]  11/17exp date  9554-6463-01  Patient taking differently: Inject 25 Units into the skin nightly 1 sample given 8/25/16  [id number]  11/17exp date  8590-3192-93 6/17/19  Yes ERICK Miller CNP   nitroGLYCERIN (NITROSTAT) 0.4 MG SL tablet DISSOLVE ONE TABLET UNDER THE TONGUE EVERY 5 MINUTES AS NEEDED FOR CHEST PAIN.   DO NOT EXCEED A TOTAL OF 3 DOSES IN 15 MINUTES 2/1/19  Yes ERICK Miller CNP   magnesium oxide (MAG-OX) 400 MG tablet Take 400 mg by mouth daily   Yes Historical Provider, MD   triamcinolone (KENALOG) 0.025 % ointment Apply topically 2 times daily to areas of eczema on lower legs 4/30/18  Yes Gareth Lundberg MD Derrick   metoprolol tartrate (LOPRESSOR) 50 MG tablet Take 1 tablet by mouth 2 times daily 12/18/17  Yes ERICK Quevedo CNP   lisinopril (PRINIVIL;ZESTRIL) 40 MG tablet Take 40 mg by mouth daily   Yes Historical Provider, MD   omeprazole (PRILOSEC) 20 MG delayed release capsule Take 20 mg by mouth every evening   Yes Historical Provider, MD   saxagliptin (ONGLYZA) 5 MG TABS tablet Take 5 mg by mouth daily   Yes Historical Provider, MD   metFORMIN (GLUCOPHAGE) 1000 MG tablet Take 1 tablet by mouth 2 times daily (with meals) 5/7/15  Yes ERICK Angeles CNP   ONETOUCH ULTRA strip USE 1 STRIP TO CHECK GLUCOSE TWICE DAILY 6/14/21   ERICK Quevedo CNP   magnesium oxide (MAG-OX) 400 MG tablet Take 400 mg by mouth 4/16/21   Historical Provider, MD   ELIQULUKE 5 MG TABS tablet Take 1 tablet by mouth 2 times daily 6/10/21   ERICK Quevedo CNP   oxyCODONE-acetaminophen (PERCOCET) 5-325 MG per tablet Take 1 tablet by mouth every 8 hours as needed for Pain for up to 30 days.  5/6/21 6/5/21  ERICK Quevedo CNP   diclofenac sodium (VOLTAREN) 1 % GEL Apply 2 g topically 4 times daily as needed for Pain 2/24/21   Shital Card MD   albuterol sulfate  (90 Base) MCG/ACT inhaler INHALE 2 PUFFS BY INHALATION THREE TIMES A DAY AS NEEDED FOR SHORTNESS OF BREATH 3/30/20   Historical Provider, MD   albuterol (PROVENTIL) (2.5 MG/3ML) 0.083% nebulizer solution Take 3 mLs by nebulization every 6 hours as needed for Wheezing or Shortness of Breath 3/19/20   Casey Moreno MD   ondansetron [facility]) 8 MG tablet Take 1 tablet by mouth every 8 hours as needed for Nausea 6/17/19   ERICK Quevedo CNP   Handicap Placard MISC by Does not apply route Expires 12/2023 12/17/18   ERICK Quevedo CNP   gabapentin (NEURONTIN) 300 MG capsule TAKE 1 CAPSULE BY MOUTH THREE TIMES DAILY 8/20/18 5/5/21  Pete Villalobos APRN - CNP   hydrocortisone (WESTCORT) 0.2 % cream Apply topically 2 times daily.  12/28/16   ERICK Bolanos CNP   Insulin Pen Needle 29G X 12.7MM MISC 1 each by Does not apply route daily 8/17/16   ERICK Bolanos CNP        Current Facility-Administered Medications: insulin glargine (LANTUS) injection vial 25 Units, 25 Units, Subcutaneous, Nightly  amiodarone (CORDARONE) tablet 200 mg, 200 mg, Oral, Daily  [Held by provider] apixaban (ELIQUIS) tablet 5 mg, 5 mg, Oral, BID  aspirin chewable tablet 81 mg, 81 mg, Oral, Daily  clopidogrel (PLAVIX) tablet 75 mg, 75 mg, Oral, Daily  furosemide (LASIX) injection 40 mg, 40 mg, Intravenous, Daily  gabapentin (NEURONTIN) capsule 300 mg, 300 mg, Oral, TID  pantoprazole (PROTONIX) tablet 40 mg, 40 mg, Oral, QAM AC  albuterol (PROVENTIL) nebulizer solution 2.5 mg, 2.5 mg, Nebulization, As Directed RT PRN  sodium chloride flush 0.9 % injection 5-40 mL, 5-40 mL, Intravenous, 2 times per day  sodium chloride flush 0.9 % injection 5-40 mL, 5-40 mL, Intravenous, PRN  0.9 % sodium chloride infusion, 25 mL, Intravenous, PRN  ondansetron (ZOFRAN-ODT) disintegrating tablet 4 mg, 4 mg, Oral, Q8H PRN **OR** ondansetron (ZOFRAN) injection 4 mg, 4 mg, Intravenous, Q6H PRN  polyethylene glycol (GLYCOLAX) packet 17 g, 17 g, Oral, Daily PRN  acetaminophen (TYLENOL) tablet 650 mg, 650 mg, Oral, Q6H PRN **OR** acetaminophen (TYLENOL) suppository 650 mg, 650 mg, Rectal, Q6H PRN  insulin lispro (HUMALOG) injection vial 0-18 Units, 0-18 Units, Subcutaneous, TID WC  insulin lispro (HUMALOG) injection vial 0-9 Units, 0-9 Units, Subcutaneous, Nightly  glucose (GLUTOSE) 40 % oral gel 15 g, 15 g, Oral, PRN  dextrose 50 % IV solution, 12.5 g, Intravenous, PRN  glucagon (rDNA) injection 1 mg, 1 mg, Intramuscular, PRN  dextrose 5 % solution, 100 mL/hr, Intravenous, PRN  isosorbide mononitrate (IMDUR) extended release tablet 30 mg, 30 mg, Oral, Daily  albuterol (PROVENTIL) nebulizer solution 2.5 mg, 2.5 mg, Nebulization, As Directed RT present  Extremities:  ·  No Cyanosis or Clubbing  ·  Lower extremity edema: No  Neurological:  · Alert and oriented. · Moves all extremities well    DATA:    Diagnostics:    7/2018 Cath:  ELLA LAD and RCA    Coronary arteries: The coronary circulation is right dominant. The left main bifurcates normally into the LAD and  circumflex. The left anterior descending gives rise to 2 diagonals. The left circumflex gives rise to 3 obtuse marginals. Left main: Distal vessel lesion: There is a 30% stenosis. LAD: Mid-vessel lesion: The diagnostic study demonstrated a tubular, 28 mm (L), 80% stenosis. This lesion appears mildly calcified and consistent with atherosclerotic disease. It is not a bifurcation lesion. There is RADHA grade 3 flow (brisk flow) across the lesion. The distal vessel supplies a moderate-sized vascular territory. The lesion presents an ACC/AHA type B2 \"moderate risk\" lesion for intervention, with 2 or more adverse characteristics. Stent placement. Left circumflex: Prior intervention: stent in the proximal left circumflex. The stented segment is patent. 1st obtuse marginal: Ostial lesion: There is a 45% stenosis. 2nd obtuse marginal: Ostial lesion: There is a 40% stenosis. Right coronary: Prior intervention: stent in the proximal RCA. The stented segment is patent. Mid-vessel lesion: The diagnostic study demonstrated a discrete, 90% stenosis. This lesion appears moderately calcified and consistent with atherosclerotic disease. It is not a bifurcation lesion. There is RADHA grade 3 flow (brisk flow) across the lesion. The distal vessel supplies a large vascular territory. The lesion is a likely culprit for the patient's anginal symptoms. The lesion presents an ACC/AHA type A \"low risk\" lesion for intervention. Stent placement. Conclusions:  1. Multivessel coronary artery disease. 2. Successful drug-eluting stent placement in mid LAD and mid right coronary arteries.      11/2018 Carotids:  Right carotid: Mild stenosis (<50%) with patent vertebral artery    Left carotid:             Mild stenosis (<50%) with patent vertebral artery      12/2018 Echo:  · Dilated left ventricle with low normal systolic function. EF 50-55%. · Mild to moderate left ventricular hypertrophy. · Mildly dilated aortic root, 3.8 cm. Borderline dilated ascending aorta. · Mild mitral regurgitation. · Trivial tricuspid regurgitation. Unable to assess right heart pressures. · Trivial pulmonic insufficiency. · Left ventricular diastolic dysfunction. 11/19 carotids - mild B disease      12/19 echo - EF 50-55%. · Mild left ventricular hypertrophy. · The aortic root is at the upper limits of normal.  · Mild mitral regurgitation. · Trivial tricuspid regurgitation. Unable to assess right heart pressures. · Calcified aortic valve. 2/20 stress   EKG:  No Ischemic EKG Changes. Arrhythmia noted:occasional PACs an rare PVCs. IMAGING: There is no ischemia present. large high grade distal anterior, apical and distal inferior area. infarct noted. .  The left ventricular ejection fraction was mildly reduced and measured 42%. Visually EF appears lower. Left ventricular wall motion was abnormal - apical dyskinesis. SYMPTOMS: He experienced  chest heaviness, dyspnea and headache during the infusion. 3/20 PVRs    Abnormal study suggesting significant vascular occlusive disease of the left lower extremity and mild disease on the right. Further imaging suggested if the patient is symptomatic. 3/20 LE art Duplex   No significant acceleration of Doppler velocities noted. There was a slight increase in velocities at the distal superficial femoral artery on the right side. At the posterior tibial artery level the Doppler waveform is biphasic on the right and monophasic on the left.     Labs:     CBC:   Recent Labs     08/09/21  2207 08/10/21  0308   WBC 7.9 11.2   HGB 10.8* 10.9*   HCT 37.1* 37.8*    291 BMP:   Recent Labs     08/09/21  0822 08/10/21  0308    137   K 5.2 4.3   CO2 23 25   BUN 29* 40*   CREATININE 1.11 1.45*   LABGLOM >60 47*   GLUCOSE 318* 359*     Pro-BNP:    Recent Labs     08/09/21  0822   PROBNP 2,985*     BNP: No results for input(s): BNP in the last 72 hours. PT/INR:   Recent Labs     08/09/21 0822   PROTIME 11.1   INR 1.0     APTT:  Recent Labs     08/09/21  2207 08/10/21  0308   APTT 24.4 24.8     CARDIAC ENZYMES:No results for input(s): CKTOTAL, CKMB, CKMBINDEX, TROPONINI in the last 72 hours. Invalid input(s):  [address]  Recent Labs     08/09/21  6831 08/09/21  1101 08/09/21 2207   TROPONINT NOT REPORTED NOT REPORTED NOT REPORTED       FASTING LIPID PANEL:  Lab Results   Component Value Date    HDL 37 10/29/2018    [address] 162 10/29/2018    TRIG 172 10/29/2018     LIVER PROFILE:  Recent Labs     08/09/21 0822   AST 51*   ALT 39   LABALBU 3.8         Patient's Active Problem List  Active Problems:    Chest pain    Acute on chronic systolic heart failure (HCC)  Resolved Problems:    * No resolved hospital problems. *        IMPRESSION:    1. Chest pain resolved in sublingual NG  2. CAD s/p PCI to LCx, OM, Diag & RCA  3. Hx of VT s/p AICD  4. Acute on chronic diastolic HF  5. HTN  6. HLD  7. Paroxysmal A fib  8. PVD    RECOMMENDATIONS:  1. Device interrogation  2. Continue amiodarone 200 mg OD  3. Continue ASA & plavix  4. Continue lasix 40 mg IV once daily  5. Continue imdur 30 mg daily  6. Continue heparin gtt. Hold Eliquis while on heparin gtt  7. Keep Npo after midnight. Coronary angiography tomorrow  8. 2D ECHO ordered. Follow up as outpatient  9. K>4, Mg>2    Thank you for allowing us to participate in the care of John Fowler. If you have any questions or concerns, please do not hesitate to contact us. Discussed with patient and Nurse. Debbi Weller MD, M.D.   Fellow, [address]        Please note that part of this chart were generated using voice recognition  dictation software. Although every effort was made to ensure the accuracy of this automated transcription, some errors in transcription may have occurred. Attestation signed by      Attending Physician Statement:    I have discussed the care of  Romayne Spikes , including pertinent history and exam findings, with the Cardiology fellow/resident. I have seen and examined the patient and the key elements of all parts of the encounter have been performed by me. I agree with the assessment, plan and orders as documented by the fellow/resident, after I modified exam findings and plan of treatments, and the final version is my approved version of the assessment. Additional Comments:   CP- concern for unstable angina- per patient very similar pain prior to PCI many years ago  Known CAD s/p PCI  VT s/p AICD  PAF  ENRRIQUE  - currently CP free  - troponins are negative  - will plan for 2d Echo today  - will plan for cardiac cath in AM if Cr improved, if not will need nephro optimization prior  - hold lasix  - continue heparin zoey Kim, DO, FACC, RPVI, LENY, [facility]  [facility]. [facility]  [phone]

## 2021-08-10 NOTE — PROGRESS NOTES
Percutaneous Transluminal Coronary Angio.     Restrictions  Restrictions/Precautions  Restrictions/Precautions: Fall Risk, Up as Tolerated  Required Braces or Orthoses?: No  Position Activity Restriction  Other position/activity restrictions:  Vision/Hearing  Vision: Impaired  Vision Exceptions: Wears glasses at all times  Hearing: Exceptions to [facility] (Pt reports bilateral hearing aids however reports he does not wear them)  Hearing Exceptions: Hard of hearing/hearing concerns     Subjective  General  Patient assessed for rehabilitation services?: Yes  Response To Previous Treatment: Not applicable  Family / Caregiver Present: No  Follows Commands: Within Functional Limits  Subjective  Subjective: RN and pt in agreement for PT eval; pt seated in bedside chair upon writer's arrival on 2L NC, pt very pleasant and cooperative throughout session  Pain Screening  Patient Currently in Pain: Denies  Vital Signs  Patient Currently in Pain: Denies       Orientation  Orientation  Overall Orientation Status: Within Functional Limits  Social/Functional History  Social/Functional History  Lives With: Family (daughter, son in law, adult granddtr)  Type of Home: House  Home Layout: Two level, Bed/Bath upstairs (full flight of stairs to second floor bed/bath with R hand rail)  Home Access: Level entry  Bathroom Shower/Tub: Walk-in shower  Bathroom Toilet: Standard  Bathroom Equipment: Grab bars in shower  Bathroom Accessibility: Accessible  Home Equipment:  (pt reports 2L home O2 for nighttime use only)  Receives Help From: Family (Pt reports supportive family able to assist PRN)  ADL Assistance: Independent  Homemaking Assistance: Independent  Homemaking Responsibilities: Yes (all IADL tasks able to be shared with or completed by family)  Ambulation Assistance: Independent  Transfer Assistance: Independent  Active : Yes  Mode of Transportation: Car, Truck, SUV  Occupation: Retired  Type of occupation: 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 however reports being bored since retiring and 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 [employer]  Overall Cognitive Status: WFL    Objective  Joint Mobility  ROM RLE: WFL  ROM LLE: WFL  ROM RUE: WFL  ROM LUE: WFL  Strength RLE  Strength RLE: WFL  Strength LLE  Strength LLE: WFL  Strength RUE  Strength RUE: WFL  Strength LUE  Strength LUE: WFL     Sensation  Overall Sensation Status: WFL  Bed mobility  Scooting: Supervision  Comment: pt seated in bedside chair upon writer's arrival, retired in bedside chair upon writer's exit  Transfers  Sit to Stand: Stand by assistance  Stand to sit: Stand by assistance  Ambulation  Ambulation?: Yes  Ambulation 1  Surface: level tile  Device: No Device  Other Apparatus: O2 (2L)  Assistance: Stand by assistance  Quality of Gait: Forward flexed posture; increased hip flexion bilaterally; decreased step length bilaterally  Gait Deviations: Slow Makenzie  Distance: 175ft  Comments: Pt reports baseline gait with increased forward flexed posture, SpO2 94% following ambulation on 2L.   Stairs/Curb  Stairs?: No     Balance  Posture: Fair  Sitting - Static: Good  Sitting - Dynamic: Good;-  Standing - Static: Good  Standing - Dynamic: Good;-  Comments: Standing balance assessed w/ no AD        Plan   Plan  Times per week: 4-5x/week  Current Treatment Recommendations: Transfer Training, Endurance Training, Gait Training, Balance Training, Stair training, Functional Mobility Training  Safety Devices  Type of devices: Left in chair, Call light within reach, Nurse notified  Restraints  Initially in place: No  AM-PAC Score     AM-PAC Inpatient Mobility without Stair Climbing Raw Score : 19 (08/10/21 1514)  AM-PAC Inpatient without Stair Climbing T-Scale Score : 56.04 (08/10/21 1514)  Mobility Inpatient CMS 0-100% Score: 12.25 (08/10/21 1514)  Mobility Inpatient without Stair CMS G-Code Modifier : CI (08/10/21 1514)       Goals  Short term goals  Time Frame for Short term goals: 10  Short term goal 1: Pt to perform bed mobility and functional transfers independently  Short term goal 2: Ambulate 300ft w/ no AD with supervision  Short term goal 3: Ascend/descend 9 stairs with R rail to simulate home environment with supervision  Short term goal 4: Demonstrate standing dynamic balance of good - to decrease fall risk  Patient Goals   Patient goals :  To go home       Therapy Time   Individual Concurrent Group Co-treatment   Time In 1027         Time Out 1050         Minutes 23         Timed Code Treatment Minutes: 9 Minutes       Yoseph Tobias, PT

## 2021-08-10 NOTE — PROGRESS NOTES
Occupational Therapy   Occupational Therapy Initial Assessment  Date: 8/10/2021   Patient Name: Meri Verma  MRN: [id number]     : 1945    Date of Service: 8/10/2021     Chief Complaint   Patient presents with    Respiratory Distress     CP x4 days, SPO2 78% on 15LNRB    Chest Pain     324 ASA and nitro PTA       Discharge Recommendations:  Pt likely to be safe to return home at discharge with good family support available as pt progresses towards below stated goals with therapy. Assessment   Performance deficits / Impairments: Decreased functional mobility ; Decreased ADL status; Decreased endurance;Decreased safe awareness;Decreased balance;Decreased high-level IADLs  Comments: Pt participated in OT eval and treatment this AM and demonstrated ability to engage in functional transfers/functional mobility and simple ADL tasks; pt was limited in performing functional tasks this date due to above noted deficits, most significantly decreased activity tolerance. Pt with 2L O2 in place during session, with O2 removed pt desaturated to 88% following minimal exertion, with 2L O2 in place pt maintained SPO2 above 90%. Pt will continue to benefit from continued therapy services while hospitalized and at discharge to maximize pt's safety/independence in performing functional tasks as well as for continued energy conservation training. Prognosis: Good  Decision Making: Medium Complexity  OT Education: OT Role;Plan of Care;Precautions; ADL Adaptive Strategies;Transfer Training;Energy Conservation (Activity Promotion, Pursed Lip Breathing Techniques, Safety Awareness/Line Mngt; good return)  REQUIRES OT FOLLOW UP: Yes  Activity Tolerance  Activity Tolerance: Patient limited by fatigue (SOB and BLE fatigue)  Safety Devices  Safety Devices in place: Yes  Type of devices: Call light within reach; Left in chair;Nurse notified  Restraints  Initially in place: No           Patient Diagnosis(es): The primary encounter diagnosis was Chest pain, unspecified type. A diagnosis of Shortness of breath was also pertinent to this visit. has a past medical history of Arrhythmia, CAD (coronary artery disease), GERD (gastroesophageal reflux disease), Heart attack ([address] Hyperlipidemia, Hypertension, Ischemic cardiomyopathy, Osteoarthritis, and Type II or unspecified type diabetes mellitus without mention of complication, not stated as uncontrolled. has a past surgical history that includes Pacemaker insertion; Appendectomy; Rotator cuff repair; Colonoscopy; joint replacement; Cardiac catheterization; Cardiac surgery; Tonsillectomy; Coronary angioplasty with stent (07/2018); Diagnostic Cardiac Cath Lab Procedure; and Percutaneous Transluminal Coronary Angio. Restrictions  Restrictions/Precautions  Restrictions/Precautions: Fall Risk, Up as Tolerated  Required Braces or Orthoses?: No  Position Activity Restriction  Other position/activity restrictions: 2L O2 via nasal canula    Subjective   General  Patient assessed for rehabilitation services?: Yes  Family / Caregiver Present: No  General Comment  Comments: RN ok'd for therapy this AM. Pt agreeable to participate in session and pleasant/cooperative throughout.   Patient Currently in Pain: Denies    Social/Functional History  Social/Functional History  Lives With: Family (daughter, son in law, adult granddtr)  Type of Home: House  Home Layout: Two level, Bed/Bath upstairs (full flight of stairs to second floor bed/bath with R hand rail)  Home Access: Level entry  Bathroom Shower/Tub: Walk-in shower  Bathroom Toilet: Standard  Bathroom Equipment: Grab bars in shower  Bathroom Accessibility: Randolph Cordon:  (pt reports 2L home O2 for nighttime use only)  Receives Help From: Family (Pt reports supportive family able to assist PRN)  ADL Assistance: Independent  Homemaking Assistance: Independent  Homemaking Responsibilities: Yes (all IADL tasks able to be shared with all functional task performance       Therapy Time   Individual Concurrent Group Co-treatment   Time In 1026         Time Out 1049         Minutes 23         Timed Code Treatment Minutes: 9 Minutes       Rufino Chapman, OTR/L

## 2021-08-10 NOTE — PROGRESS NOTES
Congestive Heart Failure Education completed and charted. CHF booklet given. Patient was receptive to education. Discussed the  importance of medication compliance. Discussed the importance of a heart healthy diet. Discussed 2000 mg sodium-restricted daily diet. Patient instructed to limit fluid intake to  1.5 to 2 liters per day. Patient instructed to weigh self at the same time of each day each morning, reinforced teaching to monitor for 3-5 lb weight increase over 1-2 days notify physician if change noted. Signs and symptoms of CHF discussed with patient, such as feeling more tired than normal, feeling short of breath, coughing that increases when lying down, sudden weight gain, swelling of the feet, legs or belly. Patient verbalized understanding to notify physician office if these symptoms occur.     EF 47%

## 2021-08-11 ENCOUNTER — APPOINTMENT (OUTPATIENT)
Dept: CARDIAC CATH/INVASIVE PROCEDURES | Age: 76
DRG: 246 | End: 2021-08-11
Payer: MEDICARE

## 2021-08-11 PROBLEM — Z79.02 ENCOUNTER FOR CURRENT LONG TERM USE OF ANTIPLATELET DRUG: Status: ACTIVE | Noted: 2021-07-09

## 2021-08-11 LAB
ABSOLUTE EOS #: 0.07 K/UL (ref 0–0.44)
ABSOLUTE IMMATURE GRANULOCYTE: 0.04 K/UL (ref 0–0.3)
ABSOLUTE LYMPH #: 2.41 K/UL (ref 1.1–3.7)
ABSOLUTE MONO #: 0.93 K/UL (ref 0.1–1.2)
ANION GAP SERPL CALCULATED.3IONS-SCNC: 14 MMOL/L (ref 9–17)
BASOPHILS # BLD: 0 % (ref 0–2)
BASOPHILS ABSOLUTE: 0.04 K/UL (ref 0–0.2)
BILIRUBIN URINE: NEGATIVE
BUN BLDV-MCNC: 40 MG/DL (ref 8–23)
BUN/CREAT BLD: ABNORMAL (ref 9–20)
CALCIUM SERPL-MCNC: 8.4 MG/DL (ref 8.6–10.4)
CHLORIDE BLD-SCNC: 102 MMOL/L (ref 98–107)
CO2: 27 MMOL/L (ref 20–31)
COLOR: YELLOW
COMMENT UA: ABNORMAL
CREAT SERPL-MCNC: 1.28 MG/DL (ref 0.7–1.2)
DIFFERENTIAL TYPE: ABNORMAL
EOSINOPHILS RELATIVE PERCENT: 1 % (ref 1–4)
ESTIMATED AVERAGE GLUCOSE: 160 MG/DL
GFR AFRICAN AMERICAN: >60 ML/MIN
GFR NON-AFRICAN AMERICAN: 55 ML/MIN
GFR SERPL CREATININE-BSD FRML MDRD: ABNORMAL ML/MIN/{1.73_M2}
GFR SERPL CREATININE-BSD FRML MDRD: ABNORMAL ML/MIN/{1.73_M2}
GLUCOSE BLD-MCNC: 164 MG/DL (ref 75–110)
GLUCOSE BLD-MCNC: 173 MG/DL (ref 70–99)
GLUCOSE BLD-MCNC: 240 MG/DL (ref 75–110)
GLUCOSE URINE: ABNORMAL
HBA1C MFR BLD: 7.2 % (ref 4–6)
HCT VFR BLD CALC: 36.1 % (ref 40.7–50.3)
HEMOGLOBIN: 10.3 G/DL (ref 13–17)
IMMATURE GRANULOCYTES: 0 %
INR BLD: 1
KETONES, URINE: NEGATIVE
LEUKOCYTE ESTERASE, URINE: NEGATIVE
LV EF: 20 %
LV EF: 48 %
LVEF MODALITY: NORMAL
LVEF MODALITY: NORMAL
LYMPHOCYTES # BLD: 26 % (ref 24–43)
MAGNESIUM: 2.1 MG/DL (ref 1.6–2.6)
MCH RBC QN AUTO: 23.2 PG (ref 25.2–33.5)
MCHC RBC AUTO-ENTMCNC: 28.5 G/DL (ref 28.4–34.8)
MCV RBC AUTO: 81.3 FL (ref 82.6–102.9)
MONOCYTES # BLD: 10 % (ref 3–12)
MYCOPLASMA PNEUMONIAE IGM: 0.26
NITRITE, URINE: NEGATIVE
NRBC AUTOMATED: 0 PER 100 WBC
PARTIAL THROMBOPLASTIN TIME: 22.8 SEC (ref 20.5–30.5)
PARTIAL THROMBOPLASTIN TIME: 23 SEC (ref 20.5–30.5)
PARTIAL THROMBOPLASTIN TIME: 24.1 SEC (ref 20.5–30.5)
PARTIAL THROMBOPLASTIN TIME: 40 SEC (ref 20.5–30.5)
PARTIAL THROMBOPLASTIN TIME: 47.6 SEC (ref 20.5–30.5)
PDW BLD-RTO: 17.9 % (ref 11.8–14.4)
PH UA: 7 (ref 5–8)
PLATELET # BLD: 260 K/UL (ref 138–453)
PLATELET ESTIMATE: ABNORMAL
PMV BLD AUTO: 10.1 FL (ref 8.1–13.5)
POTASSIUM SERPL-SCNC: 3.8 MMOL/L (ref 3.7–5.3)
PREALBUMIN: 23.5 MG/DL (ref 20–40)
PROTEIN UA: NEGATIVE
PROTHROMBIN TIME: 10.5 SEC (ref 9.1–12.3)
RBC # BLD: 4.44 M/UL (ref 4.21–5.77)
RBC # BLD: ABNORMAL 10*6/UL
SEG NEUTROPHILS: 62 % (ref 36–65)
SEGMENTED NEUTROPHILS ABSOLUTE COUNT: 5.65 K/UL (ref 1.5–8.1)
SODIUM BLD-SCNC: 143 MMOL/L (ref 135–144)
SPECIFIC GRAVITY UA: 1.02 (ref 1–1.03)
TURBIDITY: CLEAR
URINE HGB: NEGATIVE
UROBILINOGEN, URINE: NORMAL
WBC # BLD: 9.1 K/UL (ref 3.5–11.3)
WBC # BLD: ABNORMAL 10*3/UL

## 2021-08-11 PROCEDURE — 2709999900 HC NON-CHARGEABLE SUPPLY

## 2021-08-11 PROCEDURE — B2111ZZ FLUOROSCOPY OF MULTIPLE CORONARY ARTERIES USING LOW OSMOLAR CONTRAST: ICD-10-PCS | Performed by: INTERNAL MEDICINE

## 2021-08-11 PROCEDURE — 85025 COMPLETE CBC W/AUTO DIFF WBC: CPT

## 2021-08-11 PROCEDURE — 2500000003 HC RX 250 WO HCPCS

## 2021-08-11 PROCEDURE — 6370000000 HC RX 637 (ALT 250 FOR IP): Performed by: STUDENT IN AN ORGANIZED HEALTH CARE EDUCATION/TRAINING PROGRAM

## 2021-08-11 PROCEDURE — 6370000000 HC RX 637 (ALT 250 FOR IP): Performed by: NURSE PRACTITIONER

## 2021-08-11 PROCEDURE — 87641 MR-STAPH DNA AMP PROBE: CPT

## 2021-08-11 PROCEDURE — 93880 EXTRACRANIAL BILAT STUDY: CPT

## 2021-08-11 PROCEDURE — 83735 ASSAY OF MAGNESIUM: CPT

## 2021-08-11 PROCEDURE — 36415 COLL VENOUS BLD VENIPUNCTURE: CPT

## 2021-08-11 PROCEDURE — 4A023N7 MEASUREMENT OF CARDIAC SAMPLING AND PRESSURE, LEFT HEART, PERCUTANEOUS APPROACH: ICD-10-PCS | Performed by: INTERNAL MEDICINE

## 2021-08-11 PROCEDURE — 2060000000 HC ICU INTERMEDIATE R&B

## 2021-08-11 PROCEDURE — C1894 INTRO/SHEATH, NON-LASER: HCPCS

## 2021-08-11 PROCEDURE — 99222 1ST HOSP IP/OBS MODERATE 55: CPT | Performed by: NURSE PRACTITIONER

## 2021-08-11 PROCEDURE — 6360000002 HC RX W HCPCS: Performed by: STUDENT IN AN ORGANIZED HEALTH CARE EDUCATION/TRAINING PROGRAM

## 2021-08-11 PROCEDURE — 81003 URINALYSIS AUTO W/O SCOPE: CPT

## 2021-08-11 PROCEDURE — 2580000003 HC RX 258: Performed by: STUDENT IN AN ORGANIZED HEALTH CARE EDUCATION/TRAINING PROGRAM

## 2021-08-11 PROCEDURE — 6360000002 HC RX W HCPCS

## 2021-08-11 PROCEDURE — 84134 ASSAY OF PREALBUMIN: CPT

## 2021-08-11 PROCEDURE — 87086 URINE CULTURE/COLONY COUNT: CPT

## 2021-08-11 PROCEDURE — 93458 L HRT ARTERY/VENTRICLE ANGIO: CPT | Performed by: INTERNAL MEDICINE

## 2021-08-11 PROCEDURE — 80048 BASIC METABOLIC PNL TOTAL CA: CPT

## 2021-08-11 PROCEDURE — 85610 PROTHROMBIN TIME: CPT

## 2021-08-11 PROCEDURE — 83036 HEMOGLOBIN GLYCOSYLATED A1C: CPT

## 2021-08-11 PROCEDURE — 82947 ASSAY GLUCOSE BLOOD QUANT: CPT

## 2021-08-11 PROCEDURE — 93970 EXTREMITY STUDY: CPT

## 2021-08-11 PROCEDURE — B2151ZZ FLUOROSCOPY OF LEFT HEART USING LOW OSMOLAR CONTRAST: ICD-10-PCS | Performed by: INTERNAL MEDICINE

## 2021-08-11 PROCEDURE — 6360000004 HC RX CONTRAST MEDICATION

## 2021-08-11 PROCEDURE — 99232 SBSQ HOSP IP/OBS MODERATE 35: CPT | Performed by: INTERNAL MEDICINE

## 2021-08-11 PROCEDURE — 93306 TTE W/DOPPLER COMPLETE: CPT

## 2021-08-11 PROCEDURE — 85730 THROMBOPLASTIN TIME PARTIAL: CPT

## 2021-08-11 RX ORDER — SODIUM CHLORIDE 0.9 % (FLUSH) 0.9 %
5-40 SYRINGE (ML) INJECTION EVERY 12 HOURS SCHEDULED
Status: CANCELLED | OUTPATIENT
Start: 2021-08-11

## 2021-08-11 RX ORDER — SODIUM CHLORIDE 9 MG/ML
INJECTION, SOLUTION INTRAVENOUS CONTINUOUS
Status: CANCELLED | OUTPATIENT
Start: 2021-08-11

## 2021-08-11 RX ORDER — HEPARIN SODIUM 1000 [USP'U]/ML
2000 INJECTION, SOLUTION INTRAVENOUS; SUBCUTANEOUS PRN
Status: DISCONTINUED | OUTPATIENT
Start: 2021-08-11 | End: 2021-08-13

## 2021-08-11 RX ORDER — ASPIRIN 81 MG/1
81 TABLET ORAL DAILY
Status: DISCONTINUED | OUTPATIENT
Start: 2021-08-12 | End: 2021-08-15 | Stop reason: HOSPADM

## 2021-08-11 RX ORDER — HEPARIN SODIUM 10000 [USP'U]/100ML
5-30 INJECTION, SOLUTION INTRAVENOUS CONTINUOUS
Status: DISCONTINUED | OUTPATIENT
Start: 2021-08-11 | End: 2021-08-13

## 2021-08-11 RX ORDER — SODIUM CHLORIDE 0.9 % (FLUSH) 0.9 %
5-40 SYRINGE (ML) INJECTION PRN
Status: CANCELLED | OUTPATIENT
Start: 2021-08-11

## 2021-08-11 RX ORDER — CHLORHEXIDINE GLUCONATE 0.12 MG/ML
15 RINSE ORAL ONCE
Status: CANCELLED | OUTPATIENT
Start: 2021-08-11 | End: 2021-08-11

## 2021-08-11 RX ORDER — CHLORHEXIDINE GLUCONATE 4 G/100ML
SOLUTION TOPICAL SEE ADMIN INSTRUCTIONS
Status: CANCELLED | OUTPATIENT
Start: 2021-08-11

## 2021-08-11 RX ORDER — SODIUM CHLORIDE 9 MG/ML
25 INJECTION, SOLUTION INTRAVENOUS PRN
Status: CANCELLED | OUTPATIENT
Start: 2021-08-11

## 2021-08-11 RX ORDER — VANCOMYCIN HYDROCHLORIDE 1 G/200ML
1000 INJECTION, SOLUTION INTRAVENOUS
Status: CANCELLED | OUTPATIENT
Start: 2021-08-11 | End: 2021-08-11

## 2021-08-11 RX ORDER — HEPARIN SODIUM 1000 [USP'U]/ML
4000 INJECTION, SOLUTION INTRAVENOUS; SUBCUTANEOUS ONCE
Status: COMPLETED | OUTPATIENT
Start: 2021-08-11 | End: 2021-08-11

## 2021-08-11 RX ORDER — AMIODARONE HYDROCHLORIDE 200 MG/1
200 TABLET ORAL
Status: CANCELLED | OUTPATIENT
Start: 2021-08-11

## 2021-08-11 RX ORDER — ATORVASTATIN CALCIUM 80 MG/1
80 TABLET, FILM COATED ORAL NIGHTLY
Status: DISCONTINUED | OUTPATIENT
Start: 2021-08-11 | End: 2021-08-15 | Stop reason: HOSPADM

## 2021-08-11 RX ORDER — HEPARIN SODIUM 1000 [USP'U]/ML
4000 INJECTION, SOLUTION INTRAVENOUS; SUBCUTANEOUS PRN
Status: DISCONTINUED | OUTPATIENT
Start: 2021-08-11 | End: 2021-08-13

## 2021-08-11 RX ADMIN — ATORVASTATIN CALCIUM 80 MG: 80 TABLET, FILM COATED ORAL at 20:07

## 2021-08-11 RX ADMIN — SODIUM CHLORIDE, PRESERVATIVE FREE 10 ML: 5 INJECTION INTRAVENOUS at 08:36

## 2021-08-11 RX ADMIN — GABAPENTIN 300 MG: 300 CAPSULE ORAL at 08:36

## 2021-08-11 RX ADMIN — FUROSEMIDE 40 MG: 10 INJECTION, SOLUTION INTRAMUSCULAR; INTRAVENOUS at 08:36

## 2021-08-11 RX ADMIN — INSULIN LISPRO 3 UNITS: 100 INJECTION, SOLUTION INTRAVENOUS; SUBCUTANEOUS at 20:36

## 2021-08-11 RX ADMIN — HEPARIN SODIUM AND DEXTROSE 9.59 UNITS/KG/HR: 10000; 5 INJECTION INTRAVENOUS at 15:03

## 2021-08-11 RX ADMIN — AMIODARONE HYDROCHLORIDE 200 MG: 200 TABLET ORAL at 08:36

## 2021-08-11 RX ADMIN — CLOPIDOGREL 75 MG: 75 TABLET, FILM COATED ORAL at 08:36

## 2021-08-11 RX ADMIN — INSULIN GLARGINE 25 UNITS: 100 INJECTION, SOLUTION SUBCUTANEOUS at 20:37

## 2021-08-11 RX ADMIN — HEPARIN SODIUM 4000 UNITS: 1000 INJECTION INTRAVENOUS; SUBCUTANEOUS at 15:05

## 2021-08-11 RX ADMIN — SODIUM CHLORIDE, PRESERVATIVE FREE 10 ML: 5 INJECTION INTRAVENOUS at 20:07

## 2021-08-11 RX ADMIN — GABAPENTIN 300 MG: 300 CAPSULE ORAL at 15:06

## 2021-08-11 RX ADMIN — SODIUM CHLORIDE 25 ML: 9 INJECTION, SOLUTION INTRAVENOUS at 12:00

## 2021-08-11 RX ADMIN — PANTOPRAZOLE SODIUM 40 MG: 40 TABLET, DELAYED RELEASE ORAL at 08:36

## 2021-08-11 RX ADMIN — HEPARIN SODIUM 2000 UNITS: 1000 INJECTION, SOLUTION INTRAVENOUS; SUBCUTANEOUS at 06:34

## 2021-08-11 RX ADMIN — HEPARIN SODIUM 4000 UNITS: 1000 INJECTION INTRAVENOUS; SUBCUTANEOUS at 22:51

## 2021-08-11 RX ADMIN — GABAPENTIN 300 MG: 300 CAPSULE ORAL at 20:05

## 2021-08-11 RX ADMIN — HEPARIN SODIUM AND DEXTROSE 13.59 UNITS/KG/HR: 10000; 5 INJECTION INTRAVENOUS at 22:50

## 2021-08-11 RX ADMIN — ISOSORBIDE MONONITRATE 30 MG: 30 TABLET ORAL at 08:36

## 2021-08-11 RX ADMIN — ASPIRIN 81 MG: 81 TABLET, CHEWABLE ORAL at 08:36

## 2021-08-11 ASSESSMENT — ENCOUNTER SYMPTOMS
SORE THROAT: 0
WHEEZING: 0
CHEST TIGHTNESS: 1
CONSTIPATION: 0
BACK PAIN: 0
VOMITING: 0
ABDOMINAL PAIN: 0
TROUBLE SWALLOWING: 0
COUGH: 0
SHORTNESS OF BREATH: 1
NAUSEA: 0
ABDOMINAL DISTENTION: 0
BACK PAIN: 1
DIARRHEA: 0
COLOR CHANGE: 0
CHEST TIGHTNESS: 0
RHINORRHEA: 0

## 2021-08-11 ASSESSMENT — PAIN SCALES - GENERAL
PAINLEVEL_OUTOF10: 0
PAINLEVEL_OUTOF10: 0

## 2021-08-11 NOTE — CONSULTS
[facility] Cardiothoracic Surgery  CONSULTATION      CC: Multivessel CAD s/p cardiac catheterization    HPI:  Mr. Citlaly Virgen is a 77 y.o.  male who presents s/p cardiac cath today with Dr Jus Meneses. Patient was initially admitted after experiencing a sudden onset of chest pain which was unrelieved with sublingual nitro, dose of lasix and increased oxygen via NC that patient tried at home. Pertinent medical history includes CAD with history of MI and stenting, A-fib with anticoagulation on Eliquis, CHF with reduced EF of 20%, pacemaker, REGINA, pulmonary veno-occlusive disease, diabetes, hypertension, hyperlipidemia, PAD and obesity. Patient is oxygen dependent and currently on 2L via nasal cannula at home. Cardiac workup has revealed significant multivessel CAD with in stent stenosis and a reduced EF of 20%. His last dose of Eliquis was yesterday evening. Patient denies shortness of breath and chest pain at this time. He has been referred for consideration of coronary revascularization. PMH:   has a past medical history of Arrhythmia, CAD (coronary artery disease), GERD (gastroesophageal reflux disease), Heart attack ([address] Hyperlipidemia, Hypertension, Ischemic cardiomyopathy, Osteoarthritis, and Type II or unspecified type diabetes mellitus without mention of complication, not stated as uncontrolled. PSH:   has a past surgical history that includes Pacemaker insertion; Appendectomy; Rotator cuff repair; Colonoscopy; joint replacement; Cardiac catheterization; Cardiac surgery; Tonsillectomy; Coronary angioplasty with stent (07/2018); Diagnostic Cardiac Cath Lab Procedure; and Percutaneous Transluminal Coronary Angio. Allergies:     Allergies   Allergen Reactions    Coreg [Carvedilol] Other (See Comments)     Low pause     Pravastatin Other (See Comments)     Myalgias     Sertraline Other (See Comments)    Zocor [Simvastatin]      norvasc     Citalopram Anxiety       Medications:   Current acetaminophen (TYLENOL) suppository 650 mg, 650 mg, Rectal, Q6H PRN, Migue Rhoades MD    insulin lispro (HUMALOG) injection vial 0-18 Units, 0-18 Units, Subcutaneous, TID WC, Migue Rhoades MD, 9 Units at 08/10/21 1746    insulin lispro (HUMALOG) injection vial 0-9 Units, 0-9 Units, Subcutaneous, Nightly, Migue Rhoades MD, 3 Units at 08/10/21 2131    glucose (GLUTOSE) 40 % oral gel 15 g, 15 g, Oral, PRN, Migue Rhoades MD    dextrose 50 % IV solution, 12.5 g, Intravenous, PRN, Migue Rhoades MD    glucagon (rDNA) injection 1 mg, 1 mg, Intramuscular, PRN, Migue Rhoades MD    dextrose 5 % solution, 100 mL/hr, Intravenous, PRN, Migue Rhoades MD    isosorbide mononitrate (IMDUR) extended release tablet 30 mg, 30 mg, Oral, Daily, Migue Rhoades MD, 30 mg at 08/11/21 0836    albuterol (PROVENTIL) nebulizer solution 2.5 mg, 2.5 mg, Nebulization, As Directed RT PRN, Migue Rhoades MD    Social Hx:   reports that he quit smoking about 27 years ago. His smoking use included cigarettes. He has a 40.00 pack-year smoking history. He has never used smokeless tobacco.    Family Hx: family history includes Cancer in his brother and sister; Diabetes in his maternal grandmother; Heart Disease in his father and mother; High Blood Pressure in his mother. ROS:    Review of Systems   Constitutional: Positive for fatigue. Negative for activity change, appetite change, chills and fever. HENT: Negative for congestion. Respiratory: Positive for shortness of breath. Negative for chest tightness. Cardiovascular: Negative for chest pain and leg swelling. Gastrointestinal: Negative for abdominal pain, constipation and diarrhea. Genitourinary: Negative for dysuria. Musculoskeletal: Positive for back pain. Skin: Negative for color change. Neurological: Positive for weakness. Negative for dizziness. Psychiatric/Behavioral: Negative for suicidal ideas. The patient is not nervous/anxious.         Physical Examination   Vitals:  Vitals:    08/11/21 1154   BP: 136/66   Pulse: 64   Resp: 16   Temp:    SpO2: 100%     Physical Exam  Vitals reviewed. Constitutional:       General: He is not in acute distress. Appearance: Normal appearance. He is obese. He is not ill-appearing. Interventions: Nasal cannula in place. HENT:      Head: Normocephalic. Right Ear: Decreased hearing noted. Left Ear: Decreased hearing noted. Nose: Nose normal.      Mouth/Throat:      Mouth: Mucous membranes are moist.      Pharynx: Oropharynx is clear. Eyes:      Extraocular Movements: Extraocular movements intact. Conjunctiva/sclera: Conjunctivae normal.      Pupils: Pupils are equal, round, and reactive to light. Cardiovascular:      Rate and Rhythm: Normal rate and regular rhythm. Pulses: Normal pulses. Heart sounds: Normal heart sounds. No murmur heard. Pulmonary:      Effort: Pulmonary effort is normal.      Breath sounds: Normal breath sounds. Abdominal:      Palpations: Abdomen is soft. Tenderness: There is no abdominal tenderness. Musculoskeletal:         General: Normal range of motion. Neurological:      General: No focal deficit present. Mental Status: He is alert and oriented to person, place, and time. Psychiatric:         Mood and Affect: Mood normal.         Behavior: Behavior normal.         Labs:   CBC:   Recent Labs     08/09/21  2207 08/10/21  0308 08/11/21  0502   WBC 7.9 11.2 9.1   HGB 10.8* 10.9* 10.3*   HCT 37.1* 37.8* 36.1*   MCV 78.9* 80.3* 81.3*    291 260     BMP:  Recent Labs     08/10/21  0308 08/10/21  1651 08/11/21  0502    139 143   K 4.3 3.9 3.8   CL 98 99 102   CO2 25 27 27   BUN 40* 47* 40*   CREATININE 1.45* 1.40* 1.28*     Accucheck Glucoses:  Recent Labs     08/10/21  0715 08/10/21  1058 08/10/21  1701 08/10/21  2129 08/11/21  1052   POCGLU 344* 326* 286* 237* 164*     Cardiac Enzymes: No results for input(s): CKTOTAL, CKMB, CKMBINDEX, TROPONINI in the last 72 hours.   PT/INR: Recent Labs     08/09/21  0822   PROTIME 11.1   INR 1.0     APTT:   Recent Labs     08/10/21  2143 08/11/21  0502 08/11/21  1121   APTT 32.9* 40.0* 47.6*     Liver Profile:  Lab Results   Component Value Date    AST 51 08/09/2021    ALT 39 08/09/2021    BILIDIR 0.1 05/20/2020    BILITOT 0.30 08/09/2021    ALKPHOS 71 08/09/2021     Lab Results   Component Value Date    CHOL 233 10/29/2018    HDL 37 10/29/2018    TRIG 172 10/29/2018     TSH:   Lab Results   Component Value Date    TSH 2.87 05/20/2020     UA:   Lab Results   Component Value Date    NITRITE - 03/18/2021    COLORU yellow 03/18/2021    COLORU YELLOW 03/03/2017    PHUR 5.5 03/18/2021    PHUR 7.0 03/03/2017    CLARITYU cloudy 03/18/2021    SPECGRAV 1.010 03/18/2021    SPECGRAV 1.017 03/03/2017    LEUKOCYTESUR moderate 03/18/2021    LEUKOCYTESUR NEGATIVE 03/03/2017    UROBILINOGEN Normal 03/03/2017    BILIRUBINUR - 03/18/2021    BLOODU moderate 03/18/2021    GLUCOSEU - 03/18/2021    GLUCOSEU 3+ 03/03/2017         Testing:  Cardiac cath:   Multi-vessel Coronary Artery Disease. LVEF 20%. Recommendations:    Medical therapy as needed. Risk factor modification. Routine Post Diagnostic Cath orders. Urgent surgical CABG (required before discharge, patient stable with   medical management). Signature:    ----------------------------------------------------------------   Electronically signed by Diann MoorePerforming Physician)   on 08/11/2021 13:26   ----------------------------------------------------------------      Angiographic Findings: Cardiac Arteries and Lesion Findings     LMCA: has distal 20-30% stenosis. LAD: has proximal 85% stenosis followed by 95% instent restenosis. The D1 has patent stent followed by 75% stenosis. The D2 has ostial 80% stenosis. LCx: has mid 60% stenosis. The OM1 has ostial 80% stenosis. The OM2 has ostial 70 stenosis. RCA: has mid 20% instent restenosis. The distal RCA has 30% stenosis.  The RPDA has mid 8/9/2021 10:14 am       COMPARISON:   November 29, 2017 chest examination       HISTORY:   ORDERING SYSTEM PROVIDED HISTORY: SOB, sats in 80's   TECHNOLOGIST PROVIDED HISTORY:   SOB, sats in 80's   Reason for Exam: Shortness of breath/ low sat's/  AP erect/ port. Acuity: Unknown   Type of Exam: Unknown       FINDINGS:   Mild enlargement of the cardiopericardial silhouette/moderate tortuosity of   thoracic aorta       Left subclavian AICD with satisfactorily position pacemaker leads       Mild diffuse bilateral interstitial infiltrates       No significant pleural process           Impression   Satisfactory lead position       Mild diffuse bilateral interstitial infiltrates related to mild vascular   congestion and/or infection       CT scan: Ordered      Imaging Studies:  I have personally reviewed the testing/imaging above with Dr Karen Collazo, he is in agreement with the findings listed above. In summary, Mr. Con Maria is a 77y.o. year-old male with multivessel CAD and a reduced EF of 20%. Problem List:   Multivessel CAD s/p cardiac cath  History of stenting to LCx, OM, Diag & RCA  Acute on chronic systolic heart failure  Paroxysmal atrial fibrillation with chronic anticoagulation on Eliquis  Last dose yesterday @ 18:00  History of VT s/p AICD  Obstructive sleep apnea  Pulmonary veno-occlusive disease  Diabetes mellitus type 2 with long term use of insulin  Hypertension  Hyperlipidemia  Claudication with suspected PAD  Obesity, BMI 31.19    Recommendation: Per discussion with Dr Karen Collazo, I believe Mr. Con Maria would benefit optimally from CABG if surgical candidate after pre-operative testing has been completed and reviewed by surgeon. I have discussed the proposed procedure, along with its risk, benefits, and therapeutic alternatives. Specific risks discussedincluded death, stroke, mediastinitis, re-operation for bleeding, and atrial fibrillation.   We have also discussed the potential need for blood transfusion, along with its risks and benefits. He appears to understand,and consents to proceed. Surgical intervention scheduling to be determined. On this date 8/11/2021 I have spent 45 minutes reviewing previous notes, test results and face to face with the patient discussing the diagnosis and importance of compliance with the treatment plan as well as documenting on the day of the visit. At least 50% of the time documented was spent with the patient to provide counseling and/or coordination of care.     Agree with the above  Plan for PFTs and pulmonary consult  PAD with claudication  Reduced EF and A.fib  High risk for CABG would consider PCI and medical management  Will discuss with the cardiology service    ERICK Allen - CNP

## 2021-08-11 NOTE — PROGRESS NOTES
Received call from Dr. Marlena Naqvi. Stat BMP ordered. If creatinine is uptrending call primary to discontinue lasix and consult nephrology.  Previous creatinine was 1.45, recheck was 1.40

## 2021-08-11 NOTE — ED PROVIDER NOTES
[facility]  ED  Emergency Department Encounter  Emergency Medicine Resident     Pt Name: Lamine Gaston  MRN: [id number]  [place] 1945  Date of evaluation: 8/11/21  PCP:  ERICK Leon CNP    CHIEF COMPLAINT       Chief Complaint   Patient presents with    Respiratory Distress     CP x4 days, SPO2 78% on 15LNRB    Chest Pain     324 ASA and nitro PTA       HISTORY OFPRESENT ILLNESS  (Location/Symptom, Timing/Onset, Context/Setting, Quality, Duration, Modifying Goldie Maser.)      Lamine Gaston is a 77 y.o. male with history of coronary artery disease, gastroesophageal reflux disease, previous myocardial infarction presents with concerns for acute on chronic shortness of breath as well as acute chest pain. Patient states that he is awakened from sleep at 3 in the morning with concerns for crushing substernal chest pain. Patient states that prior to that he had chest pain for 4 days duration with an acute change in the character of it 3 AM this morning. Patient brought in by EMS on 15 L nonrebreather with an SPO2 of 78% with good waveform. Patient received nitroglycerin prior to arrival, upon initial evaluation patient is mildly hypertensive with a blood pressure of 308 systolic, patient was placed on BiPAP with improvement of symptoms, patient is able to give a limited review of systems, denies trauma to the area, denies nausea or vomiting, says the chest pain was improved with a second nitroglycerin, denies change in bowel or bladder function. Patient does take Eliquis as well as aspirin and Plavix daily at baseline. Patient does have an AICD in place with EKG showing atrially paced rhythm.     PAST MEDICAL / SURGICAL / SOCIAL / FAMILY HISTORY      has a past medical history of Arrhythmia, CAD (coronary artery disease), GERD (gastroesophageal reflux disease), Heart attack ([facility] [address] Hyperlipidemia, Hypertension, Ischemic cardiomyopathy, Osteoarthritis, and Type II or unspecified type diabetes mellitus without mention of complication, not stated as uncontrolled. has a past surgical history that includes Pacemaker insertion; Appendectomy; Rotator cuff repair; Colonoscopy; joint replacement; Cardiac catheterization; Cardiac surgery; Tonsillectomy; Coronary angioplasty with stent (2018); Diagnostic Cardiac Cath Lab Procedure; and Percutaneous Transluminal Coronary Angio. Social History     Socioeconomic History    Marital status:      Spouse name: Not on file    Number of children: Not on file    Years of education: Not on file    Highest education level: Not on file   Occupational History    Occupation: retired   Tobacco Use    Smoking status: Former Smoker     Packs/day: 1.00     Years: 40.00     Pack years: 40.00     Types: Cigarettes     Quit date: 1994     Years since quittin.2    Smokeless tobacco: Never Used   Substance and Sexual Activity    Alcohol use: Yes     Comment: rare    Drug use: No    Sexual activity: Not on file   Other Topics Concern    Not on file   Social History Narrative    Not on file     Social Determinants of Health     Financial Resource Strain: Low Risk     Difficulty of Paying Living Expenses: Not hard at all   Food Insecurity: No Food Insecurity    Worried About [facility] in the Last Year: Never true    Juan José [employer] in the Last Year: Never true   Transportation Needs: No Transportation Needs    Lack of Transportation (Medical): No    Lack of Transportation (Non-Medical): No   Physical Activity: Inactive    Days of Exercise per Week: 0 days    Minutes of Exercise per Session: 0 min   Stress: No Stress Concern Present    Feeling of Stress : Only a little   Social Connections:  Moderately Integrated    Frequency of Communication with Friends and Family: More than three times a week    Frequency of Social Gatherings with Friends and Family: More than three times a week    Attends [employer] Services: 1 to 4 times per year   [employer] or Organizations: Yes    Attends Club or Organization Meetings: More than 4 times per year    Marital Status:    Intimate Partner Violence:     Fear of Current or Ex-Partner:     Emotionally Abused:     Physically Abused:     Sexually Abused:        Family History   Problem Relation Age of Onset    Heart Disease Mother     High Blood Pressure Mother     Heart Disease Father     Cancer Sister         breast cancer    Cancer Brother         bladder cancer    Diabetes Maternal Grandmother        Allergies:  Coreg [carvedilol], Pravastatin, Sertraline, Zocor [simvastatin], and Citalopram    Home Medications:  Prior to Admission medications    Medication Sig Start Date End Date Taking? Authorizing Provider   insulin detemir (LEVEMIR) 100 UNIT/ML injection vial Inject 25 Units into the skin nightly   Yes Historical Provider, MD   esomeprazole Magnesium (NEXIUM) 20 MG PACK Take 20 mg by mouth daily   Yes Historical Provider, MD   furosemide (LASIX) 40 MG tablet Take 1 tablet by mouth once daily   May take 1 additional tablet daily PRN 2 pound weight gain, short of breath or swelling.  7/9/21  Yes ERICK Aaron CNP   apixaban (ELIQUIS) 5 MG TABS tablet Take 5 mg by mouth 2 times daily  4/17/21  Yes Historical Provider, MD   alogliptin (NESINA) 25 MG TABS tablet Take 1 tablet by mouth daily 6/10/21  Yes ERICK Aaron CNP   empagliflozin (JARDIANCE) 10 MG tablet Take 1 tablet by mouth daily  Patient taking differently: Take 1 tablet by mouth daily Pt states he has not started taking this medication yet 6/10/21  Yes ERICK Aaron CNP   dilTIAZem (CARDIZEM CD) 240 MG extended release capsule Take 240 mg by mouth daily   Yes Historical Provider, MD   glimepiride (AMARYL) 4 MG tablet Take 1 tablet by mouth 2 times daily 2/25/21  Yes ERICK Aaron CNP   clopidogrel (PLAVIX) 75 MG tablet Take 1 tablet by nebulizer solution Take 3 mLs by nebulization every 6 hours as needed for Wheezing or Shortness of Breath 3/19/20   Marco A Kaye MD   Handicap Placard MISC by Does not apply route Expires 12/2023 12/17/18   ERICK Taylor CNP   gabapentin (NEURONTIN) 300 MG capsule TAKE 1 CAPSULE BY MOUTH THREE TIMES DAILY 8/20/18 5/5/21  ERICK Taylor CNP   Insulin Pen Needle 29G X 12.7MM MISC 1 each by Does not apply route daily 8/17/16   ERICK Melgar CNP       REVIEW OFSYSTEMS    (2-9 systems for level 4, 10 or more for level 5)      Review of Systems   Constitutional: Positive for fatigue. Negative for chills, diaphoresis and fever. HENT: Negative for rhinorrhea, sore throat, tinnitus and trouble swallowing. Eyes: Negative for visual disturbance. Respiratory: Positive for chest tightness and shortness of breath. Negative for cough and wheezing. Cardiovascular: Positive for chest pain. Negative for leg swelling. Gastrointestinal: Negative for abdominal distention, abdominal pain, constipation, diarrhea, nausea and vomiting. Endocrine: Negative for polyuria. Genitourinary: Negative for dysuria, flank pain and frequency. Musculoskeletal: Negative for arthralgias, back pain, joint swelling and myalgias. Neurological: Negative for dizziness, tremors, seizures, weakness, light-headedness, numbness and headaches. PHYSICAL EXAM   (up to 7 for level 4, 8 or more forlevel 5)      INITIAL VITALS:   ED Triage Vitals   BP Temp Temp Source Pulse Resp SpO2 Height Weight   08/09/21 0819 08/09/21 0840 08/09/21 0840 08/09/21 0819 08/09/21 0819 08/09/21 0816 08/10/21 0701 08/09/21 2130   (!) 161/84 97.9 °F (36.6 °C) Axillary 60 25 (!) 78 % 6' (1.829 m) 228 lb 9.9 oz (103.7 kg)       Physical Exam  Constitutional:       General: He is not in acute distress. Appearance: He is not ill-appearing. HENT:      Head: Normocephalic and atraumatic.       Right Ear: External ear normal. Left Ear: External ear normal.   Eyes:      Extraocular Movements: Extraocular movements intact. Cardiovascular:      Rate and Rhythm: Normal rate and regular rhythm. Pulmonary:      Effort: Pulmonary effort is normal.      Breath sounds: Rhonchi (Bilateral lower lung fields) present. Abdominal:      General: Abdomen is flat. Musculoskeletal:         General: No deformity or signs of injury. Skin:     General: Skin is warm. Capillary Refill: Capillary refill takes less than 2 seconds. Neurological:      Mental Status: He is oriented to person, place, and time. Mental status is at baseline.    Psychiatric:         Mood and Affect: Mood normal.         DIFFERENTIAL  DIAGNOSIS     PLAN (LABS / IMAGING / EKG):  Orders Placed This Encounter   Procedures    SARS-CoV-2 NAAT (Rapid)    Respiratory Panel, Molecular, with COVID-19 (Restricted: peds pts or suitable admitted adults)    LEGIONELLA ANTIGEN, URINE    Strep Pneumoniae Antigen    XR CHEST PORTABLE    BLOOD GAS, VENOUS    Protime-INR    APTT    Troponin    D-Dimer, Quantitative    Brain Natriuretic Peptide    Basic Metabolic Panel w/ Reflex to MG    CBC auto differential    Troponin    CBC    Mycoplasma pneumoniae antibody, IgM    Procalcitonin    APTT    APTT    PREVIOUS SPECIMEN    SPECIMEN REJECTION    BASIC METABOLIC PANEL    APTT    APTT    APTT    Diet NPO    PPE Instructions    Vital signs per unit routine    Notify physician    Up as tolerated    Daily weights    Strict intake and output    Elevate legs    HYPOGLYCEMIA TREATMENT: blood glucose less than 50 mg/dL and patient  ALERT and TOLERATING PO    HYPOGLYCEMIA TREATMENT: blood glucose less than 70 mg/dL and patient ALERT and TOLERATING PO    HYPOGLYCEMIA TREATMENT: blood glucose less than 70 mg/dL and patient NOT ALERT or NPO    Telemetry monitoring - continuous duration    Initiate PAT Protocol    Full Code    Inpatient consult to Internal Medicine    Inpatient consult to Case Management    Inpatient consult to Cardiology    OT eval and treat    PT evaluation and treat    BIPAP    Initiate RT Protocol    Respiratory care evaluation only    Initiate Oxygen Therapy Protocol    Initiate RT Protocol    Respiratory care evaluation only    Pacer Interrogate    POCT Glucose    POCT glucose    POC Glucose Fingerstick    POCT glucose    POC Glucose Fingerstick    POC Glucose Fingerstick    POC Glucose Fingerstick    POC Glucose Fingerstick    POC Glucose Fingerstick    POC Glucose Fingerstick    EKG 12 Lead    ECHO Complete 2D W Doppler W Color    PATIENT STATUS (FROM ED OR OR/PROCEDURAL) Inpatient    PATIENT STATUS (FROM ED OR OR/PROCEDURAL) Inpatient       MEDICATIONS ORDERED:  Orders Placed This Encounter   Medications    DISCONTD: acetaminophen (TYLENOL) tablet 650 mg    DISCONTD: acetaminophen (TYLENOL) suppository 650 mg    methylPREDNISolone sodium (SOLU-MEDROL) injection 125 mg    DISCONTD: nitroGLYCERIN 50 mg in dextrose 5% 250 mL infusion    DISCONTD: furosemide (LASIX) injection 20 mg    amiodarone (CORDARONE) tablet 200 mg    apixaban (ELIQUIS) tablet 5 mg    aspirin chewable tablet 81 mg    clopidogrel (PLAVIX) tablet 75 mg    furosemide (LASIX) injection 40 mg    gabapentin (NEURONTIN) capsule 300 mg    pantoprazole (PROTONIX) tablet 40 mg    albuterol (PROVENTIL) nebulizer solution 2.5 mg     Order Specific Question:   Initiate RT Bronchodilator Protocol     Answer:    Yes    sodium chloride flush 0.9 % injection 5-40 mL    sodium chloride flush 0.9 % injection 5-40 mL    0.9 % sodium chloride infusion    OR Linked Order Group     ondansetron (ZOFRAN-ODT) disintegrating tablet 4 mg     ondansetron (ZOFRAN) injection 4 mg    polyethylene glycol (GLYCOLAX) packet 17 g    OR Linked Order Group     acetaminophen (TYLENOL) tablet 650 mg     acetaminophen (TYLENOL) suppository 650 mg    insulin lispro (HUMALOG) injection vial 0-18 Units    insulin lispro (HUMALOG) injection vial 0-9 Units    glucose (GLUTOSE) 40 % oral gel 15 g    dextrose 50 % IV solution    glucagon (rDNA) injection 1 mg    dextrose 5 % solution    isosorbide mononitrate (IMDUR) extended release tablet 30 mg    albuterol (PROVENTIL) nebulizer solution 2.5 mg     Order Specific Question:   Initiate RT Bronchodilator Protocol     Answer: Yes    DISCONTD: insulin glargine (LANTUS) injection vial 10 Units    heparin (porcine) injection 4,000 Units    heparin (porcine) injection 4,000 Units    heparin (porcine) injection 2,000 Units    heparin 25,000 units in dextrose 5% 250 mL (premix) infusion    melatonin tablet 3 mg    insulin glargine (LANTUS) injection vial 10 Units    insulin glargine (LANTUS) injection vial 25 Units       DDX: Angina, myocardial infarction, congestive heart failure, acute on chronic congestive heart failure, pneumothorax, PE, pulmonary edema or pneumonia    Initial MDM/Plan/ED COURSE:    77 y.o. male who presents with a history of coronary artery disease, gastroesophageal reflux disease, previous myocardial infarction presents with concerns for acute on chronic shortness of breath as well as acute chest pain. Patient states that he is awakened from sleep at 3 in the morning with concerns for crushing substernal chest pain. Patient states that prior to that he had chest pain for 4 days duration with an acute change in the character of it 3 AM this morning. Patient brought in by EMS on 15 L nonrebreather with an SPO2 of 78% with good waveform.   Patient received nitroglycerin prior to arrival, upon initial evaluation patient is mildly hypertensive with a blood pressure of 547 systolic, patient was placed on BiPAP with improvement of symptoms, patient is able to give a limited review of systems, denies trauma to the area, denies nausea or vomiting, says the chest pain was improved with a second nitroglycerin, failure based on physical examination, x-ray, elevation in proBNP. Patient treated in the emergency department with Solu-Medrol, Covid swab was negative. Patient received Lasix, admitted to internal medicine teaching service.     DIAGNOSTIC RESULTS / EMERGENCYDEPARTMENT COURSE / MDM     LABS:  Labs Reviewed   CBC WITH AUTO DIFFERENTIAL - Abnormal; Notable for the following components:       Result Value    WBC 14.4 (*)     Hemoglobin 11.2 (*)     Hematocrit 39.7 (*)     MCV 82.4 (*)     MCH 23.2 (*)     MCHC 28.2 (*)     RDW 17.8 (*)     Immature Granulocytes 1 (*)     Segs Absolute 8.79 (*)     Absolute Lymph # 4.18 (*)     All other components within normal limits   COMPREHENSIVE METABOLIC PANEL W/ REFLEX TO MG FOR LOW K - Abnormal; Notable for the following components:    Glucose 318 (*)     BUN 29 (*)     Calcium 8.3 (*)     AST 51 (*)     All other components within normal limits   BLOOD GAS, VENOUS - Abnormal; Notable for the following components:    pH, Shoaib 7.263 (*)     pO2, Shoaib 94.9 (*)     HCO3, Venous 22.4 (*)     Negative Base Excess, Shoaib 4.3 (*)     O2 Sat, Shoaib 96.0 (*)     All other components within normal limits   BRAIN NATRIURETIC PEPTIDE - Abnormal; Notable for the following components:    Pro-BNP 2,985 (*)     All other components within normal limits   BASIC METABOLIC PANEL W/ REFLEX TO MG FOR LOW K - Abnormal; Notable for the following components:    Glucose 359 (*)     BUN 40 (*)     CREATININE 1.45 (*)     Calcium 8.1 (*)     GFR Non- 47 (*)     GFR  58 (*)     All other components within normal limits   CBC WITH AUTO DIFFERENTIAL - Abnormal; Notable for the following components:    Hemoglobin 10.9 (*)     Hematocrit 37.8 (*)     MCV 80.3 (*)     MCH 23.1 (*)     RDW 17.2 (*)     Seg Neutrophils 82 (*)     Lymphocytes 11 (*)     Eosinophils % 0 (*)     Immature Granulocytes 1 (*)     Segs Absolute 9.13 (*)     All other components within normal limits   CBC - Abnormal; Notable for the following components:    Hemoglobin 10.8 (*)     Hematocrit 37.1 (*)     MCV 78.9 (*)     MCH 23.0 (*)     RDW 17.7 (*)     All other components within normal limits   PROCALCITONIN - Abnormal; Notable for the following components:    Procalcitonin 0.42 (*)     All other components within normal limits   APTT - Abnormal; Notable for the following components:    PTT 37.6 (*)     All other components within normal limits   BASIC METABOLIC PANEL - Abnormal; Notable for the following components:    Glucose 302 (*)     BUN 47 (*)     CREATININE 1.40 (*)     Calcium 8.3 (*)     GFR Non- 49 (*)     GFR  60 (*)     All other components within normal limits   BASIC METABOLIC PANEL W/ REFLEX TO MG FOR LOW K - Abnormal; Notable for the following components:    Glucose 173 (*)     BUN 40 (*)     CREATININE 1.28 (*)     Calcium 8.4 (*)     GFR Non- 55 (*)     All other components within normal limits   CBC WITH AUTO DIFFERENTIAL - Abnormal; Notable for the following components:    Hemoglobin 10.3 (*)     Hematocrit 36.1 (*)     MCV 81.3 (*)     MCH 23.2 (*)     RDW 17.9 (*)     All other components within normal limits   APTT - Abnormal; Notable for the following components:    PTT 32.9 (*)     All other components within normal limits   APTT - Abnormal; Notable for the following components:    PTT 40.0 (*)     All other components within normal limits   APTT - Abnormal; Notable for the following components:    PTT 47.6 (*)     All other components within normal limits   POC GLUCOSE FINGERSTICK - Abnormal; Notable for the following components:    POC Glucose 211 (*)     All other components within normal limits   POC GLUCOSE FINGERSTICK - Abnormal; Notable for the following components:    POC Glucose 358 (*)     All other components within normal limits   POC GLUCOSE FINGERSTICK - Abnormal; Notable for the following components:    POC Glucose 344 (*) [address]   Fellow                   Referring Nurse                           Practitioner   Interpreting             Referring Physician         Demetra Mccartney  Type of Study   TTE procedure:2D Echocardiogram, M-Mode, Doppler, Color Doppler. Procedure Date Date: 08/11/2021 Start: 09:12 AM Study Location: [facility] Technical Quality: Adequate visualization Indications:Angina. History / Tech. Comments: Echo done at patient bedside. Procedure explained to patient. Type 2 DM, HTN, CAD, REGINA Patient Status: Inpatient Height: 72 inches Weight: 229.01 pounds BSA: 2.26 m^2 BMI: 31.06 kg/m^2 CONCLUSIONS Summary Left ventricle is normal in size. Global left ventricular systolic function is mildly reduced. Calculated ejection fraction 53% by Heart Model. Visually estimated EF 45-50%. Left atrium is normal in size. Right atrium is normal in size. Normal right ventricular size and function. Pacemaker / ICD lead seen in right ventricle. Normal mitral valve structure. Trivial mitral regurgitation. Normal tricuspid valve structure and function. No tricuspid regurgitation. No pericardial effusion seen. Signature ----------------------------------------------------------------------------  Electronically signed by Yuliana Pettit(Sonographer) on 08/11/2021  10:21 AM ---------------------------------------------------------------------------- ----------------------------------------------------------------------------  Electronically signed by Matthew Hedrick(Interpreting physician) on 08/11/2021  10:26 AM ---------------------------------------------------------------------------- FINDINGS Left Atrium Left atrium is normal in size. Left Ventricle Left ventricle is normal in size. Global left ventricular systolic function is mildly reduced. Calculated ejection fraction 53% by Heart Model. Visually estimated EF 45-50%. Right Atrium Right atrium is normal in size.  Right Ventricle Normal right ventricular size and function. Pacemaker / ICD lead seen in right ventricle. TAPSE value of 1.86cm noted. Mitral Valve Normal mitral valve structure. Trivial mitral regurgitation. Aortic Valve Normal aortic valve structure and function without stenosis or regurgitation. Tricuspid Valve Normal tricuspid valve structure and function. No tricuspid regurgitation. Pulmonic Valve The pulmonic valve is normal in structure. No pulmonic insufficiency. Pericardial Effusion No pericardial effusion seen. Miscellaneous IVC normal diameter & inspiratory collapse indicating normal RA filling pressure .  M-mode / 2D Measurements & Calculations:   LVIDd:7.2 cm(3.7 - 5.6 cm)       Diastolic WNKDEB:64.9 ml  UHGEL:1.2 cm(2.2 - 4.0 cm)       Systolic KGPJNM:89.7 ml  EMHR:0.7 cm(0.6 - 1.1 cm)        Aortic Root:3 cm(2.0 - 3.7 cm)  LVPWd:0.9 cm(0.6 - 1.1 cm)       LA Dimension: 5.5 cm(1.9 - 4.0 cm)  Fractional Shortenin.33 %     LA volume/Index: 71.87 ml /32m^2  Calculated LVEF (%): 53.62 %     LVOT:2.3 cm                                   RVDd:2.6 cm   Mitral:                                  Aortic   Valve Area (P1/2-Time): 2.75 cm^2        Peak Velocity: 1.75 m/s  Peak E-Wave: 0.56 m/s                    Mean Velocity: 1.05 m/s  Peak A-Wave: 0.50 m/s                    Peak Gradient: 12.25 mmHg  E/A Ratio: 1.13                          Mean Gradient: 5 mmHg  Peak Gradient: 1.26 mmHg  Mean Gradient: 1 mmHg  Deceleration Time: 272 msec              Area (continuity): 1.9 cm^2  P1/2t: 80 msec                           AV VTI: 32.8 cm   Area (continuity): 2.12 cm^2  Mean Velocity: 0.50 m/s                                            Pulmonic:                                            Peak Velocity: 0.90 m/s                                           Peak Gradient: 3.21 mmHg  Diastology / Tissue Doppler Lateral Wall E' velocity:0.06 m/s Lateral Wall E/E':8.9        EKG  Atrial paced rhythm, left axis deviation, no ST elevation or depression, nonspecific EKG    All EKG's are interpreted by the Emergency Department Physicianwho either signs or Co-signs this chart in the absence of a cardiologist.      PROCEDURES:  None    CONSULTS:  IP CONSULT TO INTERNAL MEDICINE  IP CONSULT TO CASE MANAGEMENT  IP CONSULT TO CARDIOLOGY    CRITICAL CARE:  Please see attending note    FINAL IMPRESSION      1. Chest pain, unspecified type    2. Shortness of breath          DISPOSITION / PLAN     DISPOSITION Admitted 08/09/2021 12:06:30 PM      PATIENT REFERRED TO:  No follow-up provider specified.     DISCHARGE MEDICATIONS:  Current Discharge Medication List          Sharona Bustos MD  Emergency Medicine Resident    (Please note that portions of this note were completed with a voice recognition program.Efforts were made to edit the dictations but occasionally words are mis-transcribed.)        Sharona Bustos MD  Resident  08/11/21 [id number]

## 2021-08-11 NOTE — PROGRESS NOTES
[facility]  Internal Medicine Teaching Residency Program  Inpatient Daily Progress Note  ______________________________________________________________________________    Patient: Gautam Taylor  YOB: 1945   UCY:[id number]    Acct: [de-identified]     Room: [address] [place] [address]  Admit date: 8/9/2021  Today's date: 08/11/21  Number of days in the hospital: 2    SUBJECTIVE   Admitting Diagnosis: <principal problem not specified>  CC: chest pain and SOB. Pt seen & examined at bedside. Chart & results reviewed. Vitals stable. No acute events overnight. Pt slept well, eating ok, no bowel/bladder complains. Cardiac is on board, Plans for AICD interrogation, cardiac cath today. ECHO pending. Pertinent labs: Cr-1.28 BUN- 40, BSL- 173  ECHO (8/11/21) : EF 45-50%, Global left ventricular systolic function   is mildly reduced, Pacemaker / ICD lead seen in right ventricle, Trivial MR.  ROS:  Constitutional:  negative for chills, fevers, sweats  Respiratory:  negative for cough, dyspnea on exertion, hemoptysis, shortness of breath, wheezing  Cardiovascular:  negative for chest pain, chest pressure/discomfort, lower extremity edema, palpitations  Gastrointestinal:  negative for abdominal pain, constipation, diarrhea, nausea, vomiting  Neurological:  negative for dizziness, headache  BRIEF HISTORY     A 76 y. o.  male presents with a chief c/o sudden onset chest pain. He described it a diffuse heaviness in chest, radiating to R elbow, pain was constant, 8/10 in intensity, associated with sweating and SOB at rest. He tried increasing concentration home O2 and lasix but nothing helped. EMS was called. As per the records, he received NTG which improved his chest pain, 324 aspirin and 1 ICD shock.  In ED, his SpO2 74% on NRB, BiPAP placed, with plans to decrease FiO2.      He has PMH of A fib (on eliquis), CHF (EF-%), peripheral vascular disease, DM type 2, Hypertension, Hyperlipidemia. He reports to be compliant with his medications. He is being evaluated for PAD claudication at [facility]. He also underwent CTA abdomen in this regard and found to have atherosclerotic disease and calcification of AA and short seg occlusion of celiac artery. He has been having lower limb claudication for alteast 4-5 months. He has H/o MI in . Over the years in , he got 8 stents in total. He has in dwelling L subclavian AICD. He was also started on Eliquis around 2021.     OBJECTIVE     Vital Signs:  /79   Pulse 60   Temp 98 °F (36.7 °C) (Oral)   Resp 19   Ht 6' (1.829 m)   Wt 229 lb 15 oz (104.3 kg)   SpO2 99%   BMI 31.19 kg/m²     Temp (24hrs), Av.9 °F (36.6 °C), Min:97.5 °F (36.4 °C), Max:98.2 °F (36.8 °C)    In: 1021   Out: 1400 [Urine:1400]    Physical Exam:  Constitutional: This is a well developed, well nourished, 30-34.9 - Obesity Grade I 77y.o. year old male who is alert, oriented, cooperative and in no apparent distress. Head:normocephalic and atraumatic. EENT:  PERRLA. No conjunctival injections. Septum was midline, mucosa was without erythema, exudates or cobblestoning. No thrush was noted. Neck: Supple without thyromegaly. No elevated JVP. Trachea was midline. Respiratory: Chest was symmetrical without dullness to percussion. Breath sounds bilaterally were clear to auscultation. There were no wheezes, rhonchi or rales. There is no intercostal retraction or use of accessory muscles. No egophony noted. Cardiovascular: Regular without murmur, clicks, gallops or rubs. Abdomen: Slightly rounded and soft without organomegaly. No rebound, rigidity or guarding was appreciated. Lymphatic: No lymphadenopathy. Musculoskeletal: Normal curvature of the spine. No gross muscle weakness. Extremities:  No lower extremity edema, ulcerations, tenderness, varicosities or erythema.   Muscle size, tone and strength are normal.  No involuntary movements are noted. Skin:  Warm and dry. Good color, turgor and pigmentation. No lesions or scars. No cyanosis or clubbing  Neurological/Psychiatric: The patient's general behavior, level of consciousness, thought content and emotional status is normal.        Medications:  Scheduled Medications:    insulin glargine  25 Units Subcutaneous Nightly    amiodarone  200 mg Oral Daily    [Held by provider] apixaban  5 mg Oral BID    aspirin  81 mg Oral Daily    clopidogrel  75 mg Oral Daily    furosemide  40 mg Intravenous Daily    gabapentin  300 mg Oral TID    pantoprazole  40 mg Oral QAM AC    sodium chloride flush  5-40 mL Intravenous 2 times per day    insulin lispro  0-18 Units Subcutaneous TID WC    insulin lispro  0-9 Units Subcutaneous Nightly    isosorbide mononitrate  30 mg Oral Daily     Continuous Infusions:    sodium chloride      dextrose      heparin (PORCINE) Infusion 19 Units/kg/hr (08/11/21 0634)     PRN Medicationsalbuterol, 2.5 mg, As Directed RT PRN  sodium chloride flush, 5-40 mL, PRN  sodium chloride, 25 mL, PRN  ondansetron, 4 mg, Q8H PRN   Or  ondansetron, 4 mg, Q6H PRN  polyethylene glycol, 17 g, Daily PRN  acetaminophen, 650 mg, Q6H PRN   Or  acetaminophen, 650 mg, Q6H PRN  glucose, 15 g, PRN  dextrose, 12.5 g, PRN  glucagon (rDNA), 1 mg, PRN  dextrose, 100 mL/hr, PRN  albuterol, 2.5 mg, As Directed RT PRN  heparin (porcine), 4,000 Units, PRN  heparin (porcine), 2,000 Units, PRN        Diagnostic Labs:  CBC:   Recent Labs     08/09/21  2207 08/10/21  0308 08/11/21  0502   WBC 7.9 11.2 9.1   RBC 4.70 4.71 4.44   HGB 10.8* 10.9* 10.3*   HCT 37.1* 37.8* 36.1*   MCV 78.9* 80.3* 81.3*   RDW 17.7* 17.2* 17.9*    291 260     BMP:   Recent Labs     08/10/21  0308 08/10/21  1651 08/11/21  0502    139 143   K 4.3 3.9 3.8   CL 98 99 102   CO2 25 27 27   BUN 40* 47* 40*   CREATININE 1.45* 1.40* 1.28*     BNP: No results for input(s): BNP in the last 72 hours.   PT/INR:   Recent Labs 08/09/21  0822   PROTIME 11.1   INR 1.0     APTT:   Recent Labs     08/10/21  1554 08/10/21  2143 08/11/21  0502   APTT 37.6* 32.9* 40.0*     CARDIAC ENZYMES: No results for input(s): CKMB, CKMBINDEX, TROPONINI in the last 72 hours. Invalid input(s): CKTOTAL;3  FASTING LIPID PANEL:  Lab Results   Component Value Date    CHOL 233 10/29/2018    HDL 37 10/29/2018    TRIG 172 10/29/2018     LIVER PROFILE:   Recent Labs     08/09/21  0822   AST 51*   ALT 39   BILITOT 0.30   ALKPHOS 71      MICROBIOLOGY:   Lab Results   Component Value Date/Time    CULTURE NO SIGNIFICANT GROWTH 03/18/2021 06:29 PM       Imaging:    XR CHEST PORTABLE    Result Date: 8/9/2021  Satisfactory lead position Mild diffuse bilateral interstitial infiltrates related to mild vascular congestion and/or infection       ASSESSMENT & PLAN   IMPRESSION  This is I 24 y.o.  male who presented with chest pain and SOB, was found to have acute hypoxic respiratory failure,  Patient admitted to inpatient status for cardiac evaluation and management of Acute on chronic systolic CHF     Acute hypoxic respiratory failure- resolving  · 78% SpO2 on 15 L O2 non rebreather  · Improved on BiPAP  · Transitioned to NC O2 4 L     Coronary Artery Disease:  Chest pain, radiating to R elbow- resolved.   · Decreased on NTG    · On heparin drip  · Troponins negative  · Cardiology on board  · CXR: Mild diffuse bilateral interstitial infiltrates related to mild vascular   congestion and/or infection   · ECHO (5/25/21): LVEF 25-30%  · ECHO (8/11/21) : EF 45-50%, Global left ventricular systolic function   is mildly reduced, Pacemaker / ICD lead seen in right ventricle, Trivial MR.     Acute on chronic systolic heart failure CHF- resolving  · Placed on BiPAP-> transitioned to NC   · Continue Lasix 40 mg IV  · Last ECHO (5/25/21): LVEF 25-30%  · Monitor strict intake and output     DM type II:  · Sugars on higher side- 163,174  · Hold metformin  · Continue Lantus nightly  · Continue Humalog   · Insulin sliding scale     Atrial fibrillation:  · Hold Eliquis  · Patient on heparin drip  · Will resume home medications- amiodarone, metoprolol and Cardizem,  if his blood pressure tolerates  · Amiodarone 200 mg continue   · AICD interrogation planned. · Plan for Cardiac cath today. H/o VT:  · S/p  AICD     DVT ppx:  · On heparin drip     GI ppx:   · On protonix     PT/OT/SW:   · Ongoing     Discharge Planning:   ·  to assist in the discharge [facility]  Internal Medicine Resident, PGY-1  [address], [place]  8/11/2021, 7:34 AM

## 2021-08-11 NOTE — OP NOTE
[facility]    CARDIAC CATHETERIZATION    Date:   8/11/2021  Patient name:  Rafiq Leal  Date of admission:  8/9/2021  8:13 AM  MRN:   [id number]  YOB: 1945    Operators:  Primary:   ELOISA Burnette MD   Assistant fellow            Jenna Batres       Procedure performed:    [x] Left Heart Catheterization. [] Graft Angiography. [x] Left Ventriculography. [] Right Heart Catheterization. [] Coronary Angiography. [] Aortic Valve Studies. [] PCI:      [] Other:       Pre Procedure Conscious Sedation Data:  ASA Class:    [] I [] II [x] III [] IV    Mallampati Class:  [x] I [] II [] III [] IV      Indication:  [] STEMI      [] + Stress test  [] ACS      [] + EKG Changes  [] Non Q MI       [] Significant Risk Factors  [x] Recurrent Angina             [] Diabetes Mellitus    [] New LBBB      [] Uncontrolled HTN. [] CHF / Low EF changes     [] Abnormal CTA / Ca Score      Procedure:  Access:  [x] Femoral  [] Radial  artery       [x] Right  [] Left    Procedure: After informed consent was obtained with explanation of the risks and benefits, patient was brought to the cath lab. The access area was prepped and draped in sterile fashion. 1% lidocaine was used for local block. The artery was cannulated with 6  Fr sheath with brisk arterial blood return. The side port was frequently flushed and aspirated with normal saline. Findings:    Cardiac Arteries and Lesion Findings       LMCA: has distal 20-30% stenosis. LAD: has proximal 85% stenosis followed by 95% instent restenosis. The D1   has patent stent followed by 75% stenosis. The D2 has ostial 80% stenosis. LCx: has mid 60% stenosis. The OM1 has ostial 80% stenosis. The OM2 has   ostial 70 stenosis. RCA: has mid 20% instent restenosis. The distal RCA has 30% stenosis. The   RPDA has mid 80% stenosis.      Coronary Tree        Dominance: Right        Procedure Summary        Multi-vessel Coronary Artery Disease.     LVEF 20%.    Calcium noted femoral artery during ultrasound guided access.        Recommendations        Medical therapy as needed.    Risk factor modification.    Routine Post Diagnostic Cath orders.    Urgent surgical CABG (required before discharge, patient stable with    medical management). Estimated Blood Loss: 10  mL    ____________________________________________________________________    History and Risk Factors    [x] Hypertension     [] Family history of CAD  [x] Hyperlipidemia     [] Cerebrovascular Disease   [x] Prior MI       [] Peripheral Vascular disease   [x] Prior PCI              [] Diabetes Mellitus    [] Left Main PCI. [] Currently on Dialysis. [] Prior CABG. [] Currently smoker. [] Cardiac Arrest outside of healthcare facility. [] Yes    [x] No        Witnessed     [] Yes   [] No     Arrest after arrival of EMS  [] Yes   [] No     [] Cardiac Arrest at other Facility. [] Yes   [x] No    Pre-Procedure Information. Heart Failure       [] Yes    [x] No        Class  [] I      [] II  [] III    [] IV. New Diagnosis    [] Yes  [] No    HF Type      [] Systolic   [] Diastolic          [] Unknown. Diagnostic Test:   EKG       [] Normal   [x] Abnormal    New antiarrhythmia medications:    [] Yes   [] No   New onset atrial fibrillation / Flutter     [] Yes   [] No   ECG Abnormalities:      [] V. Fib   [] Josy V. Tach           [] NS V. T   [] New LBBB           [] T.  Inv  []  ST dev > 0.5 mm         [] PVC's freq  [] PVC's infrequent    Stress Test Performed:      [] Yes    [x] No     Type:     [] Stress Echo   [] Exercise Stress Test (no imaging)      [] Stress Nuclear  [] Stress Imaging     Results   [] Negative   [] Positive        [] Indeterminate  [] Unavailable     If Positive/ Risk / Extent of Ischemia:       [] Low  [] Intermediate         [] High  [] Unavailable      Cardiac CTA Performed:     [] Yes    [x] No      Results   [] CAD   [] Non obstructive CAD      [] No CAD

## 2021-08-11 NOTE — PROGRESS NOTES
Physical Therapy        Physical Therapy Cancel Note      DATE: 2021    NAME: Dannie Christensen  MRN: [id number]   : 1945      Patient not seen this date for Physical Therapy due to: Other: Pt returns from the Cath. Lab. Needs to be in bed 4 hrs. Will check on pt tomorrow.       Electronically signed by Dolly Peña PTA on 2021 at 2:25 PM

## 2021-08-11 NOTE — FLOWSHEET NOTE
SPIRITUAL CARE DEPARTMENT - [facility]  PROGRESS NOTE    Shift date: 8/11/21  Shift day: Wednesday   Shift # 1    Room # 1005/1005-01   Name: Nathan Jacques            Age: 77 y.o. Gender: male          Holiness: Islam  Place of Pentecostal: Unknown    Referral: Routine Visit    Admit Date & Time: 8/9/2021  8:13 AM    PATIENT/EVENT DESCRIPTION:  Nathan Jacques is a 77 y.o. male in room 1005. SPIRITUAL ASSESSMENT/INTERVENTION:  Pre-op CABG visitation. Pt's child visited Pt prior to visitation from [place].  visited with Pt and provided spiritual support and prayer for Pt.  also made himself available after the surgery for visitation if Pt needed it. Pt was grateful for the visitation and the prayers. SPIRITUAL CARE FOLLOW-UP PLAN:  Chaplains will remain available to offer spiritual and emotional support as needed. Electronically signed by Gita Camacho, on 8/11/2021 at 2:54 PM.  [facility]  [phone]       08/11/21 1451   Encounter Summary   Services provided to: Patient   Referral/Consult From: Nurse   Support System Children   Place of Rastafari   (Islam)   Continue Visiting   (8/11/21)   Complexity of Encounter Moderate   Length of Encounter 30 minutes   Spiritual Assessment Completed Yes   Routine   Type Initial   Assessment Approachable;Peaceful; Anxious; Coping   Intervention Active listening;Explored feelings, thoughts, concerns;Nurtured hope;Prayer;Sustaining presence/ Ministry of presence   Outcome Acceptance;Comfort;Expressed gratitude;Engaged in conversation;Receptive;Venting emotion;Encouraged;Coping

## 2021-08-11 NOTE — PROGRESS NOTES
Patient complained of chest pain after ambulating in wise with PT. Rates a 4/10. Pain has now resolved after sitting. Dr. Faustina Ulloa updated.  No new orders

## 2021-08-11 NOTE — PROGRESS NOTES
CHOL, HDL in the last 72 hours. Invalid input(s): LDLCALCU  INR:   Recent Labs     08/09/21  0822   INR 1.0       Diagnostics:    7/2018 Cath:  ELLA LAD and RCA    Coronary arteries: The coronary circulation is right dominant. The left main bifurcates normally into the LAD and  circumflex. The left anterior descending gives rise to 2 diagonals. The left circumflex gives rise to 3 obtuse marginals. Left main: Distal vessel lesion: There is a 30% stenosis. LAD: Mid-vessel lesion: The diagnostic study demonstrated a tubular, 28 mm (L), 80% stenosis. This lesion appears mildly calcified and consistent with atherosclerotic disease. It is not a bifurcation lesion. There is RADHA grade 3 flow (brisk flow) across the lesion. The distal vessel supplies a moderate-sized vascular territory. The lesion presents an ACC/AHA type B2 \"moderate risk\" lesion for intervention, with 2 or more adverse characteristics. Stent placement. Left circumflex: Prior intervention: stent in the proximal left circumflex. The stented segment is patent. 1st obtuse marginal: Ostial lesion: There is a 45% stenosis. 2nd obtuse marginal: Ostial lesion: There is a 40% stenosis. Right coronary: Prior intervention: stent in the proximal RCA. The stented segment is patent. Mid-vessel lesion: The diagnostic study demonstrated a discrete, 90% stenosis. This lesion appears moderately calcified and consistent with atherosclerotic disease. It is not a bifurcation lesion. There is RADHA grade 3 flow (brisk flow) across the lesion. The distal vessel supplies a large vascular territory. The lesion is a likely culprit for the patient's anginal symptoms. The lesion presents an ACC/AHA type A \"low risk\" lesion for intervention. Stent placement.   Conclusions:  1. Multivessel coronary artery disease.   2. Successful drug-eluting stent placement in mid LAD and mid right coronary arteries.     11/2018 Carotids:  Right carotid: Mild stenosis (<50%) with patent vertebral artery    Left carotid:             Mild stenosis (<50%) with patent vertebral artery      12/2018 Echo:  · Dilated left ventricle with low normal systolic function. EF 24-07%.     · Mild to moderate left ventricular hypertrophy. · Mildly dilated aortic root, 3.8 cm.  Borderline dilated ascending aorta.   · Mild mitral regurgitation. · Trivial tricuspid regurgitation. Unable to assess right heart pressures. · Trivial pulmonic insufficiency. · Left ventricular diastolic dysfunction.     11/19 carotids - mild B disease      12/19 echo - EF 50-55%.     · Mild left ventricular hypertrophy. · The aortic root is at the upper limits of normal.  · Mild mitral regurgitation. · Trivial tricuspid regurgitation. Unable to assess right heart pressures. · Calcified aortic valve.      2/20 stress   EKG:  No Ischemic EKG Changes.  Arrhythmia noted:occasional PACs an rare PVCs. IMAGING: There is no ischemia present.  large high grade distal anterior, apical and distal inferior area.  infarct noted. . Nuzhat Theresa left ventricular ejection fraction was mildly reduced and measured 42%.   Visually EF appears lower.  Left ventricular wall motion was abnormal - apical dyskinesis.    SYMPTOMS: He experienced  chest heaviness, dyspnea and headache during the infusion.          3/20 PVRs    Abnormal study suggesting significant vascular occlusive disease of the left lower extremity and mild disease on the right.  Further imaging suggested if the patient is symptomatic.     3/20 LE art Duplex   No significant acceleration of Doppler velocities noted.  There was a slight increase in velocities at the distal superficial femoral artery on the right side.   At the posterior tibial artery level the Doppler waveform is biphasic on the right and monophasic on the left.     [facility]  1. Left ventricle: Systolic function is moderately reduced. The estimated ejection fraction is 25-30%. There is akinesis  noted of the lateral and apical lucero. Akinesis is also noted of the anterior, anterior apical and inferior apical segments.   2. Aortic valve: The valve is structurally normal.   3. Mitral valve: The valve is structurally normal.   4. Tricuspid valve: Pulmonary artery pressures are elevated suggesting moderate pulmonary hypertension. Objective:   Vitals: /79   Pulse 60   Temp 98 °F (36.7 °C) (Oral)   Resp 19   Ht 6' (1.829 m)   Wt 229 lb 15 oz (104.3 kg)   SpO2 99%   BMI 31.19 kg/m²   General appearance: alert and cooperative with exam  HEENT: Head: Normocephalic, no lesions, without obvious abnormality. Neck: no JVD, trachea midline, no adenopathy  Lungs: Clear to auscultation. Room air without distress. Heart: Regular rate and rhythm, s1/s2 auscultated, + murmurs. SR with occ A paced beat  Abdomen: soft, non-tender, bowel sounds active  Extremities: no edema  Neurologic: not done        Assessment / Acute Cardiac Problems:   1. Chest pain resolved in sublingual NG  2. CAD s/p PCI to LCx, OM, Diag & RCA  3. Hx of VT s/p AICD  4. Acute on chronic diastolic HF  5. HTN  6. HLD  7. Paroxysmal A fib  8.  PVD    Patient Active Problem List:     Lumbago     Spinal stenosis, lumbar region, without neurogenic claudication     Degeneration of lumbar or lumbosacral intervertebral disc     Type 2 diabetes mellitus without complication (HCC)     Hyperlipidemia     Hypertension     Lumbar radiculopathy, chronic     Lumbar spondylosis     PVOD (pulmonary veno-occlusive disease) ([facility])     AICD (automatic cardioverter/defibrillator) present     Coronary artery disease involving native coronary artery of native heart without angina pectoris     Gastritis without bleeding     Paroxysmal atrial fibrillation (HCC)     VT (ventricular tachycardia) ([facility])     SOB (shortness of breath) Claudication ([facility]     Bilateral leg edema     S/P right coronary artery (RCA) stent placement     Presence of stent in LAD coronary artery     Mild concentric left ventricular hypertrophy (LVH)     Dilated aortic root (HCC)     Diabetic polyneuropathy associated with type 2 diabetes mellitus (HCC)     Chronic diastolic congestive heart failure (HCC)     Diabetes mellitus ([facility]     Long term current use of amiodarone     REGINA (obstructive sleep apnea)     Osteoarthrosis     Chest pain     Acute on chronic systolic heart failure ([facility]      Plan of Treatment:   1. PAF. Currently SR with atrial paced beats. Device interrogation pending. Continue amiodarone, aspirin, plavix and heparin drip. Eliquis on hold and plan to resume prior to discharge. 2. CAD s/p PCI to LCx, OM, DIag and RCA. Continue aspirin, plavix, statin, imdur. Plan for cardiac cath today. 3. Hx of VT. AICD is situ. Device interrogation pending. [employer] device  4. 2D echo in process currently. Will follow up on read. 5. Keep K+ > 4.0, and Mg+ > 2.0  6. Further orders after cardiac cath today. 7. I have discussed risks (including but not limited to vascular injury, infection, hematoma, contrast induced kidney dysfunction, CVA and MI), benefits, alternatives in detail. All questions answered. Patient agrees to proceed.            Electronically signed by ERICK Clark CNP on 8/11/2021 at 8:55 [facility]      [phone]

## 2021-08-12 ENCOUNTER — APPOINTMENT (OUTPATIENT)
Dept: CT IMAGING | Age: 76
DRG: 246 | End: 2021-08-12
Payer: MEDICARE

## 2021-08-12 PROBLEM — I20.0 UNSTABLE ANGINA (HCC): Status: ACTIVE | Noted: 2021-08-12

## 2021-08-12 LAB
ABSOLUTE EOS #: 0.35 K/UL (ref 0–0.4)
ABSOLUTE IMMATURE GRANULOCYTE: 0 K/UL (ref 0–0.3)
ABSOLUTE LYMPH #: 2.35 K/UL (ref 1–4.8)
ABSOLUTE MONO #: 0.62 K/UL (ref 0.1–0.8)
ALBUMIN SERPL-MCNC: 3.4 G/DL (ref 3.5–5.2)
ALBUMIN/GLOBULIN RATIO: 1.3 (ref 1–2.5)
ALP BLD-CCNC: 71 U/L (ref 40–129)
ALT SERPL-CCNC: 28 U/L (ref 5–41)
ANION GAP SERPL CALCULATED.3IONS-SCNC: 11 MMOL/L (ref 9–17)
AST SERPL-CCNC: 14 U/L
BASOPHILS # BLD: 0 % (ref 0–2)
BASOPHILS ABSOLUTE: 0 K/UL (ref 0–0.2)
BILIRUB SERPL-MCNC: 0.27 MG/DL (ref 0.3–1.2)
BUN BLDV-MCNC: 25 MG/DL (ref 8–23)
BUN/CREAT BLD: ABNORMAL (ref 9–20)
CALCIUM SERPL-MCNC: 8.5 MG/DL (ref 8.6–10.4)
CHLORIDE BLD-SCNC: 106 MMOL/L (ref 98–107)
CO2: 26 MMOL/L (ref 20–31)
CREAT SERPL-MCNC: 1.03 MG/DL (ref 0.7–1.2)
CULTURE: NORMAL
DIFFERENTIAL TYPE: ABNORMAL
EOSINOPHILS RELATIVE PERCENT: 5 % (ref 1–4)
GFR AFRICAN AMERICAN: >60 ML/MIN
GFR NON-AFRICAN AMERICAN: >60 ML/MIN
GFR SERPL CREATININE-BSD FRML MDRD: ABNORMAL ML/MIN/{1.73_M2}
GFR SERPL CREATININE-BSD FRML MDRD: ABNORMAL ML/MIN/{1.73_M2}
GLUCOSE BLD-MCNC: 185 MG/DL (ref 70–99)
GLUCOSE BLD-MCNC: 249 MG/DL (ref 75–110)
GLUCOSE BLD-MCNC: 273 MG/DL (ref 75–110)
GLUCOSE BLD-MCNC: 327 MG/DL (ref 75–110)
HCT VFR BLD CALC: 35.9 % (ref 40.7–50.3)
HEMOGLOBIN: 10.1 G/DL (ref 13–17)
IMMATURE GRANULOCYTES: 0 %
LYMPHOCYTES # BLD: 34 % (ref 24–44)
Lab: NORMAL
MCH RBC QN AUTO: 23.4 PG (ref 25.2–33.5)
MCHC RBC AUTO-ENTMCNC: 28.1 G/DL (ref 28.4–34.8)
MCV RBC AUTO: 83.1 FL (ref 82.6–102.9)
MONOCYTES # BLD: 9 % (ref 1–7)
MORPHOLOGY: ABNORMAL
MORPHOLOGY: ABNORMAL
MRSA, DNA, NASAL: NORMAL
NRBC AUTOMATED: 0 PER 100 WBC
PARTIAL THROMBOPLASTIN TIME: 37 SEC (ref 20.5–30.5)
PARTIAL THROMBOPLASTIN TIME: 39.9 SEC (ref 20.5–30.5)
PARTIAL THROMBOPLASTIN TIME: 50.1 SEC (ref 20.5–30.5)
PDW BLD-RTO: 18.2 % (ref 11.8–14.4)
PLATELET # BLD: 274 K/UL (ref 138–453)
PLATELET ESTIMATE: ABNORMAL
PMV BLD AUTO: 10.2 FL (ref 8.1–13.5)
POTASSIUM SERPL-SCNC: 3.8 MMOL/L (ref 3.7–5.3)
RBC # BLD: 4.32 M/UL (ref 4.21–5.77)
RBC # BLD: ABNORMAL 10*6/UL
SEG NEUTROPHILS: 52 % (ref 36–66)
SEGMENTED NEUTROPHILS ABSOLUTE COUNT: 3.58 K/UL (ref 1.8–7.7)
SODIUM BLD-SCNC: 143 MMOL/L (ref 135–144)
SPECIMEN DESCRIPTION: NORMAL
SPECIMEN DESCRIPTION: NORMAL
TOTAL PROTEIN: 6 G/DL (ref 6.4–8.3)
WBC # BLD: 6.9 K/UL (ref 3.5–11.3)
WBC # BLD: ABNORMAL 10*3/UL

## 2021-08-12 PROCEDURE — 6370000000 HC RX 637 (ALT 250 FOR IP): Performed by: STUDENT IN AN ORGANIZED HEALTH CARE EDUCATION/TRAINING PROGRAM

## 2021-08-12 PROCEDURE — 85730 THROMBOPLASTIN TIME PARTIAL: CPT

## 2021-08-12 PROCEDURE — 94660 CPAP INITIATION&MGMT: CPT

## 2021-08-12 PROCEDURE — 80053 COMPREHEN METABOLIC PANEL: CPT

## 2021-08-12 PROCEDURE — 71250 CT THORAX DX C-: CPT

## 2021-08-12 PROCEDURE — 85025 COMPLETE CBC W/AUTO DIFF WBC: CPT

## 2021-08-12 PROCEDURE — 6370000000 HC RX 637 (ALT 250 FOR IP): Performed by: NURSE PRACTITIONER

## 2021-08-12 PROCEDURE — 6360000002 HC RX W HCPCS: Performed by: STUDENT IN AN ORGANIZED HEALTH CARE EDUCATION/TRAINING PROGRAM

## 2021-08-12 PROCEDURE — 36415 COLL VENOUS BLD VENIPUNCTURE: CPT

## 2021-08-12 PROCEDURE — 82947 ASSAY GLUCOSE BLOOD QUANT: CPT

## 2021-08-12 PROCEDURE — 2060000000 HC ICU INTERMEDIATE R&B

## 2021-08-12 PROCEDURE — 99233 SBSQ HOSP IP/OBS HIGH 50: CPT | Performed by: INTERNAL MEDICINE

## 2021-08-12 PROCEDURE — 99222 1ST HOSP IP/OBS MODERATE 55: CPT | Performed by: INTERNAL MEDICINE

## 2021-08-12 RX ORDER — FUROSEMIDE 40 MG/1
40 TABLET ORAL DAILY
Status: DISCONTINUED | OUTPATIENT
Start: 2021-08-12 | End: 2021-08-15 | Stop reason: HOSPADM

## 2021-08-12 RX ADMIN — GABAPENTIN 300 MG: 300 CAPSULE ORAL at 08:50

## 2021-08-12 RX ADMIN — ATORVASTATIN CALCIUM 80 MG: 80 TABLET, FILM COATED ORAL at 20:07

## 2021-08-12 RX ADMIN — PANTOPRAZOLE SODIUM 40 MG: 40 TABLET, DELAYED RELEASE ORAL at 08:50

## 2021-08-12 RX ADMIN — HEPARIN SODIUM AND DEXTROSE 17.59 UNITS/KG/HR: 10000; 5 INJECTION INTRAVENOUS at 23:08

## 2021-08-12 RX ADMIN — GABAPENTIN 300 MG: 300 CAPSULE ORAL at 15:14

## 2021-08-12 RX ADMIN — HEPARIN SODIUM 2000 UNITS: 1000 INJECTION INTRAVENOUS; SUBCUTANEOUS at 15:14

## 2021-08-12 RX ADMIN — INSULIN LISPRO 9 UNITS: 100 INJECTION, SOLUTION INTRAVENOUS; SUBCUTANEOUS at 12:20

## 2021-08-12 RX ADMIN — AMIODARONE HYDROCHLORIDE 200 MG: 200 TABLET ORAL at 08:50

## 2021-08-12 RX ADMIN — INSULIN GLARGINE 25 UNITS: 100 INJECTION, SOLUTION SUBCUTANEOUS at 20:07

## 2021-08-12 RX ADMIN — HEPARIN SODIUM 2000 UNITS: 1000 INJECTION INTRAVENOUS; SUBCUTANEOUS at 07:26

## 2021-08-12 RX ADMIN — GABAPENTIN 300 MG: 300 CAPSULE ORAL at 20:07

## 2021-08-12 RX ADMIN — ASPIRIN 81 MG: 81 TABLET, COATED ORAL at 08:50

## 2021-08-12 RX ADMIN — FUROSEMIDE 40 MG: 40 TABLET ORAL at 11:29

## 2021-08-12 RX ADMIN — ISOSORBIDE MONONITRATE 30 MG: 30 TABLET ORAL at 08:50

## 2021-08-12 RX ADMIN — HEPARIN SODIUM AND DEXTROSE 15.59 UNITS/KG/HR: 10000; 5 INJECTION INTRAVENOUS at 10:28

## 2021-08-12 RX ADMIN — INSULIN LISPRO 6 UNITS: 100 INJECTION, SOLUTION INTRAVENOUS; SUBCUTANEOUS at 20:08

## 2021-08-12 RX ADMIN — INSULIN LISPRO 9 UNITS: 100 INJECTION, SOLUTION INTRAVENOUS; SUBCUTANEOUS at 18:14

## 2021-08-12 ASSESSMENT — PAIN SCALES - GENERAL: PAINLEVEL_OUTOF10: 0

## 2021-08-12 NOTE — CONSULTS
smoking for 35 years 1 pack/day and quit in 1994. He is on home O2 at night which was a started in May after he was hospitalized in [facility] and was scheduled for sleep study then. He does not use oxygen during the daytime  He is retired used to drive truck. CT chest done on 08/12/2021 does not show infiltrate consolidation edema or pleural effusion at this time. Pulmonary function tests which were done 06/14/2021 showed FEV1 of 63% consistent with a stage II obstruction and concomitant restriction with TLC of 63% and severe reduction diffusion capacity of 40%. Sleep study on 07/09/2021 showed moderate obstructive sleep apnea with AHI of 19. PAST MEDICAL HISTORY:        Diagnosis Date    Arrhythmia     CAD (coronary artery disease)     GERD (gastroesophageal reflux disease)     Heart attack ([address]     Hyperlipidemia     Hypertension     Ischemic cardiomyopathy     Osteoarthritis     Type II or unspecified type diabetes mellitus without mention of complication, not stated as uncontrolled      PAST SURGICAL HISTORY:        Procedure Laterality Date    APPENDECTOMY      CARDIAC CATHETERIZATION      CARDIAC SURGERY      stents 2015    COLONOSCOPY      CORONARY ANGIOPLASTY WITH STENT PLACEMENT  07/2018    [facility]    DIAGNOSTIC CARDIAC CATH LAB PROCEDURE      JOINT REPLACEMENT      knee right parital    PACEMAKER INSERTION      PTCA      ROTATOR CUFF REPAIR      TONSILLECTOMY       FAMILY HISTORY:       Problem Relation Age of Onset    Heart Disease Mother     High Blood Pressure Mother     Heart Disease Father     Cancer Sister         breast cancer    Cancer Brother         bladder cancer    Diabetes Maternal Grandmother      SOCIAL HISTORY:   TOBACCO:   reports that he quit smoking about 27 years ago. His smoking use included cigarettes. He has a 40.00 pack-year smoking history. He has never used smokeless tobacco.  ETOH:  reports current alcohol use.   DRUGS: reports no history of drug use. AVOCATION/OCCUPATIONAL EXPOSURE:    The patient denies asbestos, silica dust, coal, foundry, quarry or [employer] exposure. The patient denies  to having pet dogs, cats, turtles or exotic birds at home. There is no history of TB or TB exposure. There is no exposure to sick contacts. Travel history is not significant history of risk factors for pulmonary disease. The patient denies using [employer]. ALLERGIES:    Allergies   Allergen Reactions    Coreg [Carvedilol] Other (See Comments)     Low pause     Pravastatin Other (See Comments)     Myalgias     Sertraline Other (See Comments)    Zocor [Simvastatin]      norvasc     Citalopram Anxiety         HOME MEDICATIONS:  Prior to Admission medications    Medication Sig Start Date End Date Taking? Authorizing Provider   insulin detemir (LEVEMIR) 100 UNIT/ML injection vial Inject 25 Units into the skin nightly   Yes Historical Provider, MD   esomeprazole Magnesium (NEXIUM) 20 MG PACK Take 20 mg by mouth daily   Yes Historical Provider, MD   furosemide (LASIX) 40 MG tablet Take 1 tablet by mouth once daily   May take 1 additional tablet daily PRN 2 pound weight gain, short of breath or swelling.  7/9/21  Yes ERICK Burgos CNP   apixaban (ELIQUIS) 5 MG TABS tablet Take 5 mg by mouth 2 times daily  4/17/21  Yes Historical Provider, MD   alogliptin (NESINA) 25 MG TABS tablet Take 1 tablet by mouth daily 6/10/21  Yes ERICK Burgos CNP   empagliflozin (JARDIANCE) 10 MG tablet Take 1 tablet by mouth daily  Patient taking differently: Take 1 tablet by mouth daily Pt states he has not started taking this medication yet 6/10/21  Yes ERICK Burgos CNP   dilTIAZem (CARDIZEM CD) 240 MG extended release capsule Take 240 mg by mouth daily   Yes Historical Provider, MD   glimepiride (AMARYL) 4 MG tablet Take 1 tablet by mouth 2 times daily 2/25/21  Yes ERICK Burgos CNP   clopidogrel (PLAVIX) 75 MG tablet Take 1 tablet by mouth daily 2/15/21  Yes ERICK Callejas CNP   isosorbide mononitrate (IMDUR) 30 MG extended release tablet Take 30 mg by mouth daily   Yes Historical Provider, MD   potassium chloride (KLOR-CON M) 20 MEQ extended release tablet TAKE 1  BY MOUTH ONCE DAILY 6/30/20  Yes ERICK Callejas CNP   amiodarone (CORDARONE) 200 MG tablet TAKE 1 TABLET BY MOUTH ONCE DAILY 4/27/20  Yes Minnie Joshua MD   nitroGLYCERIN (NITROSTAT) 0.4 MG SL tablet DISSOLVE ONE TABLET UNDER THE TONGUE EVERY 5 MINUTES AS NEEDED FOR CHEST PAIN. DO NOT EXCEED A TOTAL OF 3 DOSES IN 15 MINUTES 2/1/19  Yes ERICK Callejas CNP   magnesium oxide (MAG-OX) 400 MG tablet Take 400 mg by mouth daily   Yes Historical Provider, MD   triamcinolone (KENALOG) 0.025 % ointment Apply topically 2 times daily to areas of eczema on lower legs 4/30/18  Yes Sawyer Chawla MD   metoprolol tartrate (LOPRESSOR) 50 MG tablet Take 1 tablet by mouth 2 times daily 12/18/17  Yes ERICK Callejas CNP   lisinopril (PRINIVIL;ZESTRIL) 40 MG tablet Take 40 mg by mouth daily   Yes Historical Provider, MD   saxagliptin (ONGLYZA) 5 MG TABS tablet Take 5 mg by mouth daily   Yes Historical Provider, MD   metFORMIN (GLUCOPHAGE) 1000 MG tablet Take 1 tablet by mouth 2 times daily (with meals) 5/7/15  Yes ERICK Cao CNP   ONETOUCH ULTRA strip USE 1 STRIP TO CHECK GLUCOSE TWICE DAILY 6/14/21   ERICK Callejas CNP   oxyCODONE-acetaminophen (PERCOCET) 5-325 MG per tablet Take 1 tablet by mouth every 8 hours as needed for Pain for up to 30 days.  5/6/21 6/5/21  ERICK Callejas CNP   diclofenac sodium (VOLTAREN) 1 % GEL Apply 2 g topically 4 times daily as needed for Pain 2/24/21   Shyam Card MD   albuterol sulfate  (90 Base) MCG/ACT inhaler INHALE 2 PUFFS BY INHALATION THREE TIMES A DAY AS NEEDED FOR SHORTNESS OF BREATH 3/30/20   Historical Provider, MD   albuterol (PROVENTIL) (2.5 MG/3ML) 0.083% nebulizer solution Take 3 mLs by nebulization every 6 hours as needed for Wheezing or Shortness of Breath 3/19/20   Bhavna Arevalo MD   Handicap Placard MISC by Does not apply route Expires 12/2023 12/17/18   ERICK Albrecht CNP   gabapentin (NEURONTIN) 300 MG capsule TAKE 1 CAPSULE BY MOUTH THREE TIMES DAILY 8/20/18 5/5/21  ERICK Albrecht CNP   Insulin Pen Needle 29G X 12.7MM MISC 1 each by Does not apply route daily 8/17/16   ERICK Hdez CNP     IMMUNIZATIONS:  Most Recent Immunizations   Administered Date(s) Administered    COVID-19, Moderna, PF, 100mcg/0.5mL 03/01/2021    DTaP 08/20/2017    DTaP (Infanrix) 08/20/2017    Influenza A ([id number]),all Formulations 12/01/2009    Influenza Vaccine, unspecified formulation 10/01/2016    Influenza Virus Vaccine 09/02/2020    Influenza, High Dose (Fluzone 65 yrs and older) 09/20/2019    Influenza, High-dose, Quadv, 65 yrs +, IM (Fluzone) 08/26/2020    Influenza, MDCK, Preservative free 10/01/2020    Influenza, Quadv, IM, (6 mo and older Fluzone, Flulaval, Fluarix and 3 yrs and older Afluria) 09/17/2018    Pneumococcal Conjugate 13-valent ([id number]) 05/04/2015    Pneumococcal Polysaccharide ([id number]) 01/01/2019    Pneumococcal Vaccine 10/01/2007    Td vaccine (adult) 10/01/2008    Tdap (Boostrix, Adacel) 12/05/2008       REVIEW OF SYSTEMS:  General: negative for chills, fatigue or fever  ENT: negative for headaches, nasal congestion, sore throat or visual changes  Allergy and Immunology: negative for postnasal drip or seasonal allergies  Hematological and Lymphatic: negative for bleeding problems, swollen lymph nodes  Respiratory: Negative for cough, hemoptysis, pleuritic pain, shortness of breath, sputum changes and wheezing  Cardiovascular: negative for edema or palpitations  Gastrointestinal: negative for abdominal pain, change in bowel habits or nausea/vomiting  Genito-Urinary: negative for dysuria or urinary frequency/urgency  Musculoskeletal: negative for joint pain or joint swelling  Neurological: negative for numbness/tingling, seizures or weakness  Dermatological: negative for pruritus or rash    PHYSICAL EXAMINATION     VITAL SIGNS:   LAST-  /81   Pulse 61   Temp 98.5 °F (36.9 °C) (Oral)   Resp 12   Ht 6' (1.829 m)   Wt 231 lb (104.8 kg)   SpO2 98%   BMI 31.33 kg/m²   8-24 HR RANGE-  TEMP Temp  Av.2 °F (36.8 °C)  Min: 97.8 °F (36.6 °C)  Max: 98.6 °F (37 °C)   BP Systolic (33XKT), FFP:068 , Min:101 , IMF:814      Diastolic (15RYY), ZCI:16, Min:66, Max:112     PULSE Pulse  Av.5  Min: 60  Max: 71   RR Resp  Av  Min: 12  Max: 12   O2 SAT SpO2  Av %  Min: 98 %  Max: 98 %   OXYGEN DELIVERY No data recorded     SYSTEMIC EXAMINATION:   General appearance - well appearing, overweight, comfortable and in no acute distress  Mental status - alert, oriented to person, place, and time  Eyes - pupils equal and reactive, extraocular eye movements intact, sclera anicteric  Mouth - mucous membranes moist, pharynx normal without lesions  Neck - supple, no significant adenopathy, carotids upstroke normal bilaterally, no bruits  Lymphatics - no palpable lymphadenopathy, no hepatosplenomegaly  Chest - no tachypnea, retractions or cyanosis, decreased air entry noted bilaterally no expiratory wheezing rhonchi or crackles  Heart - normal rate, regular rhythm, normal S1, S2, no murmurs, rubs, clicks or gallops  Abdomen - soft, nontender, nondistended, no masses or organomegaly  Neurological - motor and sensory grossly normal bilaterally  Musculoskeletal - no joint tenderness, deformity or swelling  Extremities - peripheral pulses normal, no pedal edema, no clubbing or cyanosis  Skin - normal coloration and turgor, no rashes, no suspicious skin lesions noted    DATA REVIEW     Medications: Current Inpatient  Scheduled Meds:   aspirin  81 mg Oral Daily    atorvastatin  80 mg Oral Nightly    insulin glargine  25 Units Subcutaneous Nightly    amiodarone  200 mg Oral Daily    furosemide  40 mg Intravenous Daily    gabapentin  300 mg Oral TID    pantoprazole  40 mg Oral QAM AC    sodium chloride flush  5-40 mL Intravenous 2 times per day    insulin lispro  0-18 Units Subcutaneous TID WC    insulin lispro  0-9 Units Subcutaneous Nightly    isosorbide mononitrate  30 mg Oral Daily     Continuous Infusions:   heparin (PORCINE) Infusion 15.59 Units/kg/hr (08/12/21 0723)    sodium chloride 25 mL (08/11/21 1200)    dextrose       INPUT/OUTPUT:  In: -   Out: 700 [Urine:700]    LABS:-  ABG:   No results for input(s): POCPH, POCPCO2, POCPO2, POCHCO3, BTBF1FUW in the last 72 hours. CBC:   Recent Labs     08/10/21  0308 08/11/21  0502 08/12/21  0603   WBC 11.2 9.1 6.9   HGB 10.9* 10.3* 10.1*   HCT 37.8* 36.1* 35.9*   MCV 80.3* 81.3* 83.1    260 274   LYMPHOPCT 11* 26 34   RBC 4.71 4.44 4.32   MCH 23.1* 23.2* 23.4*   MCHC 28.8 28.5 28.1*   RDW 17.2* 17.9* 18.2*     BMP:   Recent Labs     08/10/21  1651 08/11/21  0502 08/12/21  0603    143 143   K 3.9 3.8 3.8   CL 99 102 106   CO2 27 27 26   BUN 47* 40* 25*   CREATININE 1.40* 1.28* 1.03   GLUCOSE 302* 173* 185*     Liver Function Test:   Recent Labs     08/12/21  0603   PROT 6.0*   LABALBU 3.4*   ALT 28   AST 14   ALKPHOS 71   BILITOT 0.27*     Amylase/Lipase:  No results for input(s): AMYLASE, LIPASE in the last 72 hours. Coagulation Profile:   Recent Labs     08/11/21  1407 08/11/21  1407 08/11/21  1909 08/11/21  2214 08/12/21  0603   INR 1.0  --   --   --   --    PROTIME 10.5  --   --   --   --    APTT 23.0   < > 22.8 24.1 37.0*    < > = values in this interval not displayed. Cardiac Enzymes:  No results for input(s): CKTOTAL, CKMB, CKMBINDEX, TROPONINI in the last 72 hours.   Lactic Acid:  No results found for: LACTA  BNP:   Lab Results   Component Value Date    .0 04/16/2021     D-Dimer:  Lab Results   Component Value Date    DDIMER 0.65 08/09/2021     Others:   Lab Results   Component Value Date    TSH 2.87 05/20/2020    T4FREE 1.30 05/20/2020     No results found for: ELOISE, RHEUMFACTOR, SEDRATE, CRP  No results found for: Carloyn Santee  No results found for: IRON, TIBC, FERRITIN  No results found for: SPEP, UPEP  Lab Results   Component Value Date    PSA 1.19 04/01/2015     Microbiology:  Recent Labs     08/09/21  2207   MPNEUM 0.26     Pathology:    Radiology Reports:  CT chest without contrast   Final Result   No acute cardiopulmonary process. VL DUP CAROTID BILATERAL   Final Result      VL VEIN MAPPING LOWER BILATERAL   Final Result      XR CHEST PORTABLE   Final Result   Satisfactory lead position      Mild diffuse bilateral interstitial infiltrates related to mild vascular   congestion and/or infection             Pulmonary Function test:    Polysomnogram:    Echocardiogram:   Results for orders placed during the hospital encounter of 08/09/21    ECHO Complete 2D W Doppler W Color    Narrative  Transthoracic Echocardiography Report (TTE)    Patient Name Laura Vacunek      Date of Study               08/11/2021  Pawel Osborn    Date of      1945  Gender                      Male  Birth    Age          77 year(s)  Race                            Room Number  1005        Height:                     72 inch, 182.88 cm    Corporate ID [id number]    Weight:                     229 pounds, 103.9 kg  #    Patient Acct [de-identified]   BSA:          2.26 m^2      BMI:      31.06  #                                                              kg/m^2    MR #         [id number]     Sonographer                 Bestkristyn Jay    Accession #  [id number]  Interpreting Physician      [address]    Fellow                   Referring Nurse  Practitioner    Interpreting             Referring Physician         Alex Mccartney    Type of Study    TTE procedure:2D Echocardiogram, M-Mode, Doppler, Color Doppler.     Procedure Date  Date: 08/11/2021 Start: 09:12 AM    Study Location[facility]  Technical Quality: Adequate visualization    Indications:Angina. History / Tech. Comments:  Echo done at patient bedside. Procedure explained to patient. Type 2 DM, HTN, CAD, REGINA    Patient Status: Inpatient    Height: 72 inches Weight: 229.01 pounds BSA: 2.26 m^2 BMI: 31.06 kg/m^2    Allergies  - *Unlisted:(Zocor, Coreg, Prevastatin, sertraline, citalopram). CONCLUSIONS    Summary  Left ventricle is normal in size. Global left ventricular systolic function  is mildly reduced. Visually estimated EF 45-50%. Left atrium is normal in size. Right atrium is normal in size. Normal right ventricular size and function. Pacemaker / ICD lead seen in right ventricle. Normal mitral valve structure. Trivial mitral regurgitation. Normal tricuspid valve structure and function. No tricuspid regurgitation. No pericardial effusion seen. Signature  ----------------------------------------------------------------------------  Electronically signed by Matthew Hedrick(Interpreting physician) on 08/11/2021  02:29 PM  ----------------------------------------------------------------------------    FINDINGS  Left Atrium  Left atrium is normal in size. Left Ventricle  Left ventricle is normal in size. Global left ventricular systolic function  is mildly reduced. Visually estimated EF 45-50%. Right Atrium  Right atrium is normal in size. Right Ventricle  Normal right ventricular size and function. Pacemaker / ICD lead seen in right ventricle. TAPSE value of 1.86cm noted. Mitral Valve  Normal mitral valve structure. Trivial mitral regurgitation. Aortic Valve  Normal aortic valve structure and function without stenosis or  regurgitation. Tricuspid Valve  Normal tricuspid valve structure and function. No tricuspid regurgitation. Pulmonic Valve  The pulmonic valve is normal in structure. No pulmonic insufficiency.   Pericardial Effusion  No pericardial effusion seen. Miscellaneous  IVC normal diameter & inspiratory collapse indicating normal RA filling  pressure . M-mode / 2D Measurements & Calculations:    LVIDd:7.2 cm(3.7 - 5.6 cm)       Diastolic CYHKSF:83.2 ml  AHZDQ:9.5 cm(2.2 - 4.0 cm)       Systolic ZGFRSC:98.3 ml  KOCQ:4.4 cm(0.6 - 1.1 cm)        Aortic Root:3 cm(2.0 - 3.7 cm)  LVPWd:0.9 cm(0.6 - 1.1 cm)       LA Dimension: 5.5 cm(1.9 - 4.0 cm)  Fractional Shortenin.33 %     LA volume/Index: 71.87 ml /32m^2  Calculated LVEF (%): 53.62 %     LVOT:2.3 cm  RVDd:2.6 cm    Mitral:                                  Aortic    Valve Area (P1/2-Time): 2.75 cm^2        Peak Velocity: 1.75 m/s  Peak E-Wave: 0.56 m/s                    Mean Velocity: 1.05 m/s  Peak A-Wave: 0.50 m/s                    Peak Gradient: 12.25 mmHg  E/A Ratio: 1.13                          Mean Gradient: 5 mmHg  Peak Gradient: 1.26 mmHg  Mean Gradient: 1 mmHg  Deceleration Time: 272 msec              Area (continuity): 1.9 cm^2  P1/2t: 80 msec                           AV VTI: 32.8 cm    Area (continuity): 2.12 cm^2  Mean Velocity: 0.50 m/s    Pulmonic:    Peak Velocity: 0.90 m/s  Peak Gradient: 3.21 mmHg    Diastology / Tissue Doppler    Lateral Wall E' velocity:0.06 m/s  Lateral Wall E/E':8.9      Cardiac Catheterization:   No results found for this or any previous visit.       ASSESSMENT AND PLAN     Assessment:    //Acute hypoxic respiratory failure secondary to acute pulmonary edema secondary to acute heart failure improved  //Non-ST elevation MI  //Multivessel coronary artery disease  //Chronic obstructive pulmonary disease/moderate COPD/stage II obstruction with moderate restriction  //Obstructive sleep apnea  //History of coronary artery disease status post stent/PCI  //Acute on chronic combined systolic and diastolic heart failure  //Atrial fibrillation on Eliquis  //History of V. tach status post AICD  //Diabetes mellitus  //Hypertension  //Hyperlipidemia  //Peripheral artery disease        Plan:    I personally interviewed/examined the patient; reviewed interval history, interpreted all available radiographic and laboratory data at the time of service. He has moderate COPD with concomitant restrictive impairment which is likely combination of extraparenchymal and intraparenchymal and reduction diffusion capacity most likely with COPD and CHF. He is moderate risk for surgery/general anesthesia because of cardiopulmonary status although his functional status at home is fairly good and he is able to do regular activities. Moderate risk for postop complication including postop hypoxia/respiratory failure/atelectasis and requirement of postop ventilatory support. Surgery is not contraindicated and from pulmonary standpoint okay to proceed with surgery with acceptable risk as above. Continue with BiPAP at night 16/8/30 percent especially preop. BiPAP to be used as needed in case of pulmonary edema. Supplemental O2 to keep saturation 92%. Optimization of cardiac/CHF treatment per cardiology. He is on aspirin statin heparin amiodarone nitrates and beta-blocker. Encourage incentive spirometry, pulmonary toilet, aspiration precautions and bronchodilators  Recommend to continue with IV diuretics  Continue to monitor I/O with a goal of even/negative fluid balance  Antimicrobials reviewed; no need for antibiotic from pulmonary standpoint  DVT prophylaxis on heparin drip  Physical/occupational therapy; increase activity as tolerated. Discussed with nursing staff, treatment plan discussed with    I updated the patient regarding the current clinical condition, provisional diagnosis and management plan. He verbalized a clear understanding and I addressed his concerns, and answered all questions to the best of my abilities. It was my pleasure to evaluate Romayne Spikes today. We will continue to follow.  I would like to thank you for allowing me to participate in the care of this patient. Please feel free to call with any further questions or concerns. Augustina Reis MD.   Pulmonary and Critical Care Medicine           8/12/2021, 10:11 AM    Please note that this chart was generated using voice recognition Dragon dictation software. Although every effort was made to ensure the accuracy of this automated transcription, some errors in transcription may have occurred.

## 2021-08-12 NOTE — PROGRESS NOTES
[facility]   Progress Note                    Date:   8/12/2021  Patient name:  Milford Meigs  Date of admission:  8/9/2021  8:13 AM  MRN:   [id number]  YOB: 1945  PCP:    ERICK Llanes CNP    Reason for Admission:  Shortness of breath [R06.02]  Acute on chronic systolic heart failure ([address] [I50.23]  Chest pain [R07.9]  Chest pain, unspecified type [R07.9]    Subjective:      Clinical Changes / Abnormalities:    Patient seen and examined at bedside. 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 Transferred to CV-ICU post procedure for CV surgery evaluation. No acute events overnight. Reports no chest pain or shortness of breath. Laying comfortably in bed.      Urine output in the last 24 hours:     Intake/Output Summary (Last 24 hours) at 8/12/2021 0643  Last data filed at 8/12/2021 0400  Gross per 24 hour   Intake --   Output 700 ml   Net -700 ml     I/O since admission: -2.6 liters    Medications:   Scheduled Meds:   aspirin  81 mg Oral Daily    atorvastatin  80 mg Oral Nightly    insulin glargine  25 Units Subcutaneous Nightly    amiodarone  200 mg Oral Daily    [Held by provider] apixaban  5 mg Oral BID    furosemide  40 mg Intravenous Daily    gabapentin  300 mg Oral TID    pantoprazole  40 mg Oral QAM AC    sodium chloride flush  5-40 mL Intravenous 2 times per day    insulin lispro  0-18 Units Subcutaneous TID WC    insulin lispro  0-9 Units Subcutaneous Nightly    isosorbide mononitrate  30 mg Oral Daily     Continuous Infusions:   heparin (PORCINE) Infusion 13.59 Units/kg/hr (08/11/21 2250)    sodium chloride 25 mL (08/11/21 1200)    dextrose       CBC:   Recent Labs     08/10/21  0308 08/11/21  0502 08/12/21  0603   WBC 11.2 9.1 6.9   HGB 10.9* 10.3* 10.1*    260 274     BMP:    Recent Labs     08/10/21  0308 08/10/21  1651 08/11/21  0502    139 143   K 4.3 3.9 3.8   CL 98 99 102   CO2 25 27 27   BUN 40* 47* 40*   CREATININE 1.45* 1.40* 1.28*   GLUCOSE 359* 302* 173*     Hepatic:   Recent Labs     21  0822   AST 51*   ALT 39   BILITOT 0.30   ALKPHOS 71     Troponin: No results for input(s): TROPONINI in the last 72 hours. Recent Labs     21  0822 21  1101 21  2207   TROPONINT NOT REPORTED NOT REPORTED NOT REPORTED     BNP:   Recent Labs     21   PROBNP 2,985*      No results for input(s): BNP in the last 72 hours. Lipids: No results for input(s): CHOL, HDL in the last 72 hours. Invalid input(s): LDLCALCU  INR:   Recent Labs     21  0822 21  1407   INR 1.0 1.0       Objective:   Vitals: /81   Pulse 61   Temp 98.5 °F (36.9 °C) (Oral)   Resp 12   Ht 6' (1.829 m)   Wt 231 lb (104.8 kg)   SpO2 98%   BMI 31.33 kg/m²    Constitutional and General Appearance:   Alert, cooperative, no distress and appears stated age  Respiratory:  No for increased work of breathing. On auscultation: diminished breath sounds bilaterally  Cardiovascular:  Regular S1 and S2. No murmurs  Abdomen:   No masses or tenderness  Bowel sounds present  Extremities:   No Cyanosis or Clubbing   Lower extremity edema: No  Neurological:  Alert and oriented. DATA:    Diagnostics:    EK21  Atrial-paced rhythm with prolonged AV conduction, Left axis deviation, Nonspecific ST abnormality, Prolonged QT    ECHO:  21  Summary  Left ventricle is normal in size. Global left ventricular systolic function  is mildly reduced. Visually estimated EF 45-50%. Left atrium is normal in size. Right atrium is normal in size. Normal right ventricular size and function. Pacemaker / ICD lead seen in right ventricle. Normal mitral valve structure. Trivial mitral regurgitation. Normal tricuspid valve structure and function. No tricuspid regurgitation. No pericardial effusion seen. Cardiac Angiography: 21  LMCA: has distal 20-30% stenosis.      LAD: has proximal 85% stenosis followed by 95% instent restenosis. The D1   has patent stent followed by 75% stenosis. The D2 has ostial 80% stenosis. LCx: has mid 60% stenosis. The OM1 has ostial 80% stenosis. The OM2 has   ostial 70 stenosis. RCA: has mid 20% instent restenosis. The distal RCA has 30% stenosis. The   RPDA has mid 80% stenosis. Coronary Tree Dominance: Right       Procedure Summary   Multi-vessel Coronary Artery Disease. LVEF 20%. Calcium noted femoral artery during ultrasound guided access. Recommendations   Medical therapy as needed. Risk factor modification. Routine Post Diagnostic Cath orders. Urgent surgical CABG (required before discharge, patient stable with medical management). Assessment / Acute Cardiac Problems:   MV-CAD. Detailed report listed above. LAD prox 85% followed by 95% instent restenosis. D1 75% stenosis distal to stent. D2 80% stenosis. LCx mid 60% with OM1 ostial 80% and OM2 ostial 70%. RPDA mid 80% stenosis. ICM (EF 45%)  VT s/p AICD  Acute on chronic diastolic and systolic hF  HTN  HLD  Paroxysmal A fib on eliquis  PVD    Plan of Treatment:   Continue heparin gtt. Hold Eliquis in setting of heparin gtt. Continue amiodarone 200 once daily  Continue ASA  Continue lipitor 80 mg daily  Continue lasix 40 mg IV daily  Continue imdur 30 mg daily  CV surgery consulted, appreciate recommendations. Will discuss cath films with Dr. Lauren Hoskins regarding best option moving forward PCI vs CABG vs medical management. K>4, Mg>2    Anjelica Stanton MD, MD  Fellow, [address]      Attending Cardiologist Addendum: I have reviewed and performed the history, physical, subjective, objective, assessment, and plan with the student/resident/fellow/APN and agree with the note. I performed the history and physical personally. I have made changes to the note above as needed.     Will have Dr. Lauren Hoskins review images and decide if high risk PCI is a possibility, if not will

## 2021-08-12 NOTE — PROGRESS NOTES
[facility]  Daily Progress Note    Surgeon: Dr Davis Ardon   CC: Multivessel CAD s/p cardiac cath  OR: TBD. Eliquis: last dose 8/10/2021 @ 18:00  EF: 20%      Subjective:  Mr. Onofre Milner feels well today with no acute complaints. Remains on heparin gtt per Cardiology. Oxygen via NC at baseline. Encourage OOBTC and ambulating. Denies chest pain or shortness of breath. Plan of care reviewed and questions answered. Physical Exam  Vital Signs: /81   Pulse 61   Temp 98.5 °F (36.9 °C) (Oral)   Resp 12   Ht 6' (1.829 m)   Wt 231 lb (104.8 kg)   SpO2 98%   BMI 31.33 kg/m²  O2 Flow Rate (L/min): 3 L/min   Admit Weight: Weight: 228 lb 9.9 oz (103.7 kg)   WEIGHTWeight: 231 lb (104.8 kg)     General: alert and oriented to person, place and time, well-developed and well-nourished, in no acute distress. Up in chair  Heart: Normal S1 and S2.  Regular rhythm. No murmurs, gallops, or rubs. Lungs: clear to auscultation bilaterally and diminished breath sounds bilaterally  Abdomen: soft, non tender, non distended, BSx4  Extremities: 1+ pitting edema  Wounds: clean and dry, healing appropriately.      Scheduled Meds:    aspirin  81 mg Oral Daily    atorvastatin  80 mg Oral Nightly    insulin glargine  25 Units Subcutaneous Nightly    amiodarone  200 mg Oral Daily    [Held by provider] apixaban  5 mg Oral BID    furosemide  40 mg Intravenous Daily    gabapentin  300 mg Oral TID    pantoprazole  40 mg Oral QAM AC    sodium chloride flush  5-40 mL Intravenous 2 times per day    insulin lispro  0-18 Units Subcutaneous TID WC    insulin lispro  0-9 Units Subcutaneous Nightly    isosorbide mononitrate  30 mg Oral Daily     Continuous Infusions:    heparin (PORCINE) Infusion 13.59 Units/kg/hr (08/11/21 2250)    sodium chloride 25 mL (08/11/21 1200)    dextrose         Data:  CBC:   Recent Labs     08/10/21  0308 08/11/21  0502 08/12/21  0603   WBC 11.2 9.1 6.9   HGB 10.9* 10.3* 10.1* HCT 37.8* 36.1* 35.9*   MCV 80.3* 81.3* 83.1    260 274     BMP:   Recent Labs     08/10/21  0308 08/10/21  1651 08/11/21  0502    139 143   K 4.3 3.9 3.8   CL 98 99 102   CO2 25 27 27   BUN 40* 47* 40*   CREATININE 1.45* 1.40* 1.28*     PT/INR:   Recent Labs     08/09/21  0822 08/11/21  1407   PROTIME 11.1 10.5   INR 1.0 1.0     APTT:   Recent Labs     08/11/21  1909 08/11/21  2214 08/12/21  0603   APTT 22.8 24.1 37.0*         I/O:  I/O last 3 completed shifts: In: 130 [I.V.:130]  Out: 1200 [Urine:1200]      Assessment:  · Multivessel CAD s/p cardiac cath  ? History of stenting to LCx, OM, Diag & RCA  · Acute on chronic systolic heart failure  · Paroxysmal atrial fibrillation with chronic anticoagulation on Eliquis  ?  Last dose yesterday @ 18:00  · History of VT s/p AICD  · Obstructive sleep apnea  · Pulmonary veno-occlusive disease  · Diabetes mellitus type 2 with long term use of insulin  · Hypertension  · Hyperlipidemia  · Claudication with suspected PAD  · Obesity, BMI 31.19      Plan:    Remains inpatient on telemetry,    Monitor vitals closely including continuous pulse oximetry   Oxygen as needed via nasal cannula to maintain SpO2 > 92%   Educate on incentive spirometry use and significance prior to OR   Monitor CBC, BMP, INR and Mag daily   Heparin gtt per Cardiology  o No need for further pharmaceutical DVT prophylaxis   Patient is on ASA, Statin therapy per protocol    Amiodarone 200 mg daily per home dose   SCDs while stationary    Protonix for GI prophylaxis   Replace electrolytes as needed per sliding scale and recheck per policy   PT/OT evalutation for discharge recommendation and ambulation 3x daily   Case Management consult for discharge planning   Pulmonology consult   Surgical candidacy to be determined after testing completed  o OR will not be scheduled until Monday at earliest due to timing of last dose of Eliquis      The above recommendations including medications and orders were discussed and agreed upon with , the attending on service for the cardiothoracic surgery group today. Electronically signed by ERICK Gill CNP on 8/12/2021 at 6:58 AM     At least 50% of the time documented was spent with the patient to provide counseling and/or coordination of care. This note was created with the assistance of a speech-recognition program.  Although the intention is to generate a document that actually reflects the content of the visit, no guarantees can be provided that every mistake has been identified and corrected by editing. Note was updated later by me after  physical examination and  completion of the assessment.

## 2021-08-12 NOTE — PROGRESS NOTES
[facility]  Internal Medicine Teaching Residency Program  Inpatient Daily Progress Note  ______________________________________________________________________________    Patient: Abrahan Flores  YOB: 1945   TSH:[id number]    Acct: [de-identified]     Room: [address] [place] [address]  Admit date: 8/9/2021  Today's date: 08/12/21  Number of days in the hospital: 3    SUBJECTIVE   Admitting Diagnosis: <principal problem not specified>  CC: chest pain and SOB. Pt seen & examined at bedside. Chart & results reviewed. Vitals stable. No acute events overnight. Patient slept well. Denies chest pain, shortness of breath, dizziness, nausea. He is eating okay no bowel bladder complaints. He underwent cardiac catheterization yesterday and was found to have multi-vessel Coronary Artery Disease with LVEF 20%. Further plans for CABG versus PCI versus medical management will be based on discussion between cardio and CVTS.      ROS:  Constitutional:  negative for chills, fevers, sweats  Respiratory:  negative for cough, dyspnea on exertion, hemoptysis, shortness of breath, wheezing  Cardiovascular:  negative for chest pain, chest pressure/discomfort, lower extremity edema, palpitations  Gastrointestinal:  negative for abdominal pain, constipation, diarrhea, nausea, vomiting  Neurological:  negative for dizziness, headache  BRIEF HISTORY   A 76 y. o.  male presents with a chief c/o sudden onset chest pain. He described it a diffuse heaviness in chest, radiating to R elbow, pain was constant, 8/10 in intensity, associated with sweating and SOB at rest. He tried increasing concentration home O2 and lasix but nothing helped. EMS was called. As per the records, he received NTG which improved his chest pain, 324 aspirin and 1 ICD shock.  In ED, his SpO2 74% on NRB, BiPAP placed, with plans to decrease FiO2.      He has PMH of A fib (on eliquis), CHF (EF-%), peripheral vascular disease, DM type 2, Hypertension, Hyperlipidemia. He reports to be compliant with his medications. He is being evaluated for PAD claudication at [address]. He also underwent CTA abdomen in this regard and found to have atherosclerotic disease and calcification of AA and short seg occlusion of celiac artery. He has been having lower limb claudication for alteast 4-5 months. He has H/o MI in . Over the years in , he got 8 stents in total. He has in dwelling L subclavian AICD. He was also started on Eliquis around 2021.       OBJECTIVE     Vital Signs:  /81   Pulse 61   Temp 98.5 °F (36.9 °C) (Oral)   Resp 12   Ht 6' (1.829 m)   Wt 231 lb (104.8 kg)   SpO2 98%   BMI 31.33 kg/m²     Temp (24hrs), Av.2 °F (36.8 °C), Min:97.8 °F (36.6 °C), Max:98.6 °F (37 °C)    In: -   Out: 700 [Urine:700]    Physical Exam:  Constitutional: This is a well developed, well nourished, 30-34.9 - Obesity Grade I 77y.o. year old male who is alert, oriented, cooperative and in no apparent distress. Head:normocephalic and atraumatic. EENT:  PERRLA. No conjunctival injections. Septum was midline, mucosa was without erythema, exudates or cobblestoning. No thrush was noted. Neck: Supple without thyromegaly. No elevated JVP. Trachea was midline. Respiratory: Chest was symmetrical without dullness to percussion. Breath sounds bilaterally were clear to auscultation. There were no wheezes, rhonchi or rales. There is no intercostal retraction or use of accessory muscles. No egophony noted. Cardiovascular: Regular without murmur, clicks, gallops or rubs. Abdomen: Slightly rounded and soft without organomegaly. No rebound, rigidity or guarding was appreciated. Lymphatic: No lymphadenopathy. Musculoskeletal: Normal curvature of the spine. No gross muscle weakness. Extremities:  No lower extremity edema, ulcerations, tenderness, varicosities or erythema.   Muscle size, tone and strength are normal.  No involuntary movements are noted. Skin:  Warm and dry. Good color, turgor and pigmentation. No lesions or scars. No cyanosis or clubbing  Neurological/Psychiatric: The patient's general behavior, level of consciousness, thought content and emotional status is normal.        Medications:  Scheduled Medications:    aspirin  81 mg Oral Daily    atorvastatin  80 mg Oral Nightly    insulin glargine  25 Units Subcutaneous Nightly    amiodarone  200 mg Oral Daily    furosemide  40 mg Intravenous Daily    gabapentin  300 mg Oral TID    pantoprazole  40 mg Oral QAM AC    sodium chloride flush  5-40 mL Intravenous 2 times per day    insulin lispro  0-18 Units Subcutaneous TID WC    insulin lispro  0-9 Units Subcutaneous Nightly    isosorbide mononitrate  30 mg Oral Daily     Continuous Infusions:    heparin (PORCINE) Infusion 13.59 Units/kg/hr (08/11/21 2250)    sodium chloride 25 mL (08/11/21 1200)    dextrose       PRN Medicationsheparin (porcine), 4,000 Units, PRN  heparin (porcine), 2,000 Units, PRN  albuterol, 2.5 mg, As Directed RT PRN  sodium chloride flush, 5-40 mL, PRN  sodium chloride, 25 mL, PRN  ondansetron, 4 mg, Q8H PRN   Or  ondansetron, 4 mg, Q6H PRN  polyethylene glycol, 17 g, Daily PRN  acetaminophen, 650 mg, Q6H PRN   Or  acetaminophen, 650 mg, Q6H PRN  glucose, 15 g, PRN  dextrose, 12.5 g, PRN  glucagon (rDNA), 1 mg, PRN  dextrose, 100 mL/hr, PRN  albuterol, 2.5 mg, As Directed RT PRN        Diagnostic Labs:  CBC:   Recent Labs     08/10/21  0308 08/11/21  0502 08/12/21  0603   WBC 11.2 9.1 6.9   RBC 4.71 4.44 4.32   HGB 10.9* 10.3* 10.1*   HCT 37.8* 36.1* 35.9*   MCV 80.3* 81.3* 83.1   RDW 17.2* 17.9* 18.2*    260 274     BMP:   Recent Labs     08/10/21  1651 08/11/21  0502 08/12/21  0603    143 143   K 3.9 3.8 3.8   CL 99 102 106   CO2 27 27 26   BUN 47* 40* 25*   CREATININE 1.40* 1.28* 1.03     BNP: No results for input(s): BNP in the last 72 hours.   PT/INR:   Recent Labs 08/09/21  0822 08/11/21  1407   PROTIME 11.1 10.5   INR 1.0 1.0     APTT:   Recent Labs     08/11/21  1909 08/11/21  2214 08/12/21  0603   APTT 22.8 24.1 37.0*     CARDIAC ENZYMES: No results for input(s): CKMB, CKMBINDEX, TROPONINI in the last 72 hours.     Invalid input(s): CKTOTAL;3  FASTING LIPID PANEL:  Lab Results   Component Value Date    CHOL 233 10/29/2018    HDL 37 10/29/2018    TRIG 172 10/29/2018     LIVER PROFILE:   Recent Labs     08/09/21  0822 08/12/21  0603   AST 51* 14   ALT 39 28   BILITOT 0.30 0.27*   ALKPHOS 71 71      MICROBIOLOGY:   Lab Results   Component Value Date/Time    CULTURE NO SIGNIFICANT GROWTH 03/18/2021 06:29 PM       Imaging:    XR CHEST PORTABLE    Result Date: 8/9/2021  Satisfactory lead position Mild diffuse bilateral interstitial infiltrates related to mild vascular congestion and/or infection       ASSESSMENT & PLAN   This is V 26 y.o.  male who presented with chest pain and SOB, was found to have acute hypoxic respiratory failure,  Patient admitted to inpatient status for cardiac evaluation and management of Acute on chronic systolic CHF.     Acute hypoxic respiratory failure- resolving  · 78% SpO2 on 15 L O2 non rebreather  · Improved on BiPAP  · Transitioned to NC O2 4 L     Multivessel- Coronary Artery Disease:  · On heparin drip  · Troponins negative  · Cardiology and CV surgery on board  · Continue ASA 81, Lipitor 80, Lasix 40, Imdur 30  · ECHO (5/25/21): LVEF 25-30%  · ECHO (8/11/21) : EF 45-50%, Global left ventricular systolic function   is mildly reduced, Pacemaker / ICD lead seen in right ventricle, Trivial MR.     Acute on chronic systolic heart failure CHF- resolving  · Placed on BiPAP-> transitioned to NC   · Continue Lasix 40 mg IV  · Monitor strict intake and output  · CXR: Mild diffuse bilateral interstitial infiltrates related to mild vascular   congestion and/or infection      DM type II:  · Hold metformin  · Continue Lantus nightly  · Continue Humalog   · Insulin sliding scale     Atrial fibrillation:  · Hold Eliquis  · Patient on heparin drip  · Will resume home medications- amiodarone, metoprolol and Cardizem,  if his blood pressure tolerates  · Amiodarone 200 mg continue   · AICD interrogation planned. · Plan for Cardiac cath today.     H/o VT:  · S/p  AICD     DVT ppx:  · On heparin drip     GI ppx:   · On protonix     PT/OT/SW:   · Ongoing     Discharge Planning:   ·  to assist in the discharge [facility]  Internal Medicine Resident, PGY-1  [facility];  [place], [place]  8/12/2021, 7:19 AM

## 2021-08-13 LAB
ACTIVATED CLOTTING TIME: 199 SEC (ref 79–149)
ANION GAP SERPL CALCULATED.3IONS-SCNC: 9 MMOL/L (ref 9–17)
BUN BLDV-MCNC: 17 MG/DL (ref 8–23)
BUN/CREAT BLD: ABNORMAL (ref 9–20)
CALCIUM SERPL-MCNC: 8.3 MG/DL (ref 8.6–10.4)
CHLORIDE BLD-SCNC: 105 MMOL/L (ref 98–107)
CO2: 27 MMOL/L (ref 20–31)
CREAT SERPL-MCNC: 0.9 MG/DL (ref 0.7–1.2)
GFR AFRICAN AMERICAN: >60 ML/MIN
GFR NON-AFRICAN AMERICAN: >60 ML/MIN
GFR SERPL CREATININE-BSD FRML MDRD: ABNORMAL ML/MIN/{1.73_M2}
GFR SERPL CREATININE-BSD FRML MDRD: ABNORMAL ML/MIN/{1.73_M2}
GLUCOSE BLD-MCNC: 178 MG/DL (ref 70–99)
GLUCOSE BLD-MCNC: 215 MG/DL (ref 75–110)
GLUCOSE BLD-MCNC: 228 MG/DL (ref 75–110)
HCT VFR BLD CALC: 35.2 % (ref 40.7–50.3)
HEMOGLOBIN: 10 G/DL (ref 13–17)
MAGNESIUM: 2.2 MG/DL (ref 1.6–2.6)
MCH RBC QN AUTO: 23.3 PG (ref 25.2–33.5)
MCHC RBC AUTO-ENTMCNC: 28.4 G/DL (ref 28.4–34.8)
MCV RBC AUTO: 82.1 FL (ref 82.6–102.9)
NRBC AUTOMATED: 0 PER 100 WBC
PARTIAL THROMBOPLASTIN TIME: 54.6 SEC (ref 20.5–30.5)
PDW BLD-RTO: 18 % (ref 11.8–14.4)
PLATELET # BLD: 228 K/UL (ref 138–453)
PMV BLD AUTO: 9.8 FL (ref 8.1–13.5)
POTASSIUM SERPL-SCNC: 3.9 MMOL/L (ref 3.7–5.3)
RBC # BLD: 4.29 M/UL (ref 4.21–5.77)
SODIUM BLD-SCNC: 141 MMOL/L (ref 135–144)
WBC # BLD: 6.3 K/UL (ref 3.5–11.3)

## 2021-08-13 PROCEDURE — 6370000000 HC RX 637 (ALT 250 FOR IP): Performed by: STUDENT IN AN ORGANIZED HEALTH CARE EDUCATION/TRAINING PROGRAM

## 2021-08-13 PROCEDURE — 36415 COLL VENOUS BLD VENIPUNCTURE: CPT

## 2021-08-13 PROCEDURE — 6370000000 HC RX 637 (ALT 250 FOR IP): Performed by: NURSE PRACTITIONER

## 2021-08-13 PROCEDURE — 83735 ASSAY OF MAGNESIUM: CPT

## 2021-08-13 PROCEDURE — 2000000000 HC ICU R&B

## 2021-08-13 PROCEDURE — 2500000003 HC RX 250 WO HCPCS

## 2021-08-13 PROCEDURE — 99232 SBSQ HOSP IP/OBS MODERATE 35: CPT | Performed by: INTERNAL MEDICINE

## 2021-08-13 PROCEDURE — 97116 GAIT TRAINING THERAPY: CPT

## 2021-08-13 PROCEDURE — 2060000000 HC ICU INTERMEDIATE R&B

## 2021-08-13 PROCEDURE — 85347 COAGULATION TIME ACTIVATED: CPT

## 2021-08-13 PROCEDURE — 2709999900 HC NON-CHARGEABLE SUPPLY

## 2021-08-13 PROCEDURE — 6370000000 HC RX 637 (ALT 250 FOR IP)

## 2021-08-13 PROCEDURE — C1874 STENT, COATED/COV W/DEL SYS: HCPCS

## 2021-08-13 PROCEDURE — 93005 ELECTROCARDIOGRAM TRACING: CPT | Performed by: INTERNAL MEDICINE

## 2021-08-13 PROCEDURE — C1769 GUIDE WIRE: HCPCS

## 2021-08-13 PROCEDURE — C1887 CATHETER, GUIDING: HCPCS

## 2021-08-13 PROCEDURE — C1894 INTRO/SHEATH, NON-LASER: HCPCS

## 2021-08-13 PROCEDURE — 82947 ASSAY GLUCOSE BLOOD QUANT: CPT

## 2021-08-13 PROCEDURE — 027034Z DILATION OF CORONARY ARTERY, ONE ARTERY WITH DRUG-ELUTING INTRALUMINAL DEVICE, PERCUTANEOUS APPROACH: ICD-10-PCS | Performed by: INTERNAL MEDICINE

## 2021-08-13 PROCEDURE — 85027 COMPLETE CBC AUTOMATED: CPT

## 2021-08-13 PROCEDURE — C1725 CATH, TRANSLUMIN NON-LASER: HCPCS

## 2021-08-13 PROCEDURE — 6360000002 HC RX W HCPCS: Performed by: STUDENT IN AN ORGANIZED HEALTH CARE EDUCATION/TRAINING PROGRAM

## 2021-08-13 PROCEDURE — 2580000003 HC RX 258: Performed by: STUDENT IN AN ORGANIZED HEALTH CARE EDUCATION/TRAINING PROGRAM

## 2021-08-13 PROCEDURE — 80048 BASIC METABOLIC PNL TOTAL CA: CPT

## 2021-08-13 PROCEDURE — 85730 THROMBOPLASTIN TIME PARTIAL: CPT

## 2021-08-13 PROCEDURE — 6360000002 HC RX W HCPCS

## 2021-08-13 PROCEDURE — APPNB30 APP NON BILLABLE TIME 0-30 MINS: Performed by: NURSE PRACTITIONER

## 2021-08-13 PROCEDURE — C9600 PERC DRUG-EL COR STENT SING: HCPCS | Performed by: INTERNAL MEDICINE

## 2021-08-13 PROCEDURE — 97110 THERAPEUTIC EXERCISES: CPT

## 2021-08-13 PROCEDURE — 6360000004 HC RX CONTRAST MEDICATION

## 2021-08-13 RX ORDER — LISINOPRIL 20 MG/1
40 TABLET ORAL DAILY
Status: DISCONTINUED | OUTPATIENT
Start: 2021-08-13 | End: 2021-08-15 | Stop reason: HOSPADM

## 2021-08-13 RX ORDER — ACETAMINOPHEN 325 MG/1
650 TABLET ORAL EVERY 4 HOURS PRN
Status: DISCONTINUED | OUTPATIENT
Start: 2021-08-13 | End: 2021-08-15 | Stop reason: HOSPADM

## 2021-08-13 RX ORDER — SODIUM CHLORIDE 0.9 % (FLUSH) 0.9 %
5-40 SYRINGE (ML) INJECTION PRN
Status: DISCONTINUED | OUTPATIENT
Start: 2021-08-13 | End: 2021-08-15 | Stop reason: HOSPADM

## 2021-08-13 RX ORDER — METOPROLOL TARTRATE 50 MG/1
50 TABLET, FILM COATED ORAL 2 TIMES DAILY
Status: DISCONTINUED | OUTPATIENT
Start: 2021-08-13 | End: 2021-08-15 | Stop reason: HOSPADM

## 2021-08-13 RX ORDER — ASPIRIN 81 MG/1
81 TABLET, CHEWABLE ORAL DAILY
Status: DISCONTINUED | OUTPATIENT
Start: 2021-08-14 | End: 2021-08-13

## 2021-08-13 RX ORDER — SODIUM CHLORIDE 0.9 % (FLUSH) 0.9 %
5-40 SYRINGE (ML) INJECTION EVERY 12 HOURS SCHEDULED
Status: DISCONTINUED | OUTPATIENT
Start: 2021-08-13 | End: 2021-08-15 | Stop reason: HOSPADM

## 2021-08-13 RX ORDER — SODIUM CHLORIDE 9 MG/ML
25 INJECTION, SOLUTION INTRAVENOUS PRN
Status: DISCONTINUED | OUTPATIENT
Start: 2021-08-13 | End: 2021-08-15 | Stop reason: HOSPADM

## 2021-08-13 RX ORDER — ONDANSETRON 2 MG/ML
4 INJECTION INTRAMUSCULAR; INTRAVENOUS EVERY 6 HOURS PRN
Status: DISCONTINUED | OUTPATIENT
Start: 2021-08-13 | End: 2021-08-15 | Stop reason: HOSPADM

## 2021-08-13 RX ADMIN — ASPIRIN 81 MG: 81 TABLET, COATED ORAL at 08:20

## 2021-08-13 RX ADMIN — ACETAMINOPHEN 650 MG: 325 TABLET ORAL at 23:32

## 2021-08-13 RX ADMIN — METOPROLOL TARTRATE 50 MG: 50 TABLET, FILM COATED ORAL at 20:25

## 2021-08-13 RX ADMIN — FUROSEMIDE 40 MG: 40 TABLET ORAL at 08:20

## 2021-08-13 RX ADMIN — GABAPENTIN 300 MG: 300 CAPSULE ORAL at 20:26

## 2021-08-13 RX ADMIN — GABAPENTIN 300 MG: 300 CAPSULE ORAL at 15:28

## 2021-08-13 RX ADMIN — PANTOPRAZOLE SODIUM 40 MG: 40 TABLET, DELAYED RELEASE ORAL at 08:20

## 2021-08-13 RX ADMIN — AMIODARONE HYDROCHLORIDE 200 MG: 200 TABLET ORAL at 08:20

## 2021-08-13 RX ADMIN — SODIUM CHLORIDE, PRESERVATIVE FREE 10 ML: 5 INJECTION INTRAVENOUS at 20:26

## 2021-08-13 RX ADMIN — INSULIN LISPRO 3 UNITS: 100 INJECTION, SOLUTION INTRAVENOUS; SUBCUTANEOUS at 20:26

## 2021-08-13 RX ADMIN — GABAPENTIN 300 MG: 300 CAPSULE ORAL at 08:19

## 2021-08-13 RX ADMIN — ATORVASTATIN CALCIUM 80 MG: 80 TABLET, FILM COATED ORAL at 20:26

## 2021-08-13 RX ADMIN — HEPARIN SODIUM AND DEXTROSE 17.55 UNITS/KG/HR: 10000; 5 INJECTION INTRAVENOUS at 12:36

## 2021-08-13 RX ADMIN — INSULIN GLARGINE 25 UNITS: 100 INJECTION, SOLUTION SUBCUTANEOUS at 20:26

## 2021-08-13 RX ADMIN — INSULIN LISPRO 6 UNITS: 100 INJECTION, SOLUTION INTRAVENOUS; SUBCUTANEOUS at 12:35

## 2021-08-13 RX ADMIN — METOPROLOL TARTRATE 50 MG: 50 TABLET, FILM COATED ORAL at 10:04

## 2021-08-13 RX ADMIN — ISOSORBIDE MONONITRATE 30 MG: 30 TABLET ORAL at 12:35

## 2021-08-13 RX ADMIN — LISINOPRIL 40 MG: 20 TABLET ORAL at 12:35

## 2021-08-13 ASSESSMENT — ENCOUNTER SYMPTOMS
ALLERGIC/IMMUNOLOGIC NEGATIVE: 1
CONSTIPATION: 0
BLOOD IN STOOL: 0
VOICE CHANGE: 0
WHEEZING: 0
COUGH: 1
TROUBLE SWALLOWING: 0
SORE THROAT: 0
VOMITING: 0
DIARRHEA: 0
EYE REDNESS: 0
PHOTOPHOBIA: 0
SHORTNESS OF BREATH: 0

## 2021-08-13 ASSESSMENT — PAIN SCALES - GENERAL
PAINLEVEL_OUTOF10: 0
PAINLEVEL_OUTOF10: 0
PAINLEVEL_OUTOF10: 3

## 2021-08-13 NOTE — CARE COORDINATION
Transitional planning-call from [facility] accepted. Patient to have High Risk PCI.

## 2021-08-13 NOTE — PROGRESS NOTES
[facility]  Internal Medicine Teaching Residency Program  Inpatient Daily Progress Note  ______________________________________________________________________________    Patient: Khris Culver  YOB: 1945   FSA:[id number]    Acct: [de-identified]     Room: [address] [place] [address]  Admit date: 8/9/2021  Today's date: 08/13/21  Number of days in the hospital: 4    SUBJECTIVE   Admitting Diagnosis: Unstable angina ([address]  CC: chest pain and SOB. Pt seen & examined at bedside. Chart & results reviewed. Vitals stable. No acute events overnight, except elevated BP. He slept well. He denies headache, dizziness, chest pain, palpitation, SOB. Patient is eating well, denies bowel bladder complaints. Discussion is  ongoing between cardio and CV surgery for further management, most likely, will go for PCI stenting. ROS:  Constitutional:  negative for chills, fevers, sweats  Respiratory:  negative for cough, dyspnea on exertion, hemoptysis, shortness of breath, wheezing  Cardiovascular:  negative for chest pain, chest pressure/discomfort, lower extremity edema, palpitations  Gastrointestinal:  negative for abdominal pain, constipation, diarrhea, nausea, vomiting  Neurological:  negative for dizziness, headache  BRIEF HISTORY   A 76 y. o.  male presents with a chief c/o sudden onset chest pain. He described it a diffuse heaviness in chest, radiating to R elbow, pain was constant, 8/10 in intensity, associated with sweating and SOB at rest. He tried increasing concentration home O2 and lasix but nothing helped. EMS was called. As per the records, he received NTG which improved his chest pain, 324 aspirin and 1 ICD shock. In ED, his SpO2 74% on NRB, BiPAP placed, with plans to decrease FiO2. He has PMH of A fib (on eliquis), CHF (EF-%), peripheral vascular disease, DM type 2, Hypertension, Hyperlipidemia. He reports to be compliant with his medications.    He is being No lesions or scars. No cyanosis or clubbing  Neurological/Psychiatric: The patient's general behavior, level of consciousness, thought content and emotional status is normal.        Medications:  Scheduled Medications:    metoprolol tartrate  50 mg Oral BID    lisinopril  40 mg Oral Daily    furosemide  40 mg Oral Daily    aspirin  81 mg Oral Daily    atorvastatin  80 mg Oral Nightly    insulin glargine  25 Units Subcutaneous Nightly    amiodarone  200 mg Oral Daily    gabapentin  300 mg Oral TID    pantoprazole  40 mg Oral QAM AC    sodium chloride flush  5-40 mL Intravenous 2 times per day    insulin lispro  0-18 Units Subcutaneous TID WC    insulin lispro  0-9 Units Subcutaneous Nightly    isosorbide mononitrate  30 mg Oral Daily     Continuous Infusions:    heparin (PORCINE) Infusion 17.59 Units/kg/hr (08/12/21 2308)    sodium chloride 25 mL (08/11/21 1200)    dextrose       PRN Medicationsheparin (porcine), 4,000 Units, PRN  heparin (porcine), 2,000 Units, PRN  albuterol, 2.5 mg, As Directed RT PRN  sodium chloride flush, 5-40 mL, PRN  sodium chloride, 25 mL, PRN  ondansetron, 4 mg, Q8H PRN   Or  ondansetron, 4 mg, Q6H PRN  polyethylene glycol, 17 g, Daily PRN  acetaminophen, 650 mg, Q6H PRN   Or  acetaminophen, 650 mg, Q6H PRN  glucose, 15 g, PRN  dextrose, 12.5 g, PRN  glucagon (rDNA), 1 mg, PRN  dextrose, 100 mL/hr, PRN  albuterol, 2.5 mg, As Directed RT PRN        Diagnostic Labs:  CBC:   Recent Labs     08/11/21  0502 08/12/21  0603 08/13/21  0539   WBC 9.1 6.9 6.3   RBC 4.44 4.32 4.29   HGB 10.3* 10.1* 10.0*   HCT 36.1* 35.9* 35.2*   MCV 81.3* 83.1 82.1*   RDW 17.9* 18.2* 18.0*    274 228     BMP:   Recent Labs     08/11/21  0502 08/12/21  0603 08/13/21  0539    143 141   K 3.8 3.8 3.9    106 105   CO2 27 26 27   BUN 40* 25* 17   CREATININE 1.28* 1.03 0.90     BNP: No results for input(s): BNP in the last 72 hours.   PT/INR:   Recent Labs     08/11/21  1407   PROTIME 10. 5   INR 1.0     APTT:   Recent Labs     08/12/21  1418 08/12/21  2220 08/13/21  0539   APTT 39.9* 50.1* 54.6*     CARDIAC ENZYMES: No results for input(s): CKMB, CKMBINDEX, TROPONINI in the last 72 hours. Invalid input(s): CKTOTAL;3  FASTING LIPID PANEL:  Lab Results   Component Value Date    CHOL 233 10/29/2018    HDL 37 10/29/2018    TRIG 172 10/29/2018     LIVER PROFILE:   Recent Labs     08/12/21  0603   AST 14   ALT 28   BILITOT 0.27*   ALKPHOS 71      MICROBIOLOGY:   Lab Results   Component Value Date/Time    CULTURE NO SIGNIFICANT GROWTH 08/11/2021 11:26 PM       Imaging:    CT chest without contrast    Result Date: 8/12/2021  No acute cardiopulmonary process.      XR CHEST PORTABLE    Result Date: 8/9/2021  Satisfactory lead position Mild diffuse bilateral interstitial infiltrates related to mild vascular congestion and/or infection       ASSESSMENT & PLAN   This is A 25 y.o.  male who presented with chest pain and SOB, was found to have acute hypoxic respiratory failure,  Patient admitted to inpatient status for cardiac evaluation and management of Acute on chronic systolic CHF.     Acute hypoxic respiratory failure- resolving  · 78% SpO2 on 15 L O2 non rebreather  · Improved on BiPAP  · Transitioned to NC O2 4 L     Multivessel- Coronary Artery Disease:  · On heparin drip  · Troponins negative  · Cardiology and CV surgery on board  · Continue ASA 81, Lipitor 80, Lasix 40, Imdur 30  · ECHO (5/25/21): LVEF 25-30%  · ECHO (8/11/21) : EF 45-50%, Global left ventricular systolic function   is mildly reduced, Pacemaker / ICD lead seen in right ventricle, Trivial MR.     Acute on chronic systolic heart failure CHF- resolving  · Placed on BiPAP-> transitioned to NC   · Continue Lasix 40 mg IV  · Monitor strict intake and output  · CXR: Mild diffuse bilateral interstitial infiltrates related to mild vascular   congestion and/or infection      DM type II:  · Hold metformin  · Continue Lantus nightly  · Continue Humalog   · Insulin sliding scale     Atrial fibrillation:  · Hold Eliquis  · Patient on heparin drip  · Will resume home medications- amiodarone, metoprolol and Cardizem,  if his blood pressure tolerates  · Amiodarone 200 mg continue   · AICD interrogation planned. · Plan for Cardiac cath today. COPD:    · With stage II REGINA  · Pulmonology on board  · Not on active treatment    HTN  · We will resume home medications-  · Lopressor 50 mg twice daily, lisinopril 40 mg 40    HLP:  · Continue Lipitor 80 mg    H/o VT:  · S/p  AICD     DVT ppx:  · On heparin drip     GI ppx:   · On protonix     PT/OT/SW:   · Ongoing     Discharge Planning:   ·  to assist in the discharge [facility]  Internal Medicine Resident, PGY-1  Kacy Bryan;  [place], [place]  8/13/2021, 7:22 AM

## 2021-08-13 NOTE — PROGRESS NOTES
PULMONARY & CRITICAL CARE MEDICINE PROGRESS  NOTE     Patient:  Tricia Rodriguez  MRN: [id number]  Admit date: 8/9/2021  Primary Care Physician: ERICK Christine - CNP  Consulting Physician: Julius Argueta MD  CODE Status: Full Code  LOS: 4    SUBJECTIVE     I personally interviewed/examined the patient, reviewed interval history and interpreted all available radiographic, laboratory data at the time of service. Chief Compliant/Reason for Initial Consult:     COPD/preop evaluation/obstructive sleep apnea    Brief Hospital Course: The patient is a 77 y.o. male history of hypertension, hyperlipidemia, coronary artery disease status post multiple stents in the past, combined diastolic and systolic heart failure, chronic atrial fibrillation on Eliquis, diabetes mellitus, history of V. tach status post AICD on amiodarone, COPD with a stage II obstruction obstructive sleep apnea, recently diagnosed had not started on CPAP/BiPAP. Theron Millard Presented to emergency room with chest pain relieved with nitroglycerin chest pain was associated with shortness of breath tightness, hypoxic in the emergency room requiring nonrebreather and then BiPAP initial chest x-ray consistent with pulmonary edema, echocardiogram shows EF of 40%, treated with aspirin and statin Heparin and nitroglycerin and had cardiac catheterization done which shows multivessel coronary artery disease with a EF of 20% CT surgery was consulted for possible CABG. His pulmonary edema improved with diuretics and treatment for CHF and feeling much better. He is on 2 L nasal cannula maintaining saturation 100%. He was started on BiPAP at night and he used BiPAP 16/8/30 percent overnight.   Currently does not complain of cough purulent sputum wheezing shortness of breath or chest pain   According to patient in his usual state of health he is able to do his regular activities he does not usually complain of shortness of breath but activities are limited because of peripheral artery disease and osteoarthritis. He had been told that he has COPD and he is on inhaler at home which he uses as needed. He has history of smoking for 35 years 1 pack/day and quit in 1994. He is on home O2 at night which was a started in May 2021 after he was hospitalized in [facility] and was scheduled for sleep study then. He does not use oxygen during the daytime  He is retired used to drive truck. CT chest done on 08/12/2021 does not show infiltrate consolidation edema or pleural effusion at this time. Pulmonary function tests which were done 06/14/2021 showed FEV1 of 63% consistent with a stage II obstruction and concomitant restriction with TLC of 63% and severe reduction diffusion capacity of 40%. Sleep study on 07/09/2021 showed moderate obstructive sleep apnea with AHI of 19.       Interval History:  08/13/21  Overnight events noted, chart reviewed, medications seen. He is hemodynamically stable systolic blood pressure between 140 and 160. He is afebrile with T-max of 98.5 no acute hypoxic events were reported and he remains on room air with oxygen saturation 94 to 97%. He did use BiPAP overnight denies any problem using BiPAP and slept well. According patient he is feeling better he has only minimal cough denies sputum production denies chest pain and hemoptysis. According to patient he is out of bed to chair not ambulating to bathroom as he was told because of telemetry wires. Review of Systems:  Review of Systems   Constitutional: Positive for activity change. Negative for fatigue, fever and unexpected weight change. HENT: Negative for nosebleeds, sore throat, tinnitus, trouble swallowing and voice change. Eyes: Negative for photophobia, redness and visual disturbance. Respiratory: Positive for cough. Negative for shortness of breath and wheezing.     Cardiovascular: Negative for chest pain, palpitations and leg swelling. Gastrointestinal: Negative for blood in stool, constipation, diarrhea and vomiting. Endocrine: Negative. Genitourinary: Negative for dysuria, frequency and hematuria. Musculoskeletal: Negative. Allergic/Immunologic: Negative. Neurological: Negative for dizziness, syncope, facial asymmetry, speech difficulty, weakness and headaches. Hematological: Negative for adenopathy. Does not bruise/bleed easily. Psychiatric/Behavioral: Negative. OBJECTIVE     VITAL SIGNS:   LAST-  BP (!) 151/84   Pulse 68   Temp 97.9 °F (36.6 °C) (Oral)   Resp 18   Ht 6' (1.829 m)   Wt 231 lb 14.8 oz (105.2 kg)   SpO2 97%   BMI 31.45 kg/m²   8-24 HR RANGE-  TEMP Temp  Av.1 °F (36.7 °C)  Min: 97.9 °F (36.6 °C)  Max: 98.5 °F (35.6 °C)   BP Systolic (30WOW), NKQ:459 , Min:144 , MTI:665      Diastolic (67BEY), GRC:46, Min:71, Max:85     PULSE Pulse  Av.4  Min: 60  Max: 69   RR Resp  Av.5  Min: 15  Max: 18   O2 SAT SpO2  Av.3 %  Min: 97 %  Max: 100 %   OXYGEN DELIVERY No data recorded     Systemic Examination:   Physical Exam  General appearance - looks comfortable and in no acute distress  Mental status - alert, oriented to person, place, and time  Eyes - pupils equal and reactive, extraocular eye movements intact  Mouth - mucous membranes moist, pharynx normal without lesions  Neck - supple, no significant adenopathy, short thick neck  Chest - Chest was symmetrical without dullness to percussion. Breath sounds bilaterally were clear to auscultation. There were no wheezes, rhonchi or rales.   There is no intercostal recession or use of accessory muscles  Heart - normal rate, regular rhythm, normal S1, S2, no murmurs, rubs, clicks or gallops  Abdomen - soft, nontender, nondistended, no masses or organomegaly  Neurological - alert, oriented, normal speech, no focal findings or movement disorder noted  Extremities - peripheral pulses normal, no pedal edema, no clubbing or cyanosis  Skin - normal coloration and turgor, no rashes, no suspicious skin lesions noted     DATA REVIEW     Medications:  Scheduled Meds:   metoprolol tartrate  50 mg Oral BID    lisinopril  40 mg Oral Daily    furosemide  40 mg Oral Daily    aspirin  81 mg Oral Daily    atorvastatin  80 mg Oral Nightly    insulin glargine  25 Units Subcutaneous Nightly    amiodarone  200 mg Oral Daily    gabapentin  300 mg Oral TID    pantoprazole  40 mg Oral QAM AC    sodium chloride flush  5-40 mL Intravenous 2 times per day    insulin lispro  0-18 Units Subcutaneous TID WC    insulin lispro  0-9 Units Subcutaneous Nightly    isosorbide mononitrate  30 mg Oral Daily     Continuous Infusions:   heparin (PORCINE) Infusion 17.59 Units/kg/hr (08/12/21 2308)    sodium chloride 25 mL (08/11/21 1200)    dextrose       LABS:-  ABG:   No results for input(s): POCPH, POCPCO2, POCPO2, POCHCO3, RPRG2ALN in the last 72 hours. CBC:   Recent Labs     08/11/21  0502 08/12/21  0603 08/13/21  0539   WBC 9.1 6.9 6.3   HGB 10.3* 10.1* 10.0*   HCT 36.1* 35.9* 35.2*   MCV 81.3* 83.1 82.1*    274 228   LYMPHOPCT 26 34  --    RBC 4.44 4.32 4.29   MCH 23.2* 23.4* 23.3*   MCHC 28.5 28.1* 28.4   RDW 17.9* 18.2* 18.0*     BMP:   Recent Labs     08/11/21  0502 08/12/21  0603 08/13/21  0539    143 141   K 3.8 3.8 3.9    106 105   CO2 27 26 27   BUN 40* 25* 17   CREATININE 1.28* 1.03 0.90   GLUCOSE 173* 185* 178*     Liver Function Test:   Recent Labs     08/12/21  0603   PROT 6.0*   LABALBU 3.4*   ALT 28   AST 14   ALKPHOS 71   BILITOT 0.27*     Amylase/Lipase:  No results for input(s): AMYLASE, LIPASE in the last 72 hours. Coagulation Profile:   Recent Labs     08/11/21  1407 08/11/21  1909 08/12/21  1418 08/12/21  2220 08/13/21  0539   INR 1.0  --   --   --   --    PROTIME 10.5  --   --   --   --    APTT 23.0   < > 39.9* 50.1* 54.6*    < > = values in this interval not displayed.      Cardiac Enzymes:  No results for input(s): CKTOTAL, CKMB, CKMBINDEX, TROPONINI in the last 72 hours. Lactic Acid:  No results found for: LACTA  BNP:   Lab Results   Component Value Date    .0 04/16/2021     D-Dimer:  Lab Results   Component Value Date    DDIMER 0.65 08/09/2021     Others:   Lab Results   Component Value Date    TSH 2.87 05/20/2020    T4FREE 1.30 05/20/2020     No results found for: ELOISE, RHEUMFACTOR, SEDRATE, CRP  No results found for: Abisai Lilly  No results found for: IRON, TIBC, FERRITIN  No results found for: SPEP, UPEP  Lab Results   Component Value Date    PSA 1.19 04/01/2015       Input/Output:    Intake/Output Summary (Last 24 hours) at 8/13/2021 1059  Last data filed at 8/13/2021 0800  Gross per 24 hour   Intake 945 ml   Output 1000 ml   Net -55 ml       Microbiology:  Recent Labs     08/11/21  2326   SPECDESC . CLEAN CATCH URINE   SPECIAL NOT REPORTED   CULTURE NO SIGNIFICANT GROWTH       Pathology:    Radiology reports:  CT chest without contrast   Final Result   No acute cardiopulmonary process.          VL DUP CAROTID BILATERAL   Final Result      VL VEIN MAPPING LOWER BILATERAL   Final Result      XR CHEST PORTABLE   Final Result   Satisfactory lead position      Mild diffuse bilateral interstitial infiltrates related to mild vascular   congestion and/or infection             Echocardiogram:   Results for orders placed during the hospital encounter of 08/09/21    ECHO Complete 2D W Doppler W Color    Narrative  Transthoracic Echocardiography Report (TTE)    Patient Name [facility]      Date of Study               08/11/2021  Spencer Cart    Date of      1945  Gender                      Male  Birth    Age          77 year(s)  Race                            Room Number  1005        Height:                     72 inch, 182.88 cm    Corporate ID [id number]    Weight:                     229 pounds, 103.9 kg  #    Patient Acct [de-identified]   BSA:          2.26 m^2      BMI:      31.06  # kg/m^2    MR #         [id number]     Sonographer                 Sam Armenta    Accession #  [id number]  Interpreting Physician      [address]    Fellow                   Referring Nurse  Practitioner    Interpreting             Referring Physician         Jessica Husain  Fellow    Type of Study    TTE procedure:2D Echocardiogram, M-Mode, Doppler, Color Doppler. Procedure Date  Date: 08/11/2021 Start: 09:12 AM    Study Location: [facility]  Technical Quality: Adequate visualization    Indications:Angina. History / Tech. Comments:  Echo done at patient bedside. Procedure explained to patient. Type 2 DM, HTN, CAD, REGINA    Patient Status: Inpatient    Height: 72 inches Weight: 229.01 pounds BSA: 2.26 m^2 BMI: 31.06 kg/m^2    Allergies  - *Unlisted:(Zocor, Coreg, Prevastatin, sertraline, citalopram). CONCLUSIONS    Summary  Left ventricle is normal in size. Global left ventricular systolic function  is mildly reduced. Visually estimated EF 45-50%. Left atrium is normal in size. Right atrium is normal in size. Normal right ventricular size and function. Pacemaker / ICD lead seen in right ventricle. Normal mitral valve structure. Trivial mitral regurgitation. Normal tricuspid valve structure and function. No tricuspid regurgitation. No pericardial effusion seen. Signature  ----------------------------------------------------------------------------  Electronically signed by Matthew Hedrick(Interpreting physician) on 08/11/2021  02:29 PM  ----------------------------------------------------------------------------    FINDINGS  Left Atrium  Left atrium is normal in size. Left Ventricle  Left ventricle is normal in size. Global left ventricular systolic function  is mildly reduced. Visually estimated EF 45-50%. Right Atrium  Right atrium is normal in size. Right Ventricle  Normal right ventricular size and function.   Pacemaker / Vevelyn Fragmin elevation MI  //Multivessel coronary artery disease  //Chronic obstructive pulmonary disease/moderate COPD/stage II obstruction with moderate restriction  //Obstructive sleep apnea  //History of coronary artery disease status post stent/PCI  //Acute on chronic combined systolic and diastolic heart failure  //Atrial fibrillation on Eliquis  //History of V. tach status post AICD  //Diabetes mellitus  //Hypertension  //Hyperlipidemia  //Peripheral artery disease    Plan:    He has moderate COPD with concomitant restrictive impairment which is likely combination of extraparenchymal and intraparenchymal and reduction diffusion capacity most likely with COPD and CHF. He is moderate risk for surgery/general anesthesia because of cardiopulmonary status although his functional status at home is fairly good and he is able to do regular activities. Moderate risk for postop complication including postop hypoxia/respiratory failure/atelectasis and possible requirement of postop ventilatory support. Surgery is not contraindicated and from pulmonary standpoint okay to proceed with surgery with acceptable risk as above.     Continue with BiPAP at night 16/8/30 percent discussed with patient the importance of treatment of sleep apnea especially preop and postop. Supplemental O2 if needed to keep saturation 92%. Optimization of cardiac/CHF treatment per cardiology. He is on aspirin statin heparin amiodarone nitrates and beta-blocker. Encourage incentive spirometry, pulmonary toilet, aspiration precautions and bronchodilators  Recommend to continue with IV diuretics  Continue to monitor I/O with a goal of even/negative fluid balance  Antimicrobials reviewed; no need for antibiotic from pulmonary standpoint  DVT prophylaxis on heparin drip  Physical/occupational therapy; increase activity as tolerated.      I updated the patient regarding the current clinical condition, provisional diagnosis and management plan.  I addressed concerns and answered all questions to the best of my abilities. It was my pleasure to evaluate Misael Murcia today. We will continue to follow. I would like to thank you for allowing me to participate in the care of this patient. Please feel free to call with any further questions or concerns. Popeye Truong MD, MEdwinD. Pulmonary and Critical Care Medicine           8/13/2021, 10:59 AM    Please note that this chart was generated using voice recognition Dragon dictation software. Although every effort was made to ensure the accuracy of this automated transcription, some errors in transcription may have occurred.

## 2021-08-13 NOTE — PROGRESS NOTES
Physical Therapy  Facility/Department: [facility] CAR 1  Daily Treatment Note  NAME: Gautam Taylor  : [birth year]  MRN: [id number]    Date of Service: 2021    Discharge Recommendations:    Further therapy recommended at discharge. Continue to assess pending progression of mobility. PT Equipment Recommendations  Equipment Needed: No     Assessment     Assessment: Pt ambulated 600ft w/ no AD SBA, pt with no LOB noted throughout session. Pt reports increased bilateral LE pain with increased gait distances. Pt reports good family support upon discharge and would continued skilled acute care physical therapy to address endurance deficits and stair negotiation to return pt to prior level of independence. Prognosis: Good  Decision Making: Medium Complexity  PT Education: Goals;PT Role;Plan of Care  REQUIRES PT FOLLOW UP: Yes  Activity Tolerance  Activity Tolerance: Patient Tolerated treatment well         Patient Diagnosis(es): The primary encounter diagnosis was Chest pain, unspecified type. A diagnosis of Shortness of breath was also pertinent to this visit. has a past medical history of Arrhythmia, CAD (coronary artery disease), GERD (gastroesophageal reflux disease), Heart attack ([address] Hyperlipidemia, Hypertension, Ischemic cardiomyopathy, Osteoarthritis, and Type II or unspecified type diabetes mellitus without mention of complication, not stated as uncontrolled. has a past surgical history that includes Pacemaker insertion; Appendectomy; Rotator cuff repair; Colonoscopy; joint replacement; Cardiac catheterization; Cardiac surgery; Tonsillectomy; Coronary angioplasty with stent (2018); Diagnostic Cardiac Cath Lab Procedure; and Percutaneous Transluminal Coronary Angio.     Restrictions  Restrictions/Precautions  Restrictions/Precautions: Fall Risk, Up as Tolerated  Required Braces or Orthoses?: No  Position Activity Restriction  Other position/activity restrictions: 2L O2 via nasal canula  Subjective    Pt sitting up in his chair. Pt has no c/o pain. Orientation    Overall Orientation Status: Within Functional Limits  Objective     Transfers  Sit to Stand: Stand by assistance  Stand to sit: Stand by assistance  Ambulation  Ambulation?: Yes  Ambulation 1  Surface: level tile  Device: No Device  Other Apparatus: O2 (2L)  Assistance: Stand by assistance  Quality of Gait: Forward flexed posture; increased hip flexion bilaterally; decreased step length bilaterally  Gait Deviations: Slow Makenzie  Distance: 600ft  Comments: Pt reports baseline gait with increased forward flexed posture, SpO2 94% following ambulation on 2L. Stairs/Curb  Stairs?: No  Balance  Posture: Fair  Sitting - Static: Good  Sitting - Dynamic: Good;-  Standing - Static: Good  Standing - Dynamic: Good;-  Comments: Standing balance assessed w/ no AD   Ex's  Seated LE exercise program: Long Arc Quads, hip abduction/adduction, heel/toe raises, and marches. Reps: 20 x each with 2 lb wt on B LE's. AM-PAC Score     AM-PAC Inpatient Mobility without Stair Climbing Raw Score : 19 (08/13/21 1046)  AM-PAC Inpatient without Stair Climbing T-Scale Score : 56.04 (08/13/21 1046)  Mobility Inpatient CMS 0-100% Score: 12.25 (08/13/21 1046)  Mobility Inpatient without Stair CMS G-Code Modifier : CI (08/13/21 1046)       Goals  Short term goals  Time Frame for Short term goals: 10  Short term goal 1: Pt to perform bed mobility and functional transfers independently  Short term goal 2: Ambulate 300ft w/ no AD with supervision  Short term goal 3: Ascend/descend 9 stairs with R rail to simulate home environment with supervision  Short term goal 4: Demonstrate standing dynamic balance of good - to decrease fall risk  Patient Goals   Patient goals :  To go home    Plan    Plan  Times per week: 4-5x/week  Current Treatment Recommendations: Transfer Training, Endurance Training, Gait Training, Balance Training, Stair training, Functional Mobility Training  Safety Devices  Type of devices: Left in chair, Call light within reach, Nurse notified  Restraints  Initially in place: No     Therapy Time   Individual Concurrent Group Co-treatment   Time In  1015         Time Out  [address] [place], [place]

## 2021-08-13 NOTE — PROGRESS NOTES
Pre Procedure Conscious Sedation Data:     ASA Class:                  [] I [] II [x] III [] IV     Mallampati Class:       [] I [x] II [] III [] Wei Olea MD

## 2021-08-13 NOTE — OP NOTE
[facility]    CARDIAC CATHETERIZATION    Date:   8/13/2021  Patient name:  Esthela Knight  Date of admission:  8/9/2021  8:13 AM  MRN:   [id number]  YOB: 1945    Operators:  Primary:   Gabino Vergara MD (Attending Physician)        Procedure performed:     [x] Left Heart Catheterization. [] Graft Angiography.  [] Left Ventriculography. [] Right Heart Catheterization. [x] Coronary Angiography. [] Aortic Valve Studies. [x] PCI:      [] Other:       Pre Procedure Conscious Sedation Data:  ASA Class:    [] I [x] II [] III [] IV    Mallampati Class:  [] I [x] II [] III [] IV      Indication:  [] STEMI      [] + Stress test  [] ACS      [] + EKG Changes  [] Non Q MI       [] Significant Risk Factors  [] Recurrent Angina             [] Diabetes Mellitus    [] New LBBB      [x] Other. PCI  [] CHF / Low EF changes     [] Abnormal CTA / Ca Score      Procedure:  Access:  [] Femoral  [x] Radial  artery       [x] Right  [] Left    Procedure: After informed consent was obtained with explanation of the risks and benefits, patient was brought to the cath lab. The access area was prepped and draped in sterile fashion. 1% lidocaine was used for local block. The artery was cannulated with 6  Fr sheath with brisk arterial blood return. The side port was frequently flushed and aspirated with normal saline. Estimated Blood Loss:  [x] Minimal < 25 cc [] Moderate 25-50 cc  [] >50 cc    Findings:    LAD:         Lesion on Prox LAD: 90% stenosis 28 mm length reduced to 0%. Pre     procedure RADHA III flow was noted. Post Procedure RADHA III flow was     present. Good runoff was present. The lesion was diagnosed as Moderate     Risk (B). Devices used         - Luge Wire 182 cm. Number of passes: 2.         - Mini Trek Balloon 2.0mm x 12mm. 3 inflation(s) to a max pressure of:     14 gabi. - 6 Fr. Guideliner Catheter. Number of passes: 1.         - NC Trek 2.5x20mm Balloon.  2 inflation(s) to a max pressure of: 12     gabi. - Xience Lamar 2.5 x 33 ELLA. 1 inflation(s) to a max pressure of: 16     gabi. - Xience Lamar 2.25 x 15 ELLA. 1 inflation(s) to a max pressure of: 16     gabi. Lesion on Mid LAD: 80% stenosis 12 mm length reduced to 0%. Pre procedure     RADHA III flow was noted. Post Procedure RADHA III flow was present. Good     runoff was present. The lesion was diagnosed as Moderate Risk (B). Devices used         - Luge Wire 182 cm.         - 6F Glidesheath 10cm. - Mini Trek Balloon 2.0mm x 12mm. - Balloon of Xience Lamar 2.25 x 15 ELLA. Conclusions:      Successful PCI / Drug Eluting Stent of the proximal and mid LAD     Recommendations:  Post-cath protocol  Continue optimal medical therapy  Risk factor modification          Electronically signed on 8/13/2021 at 5:11 PM by:    Berto Hastings MD  Fellow, [address]      Physician Statement  I have discussed the case of Yasmeen Cabrera including pertinent history and exam findings with the resident. I have seen and examined the patient and the key elements of the encounter have been performed by me. I agree with the assessment, plan and orders as documented by the resident With changes made to the note. Procedure performed by me.     Electronically signed by Althea Lozano MD on 8/17/2021 at 3:01 PM.    [facility]      [phone]

## 2021-08-13 NOTE — PROGRESS NOTES
Case discussed with Cardiology team. Patient would be a high risk for surgical complications related to lung function, mobility and poor targets secondary to previous stent placement. Therefore both Cardiothoracic and Cardiology teams feel that the patient would optimally benefit from high risk PCI. Cardiothoracic team will sign off at this time. Thank you for including us in the care of this patient.     Milton Magana, ERICK - CNP

## 2021-08-13 NOTE — PROGRESS NOTES
[facility]   Progress Note                    Date:   8/13/2021  Patient name:  Meri Verma  Date of admission:  8/9/2021  8:13 AM  MRN:   [id number]  YOB: 1945  PCP:    ERICK Holly CNP    Reason for Admission:  Shortness of breath [R06.02]  Acute on chronic systolic heart failure ([address] [I50.23]  Chest pain [R07.9]  Chest pain, unspecified type [R07.9]    Subjective:      Clinical Changes / Abnormalities:    Patient seen and examined at bedside. No acute events overnight. No chest pain or shortness of breath. Noted to be hypertensive overnight. Will restart home BP meds and monitor closely.     Urine output in the last 24 hours:     Intake/Output Summary (Last 24 hours) at 8/13/2021 0619  Last data filed at 8/13/2021 0541  Gross per 24 hour   Intake 705 ml   Output 600 ml   Net 105 ml     I/O since admission: -2.5 liters      Medications:   Scheduled Meds:   metoprolol tartrate  50 mg Oral BID    lisinopril  40 mg Oral Daily    furosemide  40 mg Oral Daily    aspirin  81 mg Oral Daily    atorvastatin  80 mg Oral Nightly    insulin glargine  25 Units Subcutaneous Nightly    amiodarone  200 mg Oral Daily    gabapentin  300 mg Oral TID    pantoprazole  40 mg Oral QAM AC    sodium chloride flush  5-40 mL Intravenous 2 times per day    insulin lispro  0-18 Units Subcutaneous TID WC    insulin lispro  0-9 Units Subcutaneous Nightly    isosorbide mononitrate  30 mg Oral Daily     Continuous Infusions:   heparin (PORCINE) Infusion 17.59 Units/kg/hr (08/12/21 2308)    sodium chloride 25 mL (08/11/21 1200)    dextrose       CBC:   Recent Labs     08/11/21  0502 08/12/21  0603 08/13/21  0539   WBC 9.1 6.9 6.3   HGB 10.3* 10.1* 10.0*    274 228     BMP:    Recent Labs     08/11/21  0502 08/12/21  0603 08/13/21  0539    143 141   K 3.8 3.8 3.9    106 105   CO2 27 26 27   BUN 40* 25* 17   CREATININE 1.28* 1.03 0.90   GLUCOSE 173* 185* 178* Hepatic:   Recent Labs     21  0603   AST 14   ALT 28   BILITOT 0.27*   ALKPHOS 71     Troponin: No results for input(s): TROPONINI in the last 72 hours. No results for input(s): TROPONINT in the last 72 hours. BNP: No results for input(s): PROBNP in the last 72 hours. No results for input(s): BNP in the last 72 hours. Lipids: No results for input(s): CHOL, HDL in the last 72 hours. Invalid input(s): LDLCALCU  INR:   Recent Labs     21  1407   INR 1.0       Objective:   Vitals: BP (!) 173/84   Pulse 60   Temp 98 °F (36.7 °C) (Axillary)   Resp 15   Ht 6' (1.829 m)   Wt 231 lb 14.8 oz (105.2 kg)   SpO2 100%   BMI 31.45 kg/m²    Constitutional and General Appearance:   · Alert, cooperative, no distress and appears stated age  Respiratory:  · No for increased work of breathing. · On auscultation: diminished breath sounds bilaterally  Cardiovascular:  · Regular S1 and S2.   · No murmurs  Abdomen:   · No masses or tenderness  · Bowel sounds present  Extremities:  ·  No Cyanosis or Clubbing  ·  Lower extremity edema: No  Neurological:  · Alert and oriented.        DATA:    Diagnostics:    EK21  Atrial-paced rhythm with prolonged AV conduction, Left axis deviation, Nonspecific ST abnormality, Prolonged QT     ECHO:  21  Summary  Left ventricle is normal in size. Global left ventricular systolic function  is mildly reduced. Visually estimated EF 45-50%. Left atrium is normal in size. Right atrium is normal in size. Normal right ventricular size and function. Pacemaker / ICD lead seen in right ventricle. Normal mitral valve structure. Trivial mitral regurgitation. Normal tricuspid valve structure and function. No tricuspid regurgitation. No pericardial effusion seen.     Cardiac Angiography: 21  LMCA: has distal 20-30% stenosis. LAD: has proximal 85% stenosis followed by 95% instent restenosis. The D1   has patent stent followed by 75% stenosis.  The D2 has ostial 80% stenosis. LCx: has mid 60% stenosis. The OM1 has ostial 80% stenosis. The OM2 has   ostial 70 stenosis. RCA: has mid 20% instent restenosis. The distal RCA has 30% stenosis. The   RPDA has mid 80% stenosis. Coronary Tree Dominance: Right       Procedure Summary   Multi-vessel Coronary Artery Disease. LVEF 20%.   Calcium noted femoral artery during ultrasound guided access.       Recommendations   Medical therapy as needed. Risk factor modification. Routine Post Diagnostic Cath orders. Urgent surgical CABG (required before discharge, patient stable with medical management).          Assessment / Acute Cardiac Problems:   1. MV-CAD. Detailed report listed above. LAD prox 85% followed by 95% instent restenosis. D1 75% stenosis distal to stent. D2 80% stenosis. LCx mid 60% with OM1 ostial 80% and OM2 ostial 70%. RPDA mid 80% stenosis. 2. ICM (EF 45%)  3. VT s/p AICD  4. 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 HTN  6. HLD  7. Paroxysmal A fib on eliquis  8. PVD     Plan of Treatment:   1. Continue heparin gtt. Hold Eliquis in setting of heparin gtt. 2. Continue amiodarone 200 once daily  3. Continue ASA  4. Continue lipitor 80 mg daily  5. Continue lasix 40 mg PO daily  6. Continue imdur 30 mg daily  7. Restart home Lopressor dose of 50 mg BID  8. Restart home lisinopril dose of 40 mg daily  9. CV surgery consulted, appreciate recommendations. 10. Will discuss cath films with Dr. Kacie Ornelas regarding best option moving forward PCI vs CABG vs medical management. 11. K>4, Mg>2    Nicolasa Trujillo MD, MD  Fellow, [address] [place] [address]      I performed a history and physical examination of the patient and discussed management with the resident. I reviewed the residents note and agree with the documented findings and plan of care. Any areas of disagreement are noted on the chart. I was personally present for the key portions of any procedures.  I have documented in the chart those procedures where I was not present during the key portions. I have personally evaluated this patient and have completed at least one if not all key elements of the E/M (history, physical exam, and MDM). Additional findings are as noted. Will discuss PCI of LAD.     Hector Mccurdy MD

## 2021-08-14 LAB
ALBUMIN SERPL-MCNC: 3.2 G/DL (ref 3.5–5.2)
ALBUMIN/GLOBULIN RATIO: 1.2 (ref 1–2.5)
ALP BLD-CCNC: 67 U/L (ref 40–129)
ALT SERPL-CCNC: 29 U/L (ref 5–41)
ANION GAP SERPL CALCULATED.3IONS-SCNC: 12 MMOL/L (ref 9–17)
AST SERPL-CCNC: 21 U/L
BILIRUB SERPL-MCNC: 0.21 MG/DL (ref 0.3–1.2)
BUN BLDV-MCNC: 19 MG/DL (ref 8–23)
BUN/CREAT BLD: ABNORMAL (ref 9–20)
CALCIUM SERPL-MCNC: 8.5 MG/DL (ref 8.6–10.4)
CHLORIDE BLD-SCNC: 104 MMOL/L (ref 98–107)
CO2: 23 MMOL/L (ref 20–31)
CREAT SERPL-MCNC: 0.92 MG/DL (ref 0.7–1.2)
EKG ATRIAL RATE: 60 BPM
EKG P-R INTERVAL: 320 MS
EKG Q-T INTERVAL: 474 MS
EKG QRS DURATION: 102 MS
EKG QTC CALCULATION (BAZETT): 474 MS
EKG R AXIS: -34 DEGREES
EKG T AXIS: -29 DEGREES
EKG VENTRICULAR RATE: 60 BPM
GFR AFRICAN AMERICAN: >60 ML/MIN
GFR NON-AFRICAN AMERICAN: >60 ML/MIN
GFR SERPL CREATININE-BSD FRML MDRD: ABNORMAL ML/MIN/{1.73_M2}
GFR SERPL CREATININE-BSD FRML MDRD: ABNORMAL ML/MIN/{1.73_M2}
GLUCOSE BLD-MCNC: 195 MG/DL (ref 75–110)
GLUCOSE BLD-MCNC: 210 MG/DL (ref 75–110)
GLUCOSE BLD-MCNC: 212 MG/DL (ref 70–99)
GLUCOSE BLD-MCNC: 257 MG/DL (ref 75–110)
GLUCOSE BLD-MCNC: 294 MG/DL (ref 75–110)
HCT VFR BLD CALC: 35.9 % (ref 40.7–50.3)
HEMOGLOBIN: 10.2 G/DL (ref 13–17)
MCH RBC QN AUTO: 23.1 PG (ref 25.2–33.5)
MCHC RBC AUTO-ENTMCNC: 28.4 G/DL (ref 28.4–34.8)
MCV RBC AUTO: 81.4 FL (ref 82.6–102.9)
NRBC AUTOMATED: 0 PER 100 WBC
PDW BLD-RTO: 18.1 % (ref 11.8–14.4)
PLATELET # BLD: 256 K/UL (ref 138–453)
PMV BLD AUTO: 10.3 FL (ref 8.1–13.5)
POTASSIUM SERPL-SCNC: 3.7 MMOL/L (ref 3.7–5.3)
RBC # BLD: 4.41 M/UL (ref 4.21–5.77)
SODIUM BLD-SCNC: 139 MMOL/L (ref 135–144)
TOTAL PROTEIN: 5.8 G/DL (ref 6.4–8.3)
WBC # BLD: 6.7 K/UL (ref 3.5–11.3)

## 2021-08-14 PROCEDURE — 97535 SELF CARE MNGMENT TRAINING: CPT

## 2021-08-14 PROCEDURE — 6370000000 HC RX 637 (ALT 250 FOR IP): Performed by: STUDENT IN AN ORGANIZED HEALTH CARE EDUCATION/TRAINING PROGRAM

## 2021-08-14 PROCEDURE — 99232 SBSQ HOSP IP/OBS MODERATE 35: CPT | Performed by: INTERNAL MEDICINE

## 2021-08-14 PROCEDURE — 85027 COMPLETE CBC AUTOMATED: CPT

## 2021-08-14 PROCEDURE — 2580000003 HC RX 258: Performed by: STUDENT IN AN ORGANIZED HEALTH CARE EDUCATION/TRAINING PROGRAM

## 2021-08-14 PROCEDURE — 80053 COMPREHEN METABOLIC PANEL: CPT

## 2021-08-14 PROCEDURE — 82947 ASSAY GLUCOSE BLOOD QUANT: CPT

## 2021-08-14 PROCEDURE — 2000000000 HC ICU R&B

## 2021-08-14 PROCEDURE — 36415 COLL VENOUS BLD VENIPUNCTURE: CPT

## 2021-08-14 PROCEDURE — 94660 CPAP INITIATION&MGMT: CPT

## 2021-08-14 PROCEDURE — 2060000000 HC ICU INTERMEDIATE R&B

## 2021-08-14 RX ORDER — APIXABAN 5 MG/1
5 TABLET, FILM COATED ORAL 2 TIMES DAILY
Qty: 60 TABLET | Refills: 5 | Status: SHIPPED | OUTPATIENT
Start: 2021-08-14 | End: 2021-08-20 | Stop reason: SDUPTHER

## 2021-08-14 RX ORDER — ASPIRIN 81 MG/1
81 TABLET ORAL DAILY
Qty: 7 TABLET | Refills: 0 | Status: SHIPPED | OUTPATIENT
Start: 2021-08-15 | End: 2021-10-20 | Stop reason: ALTCHOICE

## 2021-08-14 RX ORDER — ATORVASTATIN CALCIUM 80 MG/1
80 TABLET, FILM COATED ORAL NIGHTLY
Qty: 30 TABLET | Refills: 3 | Status: SHIPPED | OUTPATIENT
Start: 2021-08-14 | End: 2021-12-08 | Stop reason: SDUPTHER

## 2021-08-14 RX ADMIN — AMIODARONE HYDROCHLORIDE 200 MG: 200 TABLET ORAL at 08:30

## 2021-08-14 RX ADMIN — METOPROLOL TARTRATE 50 MG: 50 TABLET, FILM COATED ORAL at 21:02

## 2021-08-14 RX ADMIN — LISINOPRIL 40 MG: 20 TABLET ORAL at 08:30

## 2021-08-14 RX ADMIN — SODIUM CHLORIDE, PRESERVATIVE FREE 10 ML: 5 INJECTION INTRAVENOUS at 08:37

## 2021-08-14 RX ADMIN — GABAPENTIN 300 MG: 300 CAPSULE ORAL at 14:20

## 2021-08-14 RX ADMIN — METOPROLOL TARTRATE 50 MG: 50 TABLET, FILM COATED ORAL at 08:30

## 2021-08-14 RX ADMIN — ISOSORBIDE MONONITRATE 30 MG: 30 TABLET ORAL at 08:30

## 2021-08-14 RX ADMIN — ATORVASTATIN CALCIUM 80 MG: 80 TABLET, FILM COATED ORAL at 21:02

## 2021-08-14 RX ADMIN — SODIUM CHLORIDE, PRESERVATIVE FREE 10 ML: 5 INJECTION INTRAVENOUS at 08:31

## 2021-08-14 RX ADMIN — INSULIN LISPRO 3 UNITS: 100 INJECTION, SOLUTION INTRAVENOUS; SUBCUTANEOUS at 08:31

## 2021-08-14 RX ADMIN — INSULIN LISPRO 9 UNITS: 100 INJECTION, SOLUTION INTRAVENOUS; SUBCUTANEOUS at 14:20

## 2021-08-14 RX ADMIN — FUROSEMIDE 40 MG: 40 TABLET ORAL at 08:30

## 2021-08-14 RX ADMIN — SODIUM CHLORIDE, PRESERVATIVE FREE 10 ML: 5 INJECTION INTRAVENOUS at 21:18

## 2021-08-14 RX ADMIN — TICAGRELOR 90 MG: 90 TABLET ORAL at 21:02

## 2021-08-14 RX ADMIN — INSULIN LISPRO 9 UNITS: 100 INJECTION, SOLUTION INTRAVENOUS; SUBCUTANEOUS at 17:42

## 2021-08-14 RX ADMIN — APIXABAN 5 MG: 5 TABLET, FILM COATED ORAL at 08:30

## 2021-08-14 RX ADMIN — INSULIN GLARGINE 25 UNITS: 100 INJECTION, SOLUTION SUBCUTANEOUS at 21:05

## 2021-08-14 RX ADMIN — GABAPENTIN 300 MG: 300 CAPSULE ORAL at 08:31

## 2021-08-14 RX ADMIN — TICAGRELOR 90 MG: 90 TABLET ORAL at 08:30

## 2021-08-14 RX ADMIN — GABAPENTIN 300 MG: 300 CAPSULE ORAL at 21:02

## 2021-08-14 RX ADMIN — ASPIRIN 81 MG: 81 TABLET, COATED ORAL at 10:07

## 2021-08-14 RX ADMIN — INSULIN LISPRO 3 UNITS: 100 INJECTION, SOLUTION INTRAVENOUS; SUBCUTANEOUS at 21:04

## 2021-08-14 RX ADMIN — APIXABAN 5 MG: 5 TABLET, FILM COATED ORAL at 21:02

## 2021-08-14 RX ADMIN — PANTOPRAZOLE SODIUM 40 MG: 40 TABLET, DELAYED RELEASE ORAL at 10:07

## 2021-08-14 ASSESSMENT — ENCOUNTER SYMPTOMS
CONSTIPATION: 0
BLOOD IN STOOL: 0
COUGH: 0
PHOTOPHOBIA: 0
VOICE CHANGE: 0
DIARRHEA: 0
VOMITING: 0
WHEEZING: 0
TROUBLE SWALLOWING: 0
SHORTNESS OF BREATH: 0
SORE THROAT: 0
EYE REDNESS: 0
ALLERGIC/IMMUNOLOGIC NEGATIVE: 1

## 2021-08-14 ASSESSMENT — PAIN SCALES - GENERAL
PAINLEVEL_OUTOF10: 0

## 2021-08-14 NOTE — PROGRESS NOTES
[facility]   Progress Note                    Date:   8/14/2021  Patient name:  Marilee Cheung  Date of admission:  8/9/2021  8:13 AM  MRN:   [id number]  YOB: 1945  PCP:    Baljeet Dejesus APRN - CNP    Reason for Admission:  Shortness of breath [R06.02]  Acute on chronic systolic heart failure ([address] [I50.23]  Chest pain [R07.9]  Chest pain, unspecified type [R07.9]    Subjective:      Clinical Changes / Abnormalities:    Patient seen and examined at bedside. No acute events overnight. No chest pain or shortness of breath.   S/p PCI to LAD  Radial access  No hematoma      Medications:   Scheduled Meds:   metoprolol tartrate  50 mg Oral BID    lisinopril  40 mg Oral Daily    sodium chloride flush  5-40 mL Intravenous 2 times per day    ticagrelor  90 mg Oral BID    furosemide  40 mg Oral Daily    aspirin  81 mg Oral Daily    atorvastatin  80 mg Oral Nightly    insulin glargine  25 Units Subcutaneous Nightly    amiodarone  200 mg Oral Daily    gabapentin  300 mg Oral TID    pantoprazole  40 mg Oral QAM AC    sodium chloride flush  5-40 mL Intravenous 2 times per day    insulin lispro  0-18 Units Subcutaneous TID WC    insulin lispro  0-9 Units Subcutaneous Nightly    isosorbide mononitrate  30 mg Oral Daily     Continuous Infusions:   sodium chloride      sodium chloride 25 mL (08/11/21 1200)    dextrose       CBC:   Recent Labs     08/12/21  0603 08/13/21  0539 08/14/21  0500   WBC 6.9 6.3 6.7   HGB 10.1* 10.0* 10.2*    228 256     BMP:    Recent Labs     08/12/21  0603 08/13/21  0539 08/14/21  0500    141 139   K 3.8 3.9 3.7    105 104   CO2 26 27 23   BUN 25* 17 19   CREATININE 1.03 0.90 0.92   GLUCOSE 185* 178* 212*     Hepatic:   Recent Labs     08/12/21  0603 08/14/21  0500   AST 14 21   ALT 28 29   BILITOT 0.27* 0.21*   ALKPHOS 71 67       Recent Labs     08/11/21  1407   INR 1.0       Objective:   Vitals: /84   Pulse 60   Temp 97.4 °F (36.3 °C) (Oral)   Resp 14   Ht 6' (1.829 m)   Wt 228 lb 2.8 oz (103.5 kg)   SpO2 96%   BMI 30.95 kg/m²    Constitutional and General Appearance:   · Alert, cooperative, no distress and appears stated age  Respiratory:  · No for increased work of breathing. · On auscultation: diminished breath sounds bilaterally  Cardiovascular:  · Regular S1 and S2.   · No murmurs  Abdomen:   · No masses or tenderness  · Bowel sounds present  Extremities:  ·  No Cyanosis or Clubbing  ·  Lower extremity edema: No  Neurological:  · Alert and oriented.        DATA:    Diagnostics:    EK21  Atrial-paced rhythm with prolonged AV conduction, Left axis deviation, Nonspecific ST abnormality, Prolonged QT     ECHO:  21  Summary  Left ventricle is normal in size. Global left ventricular systolic function  is mildly reduced. Visually estimated EF 45-50%. Left atrium is normal in size. Right atrium is normal in size. Normal right ventricular size and function. Pacemaker / ICD lead seen in right ventricle. Normal mitral valve structure. Trivial mitral regurgitation. Normal tricuspid valve structure and function. No tricuspid regurgitation. No pericardial effusion seen.     Cardiac Angiography: 21  LMCA: has distal 20-30% stenosis. LAD: has proximal 85% stenosis followed by 95% instent restenosis. The D1   has patent stent followed by 75% stenosis. The D2 has ostial 80% stenosis. LCx: has mid 60% stenosis. The OM1 has ostial 80% stenosis. The OM2 has   ostial 70 stenosis. RCA: has mid 20% instent restenosis. The distal RCA has 30% stenosis. The   RPDA has mid 80% stenosis. Coronary Tree Dominance: Right       Procedure Summary   Multi-vessel Coronary Artery Disease. LVEF 20%.   Calcium noted femoral artery during ultrasound guided access.       Recommendations   Medical therapy as needed. Risk factor modification. Routine Post Diagnostic Cath orders.    Urgent surgical CABG (required before discharge, patient stable with medical management).          Assessment / Acute Cardiac Problems:   1. MV-CAD. S/p PCI to LAD  2. ICM (EF 45%)  3. VT s/p AICD  4. 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 HTN  6. HLD  7. Paroxysmal A fib on eliquis  8. PVD     Plan of Treatment:       On aspirin and brilinta  Restart home dose eliquis  D/c aspirin after one week  Continue Lopressor and atorvastatin  Continue amiodarone 200 mg daily  On lisinopril 40 mg daily  On imdur 30 mg daily  Outpatient follow up with cardiology  Discharge planning per primary      Leatha Henderson MD, MD  Fellow, Cardiovascular Diseases      I performed a history and physical examination of the patient and discussed management with the resident. I reviewed the residents note and agree with the documented findings and plan of care. Any areas of disagreement are noted on the chart. I was personally present for the key portions of any procedures. I have documented in the chart those procedures where I was not present during the key portions. I have personally evaluated this patient and have completed at least one if not all key elements of the E/M (history, physical exam, and MDM). Additional findings are as noted.     Megan Arreola MD

## 2021-08-14 NOTE — PROGRESS NOTES
Occupational Therapy  Facility/Department: [facility] CAR 1  Daily Treatment Note  NAME: Chan Morales  : 1945  MRN: [id number]    Date of Service: 2021    Discharge Recommendations:  Continue to assess pending progress (Pt likely to be safe to return home at discharge with good family support available as pt progresses towards below stated goals with therapy.)    Assessment   Performance deficits / Impairments: Decreased endurance;Decreased high-level IADLs;Decreased functional mobility   Prognosis: Good  Patient Education: Pt ed on OT role, POC, AE/DME use for home-Good return  REQUIRES OT FOLLOW UP: No  Activity Tolerance  Activity Tolerance: Patient Tolerated treatment well  Safety Devices  Safety Devices in place: Yes  Type of devices: Call light within reach; Left in chair;Nurse notified;Gait belt  Restraints  Initially in place: No         Patient Diagnosis(es): The primary encounter diagnosis was Chest pain, unspecified type. A diagnosis of Shortness of breath was also pertinent to this visit. has a past medical history of Arrhythmia, CAD (coronary artery disease), GERD (gastroesophageal reflux disease), Heart attack ([address] Hyperlipidemia, Hypertension, Ischemic cardiomyopathy, Osteoarthritis, and Type II or unspecified type diabetes mellitus without mention of complication, not stated as uncontrolled. has a past surgical history that includes Pacemaker insertion; Appendectomy; Rotator cuff repair; Colonoscopy; joint replacement; Cardiac catheterization; Cardiac surgery; Tonsillectomy; Coronary angioplasty with stent (2018); Diagnostic Cardiac Cath Lab Procedure; and Percutaneous Transluminal Coronary Angio.     Restrictions  Restrictions/Precautions  Restrictions/Precautions: Fall Risk, Up as Tolerated  Required Braces or Orthoses?: No  Position Activity Restriction  Other position/activity restrictions: 2L O2 via nasal canula  Subjective   General  Chart Reviewed: Yes  Patient assessed for Inpatient CMS G-Code Modifier : CI (08/14/21 1431)    Goals  Short term goals  Time Frame for Short term goals: Pt will, by discharge:  Short term goal 1: Perform ADL tasks independently  Short term goal 2: Independently demo good safety awareness during engagement in all ADL tasks and functional transfers/functional mobility  Short term goal 3: Perform functional transfers/functional mobility independently  Short term goal 4: Demo 15+ minutes standing tolerance for increased participation in ADL/IADL tasks  Short term goal 5: Independently demo ability to incorporate energy conservation techniques as needed during all functional task performance       Therapy Time   Individual Concurrent Group Co-treatment   Time In 1410         Time Out 1419         Minutes 9         Timed Code Treatment Minutes: 9 Minutes   Pt in chair upon arrival, pleasant and agreeable to tx this date.  Pt retired to chair at end of session, call light within reach, RN present at [address] [facility],[address], [facility],  Keyana Butcher

## 2021-08-14 NOTE — PROGRESS NOTES
PULMONARY & CRITICAL CARE MEDICINE PROGRESS  NOTE     Patient:  John Fowler  MRN: [id number]  Admit date: 8/9/2021  Primary Care Physician: ERICK Fajardo - CNP  Consulting Physician: Mayra Escobar MD  CODE Status: Full Code  LOS: 5    SUBJECTIVE     I personally interviewed/examined the patient, reviewed interval history and interpreted all available radiographic, laboratory data at the time of service. Chief Compliant/Reason for Initial Consult:     COPD/preop evaluation/obstructive sleep apnea    Brief Hospital Course: The patient is a 77 y.o. male history of hypertension, hyperlipidemia, coronary artery disease status post multiple stents in the past, combined diastolic and systolic heart failure, chronic atrial fibrillation on Eliquis, diabetes mellitus, history of V. tach status post AICD on amiodarone, COPD with a stage II obstruction obstructive sleep apnea, recently diagnosed had not started on CPAP/BiPAP. Althea Cook Presented to emergency room with chest pain relieved with nitroglycerin chest pain was associated with shortness of breath tightness, hypoxic in the emergency room requiring nonrebreather and then BiPAP initial chest x-ray consistent with pulmonary edema, echocardiogram shows EF of 40%, treated with aspirin and statin Heparin and nitroglycerin and had cardiac catheterization done which shows multivessel coronary artery disease with a EF of 20% CT surgery was consulted for possible CABG. His pulmonary edema improved with diuretics and treatment for CHF and feeling much better. He is on 2 L nasal cannula maintaining saturation 100%. He was started on BiPAP at night and he used BiPAP 16/8/30 percent overnight.   Currently does not complain of cough purulent sputum wheezing shortness of breath or chest pain   According to patient in his usual state of health he is able to do his regular activities he does not usually complain of shortness of breath but activities are limited because of peripheral artery disease and osteoarthritis. He had been told that he has COPD and he is on inhaler at home which he uses as needed. He has history of smoking for 35 years 1 pack/day and quit in 1994. He is on home O2 at night which was a started in May 2021 after he was hospitalized in [facility] and was scheduled for sleep study then. He does not use oxygen during the daytime  He is retired used to drive truck. CT chest done on 08/12/2021 does not show infiltrate consolidation edema or pleural effusion at this time. Pulmonary function tests which were done 06/14/2021 showed FEV1 of 63% consistent with a stage II obstruction and concomitant restriction with TLC of 63% and severe reduction diffusion capacity of 40%. Sleep study on 07/09/2021 showed moderate obstructive sleep apnea with AHI of 19.       Interval History:  08/14/21  Overnight events noted, chart seen medications reviewed. CT surgery/cardiology decided to proceed with PCI rather than CABG. Patient had ELLA/PCI done of proximal and mid LAD on 08/13/2021. According to patient he has been feeling good denies any chest pain. He does not have cough denies orthopnea PND and pedal edema and he is ambulating. He remained hemodynamically stable and on room air maintaining saturation above 96%. Review of Systems:  Review of Systems   Constitutional: Negative for activity change, fatigue, fever and unexpected weight change. HENT: Negative for nosebleeds, sore throat, tinnitus, trouble swallowing and voice change. Eyes: Negative for photophobia, redness and visual disturbance. Respiratory: Negative for cough, shortness of breath and wheezing. Cardiovascular: Negative for chest pain, palpitations and leg swelling. Gastrointestinal: Negative for blood in stool, constipation, diarrhea and vomiting. Endocrine: Negative.     Genitourinary: Negative for dysuria, frequency and hematuria. Musculoskeletal: Negative. Allergic/Immunologic: Negative. Neurological: Negative for dizziness, syncope, facial asymmetry, speech difficulty, weakness and headaches. Hematological: Negative for adenopathy. Does not bruise/bleed easily. Psychiatric/Behavioral: Negative. OBJECTIVE     VITAL SIGNS:   LAST-  /69   Pulse 60   Temp 97.9 °F (36.6 °C) (Oral)   Resp 18   Ht 6' (1.829 m)   Wt 228 lb 2.8 oz (103.5 kg)   SpO2 96%   BMI 30.95 kg/m²   8-24 HR RANGE-  TEMP Temp  Av.8 °F (36.6 °C)  Min: 97.4 °F (36.3 °C)  Max: 98.1 °F (99.8 °C)   BP Systolic (27HHO), LZF:720 , Min:121 , QAS:335      Diastolic (65LMV), WRE:53, Min:69, Max:104     PULSE Pulse  Av.6  Min: 60  Max: 76   RR Resp  Av  Min: 14  Max: 18   O2 SAT SpO2  Av %  Min: 96 %  Max: 96 %   OXYGEN DELIVERY No data recorded     Systemic Examination:   Physical Exam  General appearance - looks comfortable and in no acute distress  Mental status - alert, oriented to person, place, and time  Eyes - pupils equal and reactive, extraocular eye movements intact  Mouth - mucous membranes moist, pharynx normal without lesions  Neck - supple, no significant adenopathy, short thick neck  Chest - Chest was symmetrical without dullness to percussion. Breath sounds bilaterally were clear to auscultation. There were no wheezes, rhonchi or rales.   There is no intercostal recession or use of accessory muscles  Heart - normal rate, regular rhythm, normal S1, S2, no murmurs, rubs, clicks or gallops  Abdomen - soft, nontender, nondistended, no masses or organomegaly  Neurological - alert, oriented, normal speech, no focal findings or movement disorder noted  Extremities - peripheral pulses normal, no pedal edema, no clubbing or cyanosis  Skin - normal coloration and turgor, no rashes, no suspicious skin lesions noted     DATA REVIEW     Medications:  Scheduled Meds:   apixaban  5 mg Oral BID    metoprolol tartrate  50 mg Oral BID    lisinopril  40 mg Oral Daily    sodium chloride flush  5-40 mL Intravenous 2 times per day    ticagrelor  90 mg Oral BID    furosemide  40 mg Oral Daily    aspirin  81 mg Oral Daily    atorvastatin  80 mg Oral Nightly    insulin glargine  25 Units Subcutaneous Nightly    amiodarone  200 mg Oral Daily    gabapentin  300 mg Oral TID    pantoprazole  40 mg Oral QAM AC    sodium chloride flush  5-40 mL Intravenous 2 times per day    insulin lispro  0-18 Units Subcutaneous TID WC    insulin lispro  0-9 Units Subcutaneous Nightly    isosorbide mononitrate  30 mg Oral Daily     Continuous Infusions:   sodium chloride      sodium chloride 25 mL (08/11/21 1200)    dextrose       LABS:-  ABG:   No results for input(s): POCPH, POCPCO2, POCPO2, POCHCO3, LWFS0NCL in the last 72 hours. CBC:   Recent Labs     08/12/21  0603 08/13/21  0539 08/14/21  0500   WBC 6.9 6.3 6.7   HGB 10.1* 10.0* 10.2*   HCT 35.9* 35.2* 35.9*   MCV 83.1 82.1* 81.4*    228 256   LYMPHOPCT 34  --   --    RBC 4.32 4.29 4.41   MCH 23.4* 23.3* 23.1*   MCHC 28.1* 28.4 28.4   RDW 18.2* 18.0* 18.1*     BMP:   Recent Labs     08/12/21  0603 08/13/21  0539 08/14/21  0500    141 139   K 3.8 3.9 3.7    105 104   CO2 26 27 23   BUN 25* 17 19   CREATININE 1.03 0.90 0.92   GLUCOSE 185* 178* 212*     Liver Function Test:   Recent Labs     08/14/21  0500   PROT 5.8*   LABALBU 3.2*   ALT 29   AST 21   ALKPHOS 67   BILITOT 0.21*     Amylase/Lipase:  No results for input(s): AMYLASE, LIPASE in the last 72 hours. Coagulation Profile:   Recent Labs     08/11/21  1407 08/11/21  1909 08/12/21  1418 08/12/21  2220 08/13/21  0539   INR 1.0  --   --   --   --    PROTIME 10.5  --   --   --   --    APTT 23.0   < > 39.9* 50.1* 54.6*    < > = values in this interval not displayed. Cardiac Enzymes:  No results for input(s): CKTOTAL, CKMB, CKMBINDEX, TROPONINI in the last 72 hours.   Lactic Acid:  No results found for: Maximino Mike    Accession #  [id number]  Interpreting Physician      [address]    Fellow                   Referring Nurse  Practitioner    Interpreting             Referring Physician         Marquis Guido  Fellow    Type of Study    TTE procedure:2D Echocardiogram, M-Mode, Doppler, Color Doppler. Procedure Date  Date: 08/11/2021 Start: 09:12 AM    Study Location: [facility]  Technical Quality: Adequate visualization    Indications:Angina. History / Tech. Comments:  Echo done at patient bedside. Procedure explained to patient. Type 2 DM, HTN, CAD, REGINA    Patient Status: Inpatient    Height: 72 inches Weight: 229.01 pounds BSA: 2.26 m^2 BMI: 31.06 kg/m^2    Allergies  - *Unlisted:(Zocor, Coreg, Prevastatin, sertraline, citalopram). CONCLUSIONS    Summary  Left ventricle is normal in size. Global left ventricular systolic function  is mildly reduced. Visually estimated EF 45-50%. Left atrium is normal in size. Right atrium is normal in size. Normal right ventricular size and function. Pacemaker / ICD lead seen in right ventricle. Normal mitral valve structure. Trivial mitral regurgitation. Normal tricuspid valve structure and function. No tricuspid regurgitation. No pericardial effusion seen. Signature  ----------------------------------------------------------------------------  Electronically signed by Matthew Hedrick(Interpreting physician) on 08/11/2021  02:29 PM  ----------------------------------------------------------------------------    FINDINGS  Left Atrium  Left atrium is normal in size. Left Ventricle  Left ventricle is normal in size. Global left ventricular systolic function  is mildly reduced. Visually estimated EF 45-50%. Right Atrium  Right atrium is normal in size. Right Ventricle  Normal right ventricular size and function. Pacemaker / ICD lead seen in right ventricle. TAPSE value of 1.86cm noted. Mitral Valve  Normal mitral valve structure.   Trivial mitral regurgitation. Aortic Valve  Normal aortic valve structure and function without stenosis or  regurgitation. Tricuspid Valve  Normal tricuspid valve structure and function. No tricuspid regurgitation. Pulmonic Valve  The pulmonic valve is normal in structure. No pulmonic insufficiency. Pericardial Effusion  No pericardial effusion seen. Miscellaneous  IVC normal diameter & inspiratory collapse indicating normal RA filling  pressure . M-mode / 2D Measurements & Calculations:    LVIDd:7.2 cm(3.7 - 5.6 cm)       Diastolic GDHLPM:80.4 ml  UHQNJ:5.3 cm(2.2 - 4.0 cm)       Systolic FPQOSI:29.5 ml  FYAW:0.1 cm(0.6 - 1.1 cm)        Aortic Root:3 cm(2.0 - 3.7 cm)  LVPWd:0.9 cm(0.6 - 1.1 cm)       LA Dimension: 5.5 cm(1.9 - 4.0 cm)  Fractional Shortenin.33 %     LA volume/Index: 71.87 ml /32m^2  Calculated LVEF (%): 53.62 %     LVOT:2.3 cm  RVDd:2.6 cm    Mitral:                                  Aortic    Valve Area (P1/2-Time): 2.75 cm^2        Peak Velocity: 1.75 m/s  Peak E-Wave: 0.56 m/s                    Mean Velocity: 1.05 m/s  Peak A-Wave: 0.50 m/s                    Peak Gradient: 12.25 mmHg  E/A Ratio: 1.13                          Mean Gradient: 5 mmHg  Peak Gradient: 1.26 mmHg  Mean Gradient: 1 mmHg  Deceleration Time: 272 msec              Area (continuity): 1.9 cm^2  P1/2t: 80 msec                           AV VTI: 32.8 cm    Area (continuity): 2.12 cm^2  Mean Velocity: 0.50 m/s    Pulmonic:    Peak Velocity: 0.90 m/s  Peak Gradient: 3.21 mmHg    Diastology / Tissue Doppler    Lateral Wall E' velocity:0.06 m/s  Lateral Wall E/E':8.9      Cardiac Catheterization:   No results found for this or any previous visit.       ASSESSMENT AND PLAN     Assessment:    //Acute hypoxic respiratory failure secondary to acute pulmonary edema secondary to acute heart failure improved  //Non-ST elevation MI  //Multivessel coronary artery disease  //Status post PCI proximal and mid LAD on 2029  //Chronic obstructive pulmonary disease/moderate COPD/stage II obstruction with moderate restriction  //Obstructive sleep apnea  //History of coronary artery disease status post stent/PCI  //Acute on chronic combined systolic and diastolic heart failure  //Atrial fibrillation on Eliquis  //History of V. tach status post AICD  //Diabetes mellitus  //Hypertension  //Hyperlipidemia  //Peripheral artery disease    Plan:      Patient had ELLA/PCI liters/13/29  Continue with BiPAP at night 16/8/30 percent discussed with patient the importance of treatment of sleep apnea. Supplemental O2 if needed to keep saturation 92%. Optimization of cardiac/CHF treatment per cardiology. He is on aspirin statin Brilinta  amiodarone nitrates beta-blocker and Eliquis. Encourage incentive spirometry, pulmonary toilet, aspiration precautions and bronchodilators  Recommend to continue with diuretics  Continue to monitor I/O with a goal of even/negative fluid balance  Antimicrobials reviewed; no need for antibiotic from pulmonary standpoint  DVT prophylaxis on therapeutic Eliquis  Physical/occupational therapy; increase activity as tolerated.      He will need to start home CPAP/BiPAP once he is home. I updated the patient regarding the current clinical condition, provisional diagnosis and management plan. I addressed concerns and answered all questions to the best of my abilities. It was my pleasure to evaluate Chioma Cooper today. We will continue to follow. I would like to thank you for allowing me to participate in the care of this patient. Please feel free to call with any further questions or concerns. Eugenia Ruiz MD, M.D. Pulmonary and Critical Care Medicine           8/14/2021, 10:07 AM    Please note that this chart was generated using voice recognition Dragon dictation software. Although every effort was made to ensure the accuracy of this automated transcription, some errors in transcription may have occurred.

## 2021-08-14 NOTE — PROGRESS NOTES
improved his chest pain, 324 aspirin and 1 ICD shock. In ED, his SpO2 74% on NRB, BiPAP placed, with plans to decrease FiO2. He has PMH of A fib (on eliquis), CHF (EF-%), peripheral vascular disease, DM type 2, Hypertension, Hyperlipidemia. He reports to be compliant with his medications. He is being evaluated for PAD claudication at [address]. He also underwent CTA abdomen in this regard and found to have atherosclerotic disease and calcification of AA and short seg occlusion of celiac artery. He has been having lower limb claudication for alteast 4-5 months. He has H/o MI in . Over the years in , he got 8 stents in total. He has in dwelling L subclavian AICD. He was also started on Eliquis around 2021.     OBJECTIVE     Vital Signs:  /84   Pulse 60   Temp 97.4 °F (36.3 °C) (Oral)   Resp 14   Ht 6' (1.829 m)   Wt 228 lb 2.8 oz (103.5 kg)   SpO2 96%   BMI 30.95 kg/m²     Temp (24hrs), Av.8 °F (36.6 °C), Min:97.4 °F (36.3 °C), Max:98.1 °F (36.7 °C)    In: 480   Out: 3150 [Urine:3150]    Physical Exam:  Constitutional: This is a well developed, well nourished, 30-34.9 - Obesity Grade I 77y.o. year old male who is alert, oriented, cooperative and in no apparent distress. Head:normocephalic and atraumatic. EENT:  PERRLA. No conjunctival injections. Septum was midline, mucosa was without erythema, exudates or cobblestoning. No thrush was noted. Neck: Supple without thyromegaly. No elevated JVP. Trachea was midline. Respiratory: Chest was symmetrical without dullness to percussion. Breath sounds bilaterally were clear to auscultation. There were no wheezes, rhonchi or rales. There is no intercostal retraction or use of accessory muscles. No egophony noted. Cardiovascular: Regular without murmur, clicks, gallops or rubs. Abdomen: Slightly rounded and soft without organomegaly. No rebound, rigidity or guarding was appreciated.     Lymphatic: No lymphadenopathy. Musculoskeletal: Normal curvature of the spine. No gross muscle weakness. Extremities:  No lower extremity edema, ulcerations, tenderness, varicosities or erythema. Muscle size, tone and strength are normal.  No involuntary movements are noted. Skin:  Warm and dry. Good color, turgor and pigmentation. No lesions or scars.   No cyanosis or clubbing  Neurological/Psychiatric: The patient's general behavior, level of consciousness, thought content and emotional status is normal.        Medications:  Scheduled Medications:    metoprolol tartrate  50 mg Oral BID    lisinopril  40 mg Oral Daily    sodium chloride flush  5-40 mL Intravenous 2 times per day    ticagrelor  90 mg Oral BID    furosemide  40 mg Oral Daily    aspirin  81 mg Oral Daily    atorvastatin  80 mg Oral Nightly    insulin glargine  25 Units Subcutaneous Nightly    amiodarone  200 mg Oral Daily    gabapentin  300 mg Oral TID    pantoprazole  40 mg Oral QAM AC    sodium chloride flush  5-40 mL Intravenous 2 times per day    insulin lispro  0-18 Units Subcutaneous TID WC    insulin lispro  0-9 Units Subcutaneous Nightly    isosorbide mononitrate  30 mg Oral Daily     Continuous Infusions:    sodium chloride      sodium chloride 25 mL (08/11/21 1200)    dextrose       PRN Medicationssodium chloride flush, 5-40 mL, PRN  sodium chloride, 25 mL, PRN  acetaminophen, 650 mg, Q4H PRN  ondansetron, 4 mg, Q6H PRN  albuterol, 2.5 mg, As Directed RT PRN  sodium chloride flush, 5-40 mL, PRN  sodium chloride, 25 mL, PRN  ondansetron, 4 mg, Q8H PRN   Or  ondansetron, 4 mg, Q6H PRN  polyethylene glycol, 17 g, Daily PRN  acetaminophen, 650 mg, Q6H PRN   Or  acetaminophen, 650 mg, Q6H PRN  glucose, 15 g, PRN  dextrose, 12.5 g, PRN  glucagon (rDNA), 1 mg, PRN  dextrose, 100 mL/hr, PRN  albuterol, 2.5 mg, As Directed RT PRN        Diagnostic Labs:  CBC:   Recent Labs     08/12/21  0603 08/13/21  0539 08/14/21  0500   WBC 6.9 6.3 6. 7   RBC 4.32 4.29 4.41   HGB 10.1* 10.0* 10.2*   HCT 35.9* 35.2* 35.9*   MCV 83.1 82.1* 81.4*   RDW 18.2* 18.0* 18.1*    228 256     BMP:   Recent Labs     08/12/21  0603 08/13/21  0539    141   K 3.8 3.9    105   CO2 26 27   BUN 25* 17   CREATININE 1.03 0.90     BNP: No results for input(s): BNP in the last 72 hours. PT/INR:   Recent Labs     08/11/21  1407   PROTIME 10.5   INR 1.0     APTT:   Recent Labs     08/12/21  1418 08/12/21  2220 08/13/21  0539   APTT 39.9* 50.1* 54.6*     CARDIAC ENZYMES: No results for input(s): CKMB, CKMBINDEX, TROPONINI in the last 72 hours. Invalid input(s): CKTOTAL;3  FASTING LIPID PANEL:  Lab Results   Component Value Date    CHOL 233 10/29/2018    HDL 37 10/29/2018    TRIG 172 10/29/2018     LIVER PROFILE:   Recent Labs     08/12/21  0603   AST 14   ALT 28   BILITOT 0.27*   ALKPHOS 71      MICROBIOLOGY:   Lab Results   Component Value Date/Time    CULTURE NO SIGNIFICANT GROWTH 08/11/2021 11:26 PM       Imaging:    CT chest without contrast    Result Date: 8/12/2021  No acute cardiopulmonary process. XR CHEST PORTABLE    Result Date: 8/9/2021  Satisfactory lead position Mild diffuse bilateral interstitial infiltrates related to mild vascular congestion and/or infection       ASSESSMENT & PLAN   This is Q 89 y.o.  male who presented with chest pain and SOB, was found to have acute hypoxic respiratory failure,  Patient admitted to inpatient status for cardiac evaluation and management of Acute on chronic systolic CHF.     Acute hypoxic respiratory failure-resolved  · likely secondary to acute pulmonary edema secondary to acute heart failure.     Multivessel- Coronary Artery Disease:  · S/P PCI to LAD (8/13/2021)   · S/P 8 stents in the past   · Start aspirin.   Stop after 8 days   · Discontinue heparin drip  · Continue Lipitor 80, Lasix 40, Imdur 30  · ECHO (5/25/21): LVEF 25-30%  · ECHO (8/11/21) : EF 45-50%, Global left ventricular systolic function   is mildly reduced, Pacemaker / ICD lead seen in right ventricle, Trivial MR. · Cardiac catheterization: MV-CAD with EF of 20%  · High risk PCI would be optimal treatment of choice according to discussion between cardio and CV surgery. · Cardiology outpatient follow-up recommended    Combined diastolic and systolic  heart failure- resolving  · On home 2 L nasal cannula, for nighttime use only ,since May 2021  · sleeping on BiPAP for last 2 nights  · continue  Imdur 30 mg daily  · CXR: Mild diffuse bilateral interstitial infiltrates related to mild vascular   congestion and/or infection      DM type II:  · Hold metformin  · Continue Lantus nightly  · Continue Humalog   · Insulin sliding scale  · Will resume home medications before discharge     Chronic atrial fibrillation:  · Will start home dose Eliquis Eliquis  · Discontinue heparin drip  · Will resume home medications- amiodarone, metoprolol  · Amiodarone 200 mg continue      COPD:    · With stage II REGINA  · Recently diagnosed, not started on CPAP/BiPAP  · Pulmonology on board  · Not on active treatment  · PFT (6/14/2021): FEV1 63% consistent with stage II obstruction and concomitant restriction with TLC of 63% and severe reduction in diffusion capacity 40%. · Sleep study (7/9/2021): moderate REGINA with AHI of 19     HTN  · We will resume home medications-  · Lopressor 50 mg twice daily, lisinopril 40 mg 40     HLP:  · Continue Lipitor 80 mg     H/o VT:  · S/p  AICD  · On amiodarone     DVT ppx:  · On heparin drip     GI ppx:   · On protonix     PT/OT/SW:   · Ongoing     Discharge Planning:   ·  to assist in the discharge  [facility]  Internal Medicine Resident, PGY-1  [facility];  [place], [place]  8/14/2021, 5:52 AM

## 2021-08-14 NOTE — DISCHARGE INSTR - COC
Continuity of Care Form    Patient Name: Serafin Painter   :    MRN:  [id number]    Admit date:  2021  Discharge date:  ***    Code Status Order: Full Code   Advance Directives:      Admitting Physician:  Rosa Collins MD  PCP: Zenaida Wang, ERICK - CNP    Discharging Nurse: [facility] Unit/Room#: 1005/1005-01  Discharging Unit Phone Number: ***    Emergency Contact:   Extended Emergency Contact Information  Primary Emergency Contact: Malissa Quigley  Address: [address] [place] [address], [address] [place] [address] Phone: [phone]  Work Phone: [phone]  Relation: Child  Hearing or visual needs: None  Other needs: None  Preferred language: English   needed?  No    Past Surgical History:  Past Surgical History:   Procedure Laterality Date    APPENDECTOMY      CARDIAC CATHETERIZATION      CARDIAC SURGERY      stents     COLONOSCOPY      CORONARY ANGIOPLASTY WITH STENT PLACEMENT  2018    [facility]    DIAGNOSTIC CARDIAC CATH LAB PROCEDURE      JOINT REPLACEMENT      knee right parital    PACEMAKER INSERTION      PTCA      ROTATOR CUFF REPAIR      TONSILLECTOMY         Immunization History:   Immunization History   Administered Date(s) Administered    COVID-19, Moderna, PF, 100mcg/0.5mL 2021, 2021    DTaP 2017    DTaP (Infanrix) 2017    Influenza A (Q3t3-69),all Formulations 2009    Influenza Vaccine, unspecified formulation 10/01/2016    Influenza Virus Vaccine 10/01/2014, 10/01/2016, 2018, 10/01/2019, 2020    Influenza, High Dose (Fluzone 65 yrs and older) 2017, 10/16/2017, 2018, 2019    Influenza, High-dose, Quadv, 65 yrs +, IM (Fluzone) 2020    Influenza, MDCK, Preservative free 10/01/2020    Influenza, Quadv, IM, (6 mo and older Fluzone, Flulaval, Fluarix and 3 yrs and older Afluria) 2018    Pneumococcal Conjugate 13-valent (Idella Hammonds) 2014, 2015    Pneumococcal 08/09/21 08/09/21 08/09/21 Respiratory Panel, Molecular, with COVID-19 (Restricted: peds pts or suitable admitted adults) (Ordered)   08/10/21 Rule-Out Test Resulted    COVID-19 Rule Out 08/09/21 08/09/21 08/09/21 SARS-CoV-2 NAAT (Rapid) (Ordered)   08/09/21 Rule-Out Test Resulted            Nurse Assessment:  Last Vital Signs: /79   Pulse 62   Temp 97.9 °F (36.6 °C) (Oral)   Resp 18   Ht 6' (1.829 m)   Wt 228 lb 2.8 oz (103.5 kg)   SpO2 96%   BMI 30.95 kg/m²     Last documented pain score (0-10 scale): Pain Level: 0  Last Weight:   Wt Readings from Last 1 Encounters:   08/14/21 228 lb 2.8 oz (103.5 kg)     Mental Status:  {IP PT MENTAL STATUS:20030:::0}    IV Access:  { KEISHA IV ACCESS:685951515:::0}    Nursing Mobility/ADLs:  Walking   {CHP DME ADLs:415348109:::0}  Transfer  {CHP DME ADLs:455926618:::0}  Bathing  {CHP DME ADLs:128966883:::0}  Dressing  {CHP DME ADLs:668809689:::0}  Toileting  {CHP DME ADLs:700034201:::0}  Feeding  {CHP DME ADLs:145316936:::0}  Med Admin  {CHP DME ADLs:215426044:::0}  Med Delivery   { KEISHA MED Delivery:674940558:::0}    Wound Care Documentation and Therapy:        Elimination:  Continence: Bowel: {YES / CT:99101}  Bladder: {YES / WP:31800}  Urinary Catheter: {Urinary Catheter:225775129:::0}   Colostomy/Ileostomy/Ileal Conduit: {YES / PC:54069}       Date of Last BM: ***    Intake/Output Summary (Last 24 hours) at 8/14/2021 1114  Last data filed at 8/14/2021 0957  Gross per 24 hour   Intake 590 ml   Output 2375 ml   Net -1785 ml     I/O last 3 completed shifts:   In: 480 [P.O.:480]  Out: 3525 [Urine:3525]    Safety Concerns:     [address] KEISHA Safety Concerns:077981709:::0}    Impairments/Disabilities:      [address] Impairments/Disabilities:595070388:::0}    Nutrition Therapy:  Current Nutrition Therapy:   [address] KEISHA Diet List:382081615:::0}    Routes of Feeding: {CHP DME Other Feedings:931512762:::0}  Liquids: {Slp liquid thickness:58652}  Daily Fluid Restriction: {CHP DME Yes amt example:238438944:::0}  Last Modified Barium Swallow with Video (Video Swallowing Test): {Done Not Done MSXM:676799495:::8}    Treatments at the Time of Hospital Discharge:   Respiratory Treatments: ***  Oxygen Therapy:  {Therapy; copd oxygen:84643:::0}  Ventilator:    {[facility] Vent List:945626620:::0}    Rehab Therapies: {THERAPEUTIC INTERVENTION:4607354597}  Weight Bearing Status/Restrictions: {[facility] Weight Bearin:::0}  Other Medical Equipment (for information only, NOT a DME order):  {EQUIPMENT:826406977}  Other Treatments: ***    Patient's personal belongings (please select all that are sent with patient):  {CHP DME Belongings:398109427:::0}    RN SIGNATURE:  {Esignature:462066164:::0}    CASE MANAGEMENT/SOCIAL WORK SECTION    Inpatient Status Date: ***    Readmission Risk Assessment Score:  Readmission Risk              Risk of Unplanned Readmission:  20           Discharging to Facility/ Agency   Name:   Address:  Phone:  Fax:    Dialysis Facility (if applicable)   Name:  Address:  Dialysis Schedule:  Phone:  Fax:    / signature: {Esignature:901696805:::0}    PHYSICIAN SECTION    Prognosis: Fair    Condition at Discharge: Stable    Rehab Potential (if transferring to Rehab): Fair    Recommended Labs or Other Treatments After Discharge:follow up with PCP and Cardiology outpatient after cardiac cath in hospital. Start taking brillinta and Eliquis. Take aspirin for 1 week and stop it. Stop PLAVIX. Follow up with PCP regarding optimisation of diabetic meds. Physician Certification: I certify the above information and transfer of Jarocho Raines  is necessary for the continuing treatment of the diagnosis listed and that he requires [facility] for greater 30 days.      Update Admission H&P: No change in H&P    PHYSICIAN SIGNATURE:  Electronically signed by Furman Schlatter, MD on 21 at 11:47 AM EDT

## 2021-08-15 VITALS
RESPIRATION RATE: 20 BRPM | SYSTOLIC BLOOD PRESSURE: 159 MMHG | WEIGHT: 227.51 LBS | TEMPERATURE: 97.7 F | DIASTOLIC BLOOD PRESSURE: 91 MMHG | HEIGHT: 72 IN | BODY MASS INDEX: 30.82 KG/M2 | HEART RATE: 61 BPM | OXYGEN SATURATION: 94 %

## 2021-08-15 LAB
ANION GAP SERPL CALCULATED.3IONS-SCNC: 8 MMOL/L (ref 9–17)
BUN BLDV-MCNC: 24 MG/DL (ref 8–23)
BUN/CREAT BLD: ABNORMAL (ref 9–20)
CALCIUM SERPL-MCNC: 8.7 MG/DL (ref 8.6–10.4)
CHLORIDE BLD-SCNC: 105 MMOL/L (ref 98–107)
CO2: 27 MMOL/L (ref 20–31)
CREAT SERPL-MCNC: 0.93 MG/DL (ref 0.7–1.2)
GFR AFRICAN AMERICAN: >60 ML/MIN
GFR NON-AFRICAN AMERICAN: >60 ML/MIN
GFR SERPL CREATININE-BSD FRML MDRD: ABNORMAL ML/MIN/{1.73_M2}
GFR SERPL CREATININE-BSD FRML MDRD: ABNORMAL ML/MIN/{1.73_M2}
GLUCOSE BLD-MCNC: 186 MG/DL (ref 75–110)
GLUCOSE BLD-MCNC: 200 MG/DL (ref 70–99)
HCT VFR BLD CALC: 35 % (ref 40.7–50.3)
HEMOGLOBIN: 10.1 G/DL (ref 13–17)
MCH RBC QN AUTO: 23.1 PG (ref 25.2–33.5)
MCHC RBC AUTO-ENTMCNC: 28.9 G/DL (ref 28.4–34.8)
MCV RBC AUTO: 80.1 FL (ref 82.6–102.9)
NRBC AUTOMATED: 0 PER 100 WBC
PDW BLD-RTO: 18.3 % (ref 11.8–14.4)
PLATELET # BLD: 254 K/UL (ref 138–453)
PMV BLD AUTO: 10.1 FL (ref 8.1–13.5)
POTASSIUM SERPL-SCNC: 4.1 MMOL/L (ref 3.7–5.3)
RBC # BLD: 4.37 M/UL (ref 4.21–5.77)
SODIUM BLD-SCNC: 140 MMOL/L (ref 135–144)
WBC # BLD: 9.4 K/UL (ref 3.5–11.3)

## 2021-08-15 PROCEDURE — 36415 COLL VENOUS BLD VENIPUNCTURE: CPT

## 2021-08-15 PROCEDURE — 6370000000 HC RX 637 (ALT 250 FOR IP): Performed by: STUDENT IN AN ORGANIZED HEALTH CARE EDUCATION/TRAINING PROGRAM

## 2021-08-15 PROCEDURE — 2580000003 HC RX 258: Performed by: STUDENT IN AN ORGANIZED HEALTH CARE EDUCATION/TRAINING PROGRAM

## 2021-08-15 PROCEDURE — 80048 BASIC METABOLIC PNL TOTAL CA: CPT

## 2021-08-15 PROCEDURE — 85027 COMPLETE CBC AUTOMATED: CPT

## 2021-08-15 PROCEDURE — 99232 SBSQ HOSP IP/OBS MODERATE 35: CPT | Performed by: INTERNAL MEDICINE

## 2021-08-15 PROCEDURE — 82947 ASSAY GLUCOSE BLOOD QUANT: CPT

## 2021-08-15 PROCEDURE — 99239 HOSP IP/OBS DSCHRG MGMT >30: CPT | Performed by: INTERNAL MEDICINE

## 2021-08-15 PROCEDURE — 94660 CPAP INITIATION&MGMT: CPT

## 2021-08-15 RX ADMIN — TICAGRELOR 90 MG: 90 TABLET ORAL at 08:37

## 2021-08-15 RX ADMIN — FUROSEMIDE 40 MG: 40 TABLET ORAL at 08:37

## 2021-08-15 RX ADMIN — GABAPENTIN 300 MG: 300 CAPSULE ORAL at 08:37

## 2021-08-15 RX ADMIN — METOPROLOL TARTRATE 50 MG: 50 TABLET, FILM COATED ORAL at 08:37

## 2021-08-15 RX ADMIN — LISINOPRIL 40 MG: 20 TABLET ORAL at 08:37

## 2021-08-15 RX ADMIN — INSULIN LISPRO 3 UNITS: 100 INJECTION, SOLUTION INTRAVENOUS; SUBCUTANEOUS at 08:47

## 2021-08-15 RX ADMIN — ISOSORBIDE MONONITRATE 30 MG: 30 TABLET ORAL at 08:37

## 2021-08-15 RX ADMIN — AMIODARONE HYDROCHLORIDE 200 MG: 200 TABLET ORAL at 08:37

## 2021-08-15 RX ADMIN — ASPIRIN 81 MG: 81 TABLET, COATED ORAL at 08:37

## 2021-08-15 RX ADMIN — PANTOPRAZOLE SODIUM 40 MG: 40 TABLET, DELAYED RELEASE ORAL at 08:37

## 2021-08-15 RX ADMIN — APIXABAN 5 MG: 5 TABLET, FILM COATED ORAL at 08:37

## 2021-08-15 RX ADMIN — SODIUM CHLORIDE, PRESERVATIVE FREE 10 ML: 5 INJECTION INTRAVENOUS at 09:00

## 2021-08-15 ASSESSMENT — ENCOUNTER SYMPTOMS
BLOOD IN STOOL: 0
TROUBLE SWALLOWING: 0
CONSTIPATION: 0
VOMITING: 0
COUGH: 0
VOICE CHANGE: 0
PHOTOPHOBIA: 0
SHORTNESS OF BREATH: 0
ALLERGIC/IMMUNOLOGIC NEGATIVE: 1
SORE THROAT: 0
WHEEZING: 0
DIARRHEA: 0
EYE REDNESS: 0

## 2021-08-15 ASSESSMENT — PAIN SCALES - GENERAL: PAINLEVEL_OUTOF10: 0

## 2021-08-15 NOTE — PROGRESS NOTES
PULMONARY & CRITICAL CARE MEDICINE PROGRESS  NOTE     Patient:  Serafin Painter  MRN: [id number]  Admit date: 8/9/2021  Primary Care Physician: ERICK qSuires - CNP  Consulting Physician: Dorcas Sanchez MD  CODE Status: Full Code  LOS: 6    SUBJECTIVE     I personally interviewed/examined the patient, reviewed interval history and interpreted all available radiographic, laboratory data at the time of service. Chief Compliant/Reason for Initial Consult:     COPD/preop evaluation/obstructive sleep apnea    Brief Hospital Course: The patient is a 77 y.o. male history of hypertension, hyperlipidemia, coronary artery disease status post multiple stents in the past, combined diastolic and systolic heart failure, chronic atrial fibrillation on Eliquis, diabetes mellitus, history of V. tach status post AICD on amiodarone, COPD with a stage II obstruction obstructive sleep apnea, recently diagnosed had not started on CPAP/BiPAP. Kev Negrete Presented to emergency room with chest pain relieved with nitroglycerin chest pain was associated with shortness of breath tightness, hypoxic in the emergency room requiring nonrebreather and then BiPAP initial chest x-ray consistent with pulmonary edema, echocardiogram shows EF of 40%, treated with aspirin and statin Heparin and nitroglycerin and had cardiac catheterization done which shows multivessel coronary artery disease with a EF of 20% CT surgery was consulted for possible CABG. His pulmonary edema improved with diuretics and treatment for CHF and feeling much better. He is on 2 L nasal cannula maintaining saturation 100%. He was started on BiPAP at night and he used BiPAP 16/8/30 percent overnight.   Currently does not complain of cough purulent sputum wheezing shortness of breath or chest pain   According to patient in his usual state of health he is able to do his regular activities he does not usually complain of shortness of breath but activities are limited because of peripheral artery disease and osteoarthritis. He had been told that he has COPD and he is on inhaler at home which he uses as needed. He has history of smoking for 35 years 1 pack/day and quit in 1994. He is on home O2 at night which was a started in May 2021 after he was hospitalized in [facility] and was scheduled for sleep study then. He does not use oxygen during the daytime  He is retired used to drive truck. CT chest done on 08/12/2021 does not show infiltrate consolidation edema or pleural effusion at this time. Pulmonary function tests which were done 06/14/2021 showed FEV1 of 63% consistent with a stage II obstruction and concomitant restriction with TLC of 63% and severe reduction diffusion capacity of 40%. Sleep study on 07/09/2021 showed moderate obstructive sleep apnea with AHI of 19.       Interval History:  08/15/21  Overnight events noted, chart reviewed labs seen. He is afebrile T-max of 98.1 in last 24-hour he is hemodynamically stable. He remained on room air and maintaining saturation above 90%  He did not complain of chest pain, shortness of breath. He is ambulating in the room and outside the room. Denies cough sputum production and denies pedal edema orthopnea PND  He did use BiPAP overnight denies any problem using the BiPAP 16/8 on 30%. Review of Systems:  Review of Systems   Constitutional: Negative for activity change, fatigue, fever and unexpected weight change. HENT: Negative for nosebleeds, sore throat, tinnitus, trouble swallowing and voice change. Eyes: Negative for photophobia, redness and visual disturbance. Respiratory: Negative for cough, shortness of breath and wheezing. Cardiovascular: Negative for chest pain, palpitations and leg swelling. Gastrointestinal: Negative for blood in stool, constipation, diarrhea and vomiting. Endocrine: Negative.     Genitourinary: Negative for dysuria, frequency and hematuria. Musculoskeletal: Negative. Allergic/Immunologic: Negative. Neurological: Negative for dizziness, syncope, facial asymmetry, speech difficulty, weakness and headaches. Hematological: Negative for adenopathy. Does not bruise/bleed easily. Psychiatric/Behavioral: Negative. OBJECTIVE     VITAL SIGNS:   LAST-  BP (!) 159/91   Pulse 61   Temp 97.7 °F (36.5 °C)   Resp 20   Ht 6' (1.829 m)   Wt 227 lb 8.2 oz (103.2 kg)   SpO2 94%   BMI 30.86 kg/m²   8-24 HR RANGE-  TEMP Temp  Av.9 °F (36.6 °C)  Min: 97.7 °F (36.5 °C)  Max: 98.1 °F (40.9 °C)   BP Systolic (42TVO), AZA:737 , Min:122 , LPW:614      Diastolic (00RWJ), RR, Min:65, Max:91     PULSE Pulse  Av.6  Min: 60  Max: 63   RR Resp  Av  Min: 20  Max: 22   O2 SAT No data recorded   OXYGEN DELIVERY No data recorded     Systemic Examination:   Physical Exam  General appearance - looks comfortable and in no acute distress  Mental status - alert, oriented to person, place, and time  Eyes - pupils equal and reactive, extraocular eye movements intact  Mouth - mucous membranes moist, pharynx normal without lesions  Neck - supple, no significant adenopathy, short thick neck  Chest - Chest was symmetrical without dullness to percussion. Breath sounds bilaterally were clear to auscultation. There were no wheezes, rhonchi or rales.   There is no intercostal recession or use of accessory muscles  Heart - normal rate, regular rhythm, normal S1, S2, no murmurs, rubs, clicks or gallops  Abdomen - soft, nontender, nondistended, no masses or organomegaly  Neurological - alert, oriented, normal speech, no focal findings or movement disorder noted  Extremities - peripheral pulses normal, no pedal edema, no clubbing or cyanosis  Skin - normal coloration and turgor, no rashes, no suspicious skin lesions noted     DATA REVIEW     Medications:  Scheduled Meds:   apixaban  5 mg Oral BID    metoprolol tartrate  50 mg Oral BID    lisinopril  40 mg Oral Daily    sodium chloride flush  5-40 mL Intravenous 2 times per day    ticagrelor  90 mg Oral BID    furosemide  40 mg Oral Daily    aspirin  81 mg Oral Daily    atorvastatin  80 mg Oral Nightly    insulin glargine  25 Units Subcutaneous Nightly    amiodarone  200 mg Oral Daily    gabapentin  300 mg Oral TID    pantoprazole  40 mg Oral QAM AC    sodium chloride flush  5-40 mL Intravenous 2 times per day    insulin lispro  0-18 Units Subcutaneous TID WC    insulin lispro  0-9 Units Subcutaneous Nightly    isosorbide mononitrate  30 mg Oral Daily     Continuous Infusions:   sodium chloride      sodium chloride 25 mL (08/11/21 1200)    dextrose       LABS:-  ABG:   No results for input(s): POCPH, POCPCO2, POCPO2, POCHCO3, SRQV6QRC in the last 72 hours. CBC:   Recent Labs     08/13/21  0539 08/14/21  0500 08/15/21  0725   WBC 6.3 6.7 9.4   HGB 10.0* 10.2* 10.1*   HCT 35.2* 35.9* 35.0*   MCV 82.1* 81.4* 80.1*    256 254   RBC 4.29 4.41 4.37   MCH 23.3* 23.1* 23.1*   MCHC 28.4 28.4 28.9   RDW 18.0* 18.1* 18.3*     BMP:   Recent Labs     08/13/21  0539 08/14/21  0500 08/15/21  0725    139 140   K 3.9 3.7 4.1    104 105   CO2 27 23 27   BUN 17 19 24*   CREATININE 0.90 0.92 0.93   GLUCOSE 178* 212* 200*     Liver Function Test:   Recent Labs     08/14/21  0500   PROT 5.8*   LABALBU 3.2*   ALT 29   AST 21   ALKPHOS 67   BILITOT 0.21*     Amylase/Lipase:  No results for input(s): AMYLASE, LIPASE in the last 72 hours. Coagulation Profile:   Recent Labs     08/12/21  1418 08/12/21  2220 08/13/21  0539   APTT 39.9* 50.1* 54.6*     Cardiac Enzymes:  No results for input(s): CKTOTAL, CKMB, CKMBINDEX, TROPONINI in the last 72 hours.   Lactic Acid:  No results found for: LACTA  BNP:   Lab Results   Component Value Date    .0 04/16/2021     D-Dimer:  Lab Results   Component Value Date    DDIMER 0.65 08/09/2021     Others:   Lab Results   Component Value Date    TSH 2.87 05/20/2020    T4FREE 1.30 05/20/2020     No results found for: ELOISE, RHEUMFACTOR, SEDRATE, CRP  No results found for: Juluis Aria  No results found for: IRON, TIBC, FERRITIN  No results found for: SPEP, UPEP  Lab Results   Component Value Date    PSA 1.19 04/01/2015       Input/Output:    Intake/Output Summary (Last 24 hours) at 8/15/2021 1209  Last data filed at 8/15/2021 0858  Gross per 24 hour   Intake 840 ml   Output 875 ml   Net -35 ml       Microbiology:  No results for input(s): SPECDESC, SPECDESC, SPECIAL, CULTURE, CULTURE, STATUS, ORG, CDIFFTOXPCR, CAMPYLOBPCR, SALMONELLAPC, SHIGAPCR, SHIGELLAPCR, MPNEUG, MPNEUM, LACTOQL in the last 72 hours. Pathology:    Radiology reports:  CT chest without contrast   Final Result   No acute cardiopulmonary process.          VL DUP CAROTID BILATERAL   Final Result      VL VEIN MAPPING LOWER BILATERAL   Final Result      XR CHEST PORTABLE   Final Result   Satisfactory lead position      Mild diffuse bilateral interstitial infiltrates related to mild vascular   congestion and/or infection             Echocardiogram:   Results for orders placed during the hospital encounter of 08/09/21    ECHO Complete 2D W Doppler W Color    Narrative  Transthoracic Echocardiography Report (TTE)    Patient Name Laura Ideatory Uday      Date of Study               08/11/2021  Agustina Celestin    Date of      1945  Gender                      Male  Birth    Age          77 year(s)  Race                            Room Number  1005        Height:                     72 inch, 182.88 cm    Corporate ID [id number]    Weight:                     229 pounds, 103.9 kg  #    Patient Acct [de-identified]   BSA:          2.26 m^2      BMI:      31.06  #                                                              kg/m^2    MR #         [id number]     Sonographer                 [facility]    Accession #  [id number]  Interpreting Physician      Adam Montilla    Fellow Referring Nurse  Practitioner    Interpreting             Referring Physician         Marilee Mccartney    Type of Study    TTE procedure:2D Echocardiogram, M-Mode, Doppler, Color Doppler. Procedure Date  Date: 08/11/2021 Start: 09:12 AM    Study Location: [facility]  Technical Quality: Adequate visualization    Indications:Angina. History / Tech. Comments:  Echo done at patient bedside. Procedure explained to patient. Type 2 DM, HTN, CAD, REGINA    Patient Status: Inpatient    Height: 72 inches Weight: 229.01 pounds BSA: 2.26 m^2 BMI: 31.06 kg/m^2    Allergies  - *Unlisted:(Zocor, Coreg, Prevastatin, sertraline, citalopram). CONCLUSIONS    Summary  Left ventricle is normal in size. Global left ventricular systolic function  is mildly reduced. Visually estimated EF 45-50%. Left atrium is normal in size. Right atrium is normal in size. Normal right ventricular size and function. Pacemaker / ICD lead seen in right ventricle. Normal mitral valve structure. Trivial mitral regurgitation. Normal tricuspid valve structure and function. No tricuspid regurgitation. No pericardial effusion seen. Signature  ----------------------------------------------------------------------------  Electronically signed by Matthew Hedrick(Interpreting physician) on 08/11/2021  02:29 PM  ----------------------------------------------------------------------------    FINDINGS  Left Atrium  Left atrium is normal in size. Left Ventricle  Left ventricle is normal in size. Global left ventricular systolic function  is mildly reduced. Visually estimated EF 45-50%. Right Atrium  Right atrium is normal in size. Right Ventricle  Normal right ventricular size and function. Pacemaker / ICD lead seen in right ventricle. TAPSE value of 1.86cm noted. Mitral Valve  Normal mitral valve structure. Trivial mitral regurgitation.   Aortic Valve  Normal aortic valve structure and function without stenosis or  regurgitation. Tricuspid Valve  Normal tricuspid valve structure and function. No tricuspid regurgitation. Pulmonic Valve  The pulmonic valve is normal in structure. No pulmonic insufficiency. Pericardial Effusion  No pericardial effusion seen. Miscellaneous  IVC normal diameter & inspiratory collapse indicating normal RA filling  pressure . M-mode / 2D Measurements & Calculations:    LVIDd:7.2 cm(3.7 - 5.6 cm)       Diastolic VPGEQE:15.6 ml  POMNJ:7.4 cm(2.2 - 4.0 cm)       Systolic CWGJSV:83.6 ml  RYMC:2.5 cm(0.6 - 1.1 cm)        Aortic Root:3 cm(2.0 - 3.7 cm)  LVPWd:0.9 cm(0.6 - 1.1 cm)       LA Dimension: 5.5 cm(1.9 - 4.0 cm)  Fractional Shortenin.33 %     LA volume/Index: 71.87 ml /32m^2  Calculated LVEF (%): 53.62 %     LVOT:2.3 cm  RVDd:2.6 cm    Mitral:                                  Aortic    Valve Area (P1/2-Time): 2.75 cm^2        Peak Velocity: 1.75 m/s  Peak E-Wave: 0.56 m/s                    Mean Velocity: 1.05 m/s  Peak A-Wave: 0.50 m/s                    Peak Gradient: 12.25 mmHg  E/A Ratio: 1.13                          Mean Gradient: 5 mmHg  Peak Gradient: 1.26 mmHg  Mean Gradient: 1 mmHg  Deceleration Time: 272 msec              Area (continuity): 1.9 cm^2  P1/2t: 80 msec                           AV VTI: 32.8 cm    Area (continuity): 2.12 cm^2  Mean Velocity: 0.50 m/s    Pulmonic:    Peak Velocity: 0.90 m/s  Peak Gradient: 3.21 mmHg    Diastology / Tissue Doppler    Lateral Wall E' velocity:0.06 m/s  Lateral Wall E/E':8.9      Cardiac Catheterization:   No results found for this or any previous visit.       ASSESSMENT AND PLAN     Assessment:    //Acute hypoxic respiratory failure secondary to acute pulmonary edema secondary to acute heart failure improved  //Non-ST elevation MI  //Multivessel coronary artery disease  //Status post PCI proximal and mid LAD on 2029  //Chronic obstructive pulmonary disease/moderate COPD/stage II obstruction with moderate restriction  //Obstructive sleep apnea  //History of coronary artery disease status post stent/PCI  //Acute on chronic combined systolic and diastolic heart failure  //Atrial fibrillation on Eliquis  //History of V. tach status post AICD  //Diabetes mellitus  //Hypertension  //Hyperlipidemia  //Peripheral artery disease    Plan:      Patient had ELLA/PCI LAD on 08/13/2021  Continue with BiPAP at night 16/8/30 percent discussed with patient the importance of treatment of sleep apnea. Discussed with patient that he will need his home CPAP BiPAP which she already had the study done and apparently going to receive it soon. Supplemental O2 if needed to keep saturation 92%. Optimization of cardiac/CHF treatment per cardiology. He is on aspirin statin Brilinta  amiodarone nitrates beta-blocker and Eliquis. Encourage incentive spirometry, pulmonary toilet, aspiration precautions and bronchodilators  Recommend to continue with diuretics  Continue to monitor I/O with a goal of even/negative fluid balance  Antimicrobials reviewed; no need for antibiotic from pulmonary standpoint  DVT prophylaxis on therapeutic Eliquis  Physical/occupational therapy; increase activity as tolerated.      He will need to start home CPAP/BiPAP once he is home. Advised to follow-up with his pulmonologist.    I updated the patient regarding the current clinical condition, provisional diagnosis and management plan. I addressed concerns and answered all questions to the best of my abilities. It was my pleasure to evaluate Yasmeen Cabrera today. We will continue to follow. I would like to thank you for allowing me to participate in the care of this patient. Please feel free to call with any further questions or concerns. Dayana Dupree MD, M.D. Pulmonary and Critical Care Medicine           8/15/2021, 12:09 PM    Please note that this chart was generated using voice recognition Dragon dictation software.   Although every effort was made to ensure the accuracy of this automated transcription, some errors in transcription may have occurred.

## 2021-08-15 NOTE — PROGRESS NOTES
[facility]  Internal Medicine Teaching Residency Program  Inpatient Daily Progress Note  ______________________________________________________________________________    Patient: Rosemarie Jaimes  YOB: 1945   JASBIR:[id number]    Acct: [de-identified]     Room: [address] [place] [address]  Admit date: 8/9/2021  Today's date: 08/15/21  Number of days in the hospital: 6    SUBJECTIVE   Admitting Diagnosis: Unstable angina ([address]  CC: Chest pain and shortness of breath    Vitals stable, used BiPAP overnight. Labs reviewed    Patient could not be discharged yesterday, as he had nobody to take care of him at home. Anticipating discharge today. Continue aspirin for a week, continue Brilinta and Eliquis on discharge as per cardiology recommendations. Patient will need a CPAP/BiPAP machine for REGINA    ROS:  Constitutional:  negative for chills, fevers, sweats  Respiratory:  negative for cough, dyspnea on exertion, hemoptysis, shortness of breath, wheezing  Cardiovascular:  negative for chest pain, chest pressure/discomfort, lower extremity edema, palpitations  Gastrointestinal:  negative for abdominal pain, constipation, diarrhea, nausea, vomiting  Neurological:  negative for dizziness, headache  BRIEF HISTORY   A 76 y. o.  male presents with a chief c/o sudden onset chest pain. He described it a diffuse heaviness in chest, radiating to R elbow, pain was constant, 8/10 in intensity, associated with sweating and SOB at rest. He tried increasing concentration home O2 and lasix but nothing helped. EMS was called. As per the records, he received NTG which improved his chest pain, 324 aspirin and 1 ICD shock. In ED, his SpO2 74% on NRB, BiPAP placed, with plans to decrease FiO2. He has PMH of A fib (on eliquis), CHF (EF-%), peripheral vascular disease, DM type 2, Hypertension, Hyperlipidemia. He reports to be compliant with his medications.    He is being evaluated for PAD claudication at [address]. He also underwent CTA abdomen in this regard and found to have atherosclerotic disease and calcification of AA and short seg occlusion of celiac artery. He has been having lower limb claudication for alteast 4-5 months. He has H/o MI in . Over the years in , he got 8 stents in total. He has in dwelling L subclavian AICD. He was also started on Eliquis around 2021.     OBJECTIVE     Vital Signs:  /65   Pulse 63   Temp 97.9 °F (36.6 °C) (Oral)   Resp 22   Ht 6' (1.829 m)   Wt 227 lb 8.2 oz (103.2 kg)   SpO2 94%   BMI 30.86 kg/m²     Temp (24hrs), Av.9 °F (36.6 °C), Min:97.7 °F (36.5 °C), Max:98.1 °F (36.7 °C)    No intake/output data recorded. Physical Exam:  Constitutional: This is a well developed, well nourished, 30-34.9 - Obesity Grade I 77y.o. year old male who is alert, oriented, cooperative and in no apparent distress. Head:normocephalic and atraumatic. EENT:  PERRLA. No conjunctival injections. Septum was midline, mucosa was without erythema, exudates or cobblestoning. No thrush was noted. Neck: Supple without thyromegaly. No elevated JVP. Trachea was midline. Respiratory: Chest was symmetrical without dullness to percussion. Breath sounds bilaterally were clear to auscultation. There were no wheezes, rhonchi or rales. There is no intercostal retraction or use of accessory muscles. No egophony noted. Cardiovascular: Regular without murmur, clicks, gallops or rubs. Abdomen: Slightly rounded and soft without organomegaly. No rebound, rigidity or guarding was appreciated. Lymphatic: No lymphadenopathy. Musculoskeletal: Normal curvature of the spine. No gross muscle weakness. Extremities:  No lower extremity edema, ulcerations, tenderness, varicosities or erythema. Muscle size, tone and strength are normal.  No involuntary movements are noted. Skin:  Warm and dry. Good color, turgor and pigmentation. No lesions or scars.   No cyanosis or clubbing  Neurological/Psychiatric: The patient's general behavior, level of consciousness, thought content and emotional status is normal.        Medications:  Scheduled Medications:    apixaban  5 mg Oral BID    metoprolol tartrate  50 mg Oral BID    lisinopril  40 mg Oral Daily    sodium chloride flush  5-40 mL Intravenous 2 times per day    ticagrelor  90 mg Oral BID    furosemide  40 mg Oral Daily    aspirin  81 mg Oral Daily    atorvastatin  80 mg Oral Nightly    insulin glargine  25 Units Subcutaneous Nightly    amiodarone  200 mg Oral Daily    gabapentin  300 mg Oral TID    pantoprazole  40 mg Oral QAM AC    sodium chloride flush  5-40 mL Intravenous 2 times per day    insulin lispro  0-18 Units Subcutaneous TID WC    insulin lispro  0-9 Units Subcutaneous Nightly    isosorbide mononitrate  30 mg Oral Daily     Continuous Infusions:    sodium chloride      sodium chloride 25 mL (08/11/21 1200)    dextrose       PRN Medicationssodium chloride flush, 5-40 mL, PRN  sodium chloride, 25 mL, PRN  acetaminophen, 650 mg, Q4H PRN  ondansetron, 4 mg, Q6H PRN  albuterol, 2.5 mg, As Directed RT PRN  sodium chloride flush, 5-40 mL, PRN  sodium chloride, 25 mL, PRN  ondansetron, 4 mg, Q8H PRN   Or  ondansetron, 4 mg, Q6H PRN  polyethylene glycol, 17 g, Daily PRN  acetaminophen, 650 mg, Q6H PRN   Or  acetaminophen, 650 mg, Q6H PRN  glucose, 15 g, PRN  dextrose, 12.5 g, PRN  glucagon (rDNA), 1 mg, PRN  dextrose, 100 mL/hr, PRN  albuterol, 2.5 mg, As Directed RT PRN        Diagnostic Labs:  CBC:   Recent Labs     08/13/21  0539 08/14/21  0500 08/15/21  0725   WBC 6.3 6.7 9.4   RBC 4.29 4.41 4.37   HGB 10.0* 10.2* 10.1*   HCT 35.2* 35.9* 35.0*   MCV 82.1* 81.4* 80.1*   RDW 18.0* 18.1* 18.3*    256 254     BMP:   Recent Labs     08/13/21  0539 08/14/21  0500 08/15/21  0725    139 140   K 3.9 3.7 4.1    104 105   CO2 27 23 27   BUN 17 19 24*   CREATININE 0.90 0.92 0.93     BNP: No results for input(s): BNP in the last 72 hours. PT/INR:   No results for input(s): PROTIME, INR in the last 72 hours. APTT:   Recent Labs     08/12/21  1418 08/12/21  2220 08/13/21  0539   APTT 39.9* 50.1* 54.6*     CARDIAC ENZYMES: No results for input(s): CKMB, CKMBINDEX, TROPONINI in the last 72 hours. Invalid input(s): CKTOTAL;3  FASTING LIPID PANEL:  Lab Results   Component Value Date    CHOL 233 10/29/2018    HDL 37 10/29/2018    TRIG 172 10/29/2018     LIVER PROFILE:   Recent Labs     08/14/21  0500   AST 21   ALT 29   BILITOT 0.21*   ALKPHOS 67      MICROBIOLOGY:   Lab Results   Component Value Date/Time    CULTURE NO SIGNIFICANT GROWTH 08/11/2021 11:26 PM       Imaging:    CT chest without contrast    Result Date: 8/12/2021  No acute cardiopulmonary process. XR CHEST PORTABLE    Result Date: 8/9/2021  Satisfactory lead position Mild diffuse bilateral interstitial infiltrates related to mild vascular congestion and/or infection       ASSESSMENT & PLAN   This is R 86 y.o.  male who presented with chest pain and SOB, was found to have acute hypoxic respiratory failure,  Patient admitted to inpatient status for cardiac evaluation and management of Acute on chronic systolic CHF.     Acute hypoxic respiratory failure-resolved  · likely secondary to acute pulmonary edema secondary to acute heart failure.     Multivessel- Coronary Artery Disease:  · S/P PCI to LAD (8/13/2021)   · Continue aspirin for 1 week  · Continue Lipitor 80, Lasix 40, Imdur 30  · ECHO (5/25/21): LVEF 25-30%  · ECHO (8/11/21) : EF 45-50%, Global left ventricular systolic function   is mildly reduced, Pacemaker / ICD lead seen in right ventricle, Trivial MR. .  · Cardiology outpatient follow-up recommended    Combined diastolic and systolic  heart failure- resolving  · On home 2 L nasal cannula, for nighttime use only ,since May 2021  · sleeping on BiPAP for last 2 nights  · continue  Imdur 30 mg daily  · CXR: Mild diffuse bilateral interstitial infiltrates related to mild vascular   congestion and/or infection      DM type II:  · Hold metformin  · Continue Lantus nightly  · Will resume home medications before discharge     Chronic atrial fibrillation:  · Continue Eliquis  · Will resume home medications- amiodarone, metoprolol  · Amiodarone 200 mg continue      COPD:    · With stage II REGINA  · Recently diagnosed, not started on CPAP/BiPAP  · PFT (6/14/2021): FEV1 63% consistent with stage II obstruction and concomitant restriction with TLC of 63% and severe reduction in diffusion capacity 40%.   · Sleep study (7/9/2021): moderate REGINA with AHI of 19     HTN  · We will resume home medications-  · Lopressor 50 mg twice daily, lisinopril 40 mg      HLP:  · Continue Lipitor 80 mg     H/o VT:  · S/p  AICD  · On amiodarone     DVT ppx:  · On heparin drip        Discharge Planning: Home       Fatuma Crawley MD  PGY-3 Internal Medicine Resident  [address] [place] [address]  8/15/2021 8:47 AM

## 2021-08-15 NOTE — PROGRESS NOTES
CLINICAL PHARMACY NOTE: MEDS TO BEDS    Total # of Prescriptions Filled: 4   The following medications were delivered to the patient:  · lipitor  · brilinta  · Aspirin  · eliquis    Additional Documentation:

## 2021-08-15 NOTE — PROGRESS NOTES
Pt has received proper discharge medication education and follow-up appointment information. Pt IV has been discontinued. Pt questions/concerns have been addressed. Pt has received medication from outpatient pharmacy and son-in law is a bedside to assist to private car for transportation.        Electronically signed by Fei Llamas RN on 8/15/2021 at 12:32 PM

## 2021-08-15 NOTE — CARE COORDINATION
Discharge 1 [facility] Case Management Department  Written by: Joslyn Hassan RN    Patient Name: Rafiq Leal  Attending Provider: Brad Wright MD  Admit Date: 2021  8:13 AM  MRN: [id number]  Account: [de-identified]                     : 1945  Discharge Date:  8/15/2021        Disposition: home    Joslyn Hassan RN

## 2021-08-15 NOTE — PLAN OF CARE
Problem: Falls - Risk of:  Goal: Will remain free from falls  Description: Will remain free from falls  8/10/2021 0142 by Sb Grimes RN  Outcome: Ongoing  8/9/2021 1851 by Amanda Medina RN  Outcome: Ongoing  Goal: Absence of physical injury  Description: Absence of physical injury  8/10/2021 0142 by Sb Grimes RN  Outcome: Ongoing  8/9/2021 1851 by Amanda Medina RN  Outcome: Ongoing     Problem: OXYGENATION/RESPIRATORY FUNCTION  Goal: Patient will maintain patent airway  8/10/2021 0142 by Sb Grimes RN  Outcome: Ongoing  8/9/2021 1851 by Amanda Medina RN  Outcome: Ongoing  Goal: Patient will achieve/maintain normal respiratory rate/effort  Description: Respiratory rate and effort will be within normal limits for the patient  8/9/2021 1851 by Amanda Medina RN  Outcome: Ongoing     Problem: HEMODYNAMIC STATUS  Goal: Patient has stable vital signs and fluid balance  8/10/2021 0142 by Sb Grimes RN  Outcome: Ongoing  8/9/2021 1851 by Amanda Medina RN  Outcome: Ongoing     Problem: FLUID AND ELECTROLYTE IMBALANCE  Goal: Fluid and electrolyte balance are achieved/maintained  8/9/2021 1851 by Amanda Medina RN  Outcome: Ongoing     Problem: ACTIVITY INTOLERANCE/IMPAIRED MOBILITY  Goal: Mobility/activity is maintained at optimum level for patient  8/9/2021 1851 by Amanda Medina RN  Outcome: Ongoing
Problem: Falls - Risk of:  Goal: Will remain free from falls  Description: Will remain free from falls  Outcome: Ongoing  Goal: Absence of physical injury  Description: Absence of physical injury  Outcome: Ongoing     Problem: OXYGENATION/RESPIRATORY FUNCTION  Goal: Patient will maintain patent airway  Outcome: Ongoing  Goal: Patient will achieve/maintain normal respiratory rate/effort  Description: Respiratory rate and effort will be within normal limits for the patient  Outcome: Ongoing     Problem: HEMODYNAMIC STATUS  Goal: Patient has stable vital signs and fluid balance  Outcome: Ongoing     Problem: FLUID AND ELECTROLYTE IMBALANCE  Goal: Fluid and electrolyte balance are achieved/maintained  Outcome: Ongoing     Problem: ACTIVITY INTOLERANCE/IMPAIRED MOBILITY  Goal: Mobility/activity is maintained at optimum level for patient  Outcome: Ongoing     Problem: Pain:  Description: Pain management should include both nonpharmacologic and pharmacologic interventions.   Goal: Pain level will decrease  Description: Pain level will decrease  Outcome: Ongoing  Goal: Control of acute pain  Description: Control of acute pain  Outcome: Ongoing  Goal: Control of chronic pain  Description: Control of chronic pain  Outcome: Ongoing
Problem: Falls - Risk of:  Goal: Will remain free from falls  Description: Will remain free from falls  Outcome: Ongoing  Goal: Absence of physical injury  Description: Absence of physical injury  Outcome: Ongoing     Problem: OXYGENATION/RESPIRATORY FUNCTION  Goal: Patient will maintain patent airway  Outcome: Ongoing  Goal: Patient will achieve/maintain normal respiratory rate/effort  Description: Respiratory rate and effort will be within normal limits for the patient  Outcome: Ongoing     Problem: HEMODYNAMIC STATUS  Goal: Patient has stable vital signs and fluid balance  Outcome: Ongoing     Problem: FLUID AND ELECTROLYTE IMBALANCE  Goal: Fluid and electrolyte balance are achieved/maintained  Outcome: Ongoing     Problem: ACTIVITY INTOLERANCE/IMPAIRED MOBILITY  Goal: Mobility/activity is maintained at optimum level for patient  Outcome: Ongoing     Problem: Pain:  Goal: Pain level will decrease  Description: Pain level will decrease  Outcome: Ongoing  Goal: Control of acute pain  Description: Control of acute pain  Outcome: Ongoing  Goal: Control of chronic pain  Description: Control of chronic pain  Outcome: Ongoing
chronic pain  8/15/2021 1126 by Nora Alexandra RN  Outcome: Completed  8/14/2021 2144 by Betsy Goodell, RN  Outcome: Ongoing

## 2021-08-16 ENCOUNTER — CARE COORDINATION (OUTPATIENT)
Dept: CASE MANAGEMENT | Age: 76
End: 2021-08-16

## 2021-08-17 ENCOUNTER — TELEPHONE (OUTPATIENT)
Dept: PRIMARY CARE CLINIC | Age: 76
End: 2021-08-17

## 2021-08-17 ENCOUNTER — CARE COORDINATION (OUTPATIENT)
Dept: CASE MANAGEMENT | Age: 76
End: 2021-08-17

## 2021-08-17 NOTE — CARE COORDINATION
[address] [facility] Transitions Initial Follow Up Call    Call within 2 business days of discharge: Yes    Patient: Lamine Gaston Patient : 1945   MRN: [id number]  Reason for Admission: respiratory distress  Discharge Date: 8/15/21 RARS: Readmission Risk Score: 19      Last Discharge [address] [address]       Complaint Diagnosis Description Type Department Provider    21 Respiratory Distress; Chest Pain Chest pain, unspecified type . .. ED to Hosp-Admission (Discharged) (ADMITTED) STV CAR 1 George Paz MD; Charolotte Severance. .. # 2nd attempt-unable to reach patient, left vm message with name and call back information, requested call back, 2 unsuccessful attempts to reach patient, care transitions completed//JU    Facility: [facility]    Non-face-to-face services provided:      Care Transitions 24 Hour Call    Patient DME: Straight cane  Patient Home Equipment: Nebulizer  Do you have support at home?: Child  Are you an active caregiver in your home?: No  Care Transitions Interventions         Follow Up  No future appointments.     Annie Pino RN

## 2021-08-17 NOTE — TELEPHONE ENCOUNTER
----- Message from Sugar Trisha sent at 8/17/2021  1:27 PM EDT -----  Subject: Hospital Follow Up    QUESTIONS  What hospital was the Patient Discharged from? [facility]  Date of Discharge? 2021-08-15  Discharge Location? Home  Reason for hospitalization as patient stated? Pt admitted to hospital on   8/9/2021 for chest pain  What question does the patient have, if applicable?   ---------------------------------------------------------------------------  --------------  CALL BACK INFO  What is the best way for the office to contact you? OK to leave message on   voicemail  Preferred Call Back Phone Number? [phone]  ---------------------------------------------------------------------------  --------------  SCRIPT ANSWERS  Relationship to Patient? Self  (Patient requests to see provider urgently. )? No  (Has the patient been discharged from the hospital within 2 business days   AND does not have a Telephone Encounter  Follow Up From [address] [place] [address]   documented in [address]?)? Yes  Do you have any questions for your primary care provider that need to be   answered prior to your appointment? (Use RN Triage if question pertains to   anything on the red flag list)? No  (Patient needs follow up visit after hospital discharge) Book first   available appointment within 7 days OF DISCHARGE, if no appt, proceed to   book the next available time slot within 14 days OF DISCHARGE AND Send   Message to Provider. [facility] Follow Up appointment cannot be booked   beyond 14 Days and should result in a Message to Provider. ?  No

## 2021-08-18 ENCOUNTER — TELEPHONE (OUTPATIENT)
Dept: PRIMARY CARE CLINIC | Age: 76
End: 2021-08-18

## 2021-08-18 NOTE — TELEPHONE ENCOUNTER
Incoming call from patient  States that he just got out of his doctors office and they took him off of all his DM medications. And needs an appt w/ pcp to discuss. Pt states his sugar was 295 this AM.  Pt states that he called on Monday and never received a call back. Writer apologized to patient and advised will send a msg and pt terminated phone call.

## 2021-08-19 ENCOUNTER — TELEPHONE (OUTPATIENT)
Dept: PRIMARY CARE CLINIC | Age: 76
End: 2021-08-19

## 2021-08-19 NOTE — TELEPHONE ENCOUNTER
[address] Transitions Initial Follow Up Call    Outreach made within 2 business days of discharge: Yes    Patient: Christie Perez Patient : 1945   MRN: [id number]  Reason for Admission: There are no discharge diagnoses documented for the most recent discharge. Discharge Date: 8/15/21       Spoke with: [facility]    Discharge department/facility: [facility] Interactive Patient Contact:  Was patient able to fill all prescriptions: Yes  Was patient instructed to bring all medications to the follow-up visit: Yes  Is patient taking all medications as directed in the discharge summary?  Yes  Does patient understand their discharge instructions: Yes  Does patient have questions or concerns that need addressed prior to 7-14 day follow up office visit: yes - Medication     Scheduled appointment with PCP within 7-14 days    Follow Up  Future Appointments   Date Time Provider Adeline Odonnell   2021 11:00 AM ERICK Mccollum - CNP [place], [place]

## 2021-08-20 ENCOUNTER — HOSPITAL ENCOUNTER (OUTPATIENT)
Age: 76
Setting detail: SPECIMEN
Discharge: HOME | End: 2021-08-20
Payer: MEDICARE

## 2021-08-20 ENCOUNTER — OFFICE VISIT (OUTPATIENT)
Dept: PRIMARY CARE CLINIC | Age: 76
End: 2021-08-20
Payer: MEDICARE

## 2021-08-20 VITALS
RESPIRATION RATE: 13 BRPM | DIASTOLIC BLOOD PRESSURE: 78 MMHG | SYSTOLIC BLOOD PRESSURE: 122 MMHG | BODY MASS INDEX: 31.1 KG/M2 | OXYGEN SATURATION: 98 % | WEIGHT: 229.6 LBS | HEART RATE: 60 BPM | HEIGHT: 72 IN

## 2021-08-20 DIAGNOSIS — E11.9 TYPE 2 DIABETES MELLITUS WITHOUT COMPLICATION, UNSPECIFIED WHETHER LONG TERM INSULIN USE (HCC): ICD-10-CM

## 2021-08-20 DIAGNOSIS — G47.33 OSA (OBSTRUCTIVE SLEEP APNEA): ICD-10-CM

## 2021-08-20 DIAGNOSIS — I48.0 PAROXYSMAL ATRIAL FIBRILLATION (HCC): ICD-10-CM

## 2021-08-20 DIAGNOSIS — I25.10 CORONARY ARTERY DISEASE INVOLVING NATIVE CORONARY ARTERY OF NATIVE HEART WITHOUT ANGINA PECTORIS: Primary | ICD-10-CM

## 2021-08-20 DIAGNOSIS — I50.32 CHRONIC DIASTOLIC CONGESTIVE HEART FAILURE (HCC): ICD-10-CM

## 2021-08-20 DIAGNOSIS — Z13.29 SCREENING FOR THYROID DISORDER: ICD-10-CM

## 2021-08-20 DIAGNOSIS — Z13.220 SCREENING FOR LIPID DISORDERS: ICD-10-CM

## 2021-08-20 DIAGNOSIS — G89.4 CHRONIC PAIN SYNDROME: ICD-10-CM

## 2021-08-20 LAB
ALBUMIN SERPL-MCNC: 4 G/DL (ref 3.5–5.2)
ALBUMIN/GLOBULIN RATIO: 1.6 (ref 1–2.5)
ALP BLD-CCNC: 78 U/L (ref 40–129)
ALT SERPL-CCNC: 24 U/L (ref 5–41)
ANION GAP SERPL CALCULATED.3IONS-SCNC: 18 MMOL/L (ref 9–17)
AST SERPL-CCNC: 20 U/L
BILIRUB SERPL-MCNC: 0.26 MG/DL (ref 0.3–1.2)
BUN BLDV-MCNC: 39 MG/DL (ref 8–23)
BUN/CREAT BLD: ABNORMAL (ref 9–20)
CALCIUM SERPL-MCNC: 8.9 MG/DL (ref 8.6–10.4)
CHLORIDE BLD-SCNC: 101 MMOL/L (ref 98–107)
CHOLESTEROL/HDL RATIO: 4
CHOLESTEROL: 140 MG/DL
CO2: 23 MMOL/L (ref 20–31)
CREAT SERPL-MCNC: 1.44 MG/DL (ref 0.7–1.2)
GFR AFRICAN AMERICAN: 58 ML/MIN
GFR NON-AFRICAN AMERICAN: 48 ML/MIN
GFR SERPL CREATININE-BSD FRML MDRD: ABNORMAL ML/MIN/{1.73_M2}
GFR SERPL CREATININE-BSD FRML MDRD: ABNORMAL ML/MIN/{1.73_M2}
GLUCOSE BLD-MCNC: 161 MG/DL (ref 70–99)
HDLC SERPL-MCNC: 35 MG/DL
LDL CHOLESTEROL: 75 MG/DL (ref 0–130)
POTASSIUM SERPL-SCNC: 4.4 MMOL/L (ref 3.7–5.3)
SODIUM BLD-SCNC: 142 MMOL/L (ref 135–144)
TOTAL PROTEIN: 6.5 G/DL (ref 6.4–8.3)
TRIGL SERPL-MCNC: 148 MG/DL
TSH SERPL DL<=0.05 MIU/L-ACNC: 1.91 MIU/L (ref 0.3–5)
VLDLC SERPL CALC-MCNC: ABNORMAL MG/DL (ref 1–30)

## 2021-08-20 PROCEDURE — 99495 TRANSJ CARE MGMT MOD F2F 14D: CPT | Performed by: NURSE PRACTITIONER

## 2021-08-20 RX ORDER — APIXABAN 5 MG/1
5 TABLET, FILM COATED ORAL 2 TIMES DAILY
Qty: 60 TABLET | Refills: 5 | Status: SHIPPED | OUTPATIENT
Start: 2021-08-20

## 2021-08-20 RX ORDER — NITROGLYCERIN 0.4 MG/1
TABLET SUBLINGUAL
Qty: 25 TABLET | Refills: 2 | Status: SHIPPED | OUTPATIENT
Start: 2021-08-20 | End: 2022-05-20 | Stop reason: SDUPTHER

## 2021-08-20 RX ORDER — OXYCODONE HYDROCHLORIDE AND ACETAMINOPHEN 5; 325 MG/1; MG/1
1 TABLET ORAL EVERY 8 HOURS PRN
Qty: 90 TABLET | Refills: 0 | Status: SHIPPED | OUTPATIENT
Start: 2021-08-20 | End: 2021-10-20 | Stop reason: SDUPTHER

## 2021-08-20 ASSESSMENT — PATIENT HEALTH QUESTIONNAIRE - PHQ9
SUM OF ALL RESPONSES TO PHQ QUESTIONS 1-9: 0
SUM OF ALL RESPONSES TO PHQ9 QUESTIONS 1 & 2: 0
SUM OF ALL RESPONSES TO PHQ QUESTIONS 1-9: 0
1. LITTLE INTEREST OR PLEASURE IN DOING THINGS: 0
SUM OF ALL RESPONSES TO PHQ QUESTIONS 1-9: 0
2. FEELING DOWN, DEPRESSED OR HOPELESS: 0

## 2021-08-20 NOTE — PROGRESS NOTES
Post-Discharge Transitional Care Management Services      Allison Jamil   YOB: 1945    Date of Visit:  8/20/2021  30 Day Post-Discharge Date: 8/15/21    Allergies   Allergen Reactions    Coreg [Carvedilol] Other (See Comments)     Low pause     Pravastatin Other (See Comments)     Myalgias     Sertraline Other (See Comments)    Zocor [Simvastatin]      norvasc     Citalopram Anxiety     Outpatient Medications Marked as Taking for the 8/20/21 encounter (Office Visit) with ERICK Callejas CNP   Medication Sig Dispense Refill    nitroGLYCERIN (NITROSTAT) 0.4 MG SL tablet DISSOLVE ONE TABLET UNDER THE TONGUE EVERY 5 MINUTES AS NEEDED FOR CHEST PAIN. DO NOT EXCEED A TOTAL OF 3 DOSES IN 15 MINUTES 25 tablet 2    aspirin 81 MG EC tablet Take 1 tablet by mouth daily for 7 days 7 tablet 0    atorvastatin (LIPITOR) 80 MG tablet Take 1 tablet by mouth nightly 30 tablet 3    ticagrelor (BRILINTA) 90 MG TABS tablet Take 1 tablet by mouth 2 times daily 60 tablet 1    ELIQUIS 5 MG TABS tablet Take 1 tablet by mouth 2 times daily 60 tablet 5    insulin detemir (LEVEMIR) 100 UNIT/ML injection vial Inject 25 Units into the skin nightly      esomeprazole Magnesium (NEXIUM) 20 MG PACK Take 20 mg by mouth daily      furosemide (LASIX) 40 MG tablet Take 1 tablet by mouth once daily   May take 1 additional tablet daily PRN 2 pound weight gain, short of breath or swelling. 180 tablet 3    ONETOUCH ULTRA strip USE 1 STRIP TO CHECK GLUCOSE TWICE DAILY 100 each 0    oxyCODONE-acetaminophen (PERCOCET) 5-325 MG per tablet Take 1 tablet by mouth every 8 hours as needed for Pain for up to 30 days.  90 tablet 0    diclofenac sodium (VOLTAREN) 1 % GEL Apply 2 g topically 4 times daily as needed for Pain 150 g 0    isosorbide mononitrate (IMDUR) 30 MG extended release tablet Take 30 mg by mouth daily      albuterol sulfate  (90 Base) MCG/ACT inhaler INHALE 2 PUFFS BY INHALATION THREE TIMES A DAY AS NEEDED FOR SHORTNESS OF BREATH      potassium chloride (KLOR-CON M) 20 MEQ extended release tablet TAKE 1  BY MOUTH ONCE DAILY 90 tablet 0    amiodarone (CORDARONE) 200 MG tablet TAKE 1 TABLET BY MOUTH ONCE DAILY 90 tablet 3    albuterol (PROVENTIL) (2.5 MG/3ML) 0.083% nebulizer solution Take 3 mLs by nebulization every 6 hours as needed for Wheezing or Shortness of Breath 5 Package 0    Handicap Placard MISC by Does not apply route Expires 12/2023 1 each 0    gabapentin (NEURONTIN) 300 MG capsule TAKE 1 CAPSULE BY MOUTH THREE TIMES DAILY 90 capsule 2    magnesium oxide (MAG-OX) 400 MG tablet Take 400 mg by mouth daily      triamcinolone (KENALOG) 0.025 % ointment Apply topically 2 times daily to areas of eczema on lower legs 80 g 2    metoprolol tartrate (LOPRESSOR) 50 MG tablet Take 1 tablet by mouth 2 times daily 60 tablet 5    lisinopril (PRINIVIL;ZESTRIL) 40 MG tablet Take 40 mg by mouth daily      Insulin Pen Needle 29G X 12.7MM MISC 1 each by Does not apply route daily 100 each 3    metFORMIN (GLUCOPHAGE) 1000 MG tablet Take 1 tablet by mouth 2 times daily (with meals) 180 tablet 3         Vitals:    08/20/21 1050   BP: 122/78   Pulse: 60   Resp: 13   SpO2: 98%   Weight: 229 lb 9.6 oz (104.1 kg)   Height: 6' (1.829 m)     Body mass index is 31.14 kg/m². Wt Readings from Last 3 Encounters:   08/20/21 229 lb 9.6 oz (104.1 kg)   08/15/21 227 lb 8.2 oz (103.2 kg)   06/10/21 228 lb 9.6 oz (103.7 kg)     BP Readings from Last 3 Encounters:   08/20/21 122/78   08/15/21 (!) 159/91   06/10/21 104/60        Patient was admitted to [facility]. V's from 8/9/21 to 8/15/21 for Chest pain/CHF/Acute resp failure. Inpatient course: Discharge summary reviewed- see chart. Current status: stable    Review of Systems:  A comprehensive review of systems was negative except for what was noted in the HPI.    Chronic pain, bilateral knees  Bilateral calf pain    Physical Exam:  General Appearance: alert and oriented to person, place and time, well developed and well- nourished, in no acute distress  Skin: warm and dry, no rash or erythema  Head: normocephalic and atraumatic  Eyes: pupils equal, round, and reactive to light, extraocular eye movements intact, conjunctivae normal  ENT: tympanic membrane, external ear and ear canal normal bilaterally, nose without deformity, nasal mucosa and turbinates normal without polyps  Neck: supple and non-tender without mass, no thyromegaly or thyroid nodules, no cervical lymphadenopathy  Pulmonary/Chest: clear to auscultation bilaterally- no wheezes, rales or rhonchi, normal air movement, no respiratory distress  Cardiovascular: normal rate, regular rhythm, normal S1 and S2, no murmurs, rubs, clicks, or gallops, distal pulses intact, no carotid bruits  Abdomen: soft, non-tender, non-distended, normal bowel sounds, no masses or organomegaly  Extremities: no cyanosis, clubbing or edema  Musculoskeletal: normal range of motion, no joint swelling, deformity or tenderness  Neurologic: reflexes normal and symmetric, no cranial nerve deficit, gait, coordination and speech normal      Assessment/Plan:  Will Van was seen today for follow-up from hospital, diabetes and discuss medications. Diagnoses and all orders for this visit:    Paroxysmal atrial fibrillation ([address]    Screening for lipid disorders  -     Lipid Panel; Future    Screening for thyroid disorder  -     TSH with Reflex; Future    Coronary artery disease involving native coronary artery of native heart without angina pectoris    Chronic diastolic congestive heart failure (HCC)    REGINA (obstructive sleep apnea)    Type 2 diabetes mellitus without complication, unspecified whether long term insulin use ([address]  -     Comprehensive Metabolic Panel; Future    Other orders  -     nitroGLYCERIN (NITROSTAT) 0.4 MG SL tablet; DISSOLVE ONE TABLET UNDER THE TONGUE EVERY 5 MINUTES AS NEEDED FOR CHEST PAIN.   DO NOT EXCEED A TOTAL OF 3 DOSES

## 2021-08-20 NOTE — LETTER
[facility]  [address] [facility] [address]  Phone: [phone]  Fax: [phone]    ERICK Jeffery CNP        August 20, 2021     Patient: Heladio Rodrigues   YOB: 1945   Date of Visit: 8/20/2021       To Whom It May Concern: It is my medical opinion that Xavier Arch requires the use of Brilinta and Eliquis for severe CAD. He was recently hospitalized 8/9/21-8/15/21 for chest pain and occlusion to stents. Advised to take both these medications. Has follow up with cardiology 9/1/21. If you have any questions or concerns, please don't hesitate to call.     Sincerely,        ERICK Jeffery CNP

## 2021-08-25 RX ORDER — EMPAGLIFLOZIN 10 MG/1
1 TABLET, FILM COATED ORAL DAILY
Qty: 90 TABLET | Refills: 3 | Status: SHIPPED | OUTPATIENT
Start: 2021-08-25 | End: 2021-09-03 | Stop reason: SDUPTHER

## 2021-08-26 ENCOUNTER — TELEPHONE (OUTPATIENT)
Dept: PRIMARY CARE CLINIC | Age: 76
End: 2021-08-26

## 2021-08-26 NOTE — TELEPHONE ENCOUNTER
Patient called stating that since stopping his metformin on Monday after his visit that his sugars have slowly been rising. He states that his BS this morning was 218. Patient is asking if he should start taking the metformin again. Please advise.

## 2021-08-26 NOTE — DISCHARGE SUMMARY
[address] [facility]     Department of Internal Medicine - Staff Internal Medicine Teaching Service    INPATIENT DISCHARGE SUMMARY      Patient Identification:  Abrahan Flores is a 77 y.o. male. :  1945  MRN: [id number]     Acct: [de-identified]   PCP: Colby Favre, APRN - CNP  Admit Date:  2021  Discharge date and time: 8/15/2021  1:00 PM   Attending Provider: No att. providers found                                     [facility] Problem Lists:  Principal Problem:    Unstable angina ([facility] [address]  Active Problems:    Chest pain    Acute on chronic systolic heart failure ([facility] [address]  Resolved Problems:    * No resolved hospital problems. *      HOSPITAL STAY     Brief Inpatient course:   Abrahan Flores is a 77 y.o. male who was admitted for the management of Unstable angina [facility], presented to the emergency department with chest pain and shortness of breath. He has PMH of A. fib (on Eliquis), CHF, PAD, DM 2, HTN, HLD.     Procedures/ Significant Interventions:    Cardiac catheterization and stent placement    Consults:     Consults:     Final Specialist Recommendations/Findings:   IP CONSULT TO INTERNAL MEDICINE  IP CONSULT TO CASE MANAGEMENT  IP CONSULT TO CARDIOLOGY  IP CONSULT TO CARDIOTHORACIC SURGERY  IP CONSULT TO SPIRITUAL SERVICES  IP CONSULT TO PULMONOLOGY  IP CONSULT TO CARDIAC REHAB  IP CONSULT TO SOCIAL WORK      Any Hospital Acquired Infections: none    Discharge Functional Status:  stable    DISCHARGE PLAN     Disposition: SNF    Patient Instructions:   Discharge Medication List as of 8/15/2021 10:52 AM        START taking these medications    Details   aspirin 81 MG EC tablet Take 1 tablet by mouth daily for 7 days, Disp-7 tablet, R-0Normal      atorvastatin (LIPITOR) 80 MG tablet Take 1 tablet by mouth nightly, Disp-30 tablet, R-3Normal      ticagrelor (BRILINTA) 90 MG TABS tablet Take 1 tablet by mouth 2 times daily, Disp-60 tablet, R-1Normal CONTINUE these medications which have CHANGED    Details   !! ELIQUIS 5 MG TABS tablet Take 1 tablet by mouth 2 times daily, Disp-60 tablet, R-5, DAWNormal       !! - Potential duplicate medications found. Please discuss with provider. CONTINUE these medications which have NOT CHANGED    Details   insulin detemir (LEVEMIR) 100 UNIT/ML injection vial Inject 25 Units into the skin nightlyHistorical Med      esomeprazole Magnesium (NEXIUM) 20 MG PACK Take 20 mg by mouth dailyHistorical Med      furosemide (LASIX) 40 MG tablet Take 1 tablet by mouth once daily   May take 1 additional tablet daily PRN 2 pound weight gain, short of breath or swelling., Disp-180 tablet, R-3Normal      ONETOUCH ULTRA strip USE 1 STRIP TO CHECK GLUCOSE TWICE DAILY, Disp-100 each, R-0Normal      !! apixaban (ELIQUIS) 5 MG TABS tablet Take 5 mg by mouth 2 times daily Historical Med      oxyCODONE-acetaminophen (PERCOCET) 5-325 MG per tablet Take 1 tablet by mouth every 8 hours as needed for Pain for up to 30 days. , Disp-90 tablet, R-0Normal      diclofenac sodium (VOLTAREN) 1 % GEL Apply 2 g topically 4 times daily as needed for Pain, Topical, 4 TIMES DAILY PRN Starting Wed 2/24/2021, Disp-150 g, R-0, Normal      isosorbide mononitrate (IMDUR) 30 MG extended release tablet Take 30 mg by mouth dailyHistorical Med      albuterol sulfate  (90 Base) MCG/ACT inhaler INHALE 2 PUFFS BY INHALATION THREE TIMES A DAY AS NEEDED FOR SHORTNESS OF BREATHHistorical Med      potassium chloride (KLOR-CON M) 20 MEQ extended release tablet TAKE 1  BY MOUTH ONCE DAILY, Disp-90 tablet,R-0Normal      amiodarone (CORDARONE) 200 MG tablet TAKE 1 TABLET BY MOUTH ONCE DAILY, Disp-90 tablet, R-3Please consider 90 day supplies to promote better adherenceNormal      albuterol (PROVENTIL) (2.5 MG/3ML) 0.083% nebulizer solution Take 3 mLs by nebulization every 6 hours as needed for Wheezing or Shortness of Breath, Disp-5 Package, R-0Whatever box is common is fine. Please refill thru PCP or pulm in futureNormal      nitroGLYCERIN (NITROSTAT) 0.4 MG SL tablet DISSOLVE ONE TABLET UNDER THE TONGUE EVERY 5 MINUTES AS NEEDED FOR CHEST PAIN. DO NOT EXCEED A TOTAL OF 3 DOSES IN 15 MINUTES, Disp-25 tablet, R-2Normal      Handicap Placard MISC Starting Mon 12/17/2018, Disp-1 each, R-0, PrintExpires 12/2023      gabapentin (NEURONTIN) 300 MG capsule TAKE 1 CAPSULE BY MOUTH THREE TIMES DAILY, Disp-90 capsule, R-2Normal      magnesium oxide (MAG-OX) 400 MG tablet Take 400 mg by mouth dailyHistorical Med      triamcinolone (KENALOG) 0.025 % ointment Apply topically 2 times daily to areas of eczema on lower legs, Disp-80 g, R-2, Normal      metoprolol tartrate (LOPRESSOR) 50 MG tablet Take 1 tablet by mouth 2 times daily, Disp-60 tablet, R-5Normal      lisinopril (PRINIVIL;ZESTRIL) 40 MG tablet Take 40 mg by mouth daily      Insulin Pen Needle 29G X 12.7MM MISC DAILY Starting 8/17/2016, Until Discontinued, Disp-100 each, R-3, Normal      metFORMIN (GLUCOPHAGE) 1000 MG tablet Take 1 tablet by mouth 2 times daily (with meals), Disp-180 tablet, R-3       !! - Potential duplicate medications found. Please discuss with provider.         STOP taking these medications       alogliptin (NESINA) 25 MG TABS tablet Comments:   Reason for Stopping:         empagliflozin (JARDIANCE) 10 MG tablet Comments:   Reason for Stopping:         dilTIAZem (CARDIZEM CD) 240 MG extended release capsule Comments:   Reason for Stopping:         glimepiride (AMARYL) 4 MG tablet Comments:   Reason for Stopping:         clopidogrel (PLAVIX) 75 MG tablet Comments:   Reason for Stopping:         sildenafil (VIAGRA) 50 MG tablet Comments:   Reason for Stopping:         polyethyl glycol-propyl glycol 0.4-0.3 % (SYSTANE) 0.4-0.3 % ophthalmic solution Comments:   Reason for Stopping:         insulin detemir (LEVEMIR FLEXTOUCH) 100 UNIT/ML injection pen Comments:   Reason for Stopping:         omeprazole (PRILOSEC) 20 MG delayed release capsule Comments:   Reason for Stopping:         Omega-3 Fatty Acids (OMEGA 3 500) 500 MG CAPS Comments:   Reason for Stopping:         saxagliptin (ONGLYZA) 5 MG TABS tablet Comments:   Reason for Stopping:         aspirin 81 MG tablet Comments:   Reason for Stopping:               Activity: activity as tolerated    Diet: cardiac diet and diabetic diet    Follow-up:    [facility]  [address] [facility]  In 1 week  post hoisp cath follow up    Fran Pinto APRCECILY - [facility]  [address] [place] [address] [facility]          Fran Pinto APRCECILY - CNP  [address] [facility] [address]   [address] [facility] [address]  [facility] [address] [facility]  [phone]    In 1 week  post hosp follow up      Patient Instructions: Follow up with PCP and Cardiology outpatient after cardiac cath in hospital. Start taking brillinta and Eliquis. Take aspirin for 1 week and stop it. Stop PLAVIX. Follow up with PCP regarding optimisation of diabetic meds. Follow up labs: none  Follow up imaging: none    Note that over 30 minutes was spent in preparing discharge papers, discussing discharge with patient, medication review, etc.      Jean-Paul Espinosa MD,   Internal Medicine Resident, PGY-1   [address] [place], [place]  8/26/2021, 3:39 PM

## 2021-08-26 NOTE — TELEPHONE ENCOUNTER
Writer read provider response, pt asked what is wrong with his kidney fucntion? He did verbalize understanding with increasing his levemir by 5 units, and will call in the morning with an update on BS levels.

## 2021-08-28 ENCOUNTER — OFFICE VISIT (OUTPATIENT)
Dept: FAMILY MEDICINE CLINIC | Age: 76
End: 2021-08-28
Payer: MEDICARE

## 2021-08-28 VITALS
HEART RATE: 59 BPM | WEIGHT: 217 LBS | BODY MASS INDEX: 29.39 KG/M2 | DIASTOLIC BLOOD PRESSURE: 66 MMHG | SYSTOLIC BLOOD PRESSURE: 106 MMHG | OXYGEN SATURATION: 97 % | HEIGHT: 72 IN

## 2021-08-28 DIAGNOSIS — R73.9 HYPERGLYCEMIA: Primary | ICD-10-CM

## 2021-08-28 DIAGNOSIS — E11.65 UNCONTROLLED TYPE 2 DIABETES MELLITUS WITH HYPERGLYCEMIA (HCC): ICD-10-CM

## 2021-08-28 DIAGNOSIS — Z79.4 TYPE 2 DIABETES MELLITUS WITH OTHER SPECIFIED COMPLICATION, WITH LONG-TERM CURRENT USE OF INSULIN (HCC): ICD-10-CM

## 2021-08-28 DIAGNOSIS — E11.69 TYPE 2 DIABETES MELLITUS WITH OTHER SPECIFIED COMPLICATION, WITH LONG-TERM CURRENT USE OF INSULIN (HCC): ICD-10-CM

## 2021-08-28 PROCEDURE — 99214 OFFICE O/P EST MOD 30 MIN: CPT | Performed by: NURSE PRACTITIONER

## 2021-08-28 PROCEDURE — 3051F HG A1C>EQUAL 7.0%<8.0%: CPT | Performed by: NURSE PRACTITIONER

## 2021-08-28 PROCEDURE — 82962 GLUCOSE BLOOD TEST: CPT | Performed by: NURSE PRACTITIONER

## 2021-08-28 ASSESSMENT — ENCOUNTER SYMPTOMS
RHINORRHEA: 0
SORE THROAT: 0
VOMITING: 0
EYE PAIN: 0
SHORTNESS OF BREATH: 0
CHEST TIGHTNESS: 0
COUGH: 0
NAUSEA: 0

## 2021-08-28 NOTE — PATIENT INSTRUCTIONS
Patient Education        Counting Carbohydrates for Diabetes: Care Instructions  Your Care Instructions     You don't have to eat special foods when you have diabetes. You just have to be careful to eat healthy foods. Carbohydrates (carbs) raise blood sugar higher and quicker than any other nutrient. Carbs are found in desserts, breads and cereals, and fruit. They're also in starchy vegetables. These include potatoes, corn, and grains such as rice and pasta. Carbs are also in milk and yogurt. The more carbs you eat at one time, the higher your blood sugar will rise. Spreading carbs all through the day helps keep your blood sugar levels within your target range. Counting carbs is one of the best ways to keep your blood sugar under control. If you use insulin, counting carbs helps you match the right amount of insulin to the number of grams of carbs in a meal. Then you can change your diet and insulin dose as needed. Testing your blood sugar several times a day can help you learn how carbs affect your blood sugar. A registered dietitian or certified diabetes educator can help you plan meals and snacks. Follow-up care is a key part of your treatment and safety. Be sure to make and go to all appointments, and call your doctor if you are having problems. It's also a good idea to know your test results and keep a list of the medicines you take. How can you care for yourself at home? Know your daily amount of carbohydrates  Your daily amount depends on several things, such as your weight, how active you are, which diabetes medicines you take, and what your goals are for your blood sugar levels. A registered dietitian or certified diabetes educator can help you plan how many carbs to include in each meal and snack. For most adults, a guideline for the daily amount of carbs is:  · 45 to 60 grams at each meal. That's about the same as 3 to 4 carbohydrate servings. · 15 to 20 grams at each snack.  That's about the same as 1 carbohydrate serving. Count carbs  Counting carbs lets you know how much rapid-acting insulin to take before you eat. If you use an insulin pump, you get a constant rate of insulin during the day. So the pump must be programmed at meals. This gives you extra insulin to cover the rise in blood sugar after meals. If you take insulin:  · Learn your own insulin-to-carb ratio. You and your diabetes health professional will figure out the ratio. You can do this by testing your blood sugar after meals. For example, you may need a certain amount of insulin for every 15 grams of carbs. · Add up the carb grams in a meal. Then you can figure out how many units of insulin to take based on your insulin-to-carb ratio. · Exercise lowers blood sugar. You can use less insulin than you would if you were not doing exercise. Keep in mind that timing matters. If you exercise within 1 hour after a meal, your body may need less insulin for that meal than it would if you exercised 3 hours after the meal. Test your blood sugar to find out how exercise affects your need for insulin. If you do or don't take insulin:  · Look at labels on packaged foods. This can tell you how many carbs are in a serving. You can also use guides from the American Diabetes Association. · Be aware of portions, or serving sizes. If a package has two servings and you eat the whole package, you need to double the number of grams of carbohydrate listed for one serving. · Protein, fat, and fiber do not raise blood sugar as much as carbs do. If you eat a lot of these nutrients in a meal, your blood sugar will rise more slowly than it would otherwise. Eat from all food groups  · Eat at least three meals a day. · Plan meals to include food from all the food groups. The food groups include grains, fruits, dairy, proteins, and vegetables. · Talk to your dietitian or diabetes educator about ways to add limited amounts of sweets into your meal plan.   · If you drink alcohol, talk to your doctor. It may not be recommended when you are taking certain diabetes medicines. Where can you learn more? Go to https://chpepiceweb.[employer]. org and sign in to your [employer] account. Enter P935 in the [employer] box to learn more about \"Counting Carbohydrates for Diabetes: Care Instructions. \"     If you do not have an account, please click on the \"Sign Up Now\" link. Current as of: August 31, 2020               Content Version: 12.9  © 2006-2021 [employer]. Care instructions adapted under license by [facility] ([facility]). If you have questions about a medical condition or this instruction, always ask your healthcare professional. [address] any warranty or liability for your use of this information. Patient Education        Giving a Single-Dose Insulin Shot: Care Instructions  Overview     Insulin is normally made by the pancreas, a gland behind the stomach. In people with diabetes, the pancreas no longer makes enough insulin or it stops making it. Without insulin, your blood sugar level rises to dangerous levels. When this happens, you need insulin shots to keep your blood sugar in your target range. You may be nervous giving a shot at first. But soon, giving yourself a shot will become routine. It is quite easy to learn how to draw up insulin into a syringe and give the shot. The needles you use to give the insulin injections are very thin, and most people who have diabetes say they do not even feel the needle enter the skin. Even if you do feel the injection, the sting of the shot is not bad and does not last long. Follow-up care is a key part of your treatment and safety. Be sure to make and go to all appointments, and call your doctor if you are having problems. It's also a good idea to know your test results and keep a list of the medicines you take. How can you care for yourself at home?   Getting started  If you have poor eyesight, have problems using your hands, or cannot prepare a dose of insulin, you may need someone to prepare your insulin injections ahead of time. · Gather your supplies. You will need an insulin syringe, your bottle of insulin, and an alcohol wipe or a cotton ball dipped in alcohol. Keep your supplies in a bag or kit so you can carry the supplies wherever you go. · Check the insulin bottle label and contents. Read and follow all instructions on the label, including how to store the insulin and how long the insulin will last.  · Wash your hands with soap and running water. Dry them well. Preparing the shot   For a single type of insulin shot:  1. Roll the bottle gently between your hands. This will warm the insulin if you have kept the bottle in the refrigerator. Roll a bottle of cloudy insulin between your hands until the white powder has dissolved and the solution is mixed. 2. Wipe the rubber lid of the insulin bottle with an alcohol wipe or a cotton ball dipped in alcohol. (If you are using a bottle for the first time, remove the protective cover over the rubber lid.) Let the top dry before you remove any insulin. 3. Remove the plastic cap from the needle on your insulin syringe. Take care not to touch the needle. 4. Pull the plunger of the syringe back, and draw air into the syringe equal to the number of units of insulin to be given. 5. Insert the needle of the syringe into the rubber lid of the insulin bottle. Push the plunger of the syringe to force the air into the bottle. This equalizes the pressure in the bottle when you remove the dose of insulin. Leave the needle in the bottle. 6. Turn the bottle and syringe upside down, and hold them in one hand. Position the tip of the needle so that it is below the surface of insulin in the bottle. Pull back the plunger to fill the syringe with slightly more than the correct number of units of insulin to be given.   7. Tap the outside (barrel) of the syringe so that trapped air bubbles move into the needle area. Push the air bubbles back into the bottle. Make sure you now have the correct number of units of insulin in your syringe. 8. Remove the needle from the bottle. Now you are ready to give the shot. Giving the shot  Before giving your shot:  1. Use alcohol to clean the skin before you give the shot. Let it dry. 2. Slightly pinch a fold of skin between your fingers and thumb of one hand. 3. Hold the syringe like a pencil close to the site, keeping your fingers off the plunger. It is usually recommended to place the syringe at a 90-degree angle to the shot site, standing straight up from the skin. 4. Bend your wrist, and quickly push the needle all the way into the pinched-up area. 5. Push the plunger of the syringe all the way in so the insulin goes into the fatty tissue. 6. Take the needle out at the same angle that you inserted it. If you bleed a little, apply pressure over the shot area with your finger, a cotton ball, or a piece of gauze. Do not rub the area. 7. Replace the cover over the needle and dispose of the needle safely. Do not use the same needle more than one time. Where to give the shot  You can inject insulin into:  · The belly, but at least 2 inches from the belly button. This is considered the best place to inject insulin. · The top outer part of the thighs. Insulin usually is absorbed more slowly from this site, unless you exercise soon after giving the shot. · The outside of the upper arms. You may need help giving yourself shots in this area. · The buttocks. You may need help with injections in the buttocks. Your doctor may advise you to give your shots in different places on your body each day. This is called site rotation. If you are going to rotate sites, check with your doctor to make sure you know how to do it right. Use the same site at the same time of each day.  For example, each day:  · At breakfast, give the shot in one of your arms. · At lunch, give the shot in one of your legs. · At dinner, give the shot in your belly. Slightly change the spot where you give an insulin shot each time you do it. For example, use five different places on the right upper arm, then use five places on the left upper arm. Using the same spot every time can cause bumps or pits in the skin and make the shots hurt more. It may also slow down how the insulin is absorbed into your body. Where can you learn more? Go to https://AxioMxpeividenceewAllegory Law.AMEC. org and sign in to your [employer] account. Enter E031 in the [employer] box to learn more about \"Giving a Single-Dose Insulin Shot: Care Instructions. \"     If you do not have an account, please click on the \"Sign Up Now\" link. Current as of: August 31, 2020               Content Version: 12.9  © 2006-2021 Healthwise, Incorporated. Care instructions adapted under license by [facility] ([facility]). If you have questions about a medical condition or this instruction, always ask your healthcare professional. [place] [address] any warranty or liability for your use of this information.

## 2021-08-28 NOTE — PROGRESS NOTES
[address] WALK-IN  [address] [facility] 15[address]  [address] [address]  Dept: [phone]  Dept Fax: [phone]    Roberth Cloud is a 77 y.o. male who presents today for his medical conditions/complaints of   Chief Complaint   Patient presents with    Blood Sugar Problem     214pm - 417          HPI:     /66 (Site: Left Upper Arm, Position: Sitting, Cuff Size: Large Adult)   Pulse 59   Ht 6' (1.829 m)   Wt 217 lb (98.4 kg)   SpO2 97%   BMI 29.43 kg/m²       HPI  Pt presented to the walk in today with c/o hyperglycemia. Previously was on Metformin but this was stopped due to elevated creat level. He is on Levamir QHS. Pt states he took 35 units last night at 11:00 PM and at 8:30 this AM his blood sugar was 320. He took another 18 units. He contacted his PCP and was directed to come here for SS insulin. He feels well with no blurry vision, dizziness, chest pain, nausea, vomiting.        Past Medical History:   Diagnosis Date    Arrhythmia     CAD (coronary artery disease)     GERD (gastroesophageal reflux disease)     Heart attack ([address]     Hyperlipidemia     Hypertension     Ischemic cardiomyopathy     Osteoarthritis     Type II or unspecified type diabetes mellitus without mention of complication, not stated as uncontrolled         Past Surgical History:   Procedure Laterality Date    APPENDECTOMY      CARDIAC CATHETERIZATION      CARDIAC SURGERY      stents     COLONOSCOPY      CORONARY ANGIOPLASTY WITH STENT PLACEMENT  2018    [facility]    DIAGNOSTIC CARDIAC CATH LAB PROCEDURE      JOINT REPLACEMENT      knee right parital    PACEMAKER INSERTION      PTCA      ROTATOR CUFF REPAIR      TONSILLECTOMY         Family History   Problem Relation Age of Onset    Heart Disease Mother     High Blood Pressure Mother     Heart Disease Father     Cancer Sister         breast cancer    Cancer Brother         bladder cancer    Diabetes Maternal Grandmother        Social History     Tobacco Use    Smoking status: Former Smoker     Packs/day: 1.00     Years: 40.00     Pack years: 40.00     Types: Cigarettes     Quit date: 1994     Years since quittin.3    Smokeless tobacco: Never Used   Substance Use Topics    Alcohol use: Yes     Comment: rare        Prior to Visit Medications    Medication Sig Taking? Authorizing Provider   insulin lispro (HUMALOG) 100 UNIT/ML injection vial 150-200  Inject 2 units  201-250 Inject 4 units  251-300  Inject 6 units  301-350  Inject 8 units  351-400 Inject 12 units  401-500 Inject 14 units Yes Valamari SerchiquiantERICK CNP   empagliflozin (JARDIANCE) 10 MG tablet Take 1 tablet by mouth daily  ERICK Mccollum CNP   nitroGLYCERIN (NITROSTAT) 0.4 MG SL tablet DISSOLVE ONE TABLET UNDER THE TONGUE EVERY 5 MINUTES AS NEEDED FOR CHEST PAIN. DO NOT EXCEED A TOTAL OF 3 DOSES IN 15 MINUTES  ERICK Mccollum CNP   ELIQUIS 5 MG TABS tablet Take 1 tablet by mouth 2 times daily  ERICK Mccollum CNP   ticagrelor (BRILINTA) 90 MG TABS tablet Take 1 tablet by mouth 2 times daily  ERICK Mccollum CNP   oxyCODONE-acetaminophen (PERCOCET) 5-325 MG per tablet Take 1 tablet by mouth every 8 hours as needed for Pain for up to 30 days. ERICK Mccollum CNP   aspirin 81 MG EC tablet Take 1 tablet by mouth daily for 7 days  eYnny Ames MD   atorvastatin (LIPITOR) 80 MG tablet Take 1 tablet by mouth nightly  Yenny Ames MD   insulin detemir (LEVEMIR) 100 UNIT/ML injection vial Inject 25 Units into the skin nightly  Historical Provider, MD   esomeprazole Magnesium (NEXIUM) 20 MG PACK Take 20 mg by mouth daily  Historical Provider, MD   furosemide (LASIX) 40 MG tablet Take 1 tablet by mouth once daily   May take 1 additional tablet daily PRN 2 pound weight gain, short of breath or swelling.   ERICK Mccollum CNP   ONETOUCH ULTRA strip USE 1 STRIP TO CHECK GLUCOSE TWICE DAILY  ERICK Quevedo CNP   diclofenac sodium (VOLTAREN) 1 % GEL Apply 2 g topically 4 times daily as needed for Pain  Carlos Benites MD   isosorbide mononitrate (IMDUR) 30 MG extended release tablet Take 30 mg by mouth daily  Historical Provider, MD   albuterol sulfate  (90 Base) MCG/ACT inhaler INHALE 2 PUFFS BY INHALATION THREE TIMES A DAY AS NEEDED FOR SHORTNESS OF BREATH  Historical Provider, MD   potassium chloride (KLOR-CON M) 20 MEQ extended release tablet TAKE 1  BY MOUTH ONCE DAILY  ERICK Quevedo CNP   amiodarone (CORDARONE) 200 MG tablet TAKE 1 TABLET BY MOUTH ONCE DAILY  Casey Moreno MD   albuterol (PROVENTIL) (2.5 MG/3ML) 0.083% nebulizer solution Take 3 mLs by nebulization every 6 hours as needed for Wheezing or Shortness of Breath  Casey Moreno MD   Handicaedwin Placard MISC by Does not apply route Expires 12/2023  ERICK Quevedo CNP   gabapentin (NEURONTIN) 300 MG capsule TAKE 1 CAPSULE BY MOUTH THREE TIMES DAILY  ERICK Quevedo CNP   magnesium oxide (MAG-OX) 400 MG tablet Take 400 mg by mouth daily  Historical Provider, MD   triamcinolone (KENALOG) 0.025 % ointment Apply topically 2 times daily to areas of eczema on lower legs  Lena Alberto MD   metoprolol tartrate (LOPRESSOR) 50 MG tablet Take 1 tablet by mouth 2 times daily  ERICK Quevedo CNP   lisinopril (PRINIVIL;ZESTRIL) 40 MG tablet Take 40 mg by mouth daily  Historical Provider, MD   Insulin Pen Needle 29G X 12.7MM MISC 1 each by Does not apply route daily  ERICK Angeles CNP       Allergies   Allergen Reactions    Coreg [Carvedilol] Other (See Comments)     Low pause     Pravastatin Other (See Comments)     Myalgias     Sertraline Other (See Comments)    Zocor [Simvastatin]      norvasc     Citalopram Anxiety         Subjective:      Review of Systems   Constitutional: Negative for chills and fever.    HENT: Negative for congestion, ear pain, rhinorrhea and sore throat. Eyes: Negative for pain and visual disturbance. Respiratory: Negative for cough, chest tightness and shortness of breath. Cardiovascular: Negative for chest pain, palpitations and leg swelling. Gastrointestinal: Negative for nausea and vomiting. Genitourinary: Negative for decreased urine volume and difficulty urinating. Musculoskeletal: Negative for gait problem, myalgias and neck pain. Skin: Negative for pallor and rash. Neurological: Negative for weakness, light-headedness and headaches. Psychiatric/Behavioral: Negative for sleep disturbance. Objective:     Physical Exam  Vitals and nursing note reviewed. Constitutional:       General: He is not in acute distress. Appearance: Normal appearance. HENT:      Head: Normocephalic and atraumatic. Right Ear: Tympanic membrane and ear canal normal.      Left Ear: Tympanic membrane and ear canal normal.      Nose: Nose normal.      Mouth/Throat:      Lips: Pink. Mouth: Mucous membranes are moist.      Pharynx: Oropharynx is clear. Uvula midline. Eyes:      Extraocular Movements: Extraocular movements intact. Conjunctiva/sclera: Conjunctivae normal.      Pupils: Pupils are equal, round, and reactive to light. Cardiovascular:      Rate and Rhythm: Bradycardia present. Pulses: Normal pulses. Pulmonary:      Effort: Pulmonary effort is normal.      Breath sounds: Normal breath sounds. Abdominal:      General: Bowel sounds are normal.      Palpations: Abdomen is soft. Musculoskeletal:         General: Normal range of motion. Cervical back: Normal range of motion and neck supple. Skin:     General: Skin is warm and dry. Capillary Refill: Capillary refill takes less than 2 seconds. Findings: No rash. Neurological:      Mental Status: He is alert and oriented to person, place, and time.       Coordination: Coordination normal.      Gait: Gait normal.   Psychiatric: Mood and Affect: Mood normal.         Thought Content: Thought content normal.           MEDICAL DECISION MAKING Assessment/Plan:     Margaret Enamorado was seen today for blood sugar problem.     Diagnoses and all orders for this visit:    Hyperglycemia  -     POCT Glucose  -     insulin lispro (HUMALOG) 100 UNIT/ML injection vial; 150-200  Inject 2 units  201-250 Inject 4 units  251-300  Inject 6 units  301-350  Inject 8 units  351-400 Inject 12 units  401-500 Inject 14 units    Type 2 diabetes mellitus with other specified complication, with long-term current use of insulin (HCC)  -     insulin lispro (HUMALOG) 100 UNIT/ML injection vial; 150-200  Inject 2 units  201-250 Inject 4 units  251-300  Inject 6 units  301-350  Inject 8 units  351-400 Inject 12 units  401-500 Inject 14 units    Uncontrolled type 2 diabetes mellitus with hyperglycemia (HCC)        Results for orders placed or performed during the hospital encounter of 08/20/21   Comprehensive Metabolic Panel   Result Value Ref Range    Glucose 161 (H) 70 - 99 mg/dL    BUN 39 (H) 8 - 23 mg/dL    CREATININE 1.44 (H) 0.70 - 1.20 mg/dL    Bun/Cre Ratio NOT REPORTED 9 - 20    Calcium 8.9 8.6 - 10.4 mg/dL    Sodium 142 135 - 144 mmol/L    Potassium 4.4 3.7 - 5.3 mmol/L    Chloride 101 98 - 107 mmol/L    CO2 23 20 - 31 mmol/L    Anion Gap 18 (H) 9 - 17 mmol/L    Alkaline Phosphatase 78 40 - 129 U/L    ALT 24 5 - 41 U/L    AST 20 <40 U/L    Total Bilirubin 0.26 (L) 0.3 - 1.2 mg/dL    Total Protein 6.5 6.4 - 8.3 g/dL    Albumin 4.0 3.5 - 5.2 g/dL    Albumin/Globulin Ratio 1.6 1.0 - 2.5    GFR Non- 48 (L) >60 mL/min    GFR  58 (L) >60 mL/min    GFR Comment          GFR Staging NOT REPORTED    TSH with Reflex   Result Value Ref Range    TSH 1.91 0.30 - 5.00 mIU/L   Lipid Panel   Result Value Ref Range    Cholesterol 140 <200 mg/dL    HDL 35 (L) >40 mg/dL    LDL Cholesterol 75 0 - 130 mg/dL    Chol/HDL Ratio 4.0 <5    Triglycerides 148 <150 mg/dL    VLDL NOT REPORTED 1 - 30 mg/dL     Glucose checked in office today was 417. Pt instructed on use of Humalog with sliding scale. Humalog sample bottle provided to the patient with log to record glucose and amount and type of glucose that is administered. Reviewed diet- drinks beer. Advised to stop as this will elevate his glucose. Reduce carb intake. Pt states he has glucose wafers at home and will check his sugar frequently over the next 24 hours. He states he knows when his glucose is low but will continue to check it at home more frequently. Pt states he has been text messaging his PCP and will keep her updated as to what his glucose is running. Advised to schedule follow up with PCP. Go to the ER for any emergent concern. Patient given educational materials - see patientinstructions. Discussed use, benefit, and side effects of prescribed medications. All patient questions answered. Pt verbalized understanding. Instructed to continue current medications, diet and exercise. Patient agreed with treatment plan. Follow up as directed.      Electronically signed by ERICK Nguyen CNP on 8/28/2021 at 3:41 PM

## 2021-08-31 ENCOUNTER — CARE COORDINATION (OUTPATIENT)
Dept: CARE COORDINATION | Age: 76
End: 2021-08-31

## 2021-09-01 ENCOUNTER — TELEPHONE (OUTPATIENT)
Dept: PRIMARY CARE CLINIC | Age: 76
End: 2021-09-01

## 2021-09-01 NOTE — TELEPHONE ENCOUNTER
Needs humalog samples please call him and let him know if we have available.     Also diabetic syringes     thanks

## 2021-09-01 NOTE — TELEPHONE ENCOUNTER
[facility] SYSTEM there was not a message on my phone.  It seemed like a butt dial. Please have him leave message with office or call again today  thanks

## 2021-09-01 NOTE — TELEPHONE ENCOUNTER
Pt called and wanted to speak to PCP, she was busy. Pt states he was going to call and update on what he found out from the [place]. Pt states he has errands to run and can be reached around 230.

## 2021-09-02 ENCOUNTER — TELEPHONE (OUTPATIENT)
Dept: PRIMARY CARE CLINIC | Age: 76
End: 2021-09-02

## 2021-09-02 ENCOUNTER — HOSPITAL ENCOUNTER (OUTPATIENT)
Age: 76
Setting detail: SPECIMEN
Discharge: HOME | End: 2021-09-02
Payer: MEDICARE

## 2021-09-02 DIAGNOSIS — R79.89 ELEVATED SERUM CREATININE: ICD-10-CM

## 2021-09-02 DIAGNOSIS — R79.89 ELEVATED SERUM CREATININE: Primary | ICD-10-CM

## 2021-09-02 LAB
ANION GAP SERPL CALCULATED.3IONS-SCNC: 14 MMOL/L (ref 9–17)
BUN BLDV-MCNC: 41 MG/DL (ref 8–23)
BUN/CREAT BLD: ABNORMAL (ref 9–20)
CALCIUM SERPL-MCNC: 8.7 MG/DL (ref 8.6–10.4)
CHLORIDE BLD-SCNC: 99 MMOL/L (ref 98–107)
CO2: 26 MMOL/L (ref 20–31)
CREAT SERPL-MCNC: 1.46 MG/DL (ref 0.7–1.2)
GFR AFRICAN AMERICAN: 57 ML/MIN
GFR NON-AFRICAN AMERICAN: 47 ML/MIN
GFR SERPL CREATININE-BSD FRML MDRD: ABNORMAL ML/MIN/{1.73_M2}
GFR SERPL CREATININE-BSD FRML MDRD: ABNORMAL ML/MIN/{1.73_M2}
GLUCOSE BLD-MCNC: 323 MG/DL (ref 70–99)
POTASSIUM SERPL-SCNC: 4.3 MMOL/L (ref 3.7–5.3)
SODIUM BLD-SCNC: 139 MMOL/L (ref 135–144)

## 2021-09-02 NOTE — TELEPHONE ENCOUNTER
[1:04 PM] Cheng Rogers  he needs humalog samples  ? [1:04 PM] Cheng Rogers R  insulin needles  ? [1:04 PM] Cheng Rogers  and to see the needles he bought  ? [1:04 PM] Paradise Majano  ok do you want on lab for visit  ? [1:05 PM] Kianna Botello can get samples  ? [1:05 PM] Cheng Rgoers  sure that will work  ? [1:05 PM] Cheng Rogers  prefer humalog pen if available  ? [1:05 PM] Cheng Rogers  otherwise vial        Patient came in office to discuss DM medication with provider I discussed with provider (see message above)  I gave him 2 boxes of Humalog U-100 samples we talked about the needles he could use, I asked provider about the levemier pen needles he had at home she said those would work. We also talked about the needles he bought and showed them to provider she looked at them and said he could use with the vial he had at home. Patient then went to lab for  BMP recheck that was high 2 weeks ago.

## 2021-09-03 RX ORDER — EMPAGLIFLOZIN 10 MG/1
1 TABLET, FILM COATED ORAL DAILY
Qty: 90 TABLET | Refills: 3 | Status: SHIPPED | OUTPATIENT
Start: 2021-09-03

## 2021-09-03 NOTE — TELEPHONE ENCOUNTER
Pt stated that he received several medications from the [facility], but his jardiance was not with them.  Requesting medication be sent to [facility] in [facility]

## 2021-09-10 DIAGNOSIS — N18.9 CHRONIC KIDNEY DISEASE, UNSPECIFIED CKD STAGE: Primary | ICD-10-CM

## 2021-09-30 ENCOUNTER — TELEPHONE (OUTPATIENT)
Dept: PHARMACY | Facility: CLINIC | Age: 76
End: 2021-09-30

## 2021-09-30 NOTE — TELEPHONE ENCOUNTER
[facility] CLINICAL PHARMACY REVIEW: ADHERENCE REVIEW  Identified care gap per [employer]; fills at [facility]: Diabetes and Statin adherence    Last Visit: 8/28/21    Patient also appears to be prescribed: atorvastatin 80mg,  Jardiance 10mg, Glimepiride 2mg    Patient found in Outcomes MTM and is not currently eligible for CMR/TIP    ASSESSMENT  DIABETES ADHERENCE    Per Insurance Records through [employer] (2020 [facility] = 100%; YTD [place] = 100%; Potential Fail Date: 12/31/21):   GLIMEPIRIDE  TAB 2MG last filled on 6/14/21 for 90 day supply. Next refill due: 11/25/21    Per Reconciled Dispense Report:  GLIMEPIRIDE  TAB 2MG last filled on 6/14/21 for 90 day supply. Per [facility]:   GLIMEPIRIDE last picked up on 6/14/21 for 90 day supply. 1 refills remaining. Billed through [facility]   Component Value Date    LABA1C 7.2 (H) 08/11/2021    LABA1C 6.6 06/10/2021    LABA1C 7.1 02/04/2021     NOTE A1c <9%    STATIN ADHERENCE    Per Insurance Records through [employer] (2020 [place] = 0%; YTD [place] = 100%; Potential Fail Date: 10/10/21): Atorvastatin last filled on 8/14/21 for 30 day supply. Next refill due: 9/13/21    Per Reconciled Dispense Report:  Atorvastatin last filled on 9/6/21 for 30 day supply. Per [facility]:   Atorvastatin last picked up on 9/6/21 for 30 day supply. 2 refills remaining.  Billed through [facility]   Component Value Date    CHOL 140 08/20/2021    TRIG 148 08/20/2021    HDL 35 (L) 08/20/2021    LDLCHOLESTEROL 75 08/20/2021    LDLCALC 162 (A) 10/29/2018     ALT   Date Value Ref Range Status   08/20/2021 24 5 - 41 U/L Final     AST   Date Value Ref Range Status   08/20/2021 20 <40 U/L Final     The 10-year ASCVD risk score (Blair Bah, et al., 2013) is: 36.8%    Values used to calculate the score:      Age: 77 years      Sex: Male      Is Non- : No      Diabetic: Yes      Tobacco smoker: No      Systolic Blood Pressure: 867 mmHg      Is BP treated: Yes      HDL Cholesterol: 35 mg/dL      Total Cholesterol: 140 mg/dL     PLAN  The following are interventions that have been identified:   - Patient eligible for 90 day supply of Atorvastatin, Jardiance     Reached patient to review.     Spoke with pt and he stated he is now getting Jardiance and Atorvastatin through the [facility]     Future Appointments   Date Time Provider Adeline Odonnell   11/22/2021 10:20 AM Trena Qureshi, APRN - CNP [facility]   2/7/2022 10:00 AM Trena Qureshi, [address] [place] [address] Drive  // Department, toll free [phone], [address] [facility] in [facility]:  No   Time Spent (min): 20

## 2021-10-05 ENCOUNTER — CARE COORDINATION (OUTPATIENT)
Dept: CARE COORDINATION | Age: 76
End: 2021-10-05

## 2021-10-05 NOTE — CARE COORDINATION
Ambulatory Care Coordination Note  10/5/2021  CM Risk Score: 7  Charlson 10 Year Mortality Risk Score: 100%     ACC: Too Joseph RN    Summary Note: Admit in August for chest pain, acute on chronic CHF. Metformin and glipizide discontinued and since then having troubles with regulating blood sugars. Currently on Levemir 35 units at HS and Humalog sliding scale. He checks fasting and at HS. Has been taking the Humalog after he eats instead of before he eats, instructed to take it before he eats after checking the blood sugar then must eat within 30 minutes and to hold if doesn't plan on eating. He is limiting his sweets and high carb foods but not helping lower his blood sugars. Blood sugars high in evenings, was 340 last night running 250-350. mornings 170-250. CHF stable, weight 215-220, no leg swelling. Breathing good, he has been very active. Just returned from [facility] and recently also in [place]. Wearing bipap every night. No chest pain recently. He has cardiology appt next week. Went to [facility] today, they scheduled him for COVID booster but he got Abelardo Bran, not Dickson Peter. Encouraged he make sure they know he got the Abelardo Bran originally, he got the vaccines at [facility]. CC Plan:   -Follow in 2 weeks to check blood sugars, appts. Diabetes Assessment    Medic Alert ID: No  Meal Planning: Avoidance of concentrated sweets   How often do you test your blood sugar?: Daily, Bedtime   Do you have barriers with adherence to non-pharmacologic self-management interventions?  (Nutrition/Exercise/Self-Monitoring): No   Have you ever had to go to the ED for symptoms of low blood sugar?: No       Do you have hyperglycemia symptoms?: No   Do you have hypoglycemia symptoms?: No   Last Blood Sugar Value: 171   Blood Sugar Monitoring Regimen: Morning Fasting, At Bedtime   Blood Sugar Trends: Fluctuating      ,   Congestive Heart Failure Assessment    Are you currently restricting fluids?: 2000cc  Do you understand a low sodium diet?: Yes  Do you understand how to read food labels?: Yes  How many restaurant meals do you eat per week?: 0  Do you salt your food before tasting it?: No         Symptoms:  CHF associated dyspnea on exertion: Pos      Symptom course: stable  Patient-reported weight (lb): 215  Weight trend: fluctuating minimally  Salt intake watch compared to last visit: stable     ,   COPD Assessment    Does the patient understand envrionmental exposure?: Yes  Is the patient able to verbalize Rescue vs. Long Acting medications?: No  Does the patient have a nebulizer?: Yes  Does the patient use a space with inhaled medications?: No            Symptoms:     Symptom course: stable  Increase use of rapid acting/rescue inhaled medications?: No  Change in chronic cough?: No/At Baseline  Change in sputum?: No/At Baseline  Sputum characteristics: Unable to Specify  Self Monitoring - SaO2: Yes  Baseline SaO2 Readin  Have you had a recent diagnosis of pneumonia either by PCP or at a hospital?: No      and   General Assessment    Do you have any symptoms that are causing concern?: No                 Care Coordination Interventions    Program Enrollment: Complex Care  Referral from Primary Care Provider: No  Suggested Interventions and Community Resources  Diabetes Education: Declined  Pharmacist: Declined  Registered Dietician: Declined  Social Work: Declined  Zone Management Tools: Completed (Comment: DM, CHF, COPD)         Goals Addressed                 This Visit's Progress     Conditions and Symptoms   On track     I will schedule office visits, as directed by my provider. I will notify my provider of any barriers to my plan of care. I will follow my Zone Management tool to seek urgent or emergent care. I will notify my provider of any symptoms that indicate a worsening of my condition.     Barriers: lack of education  Plan for overcoming my barriers: education, care management   Confidence: 9/10  Anticipated Goal Completion Date: 10/24/2021              Prior to Admission medications    Medication Sig Start Date End Date Taking? Authorizing Provider   ONETOUCH ULTRA strip USE 1 STRIP TO CHECK GLUCOSE TWICE DAILY 9/7/21  Yes ERICK Ernst CNP   insulin lispro (HUMALOG) 100 UNIT/ML injection vial 150-200  Inject 2 units  201-250 Inject 4 units  251-300  Inject 6 units  301-350  Inject 8 units  351-400 Inject 12 units  401-500 Inject 14 units 8/28/21  Yes ERICK Ram CNP   insulin detemir (LEVEMIR) 100 UNIT/ML injection vial Inject 25 Units into the skin nightly Taking 35 units at HS   Yes Historical Provider, MD   empagliflozin (JARDIANCE) 10 MG tablet Take 1 tablet by mouth daily 9/3/21   ERICK Grande CNP   nitroGLYCERIN (NITROSTAT) 0.4 MG SL tablet DISSOLVE ONE TABLET UNDER THE TONGUE EVERY 5 MINUTES AS NEEDED FOR CHEST PAIN. DO NOT EXCEED A TOTAL OF 3 DOSES IN 15 MINUTES 8/20/21   ERICK Grande CNP   ELIQUIS 5 MG TABS tablet Take 1 tablet by mouth 2 times daily 8/20/21   ERICK Grande CNP   ticagrelor (BRILINTA) 90 MG TABS tablet Take 1 tablet by mouth 2 times daily 8/20/21   ERICK Grande CNP   oxyCODONE-acetaminophen (PERCOCET) 5-325 MG per tablet Take 1 tablet by mouth every 8 hours as needed for Pain for up to 30 days. 8/20/21 9/19/21  ERICK Grande CNP   aspirin 81 MG EC tablet Take 1 tablet by mouth daily for 7 days 8/15/21 8/22/21  Jackie Swift MD   atorvastatin (LIPITOR) 80 MG tablet Take 1 tablet by mouth nightly 8/14/21   Jackie Swift MD   esomeprazole Magnesium (NEXIUM) 20 MG PACK Take 20 mg by mouth daily    Historical Provider, MD   furosemide (LASIX) 40 MG tablet Take 1 tablet by mouth once daily   May take 1 additional tablet daily PRN 2 pound weight gain, short of breath or swelling.  7/9/21   ERICK Grande CNP   diclofenac sodium (VOLTAREN) 1 % GEL Apply 2 g topically 4 times daily as needed for Pain 2/24/21 Nela Wagner MD   isosorbide mononitrate (IMDUR) 30 MG extended release tablet Take 30 mg by mouth daily    Historical Provider, MD   albuterol sulfate  (90 Base) MCG/ACT inhaler INHALE 2 PUFFS BY INHALATION THREE TIMES A DAY AS NEEDED FOR SHORTNESS OF BREATH 3/30/20   Historical Provider, MD   potassium chloride (KLOR-CON M) 20 MEQ extended release tablet TAKE 1  BY MOUTH ONCE DAILY 6/30/20   ERICK Chavez CNP   amiodarone (CORDARONE) 200 MG tablet TAKE 1 TABLET BY MOUTH ONCE DAILY 4/27/20   Geoff Knowles MD   albuterol (PROVENTIL) (2.5 MG/3ML) 0.083% nebulizer solution Take 3 mLs by nebulization every 6 hours as needed for Wheezing or Shortness of Breath 3/19/20   Geoff Knowles MD   Handicap Placard MISC by Does not apply route Expires 12/2023 12/17/18   ERICK Chavez CNP   gabapentin (NEURONTIN) 300 MG capsule TAKE 1 CAPSULE BY MOUTH THREE TIMES DAILY 8/20/18 8/20/21  ERICK Chavez CNP   magnesium oxide (MAG-OX) 400 MG tablet Take 400 mg by mouth daily    Historical Provider, MD   triamcinolone (KENALOG) 0.025 % ointment Apply topically 2 times daily to areas of eczema on lower legs 4/30/18   Jareth Nash MD   metoprolol tartrate (LOPRESSOR) 50 MG tablet Take 1 tablet by mouth 2 times daily 12/18/17   ERICK Chavez CNP   lisinopril (PRINIVIL;ZESTRIL) 40 MG tablet Take 40 mg by mouth daily    Historical Provider, MD   Insulin Pen Needle 29G X 12.7MM MISC 1 each by Does not apply route daily 8/17/16   ERICK De La Torre CNP       Future Appointments   Date Time Provider Adeline Odonnell   11/22/2021 10:20 AM ERICK Chavez CNP [email or web address]   2/7/2022 10:00 AM ERICK Chavez CNP [facility]

## 2021-10-05 NOTE — CARE COORDINATION
Left message on phone instructing him to increase Levemir to 40 units at HS IF the morning blood sugar readings of 170-250 are before he eats and not after he eats. Reinforced he should not take Humalog if he doesn't eat (like having testing or doesn't feel good). Gave my return contact info, asked he call back so can discuss 80 GM carb limit per day, dietician consult if he agrees. Will call next week to check blood sugars.

## 2021-10-05 NOTE — CARE COORDINATION
Verify am readings are fasting- prior to eating  If so increase levemir to 40 units nightly  Take blood sugar before eating meals and do humalog sliding scale prior to eating- do not take insulin if he is not going to eat    Encourage diet/exercise  Limit carbs to under 80g daily    Continue to monitor and update office with readings.  thanks

## 2021-10-18 ENCOUNTER — CARE COORDINATION (OUTPATIENT)
Dept: CARE COORDINATION | Age: 76
End: 2021-10-18

## 2021-10-18 RX ORDER — METOLAZONE 2.5 MG/1
2.5 TABLET ORAL SEE ADMIN INSTRUCTIONS
COMMUNITY
Start: 2021-10-13

## 2021-10-18 ASSESSMENT — ENCOUNTER SYMPTOMS: DYSPNEA ASSOCIATED WITH: EXERTION

## 2021-10-18 NOTE — CARE COORDINATION
Ambulatory Care Coordination Note  10/18/2021  CM Risk Score: 7  Charlson 10 Year Mortality Risk Score: 100%     ACC: Lisa Sheets, RN    Summary Note: He did not listen to writer's VM message informing him to increase Levemir to 40 units so has still been only taking 35 units. Reinforced he should take 40 units, he takes at HS. Morning blood sugars 150-220, HS blood sugars always above 400. He is taking the Humalog insulin coverage before breakfast and HS only. No worsening COPD or CHF symptoms. Has cardiology appt next week. Leaves 10/21 to go to [place], will return 10/24. Discussed dietician referral, he is accepting but stated he  he knows what he's supposed to eat- only eats potatoes once a week, doesn't eat bananas any more, eats 0.5 piece of pie instead of a whole piece. He voiced frustration, stated doesn't matter what he eats. CC Plan:   -Follow up next week to review blood sugars, medication doses. Diabetes Assessment    Medic Alert ID: No  Meal Planning: Avoidance of concentrated sweets   How often do you test your blood sugar?: Other (Comment: fasting and at HS)   Do you have barriers with adherence to non-pharmacologic self-management interventions?  (Nutrition/Exercise/Self-Monitoring): No   Have you ever had to go to the ED for symptoms of low blood sugar?: No       Do you have hyperglycemia symptoms?: No   Do you have hypoglycemia symptoms?: No   Last Blood Sugar Value: 151   Blood Sugar Monitoring Regimen: Morning Fasting, At Bedtime   Blood Sugar Trends: Fluctuating      ,   Congestive Heart Failure Assessment    Are you currently restricting fluids?: 2000cc  Do you understand a low sodium diet?: Yes  Do you understand how to read food labels?: Yes  How many restaurant meals do you eat per week?: 0  Do you salt your food before tasting it?: No         Symptoms:  CHF associated dyspnea on exertion: Pos      Symptom course: stable  Salt intake watch compared to last visit: stable      and COPD Assessment    Does the patient understand envrionmental exposure?: Yes  Is the patient able to verbalize Rescue vs. Long Acting medications?: No  Does the patient have a nebulizer?: Yes  Does the patient use a space with inhaled medications?: No            Symptoms:     Symptom course: stable  Breathlessness: exertion  Increase use of rapid acting/rescue inhaled medications?: No  Change in chronic cough?: No/At Baseline  Change in sputum?: No/At Baseline  Sputum characteristics: Unable to Specify  Self Monitoring - SaO2: No  Have you had a recent diagnosis of pneumonia either by PCP or at a hospital?: No             Care Coordination Interventions    Program Enrollment: Complex Care  Referral from Primary Care Provider: No  Suggested Interventions and Community Resources  Diabetes Education: Declined  Pharmacist: Declined  Registered Dietician: Declined  Social Work: Declined  Zone Management Tools: Completed (Comment: DM, CHF, COPD)         Goals Addressed                 This Visit's Progress     Conditions and Symptoms   On track     I will schedule office visits, as directed by my provider. I will notify my provider of any barriers to my plan of care. I will follow my Zone Management tool to seek urgent or emergent care. I will notify my provider of any symptoms that indicate a worsening of my condition. Barriers: lack of education  Plan for overcoming my barriers: education, care management   Confidence: 9/10  Anticipated Goal Completion Date: 10/24/2021              Prior to Admission medications    Medication Sig Start Date End Date Taking?  Authorizing Provider   ONETOUCH ULTRA strip USE 1 STRIP TO CHECK GLUCOSE TWICE DAILY 9/7/21   ERICK Trejo CNP   empagliflozin (JARDIANCE) 10 MG tablet Take 1 tablet by mouth daily 9/3/21   ERICK Zamorano - CNP   insulin lispro (HUMALOG) 100 UNIT/ML injection vial 150-200  Inject 2 units  201-250 Inject 4 units  251-300  Inject 6 units  301-350  Inject 8 units  351-400 Inject 12 units  401-500 Inject 14 units 8/28/21   ERICK Sanchez CNP   nitroGLYCERIN (NITROSTAT) 0.4 MG SL tablet DISSOLVE ONE TABLET UNDER THE TONGUE EVERY 5 MINUTES AS NEEDED FOR CHEST PAIN. DO NOT EXCEED A TOTAL OF 3 DOSES IN 15 MINUTES 8/20/21   ERICK Burrell CNP   ELIQUIS 5 MG TABS tablet Take 1 tablet by mouth 2 times daily 8/20/21   ERICK Burrell CNP   ticagrelor (BRILINTA) 90 MG TABS tablet Take 1 tablet by mouth 2 times daily 8/20/21   ERICK Burrell CNP   oxyCODONE-acetaminophen (PERCOCET) 5-325 MG per tablet Take 1 tablet by mouth every 8 hours as needed for Pain for up to 30 days. 8/20/21 9/19/21  ERICK Burrell CNP   aspirin 81 MG EC tablet Take 1 tablet by mouth daily for 7 days 8/15/21 8/22/21  Kathleen Machado MD   atorvastatin (LIPITOR) 80 MG tablet Take 1 tablet by mouth nightly 8/14/21   Kathleen Machado MD   insulin detemir (LEVEMIR) 100 UNIT/ML injection vial Inject 25 Units into the skin nightly Taking 35 units at HS    Historical Provider, MD   esomeprazole Magnesium (NEXIUM) 20 MG PACK Take 20 mg by mouth daily    Historical Provider, MD   furosemide (LASIX) 40 MG tablet Take 1 tablet by mouth once daily   May take 1 additional tablet daily PRN 2 pound weight gain, short of breath or swelling.  7/9/21   ERICK Burrell CNP   diclofenac sodium (VOLTAREN) 1 % GEL Apply 2 g topically 4 times daily as needed for Pain 2/24/21   James Card MD   isosorbide mononitrate (IMDUR) 30 MG extended release tablet Take 30 mg by mouth daily    Historical Provider, MD   albuterol sulfate  (90 Base) MCG/ACT inhaler INHALE 2 PUFFS BY INHALATION THREE TIMES A DAY AS NEEDED FOR SHORTNESS OF BREATH 3/30/20   Historical Provider, MD   potassium chloride (KLOR-CON M) 20 MEQ extended release tablet TAKE 1  BY MOUTH ONCE DAILY 6/30/20   Prabhjot Jerrell, APRN - CNP   amiodarone (CORDARONE) 200 MG tablet TAKE 1 TABLET BY MOUTH ONCE DAILY 4/27/20   Kandis Stanford MD   albuterol (PROVENTIL) (2.5 MG/3ML) 0.083% nebulizer solution Take 3 mLs by nebulization every 6 hours as needed for Wheezing or Shortness of Breath 3/19/20   Kandis Stanford MD   Handicap Placard MISC by Does not apply route Expires 12/2023 12/17/18   ERICK Fernando CNP   gabapentin (NEURONTIN) 300 MG capsule TAKE 1 CAPSULE BY MOUTH THREE TIMES DAILY 8/20/18 8/20/21  ERICK Fernando CNP   magnesium oxide (MAG-OX) 400 MG tablet Take 400 mg by mouth daily    Historical Provider, MD   triamcinolone (KENALOG) 0.025 % ointment Apply topically 2 times daily to areas of eczema on lower legs 4/30/18   Mireya Stanton MD   metoprolol tartrate (LOPRESSOR) 50 MG tablet Take 1 tablet by mouth 2 times daily 12/18/17   ERICK Fernando CNP   lisinopril (PRINIVIL;ZESTRIL) 40 MG tablet Take 40 mg by mouth daily    Historical Provider, MD   Insulin Pen Needle 29G X 12.7MM MISC 1 each by Does not apply route daily 8/17/16   ERICK Jung CNP       Future Appointments   Date Time Provider Adeline Odonnell   11/22/2021 10:20 AM ERICK Fernando CNP [facility]   2/7/2022 10:00 AM ERICK Fernando CNP

## 2021-10-19 ENCOUNTER — CARE COORDINATION (OUTPATIENT)
Dept: CARE COORDINATION | Age: 76
End: 2021-10-19

## 2021-10-19 NOTE — CARE COORDINATION
Registered Dietitian Initial Assessment for Care Coordination      Paula Richards  October 19, 2021    Initial Referral Reason: Diabetes    Patient Care Team:  ERICK Burrell CNP as PCP - General (Nurse Practitioner)  ERICK Burrell CNP as PCP - [facility]  Chandni Granados MD as Orthopedic Surgeon (Orthopedic Surgery)  Michelle Beard MD as Consulting Physician (Pain Management)  Jaime Waldron MD as Surgeon (Vascular Surgery)  Merlinda Harbor, MD as Consulting Physician (Internal Medicine Cardiovascular Disease)  Ana Cardona MD as Consulting Physician (Pulmonology)  Gamaliel Correa MD as Consulting Physician (Dermatology)  El Ponce MD (Orthopedic Surgery)  Odell Rodgers RN as [facility]  Coy Leonardo RN as [facility]  Sherice Joseph RD, LD as Dietitian    Patient Active Problem List   Diagnosis    Lumbago    Spinal stenosis, lumbar region, without neurogenic claudication    Degeneration of lumbar or lumbosacral intervertebral disc    Type 2 diabetes mellitus without complication ([address]    Hyperlipidemia    Hypertension    Lumbar radiculopathy, chronic    Lumbar spondylosis    PVOD (pulmonary veno-occlusive disease) ([address]    AICD (automatic cardioverter/defibrillator) present    Coronary artery disease involving native coronary artery of native heart without angina pectoris    Gastritis without bleeding    Paroxysmal atrial fibrillation ([address]    VT [facility]ricular tachycardia) ([facility])    SOB (shortness of breath)    Claudication ([address]    Bilateral leg edema    S/P right coronary artery (RCA) stent placement    Presence of stent in LAD coronary artery    Mild concentric left ventricular hypertrophy (LVH)    Dilated aortic root ([address]    Diabetic polyneuropathy associated with type 2 diabetes mellitus ([address]    Chronic diastolic congestive heart failure ([address]    Diabetes mellitus ([address]    Long term current use of amiodarone    REGINA (obstructive sleep apnea)    Osteoarthrosis    Chest pain    Acute on chronic systolic heart failure ([facility]    Encounter for current long term use of antiplatelet drug    Unstable angina (HCC)       Current Outpatient Medications   Medication Sig Dispense Refill    metOLazone (ZAROXOLYN) 2.5 MG tablet Take 2.5 mg by mouth See Admin Instructions      ONETOUCH ULTRA strip USE 1 STRIP TO CHECK GLUCOSE TWICE DAILY 100 each 5    empagliflozin (JARDIANCE) 10 MG tablet Take 1 tablet by mouth daily 90 tablet 3    insulin lispro (HUMALOG) 100 UNIT/ML injection vial 150-200  Inject 2 units  201-250 Inject 4 units  251-300  Inject 6 units  301-350  Inject 8 units  351-400 Inject 12 units  401-500 Inject 14 units 1 vial 0    nitroGLYCERIN (NITROSTAT) 0.4 MG SL tablet DISSOLVE ONE TABLET UNDER THE TONGUE EVERY 5 MINUTES AS NEEDED FOR CHEST PAIN. DO NOT EXCEED A TOTAL OF 3 DOSES IN 15 MINUTES 25 tablet 2    ELIQUIS 5 MG TABS tablet Take 1 tablet by mouth 2 times daily 60 tablet 5    ticagrelor (BRILINTA) 90 MG TABS tablet Take 1 tablet by mouth 2 times daily 60 tablet 1    oxyCODONE-acetaminophen (PERCOCET) 5-325 MG per tablet Take 1 tablet by mouth every 8 hours as needed for Pain for up to 30 days. 90 tablet 0    aspirin 81 MG EC tablet Take 1 tablet by mouth daily for 7 days 7 tablet 0    atorvastatin (LIPITOR) 80 MG tablet Take 1 tablet by mouth nightly 30 tablet 3    insulin detemir (LEVEMIR) 100 UNIT/ML injection vial Inject 25 Units into the skin nightly Taking 35 units at HS      esomeprazole Magnesium (NEXIUM) 20 MG PACK Take 20 mg by mouth daily      furosemide (LASIX) 40 MG tablet Take 1 tablet by mouth once daily   May take 1 additional tablet daily PRN 2 pound weight gain, short of breath or swelling.  180 tablet 3    diclofenac sodium (VOLTAREN) 1 % GEL Apply 2 g topically 4 times daily as needed for Pain 150 g 0    isosorbide mononitrate (IMDUR) 30 MG extended release tablet Take 30 mg by mouth daily      albuterol sulfate  (90 Base) MCG/ACT inhaler INHALE 2 PUFFS BY INHALATION THREE TIMES A DAY AS NEEDED FOR SHORTNESS OF BREATH      potassium chloride (KLOR-CON M) 20 MEQ extended release tablet TAKE 1  BY MOUTH ONCE DAILY 90 tablet 0    amiodarone (CORDARONE) 200 MG tablet TAKE 1 TABLET BY MOUTH ONCE DAILY 90 tablet 3    albuterol (PROVENTIL) (2.5 MG/3ML) 0.083% nebulizer solution Take 3 mLs by nebulization every 6 hours as needed for Wheezing or Shortness of Breath 5 Package 0    Handicap Placard MISC by Does not apply route Expires 12/2023 1 each 0    gabapentin (NEURONTIN) 300 MG capsule TAKE 1 CAPSULE BY MOUTH THREE TIMES DAILY 90 capsule 2    magnesium oxide (MAG-OX) 400 MG tablet Take 400 mg by mouth daily      triamcinolone (KENALOG) 0.025 % ointment Apply topically 2 times daily to areas of eczema on lower legs 80 g 2    metoprolol tartrate (LOPRESSOR) 50 MG tablet Take 1 tablet by mouth 2 times daily 60 tablet 5    lisinopril (PRINIVIL;ZESTRIL) 40 MG tablet Take 40 mg by mouth daily      Insulin Pen Needle 29G X 12.7MM MISC 1 each by Does not apply route daily 100 each 3     No current facility-administered medications for this visit.          Visit for:  Obesity/Weight loss  Diabetes: X  Hypertension:  Hyperlipidemia:  Other:     Anthropometric Measurements:  HT: 6'  Weight: 217  IBW: 178 + or - 10%  BMI: 29    Biochemical Data, Medical Tests and Procedures:    Lab Results   Component Value Date    LABA1C 7.2 (H) 08/11/2021     Lab Results   Component Value Date     08/11/2021       Lab Results   Component Value Date    CHOL 140 08/20/2021    CHOL 233 10/29/2018    CHOL 158 06/03/2017     Lab Results   Component Value Date    TRIG 148 08/20/2021    TRIG 172 10/29/2018    TRIG 128 06/03/2017     Lab Results   Component Value Date    HDL 35 (L) 08/20/2021    HDL 37 10/29/2018    HDL 37 (L) 06/03/2017     Lab Results   Component Value Date    LDLCALC 162 (A) 10/29/2018    LDLCALC 95 06/03/2017    LDLCALC 90 04/01/2015    LDLCHOLESTEROL 75 08/20/2021    LDLCHOLESTEROL 105 05/31/2016     Lab Results   Component Value Date    LABVLDL 26 06/03/2017    VLDL NOT REPORTED 08/20/2021    VLDL NOT REPORTED 05/31/2016    VLDL 50 04/01/2015     Lab Results   Component Value Date    CHOLHDLRATIO 4.0 08/20/2021    CHOLHDLRATIO 4.3 06/03/2017    CHOLHDLRATIO 5.2 (H) 05/31/2016       Lab Results   Component Value Date    WBC 9.4 08/15/2021    HGB 10.1 (L) 08/15/2021    HCT 35.0 (L) 08/15/2021    MCV 80.1 (L) 08/15/2021     08/15/2021       Lab Results   Component Value Date    CREATININE 1.46 (H) 09/02/2021    BUN 41 (H) 09/02/2021     09/02/2021    K 4.3 09/02/2021    CL 99 09/02/2021    CO2 26 09/02/2021         NUTRITION DIAGNOSIS    #1 Problem  Excessive carbohydrate intake       Etiology  related to food and nutrition knowledge deficit       Signs/Symptoms  as evidenced by PM blood sugars 300-400    NUTRITION INTERVENTION  Nutrition Prescription: used IBW    diabetic diet providing [id number] kcals/day     Estimated daily CHO Needs: 60 gms/meal, 15-30gms/snack   Protein needs: 70-80gms/d  Fluid needs: 2400cc/day      Patient Goals:  1. Patient will work on meal patten, eating 2-3 meals/d, skips lunch some days. Discussed and encouraged set meal times daily.  Quick/easy lunch suggestions discussed, will send patient a list of suggestions.  Goal is 3 meals daily spaced every 4-5 hours. 2. Reviewed what foods contain carbohydrate to limit and how to measure out portions. Encouraged patient to limit carb intake to 60gms/meal, 15-30gms/snack. Discussed eating dinner by 6pm and admits to snacking often in the evening, discussed low carb snacking options and will send patient lists of low carb snacks.   3. Discussed plate method, encouraged patient to increase intake of non-starchy vegetables.  Goal is 1/2 of  plate to be non-starchy vegetables, 1/4 lean protein, 1/4 carbs. Foods from each category reviewed along with what a serving size is   4. Discussed avoiding/limiting sweets and sweetened drinks. Patient states drinks water only- encouraged to continue. Jeff Spoon does admits to eating sweets at times, encouraged to limit/avoid sugary foods, discussed sugar free alternatives to sweets- sugar free jello, pudding, ect. Encouraged patient to keep sweets out of the house as much as possible and limit to once a week or less.       Nutrition Intervention Need:  Initial/Brief:  Comprehensive Nutrition Education: X  Coordination of other care during nutrition care:    Monitoring and Evaluation:  Ability to plan meals/snacks: X  Ability to select healthy foods/meals: X  Food and Nutrition Knowledge: X  Self Monitoring:  Physical Activity:  Energy intake:  Fluid/beverage intake:  Fat/chol intake:  Carb intake: X  Other:    Plan:  1. Will continue to educate patient on reading food labels, portion control, plate method, carb counting, low carb snacking, importance of meal pattern, diabetic friendly meal ideas and will encouraged to avoid/limit sweets. Patient will be provided/mailed nutrition information on all the above discussed.    2. Will follow up with patient in 2-3 weeks to review nutrition information and answer questions.        DAIANA Mccarthy

## 2021-10-20 ENCOUNTER — OFFICE VISIT (OUTPATIENT)
Dept: PRIMARY CARE CLINIC | Age: 76
End: 2021-10-20
Payer: MEDICARE

## 2021-10-20 VITALS
SYSTOLIC BLOOD PRESSURE: 110 MMHG | WEIGHT: 227.4 LBS | BODY MASS INDEX: 30.84 KG/M2 | OXYGEN SATURATION: 96 % | DIASTOLIC BLOOD PRESSURE: 62 MMHG | HEART RATE: 54 BPM | RESPIRATION RATE: 18 BRPM

## 2021-10-20 DIAGNOSIS — E11.9 TYPE 2 DIABETES MELLITUS WITHOUT COMPLICATION, UNSPECIFIED WHETHER LONG TERM INSULIN USE (HCC): Primary | ICD-10-CM

## 2021-10-20 DIAGNOSIS — G89.4 CHRONIC PAIN SYNDROME: ICD-10-CM

## 2021-10-20 PROCEDURE — 99214 OFFICE O/P EST MOD 30 MIN: CPT | Performed by: NURSE PRACTITIONER

## 2021-10-20 PROCEDURE — 3051F HG A1C>EQUAL 7.0%<8.0%: CPT | Performed by: NURSE PRACTITIONER

## 2021-10-20 RX ORDER — INSULIN DETEMIR 100 [IU]/ML
30 INJECTION, SOLUTION SUBCUTANEOUS 2 TIMES DAILY
Qty: 6 EACH | Refills: 3
Start: 2021-10-20

## 2021-10-20 RX ORDER — OXYCODONE HYDROCHLORIDE AND ACETAMINOPHEN 5; 325 MG/1; MG/1
1 TABLET ORAL EVERY 8 HOURS PRN
Qty: 90 TABLET | Refills: 0 | Status: SHIPPED | OUTPATIENT
Start: 2021-10-20 | End: 2021-11-29 | Stop reason: SDUPTHER

## 2021-10-20 RX ORDER — SEMAGLUTIDE 1.34 MG/ML
0.5 INJECTION, SOLUTION SUBCUTANEOUS WEEKLY
Qty: 1 PEN | Refills: 1
Start: 2021-10-20 | End: 2021-10-20 | Stop reason: SDUPTHER

## 2021-10-20 RX ORDER — SEMAGLUTIDE 1.34 MG/ML
0.5 INJECTION, SOLUTION SUBCUTANEOUS WEEKLY
Qty: 1 PEN | Refills: 1 | Status: SHIPPED | OUTPATIENT
Start: 2021-10-20 | End: 2022-06-06

## 2021-10-20 ASSESSMENT — ENCOUNTER SYMPTOMS
ABDOMINAL PAIN: 0
COUGH: 0
SHORTNESS OF BREATH: 0
BACK PAIN: 0

## 2021-10-20 NOTE — PROGRESS NOTES
[facility] PRIMARY CARE  [address]   [address]  [address] [address]  Dept: [phone]  Dept Fax: [phone]    Yi Patino is a 77 y.o. male who presentstoday for his medical conditions/complaints as noted below. Yi Patino is c/o of  Chief Complaint   Patient presents with    Other     Discuss Blood Sugar           HPI:     Presents for acute visit with several concerns  BP well controlled  Has gained 10lb since last OV, states that his weight has been declining at home    Recording FBS daily-   States that last night he ate a hot dog, no bun, and cherry [place] [facility]  At 10pm BS was 466  Usually seeing 400s in the evening, but he has not been recording  Humalog sliding scale, but he lost the scale is and \"guessing\" on the number of units to give himself  Wrote new sliding scale for him  Levemir 40 units in PM, will also adjust this dose  Reviewed diet extensively- avoid concentrated carbs and sugars. Limit carbs to under 80g daily  Offered diabetic education, but he has completed in the past and declines today  [facility], but would like samples today    Chronic ankle pain, will be contacting ortho today  Requesting renewal on percocet    Scheduled with nephrology, but March is the earliest appt    Denies any other problems/concerns      Hemoglobin A1C (%)   Date Value   08/11/2021 7.2 (H)   06/10/2021 6.6   02/04/2021 7.1             ( goal A1C is < 7)   Microalb/Crt.  Ratio (mcg/mg creat)   Date Value   05/24/2017 26 (H)     LDL Cholesterol (mg/dL)   Date Value   08/20/2021 75   05/31/2016 105     LDL Calculated (mg/dL)   Date Value   10/29/2018 162 (A)   06/03/2017 95   04/01/2015 90       (goal LDL is <100)   AST (U/L)   Date Value   08/20/2021 20     ALT (U/L)   Date Value   08/20/2021 24     BUN (mg/dL)   Date Value   09/02/2021 41 (H)     BP Readings from Last 3 Encounters:   10/20/21 110/62 21 106/66   21 122/78          ([id number])    Past Medical History:   Diagnosis Date    Arrhythmia     CAD (coronary artery disease)     GERD (gastroesophageal reflux disease)     [facility] ([facility] [address]     Hyperlipidemia     Hypertension     Ischemic cardiomyopathy     Osteoarthritis     Type II or unspecified type diabetes mellitus without mention of complication, not stated as uncontrolled       Past Surgical History:   Procedure Laterality Date    APPENDECTOMY      CARDIAC CATHETERIZATION      CARDIAC SURGERY      stents     COLONOSCOPY      CORON[facility]OP[facility] ST[facility]AC[facility]  2018    [facility]    DIAGNOSTIC CARDIAC CATH LAB PROCEDURE      JOINT REPLACEMENT      knee right parital    PACEMAKER INSERTION      PTCA      ROTATOR CUFF REPAIR      TONSILLECTOMY         Family History   Problem Relation Age of Onset    Heart Disease Mother     High Blood Pressure Mother     Heart Disease Father     Cancer Sister         breast cancer    Cancer Brother         bladder cancer    Diabetes Maternal Grandmother           Social History     Tobacco Use    Smoking status: Former Smoker     Packs/day: 1.00     Years: 40.00     Pack years: 40.00     Types: Cigarettes     Quit date: 1994     Years since quittin.4    Smokeless tobacco: Never Used   Substance Use Topics    Alcohol use: Yes     Comment: rare      Current Outpatient Medications   Medication Sig Dispense Refill    insulin detemir (LEVEMIR) 100 UNIT/ML injection vial Inject 30 Units into the skin 2 times daily 6 each 3    Semaglutide,0.25 or 0.5MG/DOS, (OZEMPIC, 0.25 OR 0.5 MG/DOSE,) 2 MG/1.5ML SOPN Inject 0.5 mg into the skin once a week 1 pen 1    oxyCODONE-acetaminophen (PERCOCET) 5-325 MG per tablet Take 1 tablet by mouth every 8 hours as needed for Pain for up to 30 days.  90 tablet 0    metOLazone (ZAROXOLYN) 2.5 MG tablet Take 2.5 mg by mouth See Admin Instructions      ONETOUCH ULTRA strip USE 1 STRIP TO CHECK GLUCOSE TWICE DAILY 100 each 5    empagliflozin (JARDIANCE) 10 MG tablet Take 1 tablet by mouth daily 90 tablet 3    insulin lispro (HUMALOG) 100 UNIT/ML injection vial 150-200  Inject 2 units  201-250 Inject 4 units  251-300  Inject 6 units  301-350  Inject 8 units  351-400 Inject 12 units  401-500 Inject 14 units 1 vial 0    nitroGLYCERIN (NITROSTAT) 0.4 MG SL tablet DISSOLVE ONE TABLET UNDER THE TONGUE EVERY 5 MINUTES AS NEEDED FOR CHEST PAIN. DO NOT EXCEED A TOTAL OF 3 DOSES IN 15 MINUTES 25 tablet 2    ELIQUIS 5 MG TABS tablet Take 1 tablet by mouth 2 times daily 60 tablet 5    ticagrelor (BRILINTA) 90 MG TABS tablet Take 1 tablet by mouth 2 times daily 60 tablet 1    atorvastatin (LIPITOR) 80 MG tablet Take 1 tablet by mouth nightly 30 tablet 3    esomeprazole Magnesium (NEXIUM) 20 MG PACK Take 20 mg by mouth daily      furosemide (LASIX) 40 MG tablet Take 1 tablet by mouth once daily   May take 1 additional tablet daily PRN 2 pound weight gain, short of breath or swelling.  180 tablet 3    diclofenac sodium (VOLTAREN) 1 % GEL Apply 2 g topically 4 times daily as needed for Pain 150 g 0    isosorbide mononitrate (IMDUR) 30 MG extended release tablet Take 30 mg by mouth daily      albuterol sulfate  (90 Base) MCG/ACT inhaler INHALE 2 PUFFS BY INHALATION THREE TIMES A DAY AS NEEDED FOR SHORTNESS OF BREATH      potassium chloride (KLOR-CON M) 20 MEQ extended release tablet TAKE 1  BY MOUTH ONCE DAILY 90 tablet 0    amiodarone (CORDARONE) 200 MG tablet TAKE 1 TABLET BY MOUTH ONCE DAILY 90 tablet 3    albuterol (PROVENTIL) (2.5 MG/3ML) 0.083% nebulizer solution Take 3 mLs by nebulization every 6 hours as needed for Wheezing or Shortness of Breath 5 Package 0    Handicap Placard MISC by Does not apply route Expires 12/2023 1 each 0    gabapentin (NEURONTIN) 300 MG capsule TAKE 1 CAPSULE BY MOUTH THREE TIMES DAILY 90 capsule 2    magnesium oxide (MAG-OX) 400 MG tablet Take 400 mg by mouth daily      triamcinolone (KENALOG) 0.025 % ointment Apply topically 2 times daily to areas of eczema on lower legs 80 g 2    metoprolol tartrate (LOPRESSOR) 50 MG tablet Take 1 tablet by mouth 2 times daily 60 tablet 5    lisinopril (PRINIVIL;ZESTRIL) 40 MG tablet Take 40 mg by mouth daily      Insulin Pen Needle 29G X 12.7MM MISC 1 each by Does not apply route daily 100 each 3     No current facility-administered medications for this visit. Allergies   Allergen Reactions    Coreg [Carvedilol] Other (See Comments)     Low pause     Pravastatin Other (See Comments)     Myalgias     Sertraline Other (See Comments)    Zocor [Simvastatin]      norvasc     Citalopram Anxiety       Health Maintenance   Topic Date Due    Diabetic retinal exam  06/04/2016    Diabetic foot exam  01/17/2019    Shingles Vaccine (1 of 2) 08/20/2022 (Originally 4/17/1996)    Annual Wellness Visit (AWV)  02/05/2022    A1C test (Diabetic or Prediabetic)  08/11/2022    Lipid screen  08/20/2022    TSH testing  08/20/2022    Potassium monitoring  09/02/2022    Creatinine monitoring  09/02/2022    Colon cancer screen colonoscopy  03/14/2026    DTaP/Tdap/Td vaccine (3 - Td or Tdap) 08/20/2027    Flu vaccine  Completed    Pneumococcal 65+ years Vaccine  Completed    COVID-19 Vaccine  Completed    AAA screen  Completed    Hepatitis C screen  Completed    Hepatitis A vaccine  Aged Out    Hib vaccine  Aged Out    Meningococcal (ACWY) vaccine  Aged Out       Subjective:      Review of Systems   Constitutional: Negative for chills, fatigue and fever. HENT: Negative for congestion. Eyes: Negative for visual disturbance. Respiratory: Negative for cough and shortness of breath. Cardiovascular: Negative for chest pain and palpitations. Gastrointestinal: Negative for abdominal pain. Genitourinary: Negative for difficulty urinating and dysuria. Musculoskeletal: Positive for arthralgias.  Negative for back pain.   Neurological: Negative for dizziness and headaches. Psychiatric/Behavioral: Negative for self-injury, sleep disturbance and suicidal ideas. The patient is not nervous/anxious. Objective:     Physical Exam  Vitals and nursing note reviewed. Constitutional:       Appearance: He is well-developed. HENT:      Head: Normocephalic and atraumatic. Eyes:      Pupils: Pupils are equal, round, and reactive to light. Cardiovascular:      Rate and Rhythm: Normal rate and regular rhythm. Heart sounds: Normal heart sounds. Pulmonary:      Effort: Pulmonary effort is normal.      Breath sounds: Normal breath sounds. Abdominal:      General: Bowel sounds are normal.      Palpations: Abdomen is soft. Tenderness: There is no abdominal tenderness. Musculoskeletal:         General: Normal range of motion. Cervical back: Normal range of motion. Skin:     General: Skin is warm and dry. Neurological:      Mental Status: He is alert and oriented to person, place, and time. Psychiatric:         Behavior: Behavior normal.         Thought Content: Thought content normal.         Judgment: Judgment normal.       /62   Pulse 54   Resp 18   Wt 227 lb 6.4 oz (103.1 kg)   SpO2 96%   BMI 30.84 kg/m²     Assessment:       Diagnosis Orders   1. Type 2 diabetes mellitus without complication, unspecified whether long term insulin use (HCC)  insulin detemir (LEVEMIR) 100 UNIT/ML injection vial   2. Chronic pain syndrome  oxyCODONE-acetaminophen (PERCOCET) 5-325 MG per tablet             Plan:      Return in about 1 week (around 10/27/2021) for Diabetes. 1. DM- Printed BS logs and re wrote Humalog sliding scale. Concerned he will not have enough test strips, will monitor and record 3 days and return this for review. Also record all meals. Rx given for ozempic with instruction for use. Humalog vials given, he has insulin needles at home and states he knows how to draw up his insulin. Increase levemir to 30 units BID. Follow up in one week for recheck. 2. Chronic pain- Worsening, rx given for percocet at current dose. Schedule with ortho for further eval. Follow up in one week for recheck. Orders Placed This Encounter   Medications    insulin detemir (LEVEMIR) 100 UNIT/ML injection vial     Sig: Inject 30 Units into the skin 2 times daily     Dispense:  6 each     Refill:  3    DISCONTD: Semaglutide,0.25 or 0.5MG/DOS, (OZEMPIC, 0.25 OR 0.5 MG/DOSE,) 2 MG/1.5ML SOPN     Sig: Inject 0.5 mg into the skin once a week     Dispense:  1 pen     Refill:  1    Semaglutide,0.25 or 0.5MG/DOS, (OZEMPIC, 0.25 OR 0.5 MG/DOSE,) 2 MG/1.5ML SOPN     Sig: Inject 0.5 mg into the skin once a week     Dispense:  1 pen     Refill:  1    oxyCODONE-acetaminophen (PERCOCET) 5-325 MG per tablet     Sig: Take 1 tablet by mouth every 8 hours as needed for Pain for up to 30 days. Dispense:  90 tablet     Refill:  0     Reduce doses taken as pain becomes manageable       Patient given educational materials - see patient instructions. Discussed use, benefit, and side effects of prescribed medications. All patientquestions answered. Pt voiced understanding. Reviewed health maintenance. Instructedto continue current medications, diet and exercise. Patient agreed with treatmentplan. Follow up as directed.      Electronicallysigned by ERICK Caldera CNP on 10/20/2021 at 8:58 AM

## 2021-10-27 ENCOUNTER — OFFICE VISIT (OUTPATIENT)
Dept: PRIMARY CARE CLINIC | Age: 76
End: 2021-10-27
Payer: MEDICARE

## 2021-10-27 VITALS
WEIGHT: 226.6 LBS | OXYGEN SATURATION: 94 % | BODY MASS INDEX: 30.69 KG/M2 | RESPIRATION RATE: 13 BRPM | DIASTOLIC BLOOD PRESSURE: 70 MMHG | HEART RATE: 62 BPM | HEIGHT: 72 IN | SYSTOLIC BLOOD PRESSURE: 112 MMHG

## 2021-10-27 DIAGNOSIS — E11.9 TYPE 2 DIABETES MELLITUS WITHOUT COMPLICATION, UNSPECIFIED WHETHER LONG TERM INSULIN USE (HCC): Primary | ICD-10-CM

## 2021-10-27 PROCEDURE — 99214 OFFICE O/P EST MOD 30 MIN: CPT | Performed by: NURSE PRACTITIONER

## 2021-10-27 PROCEDURE — 3051F HG A1C>EQUAL 7.0%<8.0%: CPT | Performed by: NURSE PRACTITIONER

## 2021-10-27 ASSESSMENT — ENCOUNTER SYMPTOMS
SHORTNESS OF BREATH: 0
ABDOMINAL PAIN: 0
BACK PAIN: 0
COUGH: 0

## 2021-10-27 NOTE — PROGRESS NOTES
[facility] PRIMARY CARE  [address]   [address]  [address] [address]  Dept: [phone]  Dept Fax: [phone]    Meryle Alexander is a 77 y.o. male who presentstoday for his medical conditions/complaints as noted below. Meryle Alexander is c/o of  Chief Complaint   Patient presents with    Diabetes    1 Month Follow-Up         HPI:     Presents in for 1 week recheck on diabetes   BP well controlled   Weight stable from last OV  A1C=7.2 to 8  Reviewed BS in detail, still high but improved from previous week  Does not record all dietary intake  Carbs are still greater than 80g daily- encouraged him to follow a better diet  Highest reading 377 post prandial   at lowest  Taking insulins as prescribed, tolerating well  Requesting ozempic sample today    Increased in dizziness and lightheaded that comes and goes   Notices it more when turning his head from side to side   Will resolve itself within 10 seconds  Denies any falls or fainting    Has pain to left 4th toe and a sore a the top  Sees podiatry Nov 14, 2021  Per patient, most likely will need surgery to straighten out toe    Vaccinations updated     Denies any other concerns/problems      Hemoglobin A1C (%)   Date Value   08/11/2021 7.2 (H)   06/10/2021 6.6   02/04/2021 7.1             ( goal A1C is < 7)   Microalb/Crt.  Ratio (mcg/mg creat)   Date Value   05/24/2017 26 (H)     LDL Cholesterol (mg/dL)   Date Value   08/20/2021 75   05/31/2016 105     LDL Calculated (mg/dL)   Date Value   10/29/2018 162 (A)   06/03/2017 95   04/01/2015 90       (goal LDL is <100)   AST (U/L)   Date Value   08/20/2021 20     ALT (U/L)   Date Value   08/20/2021 24     BUN (mg/dL)   Date Value   09/02/2021 41 (H)     BP Readings from Last 3 Encounters:   10/27/21 112/70   10/20/21 110/62   08/28/21 106/66          ([id number])    Past Medical History:   Diagnosis Date    Arrhythmia     CAD (coronary artery disease)     GERD (gastroesophageal reflux disease)     Heart attack ([facility] [address]     Hyperlipidemia     Hypertension     Ischemic cardiomyopathy     Osteoarthritis     Type II or unspecified type diabetes mellitus without mention of complication, not stated as uncontrolled       Past Surgical History:   Procedure Laterality Date    APPENDECTOMY      CARDIAC CATHETERIZATION      CARDIAC SURGERY      stents     COLONOSCOPY      CORONARY [facility]  [facility]    DIAGNOSTIC CARDIAC CATH LAB PROCEDURE      JOINT REPLACEMENT      knee right parital    PACEMAKER INSERTION      PTCA      ROTATOR CUFF REPAIR      TONSILLECTOMY         Family History   Problem Relation Age of Onset    Heart Disease Mother     High Blood Pressure Mother     Heart Disease Father     Cancer Sister         breast cancer    Cancer Brother         bladder cancer    Diabetes Maternal Grandmother           Social History     Tobacco Use    Smoking status: Former Smoker     Packs/day: 1.00     Years: 40.00     Pack years: 40.00     Types: Cigarettes     Quit date: 1994     Years since quittin.4    Smokeless tobacco: Never Used   Substance Use Topics    Alcohol use: Yes     Comment: rare      Current Outpatient Medications   Medication Sig Dispense Refill    insulin detemir (LEVEMIR) 100 UNIT/ML injection vial Inject 30 Units into the skin 2 times daily 6 each 3    Semaglutide,0.25 or 0.5MG/DOS, (OZEMPIC, 0.25 OR 0.5 MG/DOSE,) 2 MG/1.5ML SOPN Inject 0.5 mg into the skin once a week 1 pen 1    oxyCODONE-acetaminophen (PERCOCET) 5-325 MG per tablet Take 1 tablet by mouth every 8 hours as needed for Pain for up to 30 days.  90 tablet 0    metOLazone (ZAROXOLYN) 2.5 MG tablet Take 2.5 mg by mouth See Admin Instructions      ONETOUCH ULTRA strip USE 1 STRIP TO CHECK GLUCOSE TWICE DAILY 100 each 5    empagliflozin (JARDIANCE) 10 MG tablet Take 1 tablet by mouth daily 90 tablet 3    insulin lispro (HUMALOG) 100 UNIT/ML injection vial 150-200  Inject 2 units  201-250 Inject 4 units  251-300  Inject 6 units  301-350  Inject 8 units  351-400 Inject 12 units  401-500 Inject 14 units 1 vial 0    nitroGLYCERIN (NITROSTAT) 0.4 MG SL tablet DISSOLVE ONE TABLET UNDER THE TONGUE EVERY 5 MINUTES AS NEEDED FOR CHEST PAIN. DO NOT EXCEED A TOTAL OF 3 DOSES IN 15 MINUTES 25 tablet 2    ELIQUIS 5 MG TABS tablet Take 1 tablet by mouth 2 times daily 60 tablet 5    ticagrelor (BRILINTA) 90 MG TABS tablet Take 1 tablet by mouth 2 times daily 60 tablet 1    atorvastatin (LIPITOR) 80 MG tablet Take 1 tablet by mouth nightly 30 tablet 3    esomeprazole Magnesium (NEXIUM) 20 MG PACK Take 20 mg by mouth daily      furosemide (LASIX) 40 MG tablet Take 1 tablet by mouth once daily   May take 1 additional tablet daily PRN 2 pound weight gain, short of breath or swelling.  180 tablet 3    diclofenac sodium (VOLTAREN) 1 % GEL Apply 2 g topically 4 times daily as needed for Pain 150 g 0    isosorbide mononitrate (IMDUR) 30 MG extended release tablet Take 30 mg by mouth daily      albuterol sulfate  (90 Base) MCG/ACT inhaler INHALE 2 PUFFS BY INHALATION THREE TIMES A DAY AS NEEDED FOR SHORTNESS OF BREATH      potassium chloride (KLOR-CON M) 20 MEQ extended release tablet TAKE 1  BY MOUTH ONCE DAILY 90 tablet 0    amiodarone (CORDARONE) 200 MG tablet TAKE 1 TABLET BY MOUTH ONCE DAILY 90 tablet 3    albuterol (PROVENTIL) (2.5 MG/3ML) 0.083% nebulizer solution Take 3 mLs by nebulization every 6 hours as needed for Wheezing or Shortness of Breath 5 Package 0    Handicap Placard MISC by Does not apply route Expires 12/2023 1 each 0    gabapentin (NEURONTIN) 300 MG capsule TAKE 1 CAPSULE BY MOUTH THREE TIMES DAILY 90 capsule 2    magnesium oxide (MAG-OX) 400 MG tablet Take 400 mg by mouth daily      triamcinolone (KENALOG) 0.025 % ointment Apply topically 2 times daily to areas of eczema on lower legs 80 g 2    metoprolol tartrate (LOPRESSOR) 50 MG tablet Take 1 tablet by mouth 2 times daily 60 tablet 5    lisinopril (PRINIVIL;ZESTRIL) 40 MG tablet Take 40 mg by mouth daily      Insulin Pen Needle 29G X 12.7MM MISC 1 each by Does not apply route daily 100 each 3     No current facility-administered medications for this visit. Allergies   Allergen Reactions    Coreg [Carvedilol] Other (See Comments)     Low pause     Pravastatin Other (See Comments)     Myalgias     Sertraline Other (See Comments)    Zocor [Simvastatin]      norvasc     Citalopram Anxiety       Health Maintenance   Topic Date Due    Diabetic retinal exam  06/04/2016    Diabetic foot exam  01/17/2019    Shingles Vaccine (1 of 2) 08/20/2022 (Originally 4/17/1996)    Annual Wellness Visit (AWV)  02/05/2022    A1C test (Diabetic or Prediabetic)  08/11/2022    Lipid screen  08/20/2022    TSH testing  08/20/2022    Potassium monitoring  09/02/2022    Creatinine monitoring  09/02/2022    Colon cancer screen colonoscopy  03/14/2026    DTaP/Tdap/Td vaccine (3 - Td or Tdap) 08/20/2027    Flu vaccine  Completed    Pneumococcal 65+ years Vaccine  Completed    COVID-19 Vaccine  Completed    AAA screen  Completed    Hepatitis C screen  Completed    Hepatitis A vaccine  Aged Out    Hib vaccine  Aged Out    Meningococcal (ACWY) vaccine  Aged Out       Subjective:      Review of Systems   Constitutional: Negative for chills, fatigue and fever. HENT: Negative for congestion. Eyes: Negative for visual disturbance. Respiratory: Negative for cough and shortness of breath. Cardiovascular: Negative for chest pain and palpitations. Gastrointestinal: Negative for abdominal pain. Genitourinary: Negative for difficulty urinating and dysuria. Musculoskeletal: Positive for arthralgias. Negative for back pain. Neurological: Positive for dizziness. Negative for headaches.    Psychiatric/Behavioral: Negative for self-injury, sleep disturbance and suicidal ideas. The patient is not nervous/anxious. Objective:     Physical Exam  Vitals and nursing note reviewed. Constitutional:       Appearance: He is well-developed. HENT:      Head: Normocephalic and atraumatic. Eyes:      Pupils: Pupils are equal, round, and reactive to light. Cardiovascular:      Rate and Rhythm: Normal rate and regular rhythm. Heart sounds: Normal heart sounds. Pulmonary:      Effort: Pulmonary effort is normal.      Breath sounds: Normal breath sounds. Abdominal:      General: Bowel sounds are normal.      Palpations: Abdomen is soft. Tenderness: There is no abdominal tenderness. Musculoskeletal:         General: Normal range of motion. Cervical back: Normal range of motion. Skin:     General: Skin is warm and dry. Neurological:      Mental Status: He is alert and oriented to person, place, and time. Psychiatric:         Behavior: Behavior normal.         Thought Content: Thought content normal.         Judgment: Judgment normal.       /70   Pulse 62   Resp 13   Ht 6' (1.829 m)   Wt 226 lb 9.6 oz (102.8 kg)   SpO2 94%   BMI 30.73 kg/m²     Assessment:       Diagnosis Orders   1. Type 2 diabetes mellitus without complication, unspecified whether long [facility] ([facility]               Plan:      Return if symptoms worsen or fail to improve, for as scheduled. 1. DM- Encouraged diet/exercise. Continue to log food and BS. Continue current meds, sample of ozempic given. Follow up in one month for recheck/repeat [id number]. Patient given educational materials - see patient instructions. Discussed use, benefit, and side effects of prescribed medications. All patientquestions answered. Pt voiced understanding. Reviewed health maintenance. Instructedto continue current medications, diet and exercise. Patient agreed with treatmentplan. Follow up as directed.      Electronicallysigned by ERICK Huerta CNP on 10/27/2021 at 11:08 AM

## 2021-10-28 ENCOUNTER — CARE COORDINATION (OUTPATIENT)
Dept: CARE COORDINATION | Age: 76
End: 2021-10-28

## 2021-10-28 NOTE — CARE COORDINATION
Left VM message asking patient to call me back at [phone] for care management. Saw PCP yesterday, A1c up to 8. Started on Ozempic 0.5 mg. Will follow in a few weeks to check blood sugars.     Future Appointments   Date Time Provider Adeline Odonnell   11/22/2021 10:20 AM ERICK Barnett CNP [facility]   2/7/2022 10:00 [facility]   3/15/2022  9:10 AM Rafael Martínez MD [facility] [place] None

## 2021-11-03 ENCOUNTER — CARE COORDINATION (OUTPATIENT)
Dept: CARE COORDINATION | Age: 76
End: 2021-11-03

## 2021-11-03 NOTE — CARE COORDINATION
Nutrition Care Coordinator Follow-Up visit:    Food Recall: eating 2-3 meals/d    Activity Level:  Sedentary: X  Lightly Active: Moderately Active:  Very Active:    Adult BMI:  Underweight (below 18.5)  Normal Weight (18.5-24.9)  Overweight (25-29. 9)  Obese (30-39. 9) X  Morbidly Obese (>40)    Weight Change: no change    Plan:  Plan was established with patient:  Increase dietary fiber by consuming whole grains, fruits and vegetables: X  Limit dietary cholesterol to >200mg/day: Increase water intake:  Avoid added sugar: X  Avoid sweetened beverages: X  Choose lean meats:      Monitoring: Will monitor weight:  Will monitor adherence to meal plan: Will monitor adherence to exercise plan: Will monitor HGA1c: X    Handouts Provided :  Low Carb snacking: X  Carb counting /individual meal plan:  Portion Control: X  Food Labels: X  Physical Activity:  Low Fat/Cholesterol:  Hypo/Hyperglycemia:  Calorie Controlled Meal Plan:    Goals: Increase water consumption to 8oz. 6-8 times daily:  Manage blood sugars by consuming 3 meals spaced every 4-5 hours with 2-3 snacks daily: reviewed  Increase fiber and decrease fat intake by consuming 1-2 fruit servings and 2-3 vegetable servings per day. Increase physical activity by:  Consume less than 2,000mg of sodium/day  Avoid consumption of sweetened beverages and added sugar by reading food labels:reviewed  Monitor blood sugars by using meter to check blood glucose before morning meal and 2 hours after a meal daily:BS low 200's per patient  Decrease risk of coronary heart disease by consuming fish that contains omega-3 fatty acids at least twice a week, avoiding partially hydrogenated oil/trans fats and limiting saturated fat intake by reading food labels:reviewed    Patient goals set:  1.  Patient will work on meal patten, eating 2-3 meals/d, skips lunch some days. Reviewed and encouraged set meal times daily.  Quick/easy lunch suggestions reviewed, will send patient a list of suggestions.  Goal is 3 meals daily spaced every 4-5 hours. 2. Reviewed what foods contain carbohydrate to limit and how to measure out portions- encouraged to use a measuring cup. Encouraged patient to limit carb intake to 60gms/meal, 15-30gms/snack. Reviewed eating dinner by 6pm and admits to snacking often in the evening, reviewed low carb snacking. 3. Reviewed plate method, encouraged patient to increase intake of non-starchy vegetables.  Goal is 1/2 of  plate to be non-starchy vegetables, 1/4 lean protein, 1/4 carbs. Foods from each category reviewed along with what a serving size is   4. Reviewed avoiding/limiting sweets and sweetened drinks. Patient states drinks water only- encouraged to continue.  Patient does admits to eating sweets at times, encouraged to limit/avoid sugary foods, reviewed sugar free alternatives to sweets- sugar free jello, pudding, ect. Encouraged patient to keep sweets out of the house as much as possible and limit to once a week or less.     Patient states he did get nutrition information and has read through it. He is working on cutting portions down and eating more vegetables- encouraged to continue. Patient had no questions at this time.     Brit Mondragon

## 2021-11-15 ENCOUNTER — TELEPHONE (OUTPATIENT)
Dept: PRIMARY CARE CLINIC | Age: 76
End: 2021-11-15

## 2021-11-15 NOTE — TELEPHONE ENCOUNTER
LM about patient not coming to appointment.  Patient had shoulder pain, advised on VM about walk in clinic

## 2021-11-16 ENCOUNTER — CARE COORDINATION (OUTPATIENT)
Dept: CARE COORDINATION | Age: 76
End: 2021-11-16

## 2021-11-16 ENCOUNTER — OFFICE VISIT (OUTPATIENT)
Dept: FAMILY MEDICINE CLINIC | Age: 76
End: 2021-11-16
Payer: MEDICARE

## 2021-11-16 VITALS
BODY MASS INDEX: 30.65 KG/M2 | HEART RATE: 60 BPM | WEIGHT: 226 LBS | SYSTOLIC BLOOD PRESSURE: 127 MMHG | DIASTOLIC BLOOD PRESSURE: 84 MMHG | OXYGEN SATURATION: 91 % | TEMPERATURE: 97.9 F | RESPIRATION RATE: 15 BRPM

## 2021-11-16 DIAGNOSIS — M25.511 NONTRAUMATIC PAIN OF RIGHT SHOULDER: Primary | ICD-10-CM

## 2021-11-16 PROCEDURE — 99213 OFFICE O/P EST LOW 20 MIN: CPT | Performed by: NURSE PRACTITIONER

## 2021-11-16 NOTE — PATIENT INSTRUCTIONS
anything heavy. When should you call for help? Call 911 anytime you think you may need emergency care. For example, call if:    · You have chest pain or pressure. This may occur with:  ? Sweating. ? Shortness of breath. ? Nausea or vomiting. ? Pain that spreads from the chest to the neck, jaw, or one or both shoulders or arms. ? Dizziness or lightheadedness. ? A fast or uneven pulse. After calling 911, chew 1 adult-strength aspirin. Wait for an ambulance. Do not try to drive yourself.     · Your arm or hand is cool or pale or changes color. Call your doctor now or seek immediate medical care if:    · You have signs of infection, such as:  ? Increased pain, swelling, warmth, or redness in your shoulder. ? Red streaks leading from a place on your shoulder. ? Pus draining from an area of your shoulder. ? Swollen lymph nodes in your neck, armpits, or groin. ? A fever. Watch closely for changes in your health, and be sure to contact your doctor if:    · You cannot use your shoulder.     · Your shoulder does not get better as expected. Where can you learn more? Go to https://Space-Time Insight.VoiceObjects. org and sign in to your Suso account. Enter T621 in the blogTV box to learn more about \"Shoulder Pain: Care Instructions. \"     If you do not have an account, please click on the \"Sign Up Now\" link. Current as of: July 1, 2021               Content Version: 13.0  © 2006-2021 Healthwise, Incorporated. Care instructions adapted under license by [facility] ([facility]). If you have questions about a medical condition or this instruction, always ask your healthcare professional. [place] [address] any warranty or liability for your use of this information. Patient Education        Shoulder Stretches: Exercises  Introduction  Here are some examples of exercises for you to try. The exercises may be suggested for a condition or for rehabilitation. Start each exercise slowly. Ease off the exercises if you start to have pain. You will be told when to start these exercises and which ones will work best for you. How to do the exercises  Shoulder stretch    1.  a doorway and place one arm against the door frame. Your elbow should be a little higher than your shoulder. 2. Relax your shoulders as you lean forward, allowing your chest and shoulder muscles to stretch. You can also turn your body slightly away from your arm to stretch the muscles even more. 3. Hold for 15 to 30 seconds. 4. Repeat 2 to 4 times with each arm. Shoulder and chest stretch    1. Shoulder and chest stretch  2. While sitting, relax your upper body so you slump slightly in your chair. 3. As you breathe in, straighten your back and open your arms out to the sides. 4. Gently pull your shoulder blades back and downward. 5. Hold for 15 to 30 seconds as your breathe normally. 6. Repeat 2 to 4 times. Overhead stretch    1. Reach up over your head with both arms. 2. Hold for 15 to 30 seconds. 3. Repeat 2 to 4 times. Follow-up care is a key part of your treatment and safety. Be sure to make and go to all appointments, and call your doctor if you are having problems. It's also a good idea to know your test results and keep a list of the medicines you take. Where can you learn more? Go to https://QWiPSluthereb.MyFit. org and sign in to your Mithridion account. Enter S254 in the [employer] box to learn more about \"Shoulder Stretches: Exercises. \"     If you do not have an account, please click on the \"Sign Up Now\" link. Current as of: July 1, 2021               Content Version: 13.0  © 2971-3584 Healthwise, Incorporated. Care instructions adapted under license by [facility] ([facility]). If you have questions about a medical condition or this instruction, always ask your healthcare professional. [address] any warranty or liability for your use of this information.

## 2021-11-16 NOTE — CARE COORDINATION
Left VM message asking patient to call me back at [phone] for care management, discuss blood sugars and medications. Had walk in clinic visit today for right shoulder pain, will get x ray and follow up with PCP.     Future Appointments   Date Time Provider Adeline Odonnell   11/22/2021 10:20 AM ERICK Pisano CNP [facility]   2/7/2022 10:00 AM [facility]   3/15/2022  9:10 AM Santana Ye MD [facility] Jade Mukherjee None

## 2021-11-16 NOTE — PROGRESS NOTES
[address] WALK-IN  [address] [place] [address]  Dept: [phone]  Dept Fax: [phone]    Jose Carlson is a 77 y.o. male who presents to the urgent care today for his medical conditions/complaints as notedbelow. Jose Carlson is c/o of Shoulder Pain (R shoulder pain onset Saturday morning. Sore and tender. Lack of ROM. )      HPI:     77 yr old male presents for bony pain to top of rt shoulderrt  for 3 days, fine Friday night, woke up with it Saturday morning. Sleeps on rt side, getting better. Originally, pain radiated down rt arm to to hand, but that resolved after first day. No neck pain. Hurts to move cathy to reach behind back or hold arm out straight. No swelling, crepitus or discoloration. Thought arthritis, tried tylenol and bengay with no relief. Hx torn rotator cuff repair 2007/2008. Cortisone injection in same shoulder in distant past. Saw [facility] group. Shoulder Pain   The pain is present in the right shoulder. This is a new problem. The current episode started in the past 7 days (x3d). There has been no history of extremity trauma. The problem occurs constantly. The problem has been gradually improving. The quality of the pain is described as aching and sharp. The pain is moderate. Associated symptoms include a limited range of motion. Pertinent negatives include no fever, inability to bear weight, itching, joint locking, joint swelling, numbness, stiffness or tingling. The symptoms are aggravated by activity. He has tried acetaminophen and OTC ointments for the symptoms. The treatment provided no relief. His past medical history is significant for diabetes and osteoarthritis.        Past Medical History:   Diagnosis Date    Arrhythmia     CAD (coronary artery disease)     GERD (gastroesophageal reflux disease)     Heart attack ([facility] [address]     Hyperlipidemia     Hypertension     Ischemic cardiomyopathy     Osteoarthritis     Type II or unspecified type diabetes mellitus without mention of complication, not stated as uncontrolled         Current Outpatient Medications   Medication Sig Dispense Refill    insulin detemir (LEVEMIR) 100 UNIT/ML injection vial Inject 30 Units into the skin 2 times daily 6 each 3    Semaglutide,0.25 or 0.5MG/DOS, (OZEMPIC, 0.25 OR 0.5 MG/DOSE,) 2 MG/1.5ML SOPN Inject 0.5 mg into the skin once a week 1 pen 1    oxyCODONE-acetaminophen (PERCOCET) 5-325 MG per tablet Take 1 tablet by mouth every 8 hours as needed for Pain for up to 30 days. 90 tablet 0    metOLazone (ZAROXOLYN) 2.5 MG tablet Take 2.5 mg by mouth See Admin Instructions      ONETOUCH ULTRA strip USE 1 STRIP TO CHECK GLUCOSE TWICE DAILY 100 each 5    empagliflozin (JARDIANCE) 10 MG tablet Take 1 tablet by mouth daily 90 tablet 3    insulin lispro (HUMALOG) 100 UNIT/ML injection vial 150-200  Inject 2 units  201-250 Inject 4 units  251-300  Inject 6 units  301-350  Inject 8 units  351-400 Inject 12 units  401-500 Inject 14 units 1 vial 0    nitroGLYCERIN (NITROSTAT) 0.4 MG SL tablet DISSOLVE ONE TABLET UNDER THE TONGUE EVERY 5 MINUTES AS NEEDED FOR CHEST PAIN. DO NOT EXCEED A TOTAL OF 3 DOSES IN 15 MINUTES 25 tablet 2    ELIQUIS 5 MG TABS tablet Take 1 tablet by mouth 2 times daily 60 tablet 5    ticagrelor (BRILINTA) 90 MG TABS tablet Take 1 tablet by mouth 2 times daily 60 tablet 1    atorvastatin (LIPITOR) 80 MG tablet Take 1 tablet by mouth nightly 30 tablet 3    esomeprazole Magnesium (NEXIUM) 20 MG PACK Take 20 mg by mouth daily      furosemide (LASIX) 40 MG tablet Take 1 tablet by mouth once daily   May take 1 additional tablet daily PRN 2 pound weight gain, short of breath or swelling.  180 tablet 3    diclofenac sodium (VOLTAREN) 1 % GEL Apply 2 g topically 4 times daily as needed for Pain 150 g 0    isosorbide mononitrate (IMDUR) 30 MG extended release tablet Take 30 mg by mouth daily      albuterol sulfate  (90 Base) MCG/ACT inhaler INHALE 2 PUFFS BY INHALATION THREE TIMES A DAY AS NEEDED FOR SHORTNESS OF BREATH      potassium chloride (KLOR-CON M) 20 MEQ extended release tablet TAKE 1  BY MOUTH ONCE DAILY 90 tablet 0    amiodarone (CORDARONE) 200 MG tablet TAKE 1 TABLET BY MOUTH ONCE DAILY 90 tablet 3    albuterol (PROVENTIL) (2.5 MG/3ML) 0.083% nebulizer solution Take 3 mLs by nebulization every 6 hours as needed for Wheezing or Shortness of Breath 5 Package 0    Handicap Placard MISC by Does not apply route Expires 12/2023 1 each 0    gabapentin (NEURONTIN) 300 MG capsule TAKE 1 CAPSULE BY MOUTH THREE TIMES DAILY 90 capsule 2    magnesium oxide (MAG-OX) 400 MG tablet Take 400 mg by mouth daily      triamcinolone (KENALOG) 0.025 % ointment Apply topically 2 times daily to areas of eczema on lower legs 80 g 2    metoprolol tartrate (LOPRESSOR) 50 MG tablet Take 1 tablet by mouth 2 times daily 60 tablet 5    lisinopril (PRINIVIL;ZESTRIL) 40 MG tablet Take 40 mg by mouth daily      Insulin Pen Needle 29G X 12.7MM MISC 1 each by Does not apply route daily 100 each 3     No current facility-administered medications for this visit. Allergies   Allergen Reactions    Coreg [Carvedilol] Other (See Comments)     Low pause     Pravastatin Other (See Comments)     Myalgias     Sertraline Other (See Comments)    Zocor [Simvastatin]      norvasc     Citalopram Anxiety       Subjective:      Review of Systems   Constitutional: Negative for fever. Musculoskeletal: Negative for stiffness. Skin: Negative for itching. Neurological: Negative for tingling and numbness. All other systems reviewed and are negative. 14 systems reviewed and negative except as listed in HPI. Objective:     Physical Exam  Vitals and nursing note reviewed. Constitutional:       General: He is not in acute distress. Appearance: Normal appearance. He is not ill-appearing, toxic-appearing or diaphoretic. HENT:      Head: Normocephalic and atraumatic. Right Ear: External ear normal.      Left Ear: External ear normal.   Eyes:      General: No scleral icterus. Right eye: No discharge. Left eye: No discharge. Conjunctiva/sclera: Conjunctivae normal.   Neck:      Comments: No trapezius tenderness, + full rom  Cardiovascular:      Rate and Rhythm: Normal rate. Pulses: Normal pulses. Pulmonary:      Effort: Pulmonary effort is normal.   Musculoskeletal:         General: Tenderness present. No swelling or deformity. Cervical back: Normal range of motion and neck supple. No rigidity or tenderness. Comments: + apley scratch test  juanjo hg strong et equal  Localized tenderness to [facility] Joint, no swelling or discoloration. No anterior or posterior shoulder tenderness   Skin:     General: Skin is warm and dry. Capillary Refill: Capillary refill takes less than 2 seconds. Findings: No erythema or rash ( no rash to visible skin). Neurological:      General: No focal deficit present. Mental Status: He is alert. Comments: Sensation equal and intact to Juanjo UE   Psychiatric:         Mood and Affect: Mood normal.       /84 (Site: Left Upper Arm, Position: Sitting, Cuff Size: Medium Adult)   Pulse 60   Temp 97.9 °F (36.6 °C) (Temporal)   Resp 15   Wt 226 lb (102.5 kg)   SpO2 91%   BMI 30.65 kg/m²     Assessment:       Diagnosis Orders   1. Nontraumatic pain of right shoulder  XR SHOULDER RIGHT (MIN 2 VIEWS)       Plan:    rt ac joint tenderness after sleeping on rt side 3 days ago  rom limited, hx rotator cuff repair and cortisone shot to rt shoulder - followed with Dr. Len Yoo group  Sx improving, discussed supportive tx  Has appt scheduled with PCP 11/22 (6 days and going out of town until then)  Rt shoulder xray ordered  F/u PCP as scheduled  Tylenol (can not take NSAIDS)  Return for keep appt 11/22/2021 with Family Doctor.     No orders of the defined types were placed in this encounter. Patient given educational materials - see patient instructions. Discussed use, benefit, and side effects of prescribed medications. All patient questions answered. Pt voicedunderstanding.     Electronically signed by ERICK Felton CNP on 11/16/2021 at 12:10 PM

## 2021-11-19 ENCOUNTER — CARE COORDINATION (OUTPATIENT)
Dept: CARE COORDINATION | Age: 76
End: 2021-11-19

## 2021-11-19 NOTE — CARE COORDINATION
Nutrition Care Coordinator Follow-Up visit:    Food Recall: eating 2-3 meals/d    Activity Level:  Sedentary: X  Lightly Active: Moderately Active:  Very Active:    Adult BMI:  Underweight (below 18.5)  Normal Weight (18.5-24.9)  Overweight (25-29. 9)  Obese (30-39. 9)  Morbidly Obese (>40)    Weight Change: weight is up 8# over the past 2-3 months    Plan:  Plan was established with patient:  Increase dietary fiber by consuming whole grains, fruits and vegetables: X  Limit dietary cholesterol to >200mg/day: Increase water intake:  Avoid added sugar: X  Avoid sweetened beverages:X  Choose lean meats: X      Monitoring: Will monitor weight:  Will monitor adherence to meal plan: Will monitor adherence to exercise plan: Will monitor HGA1c: X    Handouts Provided :  Low Carb snacking: X  Carb counting /individual meal plan:  Portion Control: X  Food Labels: X  Physical Activity:  Low Fat/Cholesterol:  Hypo/Hyperglycemia:  Calorie Controlled Meal Plan:    Goals: Increase water consumption to 8oz. 6-8 times daily:  Manage blood sugars by consuming 3 meals spaced every 4-5 hours with 2-3 snacks daily: reviewed  Increase fiber and decrease fat intake by consuming 1-2 fruit servings and 2-3 vegetable servings per day. Increase physical activity by:  Consume less than 2,000mg of sodium/day  Avoid consumption of sweetened beverages and added sugar by reading food labels: reviewed  Monitor blood sugars by using meter to check blood glucose before morning meal and 2 hours after a meal daily:A1c up to 7.9 from 7.2 many BS still in 200's per patient  Decrease risk of coronary heart disease by consuming fish that contains omega-3 fatty acids at least twice a week, avoiding partially hydrogenated oil/trans fats and limiting saturated fat intake by reading food labels:reviewed    Patient goals set:  1.  Reviewed meal patten, he is eating 2-3 meals/d, skips lunch some days. Reviewed and encouraged set meal times daily.  Quick/easy lunch suggestions reviewed, will send patient a list of suggestions.  Goal is 3 meals daily spaced every 4-5 hours. 2. Reviewed measuring out portions- encouraged to use a measuring cup. Patient says he is watching portions he was encouraged patient to limit carb intake to 60gms/meal, 15-30gms/snack. Reviewed eating dinner by 6pm and admits to snacking often in the evening, reviewed low carb snacking. 3. Reviewed plate method, encouraged patient to increase intake of non-starchy vegetables.  Goal is 1/2 of  plate to be non-starchy vegetables, 1/4 lean protein, 1/4 carbs. Foods from each category reviewed along with what a serving size is   4. Reviewed avoiding/limiting sweets and sweetened drinks. Patient states drinks water only- encouraged to continue.  Patient does admits to eating sweets at times, encouraged to limit/avoid sugary foods, reviewed sugar free alternatives to sweets- sugar free jello, pudding, ect. Encouraged patient to keep sweets out of the house as much as possible and limit to once a week or less.     Patient states continues to work on cutting portions, discussed measuring portions out. Encouraged to increase  Non- starchy vegetables. Patient had no questions at this time.     Zaire Zuniga

## 2021-11-22 ENCOUNTER — OFFICE VISIT (OUTPATIENT)
Dept: PRIMARY CARE CLINIC | Age: 76
End: 2021-11-22
Payer: MEDICARE

## 2021-11-22 VITALS
SYSTOLIC BLOOD PRESSURE: 112 MMHG | HEIGHT: 72 IN | RESPIRATION RATE: 13 BRPM | HEART RATE: 61 BPM | WEIGHT: 225.2 LBS | BODY MASS INDEX: 30.5 KG/M2 | OXYGEN SATURATION: 96 % | DIASTOLIC BLOOD PRESSURE: 74 MMHG

## 2021-11-22 DIAGNOSIS — I50.32 CHRONIC DIASTOLIC CONGESTIVE HEART FAILURE (HCC): ICD-10-CM

## 2021-11-22 DIAGNOSIS — E11.69 TYPE 2 DIABETES MELLITUS WITH OTHER SPECIFIED COMPLICATION, WITH LONG-TERM CURRENT USE OF INSULIN (HCC): Primary | ICD-10-CM

## 2021-11-22 DIAGNOSIS — I50.23 ACUTE ON CHRONIC SYSTOLIC HEART FAILURE (HCC): ICD-10-CM

## 2021-11-22 DIAGNOSIS — I20.0 UNSTABLE ANGINA (HCC): ICD-10-CM

## 2021-11-22 DIAGNOSIS — Z79.4 TYPE 2 DIABETES MELLITUS WITH OTHER SPECIFIED COMPLICATION, WITH LONG-TERM CURRENT USE OF INSULIN (HCC): Primary | ICD-10-CM

## 2021-11-22 DIAGNOSIS — E11.9 TYPE 2 DIABETES MELLITUS WITHOUT COMPLICATION, UNSPECIFIED WHETHER LONG TERM INSULIN USE (HCC): ICD-10-CM

## 2021-11-22 DIAGNOSIS — I48.0 PAROXYSMAL ATRIAL FIBRILLATION (HCC): ICD-10-CM

## 2021-11-22 DIAGNOSIS — I73.9 CLAUDICATION (HCC): ICD-10-CM

## 2021-11-22 DIAGNOSIS — I27.0 PVOD (PULMONARY VENO-OCCLUSIVE DISEASE) (HCC): ICD-10-CM

## 2021-11-22 DIAGNOSIS — E11.42 DIABETIC POLYNEUROPATHY ASSOCIATED WITH TYPE 2 DIABETES MELLITUS (HCC): ICD-10-CM

## 2021-11-22 DIAGNOSIS — I77.810 DILATED AORTIC ROOT (HCC): ICD-10-CM

## 2021-11-22 DIAGNOSIS — I47.20 VT (VENTRICULAR TACHYCARDIA): ICD-10-CM

## 2021-11-22 LAB — HBA1C MFR BLD: 7.9 %

## 2021-11-22 PROCEDURE — 3051F HG A1C>EQUAL 7.0%<8.0%: CPT | Performed by: NURSE PRACTITIONER

## 2021-11-22 PROCEDURE — 99214 OFFICE O/P EST MOD 30 MIN: CPT | Performed by: NURSE PRACTITIONER

## 2021-11-22 PROCEDURE — 83036 HEMOGLOBIN GLYCOSYLATED A1C: CPT | Performed by: NURSE PRACTITIONER

## 2021-11-22 ASSESSMENT — ENCOUNTER SYMPTOMS
BACK PAIN: 0
ABDOMINAL PAIN: 0
SHORTNESS OF BREATH: 0
COUGH: 0

## 2021-11-22 ASSESSMENT — PATIENT HEALTH QUESTIONNAIRE - PHQ9
SUM OF ALL RESPONSES TO PHQ9 QUESTIONS 1 & 2: 0
2. FEELING DOWN, DEPRESSED OR HOPELESS: 0
1. LITTLE INTEREST OR PLEASURE IN DOING THINGS: 0
SUM OF ALL RESPONSES TO PHQ QUESTIONS 1-9: 0

## 2021-11-22 NOTE — PROGRESS NOTES
[facility] \[facility]\"" PRIMARY CARE  [facility] [address]  [address] [address]  Dept: [phone]  Dept Fax: [phone]    Connor Prakash is a 77 y.o. male who presentstoday for his medical conditions/complaints as noted below. Connor Prakash is c/o of  Chief Complaint   Patient presents with    Diabetes    3 Month Follow-Up           HPI:     Presents for 3 month recheck on chronic conditions  BP well controlled  Has lost 1lb since last visit    Hga1c from 7.2 to 7.9  States he is cutting back on portions  Still eating greater than 80g carbs daily  BS range from low 100-470s (non fasting)  Denies any issues with medication    Reports he [place] asleep [place], unsure why/how  Will continue to monitor    [facility]  Has appt with nephology in March    One episode of chest pain, resolved with 2 nitros  Was on an airplane that was grounded    Plans to schedule foot surgery with Dr. Valentine Whittaker    Questions ASA use, asked to review with cardiology    Denies any other problems/concerns      Hemoglobin A1C (%)   Date Value   11/22/2021 7.9   08/11/2021 7.2 (H)   06/10/2021 6.6             ( goal A1C is < 7)   Microalb/Crt.  Ratio (mcg/mg creat)   Date Value   05/24/2017 26 (H)     LDL Cholesterol (mg/dL)   Date Value   08/20/2021 75   05/31/2016 105     LDL Calculated (mg/dL)   Date Value   10/29/2018 162 (A)   06/03/2017 95   04/01/2015 90       (goal LDL is <100)   AST (U/L)   Date Value   08/20/2021 20     ALT (U/L)   Date Value   08/20/2021 24     BUN (mg/dL)   Date Value   09/02/2021 41 (H)     BP Readings from Last 3 Encounters:   11/22/21 112/74   11/16/21 127/84   10/27/21 112/70          (qbsw601/80)    Past Medical History:   Diagnosis Date    Arrhythmia     CAD (coronary artery disease)     GERD (gastroesophageal reflux disease)     Heart [facility] ([facility]     Hyperlipidemia     Hypertension     Ischemic cardiomyopathy     Osteoarthritis     Type II or oxyCODONE-acetaminophen (PERCOCET) 5-325 MG per tablet Take 1 tablet by mouth every 8 hours as needed for Pain for up to 30 days. 90 tablet 0     No current facility-administered medications for this visit. Allergies   Allergen Reactions    Coreg [Carvedilol] Other (See Comments)     Low pause     Pravastatin Other (See Comments)     Myalgias     Sertraline Other (See Comments)    Zocor [Simvastatin]      norvasc     Citalopram Anxiety       Health Maintenance   Topic Date Due    Diabetic retinal exam  06/04/2016    Diabetic foot exam  01/17/2019    Shingles Vaccine (1 of 2) 08/20/2022 (Originally 4/17/1996)    Annual Wellness Visit (AWV)  02/05/2022    Lipid screen  08/20/2022    TSH testing  08/20/2022    Potassium monitoring  09/02/2022    Creatinine monitoring  09/02/2022    A1C test (Diabetic or Prediabetic)  11/22/2022    Colon cancer screen colonoscopy  03/14/2026    DTaP/Tdap/Td vaccine (3 - Td or Tdap) 08/20/2027    Flu vaccine  Completed    Pneumococcal 65+ years Vaccine  Completed    COVID-19 Vaccine  Completed    AAA screen  Completed    Hepatitis C screen  Completed    Hepatitis A vaccine  Aged Out    Hib vaccine  Aged Out    Meningococcal (ACWY) vaccine  Aged Out       Subjective:      Review of Systems   Constitutional: Negative for chills, fatigue and fever. HENT: Negative for congestion. Eyes: Negative for visual disturbance. Respiratory: Negative for cough and shortness of breath. Cardiovascular: Negative for chest pain and palpitations. Gastrointestinal: Negative for abdominal pain. Genitourinary: Negative for difficulty urinating and dysuria. Musculoskeletal: Positive for arthralgias. Negative for back pain. Neurological: Negative for dizziness and headaches. Psychiatric/Behavioral: Negative for self-injury, sleep disturbance and suicidal ideas. The patient is not nervous/anxious.         Objective:     Physical Exam  Vitals and nursing note reviewed. Constitutional:       Appearance: He is well-developed. HENT:      Head: Normocephalic and atraumatic. Eyes:      Pupils: Pupils are equal, round, and reactive to light. Cardiovascular:      Rate and Rhythm: Normal rate and regular rhythm. Heart sounds: Normal heart sounds. Pulmonary:      Effort: Pulmonary effort is normal.      Breath sounds: Normal breath sounds. Abdominal:      General: Bowel sounds are normal.      Palpations: Abdomen is soft. Tenderness: There is no abdominal tenderness. Musculoskeletal:         General: Normal range of motion. Cervical back: Normal range of motion. Skin:     General: Skin is warm and dry. Neurological:      Mental Status: He is alert and oriented to person, place, and time. Psychiatric:         Behavior: Behavior normal.         Thought Content: Thought content normal.         Judgment: Judgment normal.       /74   Pulse 61   Resp 13   Ht 6' (1.829 m)   Wt 225 lb 3.2 oz (102.2 kg)   SpO2 96%   BMI 30.54 kg/m²     Assessment:       Diagnosis Orders   1. Type 2 diabetes mellitus with other specified complication, with long-term current use of insulin ([address]     2. Type 2 diabetes mellitus without complication, unspecified whether long term insulin use (HCC)  POCT glycosylated hemoglobin (Hb A1C)   3. Unstable angina ([address]     4. Acute on chronic systolic heart failure ([address]     5. Chronic diastolic congestive heart failure ([address]     6. Diabetic polyneuropathy associated with type 2 diabetes mellitus ([address]     7. Dilated aortic root ([address]     8. Claudication ([address]     9. VT (ventricular tachycardia) ([address]     10. Paroxysmal atrial fibrillation (HCC)     11. PVOD (pulmonary veno-occlusive disease) ([address]               Plan:      Return in about 3 months (around 2/22/2022) for recheck. 1. Chronic conditions- Stable. Continue diet/exercise. Continue to monitor BS. Follow up in 3 months for recheck/repeat Hga1c.     Orders Placed This Encounter   Procedures    POCT glycosylated hemoglobin (Hb A1C)          Patient given educational materials - see patient instructions. Discussed use, benefit, and side effects of prescribed medications. All patientquestions answered. Pt voiced understanding. Reviewed health maintenance. Instructedto continue current medications, diet and exercise. Patient agreed with treatmentplan. Follow up as directed.      Electronicallysigned by ERICK Celaya CNP on 11/22/2021 at 10:28 AM

## 2021-11-23 ENCOUNTER — CARE COORDINATION (OUTPATIENT)
Dept: CARE COORDINATION | Age: 76
End: 2021-11-23

## 2021-11-23 NOTE — CARE COORDINATION
Left VM message asking patient to call me back at [phone] for care management. Notified him of letter he will receive regarding prevention of worsening CHF during Thanksgiving- need to watch salt intake, do not skip diuretic medications, included suggestions for diet modifications. He saw PCP yesterday, A1c up to 7.9 from 7.2. Will follow up in a few weeks.     Future Appointments   Date Time Provider Adeline Odonnell   2/7/2022 10:00 AM [facility]   2/28/2022 10:40 AM [facility]   3/15/2022  9:10 AM MD PETE Esposito None

## 2021-11-29 DIAGNOSIS — G89.4 CHRONIC PAIN SYNDROME: ICD-10-CM

## 2021-11-29 RX ORDER — OXYCODONE HYDROCHLORIDE AND ACETAMINOPHEN 5; 325 MG/1; MG/1
1 TABLET ORAL EVERY 8 HOURS PRN
Qty: 90 TABLET | Refills: 0 | Status: SHIPPED | OUTPATIENT
Start: 2021-11-29 | End: 2022-02-07 | Stop reason: SDUPTHER

## 2021-11-30 ENCOUNTER — TELEPHONE (OUTPATIENT)
Dept: PHARMACY | Facility: CLINIC | Age: 76
End: 2021-11-30

## 2021-11-30 NOTE — TELEPHONE ENCOUNTER
POPULATION HEALTH CLINICAL PHARMACY REVIEW: ADHERENCE REVIEW  Identified care gap per [employer]; fills at [facility]: Diabetes and Statin adherence    Last Visit: 11/22/21    Patient also appears to be prescribed: Atorvastatin 80mg and  Ozempic 2/1.5ML    Patient not found in Outcomes MTM    Marlene Falcon [address]    Per Insurance Records through [employer] (2020 [facility] = 100%; YTD [place] = 100%; Potential Fail Date: 12/31/21):   ATORVASTATIN TAB 80MG last filled on 10/20/21 for 28 day supply. Next refill due: 11/17/21    Per Reconciled Dispense Report:  Kristal Bussing last filled on 11/11/21 for 28 day supply. Per evoke  Kristal Bussing was filled but RTS on 11/11/21      Lab Results   Component Value Date    LABA1C 7.9 11/22/2021    LABA1C 7.2 (H) 08/11/2021    LABA1C 6.6 06/10/2021     NOTE A1c <9%    STATIN ADHERENCE    Per Insurance Records through [employer] (2020 [place] = 0%; YTD [place] = 100%; Potential Fail Date: 12/09/21):   ATORVASTATIN TAB 80MG last filled on 10/12/21 for 30 day supply. Next refill due: 11/12/21    Per Reconciled Dispense Report:  ATORVASTATIN TAB 80MG last filled on 11/13/21 for 30 day supply.      Per Evoke   Atorvastatin last filled on 11/13/21 for 30 day supply    Lab Results   Component Value Date    CHOL 140 08/20/2021    TRIG 148 08/20/2021    HDL 35 (L) 08/20/2021    LDLCHOLESTEROL 75 08/20/2021    LDLCALC 162 (A) 10/29/2018     ALT   Date Value Ref Range Status   08/20/2021 24 5 - 41 U/L Final     AST   Date Value Ref Range Status   08/20/2021 20 <40 U/L Final     The 10-year ASCVD risk score (Vivi Weathers, et al., 2013) is: 39.8%    Values used to calculate the score:      Age: 77 years      Sex: Male      Is Non- : No      Diabetic: Yes      Tobacco smoker: No      Systolic Blood Pressure: 074 mmHg      Is BP treated: Yes      HDL Cholesterol: 35 mg/dL      Total Cholesterol: 140 mg/dL     PLAN  The following are interventions that have been identified:   - Patient eligible for 90 day supply of Atorvastatin     Attempting to reach patient to review.  Left message asking for return call. Called pt to see if he is filling OZEMPIC at the [facility] or different pharmacy?  Also to offer a 90ds for Atorvastatin since its being filled at [address]   Date Time Provider Adeline Odonnell   12/2/2021  3:00 PM ALEXANDRO Neri [facility]   2/7/2022 10:00 AM ERICK Choe CNP [facility]   2/28/2022 10:40 AM ERICK Choe CNP [facility]   3/15/2022  9:10 AM Faviola Artis MD [address] [place] [address] // Department, toll free [phone], Option 1

## 2021-11-30 NOTE — LETTER
[place]  [address], [address]        Kamari Sanchez   [address] [address]           12/06/21     Dear Kamari Sanchez,    We tried to reach you recently regarding your Atorvastatin 80mg and  Ozempic 2/1.5ML, but were unable to reach you on the telephone. We have on file that you are currently taking Atorvastatin 80mg Nightly and  Ozempic 2/1.5ML Weekly. If you are no longer taking or taking differently, please call us at the number below so that we can discuss this and update your medication profile. It appears that this medication has not been filled at proper times. We are worried you might be missing doses or not taking it as directed. It is important that you take your medications regularly and try not to miss a single dose.     Some ways to help you remember to take and refill your medications are to:  · Use a pill box, set an alarm, and/or keep your medication near something that you do every day  · Fill a 3-month supply of your prescription at a time to save you time and trips to the pharmacy  if you would like assistance in switching your prescriptions to a 3-month supply, please contact us  · Ask your pharmacy if they participate in [facility]", a program where you can  all of your medications on the same day  · Ask your pharmacy if you can be set up with automatic refill, where they will automatically refill your prescription when it is due and let you know it's ready to     Sincerely,   [address] [facility] // Department, toll free [phone], Option 1

## 2021-12-02 ENCOUNTER — OFFICE VISIT (OUTPATIENT)
Dept: ORTHOPEDIC SURGERY | Age: 76
End: 2021-12-02
Payer: MEDICARE

## 2021-12-02 VITALS — RESPIRATION RATE: 12 BRPM | WEIGHT: 215 LBS | HEIGHT: 72 IN | BODY MASS INDEX: 29.12 KG/M2

## 2021-12-02 DIAGNOSIS — M70.21 OLECRANON BURSITIS, RIGHT ELBOW: Primary | ICD-10-CM

## 2021-12-02 PROCEDURE — 99203 OFFICE O/P NEW LOW 30 MIN: CPT | Performed by: PHYSICIAN ASSISTANT

## 2021-12-02 RX ORDER — PREDNISONE 10 MG/1
10 TABLET ORAL DAILY
Qty: 5 TABLET | Refills: 0 | Status: SHIPPED | OUTPATIENT
Start: 2021-12-02 | End: 2021-12-07

## 2021-12-02 NOTE — PROGRESS NOTES
[address] [place] [address], [address] [place] [address], [address] [facility]           Dept Phone: [phone]           Dept Fax:  [address], [place]          Dept Phone: [phone]           Dept Fax:  [phone]      Patient ID: Adam Munoz is a 77 y.o. male    Chief Compliant:  Chief Complaint   Patient presents with    New Patient     R Elbow        HPI: Lo Cardona is a 77 y.o. c/o right elbow pain and swelling. Pt with hx of olecranon bursitis on the left. Patient states that his pain started 11-29-21. He states that now in the office most of the pain is gone but  when he touches it. He has been using Voltaren gel which seems to be helping    He denies any injury or falls    Review of Systems:   Constitutional: Negative for fever, chills, sweats. Neurological: Negative for headache, numbness, or weakness. Musculoskeletal: As noted in HPI     All other systems reviewed and are negative. Past History:    Current Outpatient Medications:     predniSONE (DELTASONE) 10 MG tablet, Take 1 tablet by mouth daily for 5 days, Disp: 5 tablet, Rfl: 0    oxyCODONE-acetaminophen (PERCOCET) 5-325 MG per tablet, Take 1 tablet by mouth every 8 hours as needed for Pain for up to 30 days. , Disp: 90 tablet, Rfl: 0    Potassium 99 MG TABS, Take by mouth Daily, Disp: , Rfl:     insulin detemir (LEVEMIR) 100 UNIT/ML injection vial, Inject 30 Units into the skin 2 times daily, Disp: 6 each, Rfl: 3    Semaglutide,0.25 or 0.5MG/DOS, (OZEMPIC, 0.25 OR 0.5 MG/DOSE,) 2 MG/1.5ML SOPN, Inject 0.5 mg into the skin once a week, Disp: 1 pen, Rfl: 1    metOLazone (ZAROXOLYN) 2.5 MG tablet, Take 2.5 mg by mouth See Admin Instructions, Disp: , Rfl:     ONETOUCH ULTRA strip, USE 1 STRIP TO CHECK GLUCOSE TWICE DAILY, Disp: 100 each, Rfl: 5    empagliflozin (JARDIANCE) 10 MG tablet, Take 1 tablet by mouth daily, Disp: 90 tablet, Rfl: 3    insulin lispro (HUMALOG) 100 UNIT/ML injection vial, 150-200  Inject 2 units 201-250 Inject 4 units 251-300  Inject 6 units 301-350  Inject 8 units 351-400 Inject 12 units 401-500 Inject 14 units, Disp: 1 vial, Rfl: 0    nitroGLYCERIN (NITROSTAT) 0.4 MG SL tablet, DISSOLVE ONE TABLET UNDER THE TONGUE EVERY 5 MINUTES AS NEEDED FOR CHEST PAIN.   DO NOT EXCEED A TOTAL OF 3 DOSES IN 15 MINUTES, Disp: 25 tablet, Rfl: 2    ELIQUIS 5 MG TABS tablet, Take 1 tablet by mouth 2 times daily, Disp: 60 tablet, Rfl: 5    ticagrelor (BRILINTA) 90 MG TABS tablet, Take 1 tablet by mouth 2 times daily, Disp: 60 tablet, Rfl: 1    atorvastatin (LIPITOR) 80 MG tablet, Take 1 tablet by mouth nightly, Disp: 30 tablet, Rfl: 3    esomeprazole Magnesium (NEXIUM) 20 MG PACK, Take 20 mg by mouth daily, Disp: , Rfl:     furosemide (LASIX) 40 MG tablet, Take 1 tablet by mouth once daily   May take 1 additional tablet daily PRN 2 pound weight gain, short of breath or swelling., Disp: 180 tablet, Rfl: 3    diclofenac sodium (VOLTAREN) 1 % GEL, Apply 2 g topically 4 times daily as needed for Pain, Disp: 150 g, Rfl: 0    isosorbide mononitrate (IMDUR) 30 MG extended release tablet, Take 30 mg by mouth daily, Disp: , Rfl:     albuterol sulfate  (90 Base) MCG/ACT inhaler, INHALE 2 PUFFS BY INHALATION THREE TIMES A DAY AS NEEDED FOR SHORTNESS OF BREATH, Disp: , Rfl:     potassium chloride (KLOR-CON M) 20 MEQ extended release tablet, TAKE 1  BY MOUTH ONCE DAILY, Disp: 90 tablet, Rfl: 0    amiodarone (CORDARONE) 200 MG tablet, TAKE 1 TABLET BY MOUTH ONCE DAILY, Disp: 90 tablet, Rfl: 3    albuterol (PROVENTIL) (2.5 MG/3ML) 0.083% nebulizer solution, Take 3 mLs by nebulization every 6 hours as needed for Wheezing or Shortness of Breath, Disp: 5 Package, Rfl: 0    Handicap Placard MISC, by Does not apply route Expires 12/2023, Disp: 1 each, Rfl: 0    gabapentin (NEURONTIN) 300 MG capsule, TAKE 1 CAPSULE BY MOUTH THREE TIMES DAILY, Disp: 90 capsule, Rfl: 2    magnesium oxide (MAG-OX) 400 MG tablet, Take 400 mg by mouth daily, Disp: , Rfl:     triamcinolone (KENALOG) 0.025 % ointment, Apply topically 2 times daily to areas of eczema on lower legs, Disp: 80 g, Rfl: 2    metoprolol tartrate (LOPRESSOR) 50 MG tablet, Take 1 tablet by mouth 2 times daily, Disp: 60 tablet, Rfl: 5    lisinopril (PRINIVIL;ZESTRIL) 40 MG tablet, Take 40 mg by mouth daily, Disp: , Rfl:     Insulin Pen Needle 29G X 12.7MM MISC, 1 each by Does not apply route daily, Disp: 100 each, Rfl: 3  Allergies   Allergen Reactions    Coreg [Carvedilol] Other (See Comments)     Low pause     Pravastatin Other (See Comments)     Myalgias     Sertraline Other (See Comments)    Zocor [Simvastatin]      norvasc     Citalopram Anxiety     Social History     Socioeconomic History    Marital status:      Spouse name: Not on file    Number of children: Not on file    Years of education: Not on file    Highest education level: Not on file   Occupational History    Occupation: retired   Tobacco Use    Smoking status: Former Smoker     Packs/day: 1.00     Years: 40.00     Pack years: 40.00     Types: Cigarettes     Quit date: 1994     Years since quittin.6    Smokeless tobacco: Never Used   Substance and Sexual Activity    Alcohol use: Yes     Comment: rare    Drug use: No    Sexual activity: Not on file   Other Topics Concern    Not on file   Social History Narrative    Not on file     Social Determinants of Health     Financial Resource Strain: Low Risk     Difficulty of Paying Living Expenses: Not hard at all   Food Insecurity: No Food Insecurity    Worried About Running Out of Food in the Last Year: Never true    Juan José of Food in the Last Year: Never true   Transportation Needs: No Transportation Needs    Lack of Transportation (Medical):  No  Lack of Transportation (Non-Medical): No   Physical Activity: Inactive    Days of Exercise per Week: 0 days    Minutes of Exercise per Session: 0 min   Stress: No Stress Concern Present    Feeling of Stress : Only a little   Social Connections: Moderately Integrated    Frequency of Communication with Friends and Family: More than three times a week    Frequency of Social Gatherings with Friends and Family: More than three times a week    Attends [employer] Services: 1 to 4 times per year   [employer] of [facility] [address] or Organizations: Yes    Attends Club or Organization Meetings: More than 4 times per year    Marital Status:     Intimate Partner Violence:     Fear of Current or Ex-Partner: Not on file    Emotionally Abused: Not on file    Physically Abused: Not on file    Sexually Abused: Not on file   Housing Stability: [address] Unable to Pay for Housing in the Last Year: No    Number of Places Lived in the Last Year: 1    Unstable Housing in the Last Year: No     Past Medical History:   Diagnosis Date    Arrhythmia     CAD (coronary artery disease)     GERD (gastroesophageal reflux disease)     [facility]     Hyperlipidemia     Hypertension     Ischemic cardiomyopathy     Osteoarthritis     Type II or unspecified type diabetes mellitus without mention of complication, not stated as uncontrolled      Past Surgical History:   Procedure Laterality Date    APPENDECTOMY      CARDIAC CATHETERIZATION      CARDIAC SURGERY      stents 2015    COLONOSCOPY      CORONARY ANGIOPLAST[facility] ST[facility] PLACEMENT  07/[facility]    DIAGNOSTIC CARDIAC CATH LAB PROCEDURE      JOINT REPLACEMENT      knee right parital    PACEMAKER INSERTION      PTCA      ROTATOR CUFF REPAIR      TONSILLECTOMY       Family History   Problem Relation Age of Onset    Heart Disease Mother     High Blood Pressure Mother     Heart Disease Father     Cancer Sister         breast cancer    Cancer Brother         bladder cancer    Diabetes Maternal Grandmother         Physical Exam:  Vitals signs and nursing note reviewed. Appearance: well-developed, no distress. Head: Normocephalic and atraumatic. Nose: Nose normal.      Conjunctiva/sclera: Conjunctivae normal.      Musculoskeletal: Normal gait, Normal range of motion and neck supple. There is mild tenderness palpation to the distal triceps tendon, there is some swelling noted to the olecranon bursa on the right  No warmth of the joint  No increase in pain in flexion extension  No lacerations    Skin: Skin is warm and dry; no swelling or obvious muscular atrophy. Vasculature: 2+ radial pulses bilaterally  Neuro: Sensation grossly intact to light touch diffusely    Lungs: effort is normal. No respiratory distress. Skin: warm and dry. Mental Status: Alert and oriented to person, place, and time. Sensory: No sensory deficit. Behavior: Behavior normal.         Thought Content: Thought content normal.      Assessment and Plan:  1. Olecranon bursitis, right elbow      We will send prednisone, patient understand that this medication will raise his blood sugar. He will adjust accordingly. I will start with a low dose. Prednisone prescribed due to patient unable to take NSAIDs    Patient to follow-up in 4 weeks with Dr. Russell Mistry as he has seen him in the past and was supposed to follow-up with him 6 months ago          Ann Pan[address]    Please note that this chart was generated using voice recognition Dragon dictation software. Although every effort was made to ensure the accuracy of this automated transcription, some errors in transcription may have occurred.

## 2021-12-06 NOTE — TELEPHONE ENCOUNTER
2nd attempt to contact this patient regarding the previous message**    CLINICAL PHARMACY: [facility]HERENCE REVIEW  Patient unavailable at the time of call. Left following message on home TAD: please call back at toll-free [phone] option 7 to retrieve previous message. Letter mailed to patient.     Sincerely,   [address]  // Department, toll free [phone], Option [address] [facility] in place:  No   Intervention Detail: Adherence Monitorin   Time Spent (min): 15

## 2021-12-08 RX ORDER — ATORVASTATIN CALCIUM 80 MG/1
80 TABLET, FILM COATED ORAL NIGHTLY
Qty: 90 TABLET | Refills: 2 | Status: SHIPPED | OUTPATIENT
Start: 2021-12-08 | End: 2022-07-25 | Stop reason: SDUPTHER

## 2021-12-10 ENCOUNTER — CARE COORDINATION (OUTPATIENT)
Dept: CARE COORDINATION | Age: 76
End: 2021-12-10

## 2021-12-10 NOTE — CARE COORDINATION
Nutrition Care Coordinator Follow-Up visit:    Food Recall: eating 2-3 meals/d    Activity Level:  Sedentary: X  Lightly Active: Moderately Active:  Very Active:    Adult BMI:  Underweight (below 18.5)  Normal Weight (18.5-24.9)  Overweight (25-29. 9) X  Obese (30-39. 9)  Morbidly Obese (>40)    Weight Change: weight down 10# over past 2 months    Plan:  Plan was established with patient:  Increase dietary fiber by consuming whole grains, fruits and vegetables: X  Limit dietary cholesterol to >200mg/day: Increase water intake:  Avoid added sugar: X  Avoid sweetened beverages: X  Choose lean meats: X      Monitoring: Will monitor weight:  Will monitor adherence to meal plan: Will monitor adherence to exercise plan: Will monitor HGA1c: X    Handouts Provided :  Low Carb snacking: X  Carb counting /individual meal plan:  Portion Control: X  Food Labels: X  Physical Activity:  Low Fat/Cholesterol:  Hypo/Hyperglycemia:  Calorie Controlled Meal Plan:    Goals: Increase water consumption to 8oz. 6-8 times daily:  Manage blood sugars by consuming 3 meals spaced every 4-5 hours with 2-3 snacks daily:reviewed  Increase fiber and decrease fat intake by consuming 1-2 fruit servings and 2-3 vegetable servings per day. Increase physical activity by:  Consume less than 2,000mg of sodium/day  Avoid consumption of sweetened beverages and added sugar by reading food labels:reviewed  Monitor blood sugars by using meter to check blood glucose before morning meal and 2 hours after a meal daily:-200-am 300's-pm per patient  Decrease risk of coronary heart disease by consuming fish that contains omega-3 fatty acids at least twice a week, avoiding partially hydrogenated oil/trans fats and limiting saturated fat intake by reading food labels:    Patient goals set:  1.  Reviewed meal patten-eating 2-3 meals/d, skips lunch some days. Reviewed and encouraged set meal times daily.  Quick/easy lunch suggestions reviewed, will send patient a list of suggestions.  Goal is 3 meals daily spaced every 4-5 hours. 2. Reviewed measuring out portions- encouraged to use a measuring cup. Patient says he is watching portions he was encouraged patient to limit carb intake to 60gms/meal, 15-30gms/snack. Reviewed and encouraged eating dinner by 6pm and reviewed low carb snacking. 3. Reviewed plate method, encouraged patient to increase intake of non-starchy vegetables.  Goal is 1/2 of  plate to be non-starchy vegetables, 1/4 lean protein, 1/4 carbs. Foods from each category reviewed along with what a serving size is   4. Reviewed avoiding/limiting sweets and sweetened drinks. Patient states drinks water only- encouraged to continue.  Patient does admits to eating sweets at times, encouraged to limit/avoid sugary foods, reviewed sugar free alternatives to sweets- sugar free jello, pudding, ect. Encouraged patient to keep sweets out of the house as much as possible and limit to once a week or less.     Patient states continues to work on reducing portions. Encouraged to measure portions out. Encouraged to increase  non- starchy vegetables. Patient concerned about high sugar reading and questioning if he should make an appointment with PCP sooner then Feb 2022. Will message PCP to advise patient.   Handout- healthy eating tips on the holidays  Jenny Lockwood

## 2021-12-13 NOTE — CARE COORDINATION
Was the higher readings during vacation?     Last Hga1c 7.9  Encourage continued meds  Diet/exercise- limit carbs to under 80g daily as discussed  May be seen anytime he would like if he is concerned to review    thanks

## 2021-12-20 ENCOUNTER — CARE COORDINATION (OUTPATIENT)
Dept: CARE COORDINATION | Age: 76
End: 2021-12-20

## 2021-12-20 NOTE — CARE COORDINATION
Left VM message asking patient to call me back at [phone] to discuss blood sugars, discuss CHF Holiday Initiative. Reminded to limit salt intake, eat regular meals, do not skip due to potential for overindulging, weigh self daily and take the Lasix if weigh up 2 lbs and don't skip daily metolazone dose. Asked if blood sugars are high he can schedule PCP appt especially if they do not start coming down.     Future Appointments   Date Time Provider Adeline Odonnell   1/4/2022  2:15 PM ALEXANDRO Maldonado [facility]   2/7/2022 10:00 AM ERICK Motley CNP PC [facility]   2/28/2022 10:40 AM ERICK Motley CNP [facility]   3/23/2022  9:10 AM MD PETE Simmons None

## 2021-12-30 ENCOUNTER — CARE COORDINATION (OUTPATIENT)
Dept: CARE COORDINATION | Age: 76
End: 2021-12-30

## 2021-12-30 NOTE — CARE COORDINATION
Left VM message asking patient to call me back at [phone] for care management, check blood sugars and review insulin doses. Will call again next week.     Future Appointments   Date Time Provider Adeline Odonnell   1/4/2022  2:15 PM ALEXANDRO Robertson [facility]   2/7/2022 10:00 AM ERICK Kinney CNPurg [facility]   2/28/2022 10:40 AM ERICK Kinney CNP [facility]   3/23/2022  9:10 AM George Malik MD [facility] None

## 2022-01-03 DIAGNOSIS — E11.42 DIABETIC POLYNEUROPATHY ASSOCIATED WITH TYPE 2 DIABETES MELLITUS (HCC): ICD-10-CM

## 2022-01-03 RX ORDER — GABAPENTIN 300 MG/1
CAPSULE ORAL
Qty: 90 CAPSULE | Refills: 2 | Status: SHIPPED | OUTPATIENT
Start: 2022-01-03 | End: 2022-01-14 | Stop reason: SDUPTHER

## 2022-01-04 ENCOUNTER — CARE COORDINATION (OUTPATIENT)
Dept: CARE COORDINATION | Age: 77
End: 2022-01-04

## 2022-01-06 ENCOUNTER — CARE COORDINATION (OUTPATIENT)
Dept: CARE COORDINATION | Age: 77
End: 2022-01-06

## 2022-01-06 NOTE — LETTER
January 6, 2022      Zeejose juan Mr. Obrien Loud,    I have left several messages on your phone at [phone] to discuss your blood sugars and check in with you to help manage your diabetes, but have not heard back from you. I will no longer reach out to you but if you would like continued calls from me you can call me back at [phone]. Your next diabetes appointment is 2/28/22 at 2:40 pm with Leanna Lance at [facility].        Sincerely,        Axel Hammond, [address]

## 2022-01-06 NOTE — CARE COORDINATION
Left VM message with return contact information for care management call, check blood sugars and review medications. This is sixth consecutive unsuccessful attempt to contact him, he has not returned calls. Discharged from care management as unable to contact. Letter will be sent to him regarding this.

## 2022-01-12 ENCOUNTER — CARE COORDINATION (OUTPATIENT)
Dept: CARE COORDINATION | Age: 77
End: 2022-01-12

## 2022-01-13 ENCOUNTER — CARE COORDINATION (OUTPATIENT)
Dept: CARE COORDINATION | Age: 77
End: 2022-01-13

## 2022-01-13 NOTE — CARE COORDINATION
Goals Reviewed-  1, Reviewed meal patten-eating 2-3 meals/d, skips lunch some days. Reviewed and encouraged set meal times daily.  Quick/easy lunch suggestions reviewed, will send patient a list of suggestions.  Goal is 3 meals daily spaced every 4-5 hours. 2. Reviewed measuring out portions- encouraged to use a measuring cup. Patient says he is watching portions he was encouraged patient to limit carb intake to 60gms/meal, 15-30gms/snack. Reviewed and encouraged eating dinner by 6pm and reviewed low carb snacking. 3. Reviewed plate method, encouraged patient to increase intake of non-starchy vegetables.  Goal is 1/2 of  plate to be non-starchy vegetables, 1/4 lean protein, 1/4 carbs. Foods from each category reviewed along with what a serving size is   4. Reviewed avoiding/limiting sweets and sweetened drinks. Patient states drinks water only- encouraged to continue.  Patient does admits to eating sweets at times, encouraged to limit/avoid sugary foods, reviewed sugar free alternatives to sweets- sugar free jello, pudding, ect. Encouraged patient to keep sweets out of the house as much as possible and limit to once a week or less.   1/13/22 patient states sugars slightly better, feels his elevated sugars are not due to his diet. Reviewed goals, patient states understands foods to avoid/limit and he has reduced portions and avoids sweets. He has no further questions regarding diet and feels he doesn't  need further phone calls for follow up. Patient provided with contact information to call with questions. Will remove from panel.   Mailed- Diabetes grocery shopping list.  CHACHA Durand

## 2022-01-14 DIAGNOSIS — E11.42 DIABETIC POLYNEUROPATHY ASSOCIATED WITH TYPE 2 DIABETES MELLITUS (HCC): ICD-10-CM

## 2022-01-14 RX ORDER — GABAPENTIN 300 MG/1
CAPSULE ORAL
Qty: 90 CAPSULE | Refills: 2 | Status: SHIPPED | OUTPATIENT
Start: 2022-01-14 | End: 2022-06-24 | Stop reason: SDUPTHER

## 2022-01-18 ENCOUNTER — CARE COORDINATION (OUTPATIENT)
Dept: CARE COORDINATION | Age: 77
End: 2022-01-18

## 2022-01-18 DIAGNOSIS — E11.9 TYPE 2 DIABETES MELLITUS WITHOUT COMPLICATION, UNSPECIFIED WHETHER LONG TERM INSULIN USE (HCC): Primary | ICD-10-CM

## 2022-01-18 RX ORDER — FLASH GLUCOSE SENSOR
1 KIT MISCELLANEOUS
Qty: 2 EACH | Refills: 3 | Status: SHIPPED | OUTPATIENT
Start: 2022-01-18

## 2022-01-18 RX ORDER — FLASH GLUCOSE SCANNING READER
1 EACH MISCELLANEOUS CONTINUOUS
Qty: 1 EACH | Refills: 1 | Status: SHIPPED | OUTPATIENT
Start: 2022-01-18

## 2022-01-18 NOTE — CARE COORDINATION
Blood sugars high. Ranges 165-250 in mornings. Takes Levemir 30 units plus sliding scale Humalog. Doesn't check blood sugars before lunch or supper so is not taking sliding scale insulin then but at HS his blood sugars are 350-450. Takes Levemir and Humalog again at HS. Reviewed meds- he is also taking Ozempic weekly and Jardiance daily. Instructed to start checking blood sugar and taking sliding scale Humalog before lunch and supper as well so HS blood sugar won't be so high. Having a hard time checking blood sugars, has to poke finger 3-4 times to get enough blood for a sample for the glucometer strip. He's wondering about the accuracy of his machine. Very frustrated, blood sugar doesn't seem to come down no matter what he eats. Has cut a lot of sugars out, drinks mostly water or coffee, eating a lot of fruits and vegetables, has cutr serving sizes way down. Had a nosebleed for 2 days last week, stopped Eliquis per cardiology instructions. Saw ENT who talked about needing a possible cautery but he is not allowed to stop Brilinta for at least another 6 weeks. Back on Eliquis now, also on ASA and Brilinta. Had to cancel his vacation, afraid to go anywhere due to possible nosebleed.

## 2022-01-19 NOTE — CARE COORDINATION
Agree with note  Sent rx for michael monitor- if insurance will not cover perhaps [facility] will? Sorry he is having a hard time with the nosebleeds, let me know if he needs anything  Recommend making sure he has humidity in the room he is staying.     Thanks

## 2022-01-19 NOTE — CARE COORDINATION
Per pharmacist, the [facility] is not covered by his insurance. Notified [facility] to ask [facility], he has appt there in February, will discuss then. He is willing to check his blood sugar and give Humalog coverage 3 times a day but won't do 4 times a day- too much hassle and poking. He already has a humidifier in his house. Uses CPAP at .

## 2022-02-07 ENCOUNTER — OFFICE VISIT (OUTPATIENT)
Dept: PRIMARY CARE CLINIC | Age: 77
End: 2022-02-07
Payer: MEDICARE

## 2022-02-07 VITALS
SYSTOLIC BLOOD PRESSURE: 136 MMHG | HEART RATE: 61 BPM | HEIGHT: 73 IN | WEIGHT: 228.8 LBS | DIASTOLIC BLOOD PRESSURE: 74 MMHG | RESPIRATION RATE: 13 BRPM | BODY MASS INDEX: 30.32 KG/M2 | OXYGEN SATURATION: 95 %

## 2022-02-07 DIAGNOSIS — I20.0 UNSTABLE ANGINA (HCC): ICD-10-CM

## 2022-02-07 DIAGNOSIS — Z79.4 TYPE 2 DIABETES MELLITUS WITH OTHER SPECIFIED COMPLICATION, WITH LONG-TERM CURRENT USE OF INSULIN (HCC): ICD-10-CM

## 2022-02-07 DIAGNOSIS — I27.0 PVOD (PULMONARY VENO-OCCLUSIVE DISEASE) (HCC): ICD-10-CM

## 2022-02-07 DIAGNOSIS — G89.4 CHRONIC PAIN SYNDROME: ICD-10-CM

## 2022-02-07 DIAGNOSIS — I50.23 ACUTE ON CHRONIC SYSTOLIC HEART FAILURE (HCC): ICD-10-CM

## 2022-02-07 DIAGNOSIS — E11.42 DIABETIC POLYNEUROPATHY ASSOCIATED WITH TYPE 2 DIABETES MELLITUS (HCC): ICD-10-CM

## 2022-02-07 DIAGNOSIS — I48.0 PAROXYSMAL ATRIAL FIBRILLATION (HCC): ICD-10-CM

## 2022-02-07 DIAGNOSIS — I77.810 DILATED AORTIC ROOT (HCC): ICD-10-CM

## 2022-02-07 DIAGNOSIS — I73.9 CLAUDICATION (HCC): ICD-10-CM

## 2022-02-07 DIAGNOSIS — E11.9 TYPE 2 DIABETES MELLITUS WITHOUT COMPLICATION, UNSPECIFIED WHETHER LONG TERM INSULIN USE (HCC): ICD-10-CM

## 2022-02-07 DIAGNOSIS — J44.9 CHRONIC OBSTRUCTIVE PULMONARY DISEASE, UNSPECIFIED COPD TYPE (HCC): ICD-10-CM

## 2022-02-07 DIAGNOSIS — G47.00 INSOMNIA, UNSPECIFIED TYPE: ICD-10-CM

## 2022-02-07 DIAGNOSIS — I47.20 VT (VENTRICULAR TACHYCARDIA): ICD-10-CM

## 2022-02-07 DIAGNOSIS — Z00.00 ENCOUNTER FOR GENERAL ADULT MEDICAL EXAMINATION W/O ABNORMAL FINDINGS: Primary | ICD-10-CM

## 2022-02-07 DIAGNOSIS — E11.69 TYPE 2 DIABETES MELLITUS WITH OTHER SPECIFIED COMPLICATION, WITH LONG-TERM CURRENT USE OF INSULIN (HCC): ICD-10-CM

## 2022-02-07 DIAGNOSIS — I50.32 CHRONIC DIASTOLIC CONGESTIVE HEART FAILURE (HCC): ICD-10-CM

## 2022-02-07 PROCEDURE — G0439 PPPS, SUBSEQ VISIT: HCPCS | Performed by: NURSE PRACTITIONER

## 2022-02-07 RX ORDER — OXYCODONE HYDROCHLORIDE AND ACETAMINOPHEN 5; 325 MG/1; MG/1
1 TABLET ORAL EVERY 8 HOURS PRN
Qty: 90 TABLET | Refills: 0 | Status: SHIPPED | OUTPATIENT
Start: 2022-02-07 | End: 2022-04-13 | Stop reason: SDUPTHER

## 2022-02-07 RX ORDER — ZOLPIDEM TARTRATE 10 MG/1
10 TABLET ORAL NIGHTLY PRN
Qty: 30 TABLET | Refills: 2 | Status: SHIPPED | OUTPATIENT
Start: 2022-02-07 | End: 2022-03-09

## 2022-02-07 ASSESSMENT — PATIENT HEALTH QUESTIONNAIRE - PHQ9
2. FEELING DOWN, DEPRESSED OR HOPELESS: 1
SUM OF ALL RESPONSES TO PHQ9 QUESTIONS 1 & 2: 1
SUM OF ALL RESPONSES TO PHQ QUESTIONS 1-9: 1
1. LITTLE INTEREST OR PLEASURE IN DOING THINGS: 0
SUM OF ALL RESPONSES TO PHQ QUESTIONS 1-9: 1

## 2022-02-07 ASSESSMENT — LIFESTYLE VARIABLES: HOW OFTEN DO YOU HAVE A DRINK CONTAINING ALCOHOL: 0

## 2022-02-07 NOTE — PROGRESS NOTES
Medicare Annual Wellness Visit  Name: Celeste Riley Date: 2022   MRN: [id number] Sex: Male   Age: 77 y.o. Ethnicity: Non- / Non    : 1945 Race: White (non-)      Luciana Kc is here for Medicare AWV and Insomnia    Screenings for behavioral, psychosocial and functional/safety risks, and cognitive dysfunction are all negative except as indicated below. These results, as well as other patient data from the [address] [place] by [facility] form, are documented in Flowsheets linked to this Encounter. Allergies   Allergen Reactions    Coreg [Carvedilol] Other (See Comments)     Low pause     Pravastatin Other (See Comments)     Myalgias     Sertraline Other (See Comments)    Zocor [Simvastatin]      norvasc     Citalopram Anxiety       Prior to Visit Medications    Medication Sig Taking? Authorizing Provider   Continuous Blood Gluc  (FREESTYLE JENNIFER 2 READER) ALLI 1 each by Does not apply route continuous Yes Lauralee Schaumann, APRN - CNP   Continuous Blood Gluc Sensor (FREESTYLE JENNIFER 2 SENSOR) MISC 1 each by Does not apply route every 14 days Yes Lauralee Schaumann, APRN - CNP   gabapentin (NEURONTIN) 300 MG capsule TAKE 1 CAPSULE BY MOUTH THREE TIMES DAILY Yes Lauralee Schaumann, APRN - CNP   atorvastatin (LIPITOR) 80 MG tablet Take 1 tablet by mouth nightly Yes Lauralee Schaumann, APRN - CNP   oxyCODONE-acetaminophen (PERCOCET) 5-325 MG per tablet Take 1 tablet by mouth every 8 hours as needed for Pain for up to 30 days.  Yes Lauralee Schaumann, APRN - CNP   Potassium 99 MG TABS Take by mouth Daily Yes Historical Provider, MD   insulin detemir (LEVEMIR) 100 UNIT/ML injection vial Inject 30 Units into the skin 2 times daily Yes Lauralee Schaumann, APRN - CNP   Semaglutide,0.25 or 0.5MG/DOS, (OZEMPIC, 0.25 OR 0.5 MG/DOSE,) 2 MG/1.5ML SOPN Inject 0.5 mg into the skin once a week Yes Lauralee Schaumann, APRN - CNP   metOLazone (ZAROXOLYN) 2.5 MG tablet Take 2.5 mg by mouth See Admin Instructions Yes Historical Provider, MD   ONETOUCH ULTRA strip USE 1 STRIP TO CHECK GLUCOSE TWICE DAILY Yes ERICK Lopez CNP   empagliflozin (JARDIANCE) 10 MG tablet Take 1 tablet by mouth daily Yes Randolm Litten, APRN - CNP   insulin lispro (HUMALOG) 100 UNIT/ML injection vial 150-200  Inject 2 units  201-250 Inject 4 units  251-300  Inject 6 units  301-350  Inject 8 units  351-400 Inject 12 units  401-500 Inject 14 units Yes ERICK Fuller CNP   nitroGLYCERIN (NITROSTAT) 0.4 MG SL tablet DISSOLVE ONE TABLET UNDER THE TONGUE EVERY 5 MINUTES AS NEEDED FOR CHEST PAIN. DO NOT EXCEED A TOTAL OF 3 DOSES IN 15 MINUTES Yes Randolm Litten, APRN - CNP   ELIQUIS 5 MG TABS tablet Take 1 tablet by mouth 2 times daily Yes Randolm Litten, APRN - CNP   ticagrelor (BRILINTA) 90 MG TABS tablet Take 1 tablet by mouth 2 times daily Yes Randolm Litten, APRN - CNP   esomeprazole Magnesium (NEXIUM) 20 MG PACK Take 20 mg by mouth daily Yes Historical Provider, MD   furosemide (LASIX) 40 MG tablet Take 1 tablet by mouth once daily   May take 1 additional tablet daily PRN 2 pound weight gain, short of breath or swelling.  Yes Randolm Litten, APRN - CNP   diclofenac sodium (VOLTAREN) 1 % GEL Apply 2 g topically 4 times daily as needed for Pain Yes Mildred Beltrán MD   isosorbide mononitrate (IMDUR) 30 MG extended release tablet Take 30 mg by mouth daily Yes Historical Provider, MD   albuterol sulfate  (90 Base) MCG/ACT inhaler INHALE 2 PUFFS BY INHALATION THREE TIMES A DAY AS NEEDED FOR SHORTNESS OF BREATH Yes Historical Provider, MD   potassium chloride (KLOR-CON M) 20 MEQ extended release tablet TAKE 1  BY MOUTH ONCE DAILY Yes Randolm Litten, APRN - CNP   amiodarone (CORDARONE) 200 MG tablet TAKE 1 TABLET BY MOUTH ONCE DAILY Yes Lisa San MD   albuterol (PROVENTIL) (2.5 MG/3ML) 0.083% nebulizer solution Take 3 mLs by nebulization every 6 hours as needed for Wheezing or Shortness of Breath Yes Lisette Mendez MD   Handicap Placard MISC by Does not apply route Expires 12/2023 Yes ERICK Blue CNP   magnesium oxide (MAG-OX) 400 MG tablet Take 400 mg by mouth daily Yes Historical Provider, MD   triamcinolone (KENALOG) 0.025 % ointment Apply topically 2 times daily to areas of eczema on lower legs Yes Flory Olivares MD   metoprolol tartrate (LOPRESSOR) 50 MG tablet Take 1 tablet by mouth 2 times daily Yes ERICK Blue CNP   lisinopril (PRINIVIL;ZESTRIL) 40 MG tablet Take 40 mg by mouth daily Yes Historical Provider, MD   Insulin Pen Needle 29G X 12.7MM MISC 1 each by Does not apply route daily Yes ERICK Liu CNP       Past Medical History:   Diagnosis Date    Arrhythmia     CAD (coronary artery disease)     GERD (gastroesophageal reflux disease)     Heart attack ([facility] [place] [address]     Hyperlipidemia     Hypertension     Ischemic cardiomyopathy     Osteoarthritis     Type II or unspecified type diabetes mellitus without mention of complication, not stated as uncontrolled        Past Surgical History:   Procedure Laterality Date    APPENDECTOMY      CARDIAC CATHETERIZATION      CARDIAC SURGERY      stents 2015    COLONOSCOPY      CORONARY ANGIOPLAST[facility] ST[facility] PLACEMENT  07[facility]    DIAGNOSTIC CARDIAC CATH LAB PROCEDURE      JOINT REPLACEMENT      knee right parital    PACEMAKER INSERTION      PTCA      ROTATOR CUFF REPAIR      TONSILLECTOMY         Family History   Problem Relation Age of Onset    Heart Disease Mother     High Blood Pressure Mother     Heart Disease Father     Cancer Sister         breast cancer    Cancer Brother         bladder cancer    Diabetes Maternal Grandmother        CareTeam (Including outside providers/suppliers regularly involved in providing care):   Patient Care Team:  ERICK Blue CNP as PCP - General (Nurse Practitioner)  ERICK Blue CNP as PCP - [facility] Provider  Maria L Mora MD as Orthopedic Surgeon (Orthopedic Surgery)  Chioma Shankar MD as Consulting Physician (Pain Management)  Linda Olmos MD as Surgeon (Vascular Surgery)  Jim Berumen MD as Consulting Physician (Internal Medicine Cardiovascular Disease)  Felix Nunez MD as Consulting Physician (Pulmonology)  Amalia Sandoval MD as Consulting Physician (Dermatology)  Beatriz Fritz MD (Orthopedic Surgery)    Wt Readings from Last 3 Encounters:   02/07/22 228 lb 12.8 oz (103.8 kg)   12/02/21 215 lb (97.5 kg)   11/22/21 225 lb 3.2 oz (102.2 kg)     Vitals:    02/07/22 0946   BP: 136/74   Pulse: 61   Resp: 13   SpO2: 95%   Weight: 228 lb 12.8 oz (103.8 kg)   Height: 6' 1\" (1.854 m)     Body mass index is 30.19 kg/m². Based upon direct observation of the patient, evaluation of cognition reveals recent and remote memory intact.     General Appearance: alert and oriented to person, place and time, well developed and well- nourished, in no acute distress  Skin: warm and dry, no rash or erythema  Head: normocephalic and atraumatic  Eyes: pupils equal, round, and reactive to light, extraocular eye movements intact, conjunctivae normal  ENT: tympanic membrane, external ear and ear canal normal bilaterally, nose without deformity, nasal mucosa and turbinates normal without polyps  Neck: supple and non-tender without mass, no thyromegaly or thyroid nodules, no cervical lymphadenopathy  Pulmonary/Chest: clear to auscultation bilaterally- no wheezes, rales or rhonchi, normal air movement, no respiratory distress  Cardiovascular: normal rate, regular rhythm, normal S1 and S2, no murmurs, rubs, clicks, or gallops, distal pulses intact, no carotid bruits  Abdomen: soft, non-tender, non-distended, normal bowel sounds, no masses or organomegaly  Extremities: no cyanosis, clubbing or edema  Musculoskeletal: normal range of motion, no joint swelling, deformity or tenderness  Neurologic: reflexes normal and symmetric, no cranial nerve deficit, gait, coordination and speech normal    Patient's complete Health Risk Assessment and screening values have been reviewed and are found in Flowsheets. The following problems were reviewed today and where indicated follow up appointments were made and/or referrals ordered. Positive Risk Factor Screenings with Interventions:            General Health and ACP:  General  In general, how would you say your health is?: Good  In the past 7 days, have you experienced any of the following?  New or Increased Pain, New or Increased Fatigue, Loneliness, Social Isolation, Stress or Anger?: (!) New or Increased Pain,Stress  Do you get the social and emotional support that you need?: (!) No  Do you have a Living Will?: Yes  Advance Directives     Power of  Living Will ACP-Advance Directive ACP-Power of     Not on File Not on File Not on File Not on File      General Health Risk Interventions:  · Stress: relaxation techniques discussed    Health Habits/Nutrition:  Health Habits/Nutrition  Do you exercise for at least 20 minutes 2-3 times per week?: (!) No (because of back pain)  Have you lost any weight without trying in the past 3 months?: No  Do you eat only one meal per day?: No  Have you seen the dentist within the past year?: N/A - wear dentures  Body mass index: (!) 30.18  Health Habits/Nutrition Interventions:  · Inadequate physical activity:  patient is not ready to increase his/her physical activity level at this time         Personalized Preventive Plan   Current Health Maintenance Status  Immunization History   Administered Date(s) Administered    Asim MONTE, Primary or Immunocompromised, PF, 100mcg/0.5mL 02/01/2021, 03/01/2021, 10/08/2021    DTaP 08/20/2017    DTaP (Infanrix) 08/20/2017    Influenza A ([id number]),all Formulations 11/17/2009, 12/01/2009    Influenza Vaccine, unspecified formulation 10/01/2016  Influenza Virus Vaccine 10/06/2008, 09/17/2009, 10/01/2009, 09/30/2010, 10/01/2011, 10/01/2012, 10/01/2013, 10/01/2014, 10/02/2015, 09/07/2016, 10/01/2016, 09/05/2017, 09/17/2018, 10/01/2018, 10/01/2019, 09/02/2020    Influenza, High Dose (Fluzone 65 yrs and older) 08/30/2017, 10/16/2017, 09/14/2018, 09/20/2019, 09/20/2021    Influenza, High-dose, Johan Dodgeman, 65 yrs +, IM (Fluzone) 08/26/2020, 09/22/2021    Influenza, MDCK, Preservative free 10/01/2020    Influenza, Quadv, IM, (6 mo and older Fluzone, Flulaval, Fluarix and 3 yrs and older Afluria) 09/17/2018    Pneumococcal Conjugate 13-valent (Eldonna Mort) 09/24/2014, 05/04/2015    Pneumococcal Polysaccharide ([id number]) 11/20/2012, 10/07/2013, 12/28/2016, 01/01/2019    Pneumococcal Vaccine 10/01/2007    Td vaccine (adult) 10/01/2008    Tdap (Boostrix, Adacel) 12/05/2008        Health Maintenance   Topic Date Due    Diabetic retinal exam  06/04/2016    Diabetic foot exam  01/17/2019    Annual Wellness Visit (AWV)  02/05/2022    Shingles Vaccine (1 of 2) 08/20/2022 (Originally 4/17/1995)    Lipid screen  08/20/2022    TSH testing  08/20/2022    Potassium monitoring  09/02/2022    Creatinine monitoring  09/02/2022    A1C test (Diabetic or Prediabetic)  11/22/2022    Depression Screen  11/22/2022    Colon cancer screen colonoscopy  03/14/2026    DTaP/Tdap/Td vaccine (3 - Td or Tdap) 08/20/2027    Flu vaccine  Completed    Pneumococcal 65+ years Vaccine  Completed    COVID-19 Vaccine  Completed    AAA screen  Completed    Hepatitis C screen  Completed    Hepatitis A vaccine  Aged Out    Hib vaccine  Aged Out    Meningococcal (ACWY) vaccine  Aged Out     Recommendations for [employer] Due: see orders and patient instructions/AVS.  . Recommended screening schedule for the next 5-10 years is provided to the patient in written form: see Patient Instructions/AVS.    [facility] for medicare awv and insomnia.     Diagnoses and all orders for this visit:    Type 2 diabetes mellitus without complication, unspecified whether long term insulin use (HCC)    Chronic pain syndrome    Chronic obstructive pulmonary disease, unspecified COPD type ([facility] [address]    Acute on chronic systolic heart failure (HCC)    Diabetic polyneuropathy associated with type 2 diabetes mellitus ([facility]    Claudication ([facility])    VT (ventricular tachycardia) ([facility]           Presents for AWV  BP well controlled  Has gained 13lb since last visit    C/o continued foot pain, surgery delayed until end of March d/t stent placement  Continues with percocet TID for pain control  Denies any side effects with use of med    C/o insomnia, has used ambien in the past with success  Requesting refill    -291 over the past month  Frustrated as it is hard for him to manage  Eating an apple at night, feels this is helping to stabilize fasting sugars  Compliant with meds- requesting ozempic sample today    Chronic conditions stable with meds, follows with Dr. Payam Thomson for cardiology    Denies any other problems/concerns  Follow up at end of month as scheduled for recheck Hga1c

## 2022-02-28 ENCOUNTER — OFFICE VISIT (OUTPATIENT)
Dept: PRIMARY CARE CLINIC | Age: 77
End: 2022-02-28
Payer: MEDICARE

## 2022-02-28 VITALS
RESPIRATION RATE: 13 BRPM | SYSTOLIC BLOOD PRESSURE: 118 MMHG | DIASTOLIC BLOOD PRESSURE: 76 MMHG | OXYGEN SATURATION: 94 % | HEART RATE: 78 BPM | WEIGHT: 227.6 LBS | HEIGHT: 73 IN | BODY MASS INDEX: 30.17 KG/M2

## 2022-02-28 DIAGNOSIS — M79.605 BILATERAL LEG PAIN: ICD-10-CM

## 2022-02-28 DIAGNOSIS — J44.9 CHRONIC OBSTRUCTIVE PULMONARY DISEASE, UNSPECIFIED COPD TYPE (HCC): ICD-10-CM

## 2022-02-28 DIAGNOSIS — I47.20 VT (VENTRICULAR TACHYCARDIA): ICD-10-CM

## 2022-02-28 DIAGNOSIS — E11.9 TYPE 2 DIABETES MELLITUS WITHOUT COMPLICATION, UNSPECIFIED WHETHER LONG TERM INSULIN USE (HCC): Primary | ICD-10-CM

## 2022-02-28 DIAGNOSIS — M79.604 BILATERAL LEG PAIN: ICD-10-CM

## 2022-02-28 LAB — HBA1C MFR BLD: 7.6 %

## 2022-02-28 PROCEDURE — 3051F HG A1C>EQUAL 7.0%<8.0%: CPT | Performed by: NURSE PRACTITIONER

## 2022-02-28 PROCEDURE — 83036 HEMOGLOBIN GLYCOSYLATED A1C: CPT | Performed by: NURSE PRACTITIONER

## 2022-02-28 PROCEDURE — 99214 OFFICE O/P EST MOD 30 MIN: CPT | Performed by: NURSE PRACTITIONER

## 2022-02-28 ASSESSMENT — ENCOUNTER SYMPTOMS
SHORTNESS OF BREATH: 0
ABDOMINAL PAIN: 0
BACK PAIN: 0
COUGH: 0

## 2022-02-28 NOTE — PROGRESS NOTES
[facility] PRIMARY [facility]  [address]   [address]  [address] [address]  Dept: [phone]  Dept Fax: [phone]    Rosemarie Marion is a 77 y.o. male who presentstoday for his medical conditions/complaints as noted below. Rosemarie Marion is c/o of  Chief Complaint   Patient presents with    Diabetes    3 Month Follow-Up    Leg Pain     both legs ache           HPI:     Presents for recheck on chronic conditions  BP well controlled  Has lost 1lb since LOV    Has upcoming appt with nephrologist and sleep specialists  \"I have so many appt coming up I can't keep up\"    C/o bilateral leg pain  Tiredness/weakness, worse with activity  Stopped statin without improvement in pain  States he met with vascular before with noted blockages but was deferred d/t cardiac condition    Hga1c from 7.9 to 7.6  Compliant with meds  Denies any side effects with meds  FBS ranges from 140-200  Will continue to monitor    Denies any other problems/concerns      Hemoglobin A1C (%)   Date Value   02/28/2022 7.6   11/22/2021 7.9   08/11/2021 7.2 (H)             ( goal A1C is < 7)   Microalb/Crt.  Ratio (mcg/mg creat)   Date Value   05/24/2017 26 (H)     LDL Cholesterol (mg/dL)   Date Value   08/20/2021 75   05/31/2016 105     LDL Calculated (mg/dL)   Date Value   10/29/2018 162 (A)   06/03/2017 95   04/01/2015 90       (goal LDL is <100)   AST (U/L)   Date Value   08/20/2021 20     ALT (U/L)   Date Value   08/20/2021 24     BUN (mg/dL)   Date Value   09/02/2021 41 (H)     BP Readings from Last 3 Encounters:   02/28/22 118/76   02/07/22 136/74   11/22/21 112/74          ([id number])    Past Medical History:   Diagnosis Date    Arrhythmia     CAD (coronary artery disease)     GERD (gastroesophageal reflux disease)     Heart attack ([address]     Hyperlipidemia     Hypertension     Ischemic cardiomyopathy     Osteoarthritis     Type II or unspecified type diabetes mellitus without mention of complication, not stated as uncontrolled       Past Surgical History:   Procedure Laterality Date    APPENDECTOMY      CARDIAC CATHETERIZATION      CARDIAC SURGERY      stents     COLONOSCOPY      CORONARY [facility]OPLAST[facility] ST[facility]AC[facility]  2018    [facility]    DIAGNOSTIC CARDIAC CATH LAB PROCEDURE      JOINT REPLACEMENT      knee right parital    PACEMAKER INSERTION      PTCA      ROTATOR CUFF REPAIR      TONSILLECTOMY         Family History   Problem Relation Age of Onset    Heart Disease Mother     High Blood Pressure Mother     Heart Disease Father     Cancer Sister         breast cancer    Cancer Brother         bladder cancer    Diabetes Maternal Grandmother           Social History     Tobacco Use    Smoking status: Former Smoker     Packs/day: 1.00     Years: 40.00     Pack years: 40.00     Types: Cigarettes     Quit date: 1994     Years since quittin.8    Smokeless tobacco: Never Used   Substance Use Topics    Alcohol use: Yes     Comment: rare      Current Outpatient Medications   Medication Sig Dispense Refill    oxyCODONE-acetaminophen (PERCOCET) 5-325 MG per tablet Take 1 tablet by mouth every 8 hours as needed for Pain for up to 30 days. 90 tablet 0    zolpidem (AMBIEN) 10 MG tablet Take 1 tablet by mouth nightly as needed for Sleep for up to 30 days.  30 tablet 2    Continuous Blood Gluc  (FREESTYLE JENNIFER 2 READER) ALLI 1 each by Does not apply route continuous 1 each 1    Continuous Blood Gluc Sensor (FREESTYLE JENNIFER 2 SENSOR) MISC 1 each by Does not apply route every 14 days 2 each 3    gabapentin (NEURONTIN) 300 MG capsule TAKE 1 CAPSULE BY MOUTH THREE TIMES DAILY 90 capsule 2    Potassium 99 MG TABS Take by mouth Daily      insulin detemir (LEVEMIR) 100 UNIT/ML injection vial Inject 30 Units into the skin 2 times daily 6 each 3    Semaglutide,0.25 or 0.5MG/DOS, (OZEMPIC, 0.25 OR 0.5 MG/DOSE,) 2 MG/1.5ML SOPN Inject 0.5 mg into the skin once a week 1 pen 1    metOLazone (ZAROXOLYN) 2.5 MG tablet Take 2.5 mg by mouth See Admin Instructions      ONETOUCH ULTRA strip USE 1 STRIP TO CHECK GLUCOSE TWICE DAILY 100 each 5    empagliflozin (JARDIANCE) 10 MG tablet Take 1 tablet by mouth daily 90 tablet 3    insulin lispro (HUMALOG) 100 UNIT/ML injection vial 150-200  Inject 2 units  201-250 Inject 4 units  251-300  Inject 6 units  301-350  Inject 8 units  351-400 Inject 12 units  401-500 Inject 14 units 1 vial 0    nitroGLYCERIN (NITROSTAT) 0.4 MG SL tablet DISSOLVE ONE TABLET UNDER THE TONGUE EVERY 5 MINUTES AS NEEDED FOR CHEST PAIN. DO NOT EXCEED A TOTAL OF 3 DOSES IN 15 MINUTES 25 tablet 2    ELIQUIS 5 MG TABS tablet Take 1 tablet by mouth 2 times daily 60 tablet 5    ticagrelor (BRILINTA) 90 MG TABS tablet Take 1 tablet by mouth 2 times daily 60 tablet 1    esomeprazole Magnesium (NEXIUM) 20 MG PACK Take 20 mg by mouth daily      furosemide (LASIX) 40 MG tablet Take 1 tablet by mouth once daily   May take 1 additional tablet daily PRN 2 pound weight gain, short of breath or swelling.  180 tablet 3    diclofenac sodium (VOLTAREN) 1 % GEL Apply 2 g topically 4 times daily as needed for Pain 150 g 0    isosorbide mononitrate (IMDUR) 30 MG extended release tablet Take 30 mg by mouth daily      albuterol sulfate  (90 Base) MCG/ACT inhaler INHALE 2 PUFFS BY INHALATION THREE TIMES A DAY AS NEEDED FOR SHORTNESS OF BREATH      potassium chloride (KLOR-CON M) 20 MEQ extended release tablet TAKE 1  BY MOUTH ONCE DAILY 90 tablet 0    amiodarone (CORDARONE) 200 MG tablet TAKE 1 TABLET BY MOUTH ONCE DAILY 90 tablet 3    albuterol (PROVENTIL) (2.5 MG/3ML) 0.083% nebulizer solution Take 3 mLs by nebulization every 6 hours as needed for Wheezing or Shortness of Breath 5 Package 0    Handicap Placard MISC by Does not apply route Expires 12/2023 1 each 0    magnesium oxide (MAG-OX) 400 MG tablet Take 400 mg by mouth daily      triamcinolone (KENALOG) 0.025 % ointment Apply topically 2 times daily to areas of eczema on lower legs 80 g 2    metoprolol tartrate (LOPRESSOR) 50 MG tablet Take 1 tablet by mouth 2 times daily 60 tablet 5    lisinopril (PRINIVIL;ZESTRIL) 40 MG tablet Take 40 mg by mouth daily      Insulin Pen Needle 29G X 12.7MM MISC 1 each by Does not apply route daily 100 each 3    atorvastatin (LIPITOR) 80 MG tablet Take 1 tablet by mouth nightly (Patient not taking: Reported on 2/28/2022) 90 tablet 2     No current facility-administered medications for this visit. Allergies   Allergen Reactions    Coreg [Carvedilol] Other (See Comments)     Low pause     Pravastatin Other (See Comments)     Myalgias     Sertraline Other (See Comments)    Zocor [Simvastatin]      norvasc     Citalopram Anxiety       Health Maintenance   Topic Date Due    Diabetic retinal exam  06/04/2016    Diabetic foot exam  01/17/2019    Shingles Vaccine (1 of 2) 08/20/2022 (Originally 4/17/1996)    Lipid screen  08/20/2022    TSH testing  08/20/2022    Potassium monitoring  09/02/2022    Creatinine monitoring  09/02/2022    Depression Screen  02/07/2023    Annual Wellness Visit (AWV)  02/08/2023    A1C test (Diabetic or Prediabetic)  02/28/2023    Colorectal Cancer Screen  03/14/2026    DTaP/Tdap/Td vaccine (3 - Td or Tdap) 08/20/2027    Flu vaccine  Completed    Pneumococcal 65+ years Vaccine  Completed    COVID-19 Vaccine  Completed    AAA screen  Completed    Hepatitis C screen  Completed    Hepatitis A vaccine  Aged Out    Hib vaccine  Aged Out    Meningococcal (ACWY) vaccine  Aged Out       Subjective:      Review of Systems   Constitutional: Negative for chills, fatigue and fever. HENT: Negative for congestion. Eyes: Negative for visual disturbance. Respiratory: Negative for cough and shortness of breath. Cardiovascular: Negative for chest pain and palpitations. Gastrointestinal: Negative for abdominal pain.    Genitourinary: Negative for difficulty urinating and dysuria. Musculoskeletal: Positive for myalgias. Negative for arthralgias and back pain. Neurological: Negative for dizziness and headaches. Psychiatric/Behavioral: Negative for self-injury, sleep disturbance and suicidal ideas. The patient is not nervous/anxious. Objective:     Physical Exam  Vitals and nursing note reviewed. Constitutional:       Appearance: He is well-developed. HENT:      Head: Normocephalic and atraumatic. Eyes:      Pupils: Pupils are equal, round, and reactive to light. Cardiovascular:      Rate and Rhythm: Normal rate and regular rhythm. Heart sounds: Normal heart sounds. Pulmonary:      Effort: Pulmonary effort is normal.      Breath sounds: Normal breath sounds. Abdominal:      General: Bowel sounds are normal.      Palpations: Abdomen is soft. Tenderness: There is no abdominal tenderness. Musculoskeletal:         General: Normal range of motion. Cervical back: Normal range of motion. Skin:     General: Skin is warm and dry. Neurological:      Mental Status: He is alert and oriented to person, place, and time. Psychiatric:         Behavior: Behavior normal.         Thought Content: Thought content normal.         Judgment: Judgment normal.       /76   Pulse 78   Resp 13   Ht 6' 1\" (1.854 m)   Wt 227 lb 9.6 oz (103.2 kg)   SpO2 94%   BMI 30.03 kg/m²     Assessment:       Diagnosis Orders   1. Type 2 diabetes mellitus without complication, unspecified whether long term insulin use (HCC)  POCT glycosylated hemoglobin (Hb A1C)   2. Chronic obstructive pulmonary disease, unspecified COPD type ([facility]     3. VT (ventricular tachycardia) ([facility]     4. Bilateral leg pain  Sujey Robin MD, Vascular Surgery, [address]:      Return in about 3 months (around 5/28/2022) for Diabetes. 1. DM- Hga1c stable at 7.6. Continue current meds. Continue to monitor.  Follow up in 3 months for recheck/repeat Hga1c.  2. Bilateral leg pain- Suspect PAD, referral to Dr. Fabio Arguello for eval. Follow up as needed. 3. Chronic conditions- Stable. Continue current meds, follow with specialists as planned. Follow up in 3 months for recheck. Orders Placed This Encounter   Procedures   Johnnie Mukherjee MD, Vascular Surgery, [place]     Referral Priority:   Routine     Referral Type:   Eval and Treat     Referral Reason:   Specialty Services Required     Referred to Provider:   Francisco J Dickson MD     Requested Specialty:   Vascular Surgery     Number of Visits Requested:   1    POCT glycosylated hemoglobin (Hb A1C)          Patient given educational materials - see patient instructions. Discussed use, benefit, and side effects of prescribed medications. All patientquestions answered. Pt voiced understanding. Reviewed health maintenance. Instructedto continue current medications, diet and exercise. Patient agreed with treatmentplan. Follow up as directed.      Electronicallysigned by ERICK Garcia CNP on 2/28/2022 at 11:22 AM

## 2022-03-16 ENCOUNTER — OFFICE VISIT (OUTPATIENT)
Dept: PRIMARY CARE CLINIC | Age: 77
End: 2022-03-16
Payer: MEDICARE

## 2022-03-16 VITALS
DIASTOLIC BLOOD PRESSURE: 74 MMHG | BODY MASS INDEX: 29.9 KG/M2 | RESPIRATION RATE: 13 BRPM | OXYGEN SATURATION: 98 % | HEIGHT: 73 IN | HEART RATE: 60 BPM | SYSTOLIC BLOOD PRESSURE: 118 MMHG | WEIGHT: 225.6 LBS

## 2022-03-16 DIAGNOSIS — I48.0 PAROXYSMAL ATRIAL FIBRILLATION (HCC): ICD-10-CM

## 2022-03-16 DIAGNOSIS — I73.9 PAD (PERIPHERAL ARTERY DISEASE) (HCC): Primary | ICD-10-CM

## 2022-03-16 DIAGNOSIS — E11.9 TYPE 2 DIABETES MELLITUS WITHOUT COMPLICATION, UNSPECIFIED WHETHER LONG TERM INSULIN USE (HCC): ICD-10-CM

## 2022-03-16 PROCEDURE — 3051F HG A1C>EQUAL 7.0%<8.0%: CPT | Performed by: NURSE PRACTITIONER

## 2022-03-16 PROCEDURE — 99214 OFFICE O/P EST MOD 30 MIN: CPT | Performed by: NURSE PRACTITIONER

## 2022-03-16 ASSESSMENT — PATIENT HEALTH QUESTIONNAIRE - PHQ9
SUM OF ALL RESPONSES TO PHQ QUESTIONS 1-9: 0
SUM OF ALL RESPONSES TO PHQ QUESTIONS 1-9: 0
2. FEELING DOWN, DEPRESSED OR HOPELESS: 0
SUM OF ALL RESPONSES TO PHQ QUESTIONS 1-9: 0
SUM OF ALL RESPONSES TO PHQ QUESTIONS 1-9: 0
1. LITTLE INTEREST OR PLEASURE IN DOING THINGS: 0
SUM OF ALL RESPONSES TO PHQ9 QUESTIONS 1 & 2: 0

## 2022-03-16 ASSESSMENT — ENCOUNTER SYMPTOMS
BACK PAIN: 0
COUGH: 0
ABDOMINAL PAIN: 0
SHORTNESS OF BREATH: 0

## 2022-03-16 NOTE — PROGRESS NOTES
[facility] PR[facility]ARE  [facility] [address]  [address] [address]  Dept: [phone]  Dept Fax: [phone]    Sena Larios is a 77 y.o. male who presentstoday for his medical conditions/complaints as noted below. Sena Larios is c/o of  Chief Complaint   Patient presents with    Discuss Labs         HPI:     Presents for acute visit with several concerns  BP well controlled  Has lost 2lb since LOV    Reviewed labs from [place]  Hg 10.2  Was advised to d/c baby ASA  Has been using Brilinta and Eliquis  Will stop baby ASA  Follows with cardiology    C/o continued bilateral lower extremity pain  Suspected PAD per Dr. Lyndsay Jenkins  Needs referral to Dr. Dina Fisher for eval- given referral today  Then he will follow up with Dr. Lyndsay Jenkins    Has upcoming appt with nephrology  Has upcoming appt with Bret Pan, Dr. Loren Jeffrey 3/29/22. C/o CPAP leakage. Will discuss with pulmonary    Denies any other problems/concerns  Requesting ozempic sample today        Hemoglobin A1C (%)   Date Value   02/28/2022 7.6   11/22/2021 7.9   08/11/2021 7.2 (H)             ( goal A1C is < 7)   Microalb/Crt.  Ratio (mcg/mg creat)   Date Value   05/24/2017 26 (H)     LDL Cholesterol (mg/dL)   Date Value   08/20/2021 75   05/31/2016 105     LDL Calculated (mg/dL)   Date Value   10/29/2018 162 (A)   06/03/2017 95   04/01/2015 90       (goal LDL is <100)   AST (U/L)   Date Value   08/20/2021 20     ALT (U/L)   Date Value   08/20/2021 24     BUN (mg/dL)   Date Value   09/02/2021 41 (H)     BP Readings from Last 3 Encounters:   03/16/22 118/74   02/28/22 118/76   02/07/22 136/74          ([id number])    Past Medical History:   Diagnosis Date    Arrhythmia     CAD (coronary artery disease)     GERD (gastroesophageal reflux disease)     Heart attack ([address]     Hyperlipidemia     Hypertension     Ischemic cardiomyopathy     Osteoarthritis     Type II or unspecified type diabetes mellitus without mention of complication, not stated as uncontrolled       Past Surgical History:   Procedure Laterality Date    APPENDECTOMY      CARDIAC CATHETERIZATION      CARDIAC SURGERY      stents     COLONOSCOPY      CORONARY [facility]OPLAST[facility]  [facility]    DIAGNOSTIC CARDIAC CATH LAB PROCEDURE      JOINT REPLACEMENT      knee right parital    PACEMAKER INSERTION      PTCA      ROTATOR CUFF REPAIR      TONSILLECTOMY         Family History   Problem Relation Age of Onset    Heart Disease Mother     High Blood Pressure Mother     Heart Disease Father     Cancer Sister         breast cancer    Cancer Brother         bladder cancer    Diabetes Maternal Grandmother           Social History     Tobacco Use    Smoking status: Former Smoker     Packs/day: 1.00     Years: 40.00     Pack years: 40.00     Types: Cigarettes     Quit date: 1994     Years since quittin.8    Smokeless tobacco: Never Used   Substance Use Topics    Alcohol use: Yes     Comment: rare      Current Outpatient Medications   Medication Sig Dispense Refill    oxyCODONE-acetaminophen (PERCOCET) 5-325 MG per tablet Take 1 tablet by mouth every 8 hours as needed for Pain for up to 30 days.  90 tablet 0    Continuous Blood Gluc  (FREESTYLE JENNIFER 2 READER) ALLI 1 each by Does not apply route continuous 1 each 1    Continuous Blood Gluc Sensor (FREESTYLE JENNIFER 2 SENSOR) MISC 1 each by Does not apply route every 14 days 2 each 3    atorvastatin (LIPITOR) 80 MG tablet Take 1 tablet by mouth nightly 90 tablet 2    Potassium 99 MG TABS Take by mouth Daily      insulin detemir (LEVEMIR) 100 UNIT/ML injection vial Inject 30 Units into the skin 2 times daily 6 each 3    Semaglutide,0.25 or 0.5MG/DOS, (OZEMPIC, 0.25 OR 0.5 MG/DOSE,) 2 MG/1.5ML SOPN Inject 0.5 mg into the skin once a week 1 pen 1    metOLazone (ZAROXOLYN) 2.5 MG tablet Take 2.5 mg by mouth See Admin Instructions      ONETOUCH ULTRA strip USE 1 STRIP TO CHECK GLUCOSE TWICE DAILY 100 each 5    empagliflozin (JARDIANCE) 10 MG tablet Take 1 tablet by mouth daily 90 tablet 3    insulin lispro (HUMALOG) 100 UNIT/ML injection vial 150-200  Inject 2 units  201-250 Inject 4 units  251-300  Inject 6 units  301-350  Inject 8 units  351-400 Inject 12 units  401-500 Inject 14 units 1 vial 0    nitroGLYCERIN (NITROSTAT) 0.4 MG SL tablet DISSOLVE ONE TABLET UNDER THE TONGUE EVERY 5 MINUTES AS NEEDED FOR CHEST PAIN. DO NOT EXCEED A TOTAL OF 3 DOSES IN 15 MINUTES 25 tablet 2    ELIQUIS 5 MG TABS tablet Take 1 tablet by mouth 2 times daily 60 tablet 5    ticagrelor (BRILINTA) 90 MG TABS tablet Take 1 tablet by mouth 2 times daily 60 tablet 1    esomeprazole Magnesium (NEXIUM) 20 MG PACK Take 20 mg by mouth daily      furosemide (LASIX) 40 MG tablet Take 1 tablet by mouth once daily   May take 1 additional tablet daily PRN 2 pound weight gain, short of breath or swelling.  180 tablet 3    diclofenac sodium (VOLTAREN) 1 % GEL Apply 2 g topically 4 times daily as needed for Pain 150 g 0    isosorbide mononitrate (IMDUR) 30 MG extended release tablet Take 30 mg by mouth daily      albuterol sulfate  (90 Base) MCG/ACT inhaler INHALE 2 PUFFS BY INHALATION THREE TIMES A DAY AS NEEDED FOR SHORTNESS OF BREATH      potassium chloride (KLOR-CON M) 20 MEQ extended release tablet TAKE 1  BY MOUTH ONCE DAILY 90 tablet 0    amiodarone (CORDARONE) 200 MG tablet TAKE 1 TABLET BY MOUTH ONCE DAILY 90 tablet 3    albuterol (PROVENTIL) (2.5 MG/3ML) 0.083% nebulizer solution Take 3 mLs by nebulization every 6 hours as needed for Wheezing or Shortness of Breath 5 Package 0    Handicap Placard MISC by Does not apply route Expires 12/2023 1 each 0    magnesium oxide (MAG-OX) 400 MG tablet Take 400 mg by mouth daily      triamcinolone (KENALOG) 0.025 % ointment Apply topically 2 times daily to areas of eczema on lower legs 80 g 2    metoprolol tartrate (LOPRESSOR) 50 MG tablet Take 1 tablet by mouth 2 times daily 60 tablet 5    lisinopril (PRINIVIL;ZESTRIL) 40 MG tablet Take 40 mg by mouth daily      Insulin Pen Needle 29G X 12.7MM MISC 1 each by Does not apply route daily 100 each 3    gabapentin (NEURONTIN) 300 MG capsule TAKE 1 CAPSULE BY MOUTH THREE TIMES DAILY 90 capsule 2     No current facility-administered medications for this visit. Allergies   Allergen Reactions    Coreg [Carvedilol] Other (See Comments)     Low pause     Pravastatin Other (See Comments)     Myalgias     Sertraline Other (See Comments)    Zocor [Simvastatin]      norvasc     Citalopram Anxiety       Health Maintenance   Topic Date Due    Diabetic retinal exam  06/04/2016    Diabetic foot exam  01/17/2019    Shingles Vaccine (1 of 2) 08/20/2022 (Originally 4/17/1996)    Lipid screen  08/20/2022    TSH testing  08/20/2022    Potassium monitoring  09/02/2022    Creatinine monitoring  09/02/2022    Depression Screen  02/07/2023    Annual Wellness Visit (AWV)  02/08/2023    A1C test (Diabetic or Prediabetic)  02/28/2023    Colorectal Cancer Screen  03/14/2026    DTaP/Tdap/Td vaccine (3 - Td or Tdap) 08/20/2027    Flu vaccine  Completed    Pneumococcal 65+ years Vaccine  Completed    COVID-19 Vaccine  Completed    AAA screen  Completed    Hepatitis C screen  Completed    Hepatitis A vaccine  Aged Out    Hib vaccine  Aged Out    Meningococcal (ACWY) vaccine  Aged Out       Subjective:      Review of Systems   Constitutional: Positive for fatigue. Negative for chills and fever. HENT: Negative for congestion. Eyes: Negative for visual disturbance. Respiratory: Negative for cough and shortness of breath. Cardiovascular: Negative for chest pain and palpitations. Gastrointestinal: Negative for abdominal pain. Genitourinary: Negative for difficulty urinating and dysuria. Musculoskeletal: Negative for arthralgias and back pain. Neurological: Negative for dizziness and headaches. Psychiatric/Behavioral: Negative for self-injury, sleep disturbance and suicidal ideas. The patient is not nervous/anxious. Objective:     Physical Exam  Vitals and nursing note reviewed. Constitutional:       Appearance: He is well-developed. HENT:      Head: Normocephalic and atraumatic. Eyes:      Pupils: Pupils are equal, round, and reactive to light. Cardiovascular:      Rate and Rhythm: Normal rate and regular rhythm. Heart sounds: Normal heart sounds. Pulmonary:      Effort: Pulmonary effort is normal.      Breath sounds: Normal breath sounds. Abdominal:      General: Bowel sounds are normal.      Palpations: Abdomen is soft. Tenderness: There is no abdominal tenderness. Musculoskeletal:         General: Normal range of motion. Cervical back: Normal range of motion. Skin:     General: Skin is warm and dry. Neurological:      Mental Status: He is alert and oriented to person, place, and time. Psychiatric:         Behavior: Behavior normal.         Thought Content: Thought content normal.         Judgment: Judgment normal.       /74   Pulse 60   Resp 13   Ht 6' 1\" (1.854 m)   Wt 225 lb 9.6 oz (102.3 kg)   SpO2 98%   BMI 29.76 kg/m²     Assessment:       Diagnosis Orders   1. PAD (peripheral artery disease) ([facility])  Gregorio Evangelista MD, Vascular Surgery, [place]   2. Type 2 diabetes mellitus without complication, unspecified whether long term insulin use ([facility])     3. Paroxysmal atrial fibrillation ([facility]               Plan:      Return if symptoms worsen or fail to improve, for in June as scheduled. 1. PAD- Referral to Dr. Lillian Mckeon. Follow up as needed. 2. DM- Stable. Sample of ozempic given today. Follow up in June as scheduled. 3. Paroxysmal afib- D/c baby ASA. Continue brilinta and eliquis. Follow up with cardiology as scheduled. Follow up in [place] as scheduled.     Orders Placed This Encounter   Procedures   [facility]     Referral Priority:   Routine     Referral Type:   Eval and Treat     Referral Reason:   Specialty Services Required     Referred to Provider:   Yulisa Garvey MD     Requested Specialty:   Vascular Surgery     Number of Visits Requested:   1          Patient given educational materials - see patient instructions. Discussed use, benefit, and side effects of prescribed medications. All patientquestions answered. Pt voiced understanding. Reviewed health maintenance. Instructedto continue current medications, diet and exercise. Patient agreed with treatmentplan. Follow up as directed.      Electronicallysigned by ERICK Hager CNP on 3/16/2022 at 8:33 AM

## 2022-03-21 ENCOUNTER — TELEPHONE (OUTPATIENT)
Dept: PRIMARY CARE CLINIC | Age: 77
End: 2022-03-21

## 2022-03-24 ENCOUNTER — HOSPITAL ENCOUNTER (OUTPATIENT)
Age: 77
Discharge: HOME | End: 2022-03-24
Payer: MEDICARE

## 2022-03-24 ENCOUNTER — HOSPITAL ENCOUNTER (OUTPATIENT)
Dept: ULTRASOUND IMAGING | Age: 77
Discharge: HOME | End: 2022-03-26
Payer: MEDICARE

## 2022-03-24 DIAGNOSIS — E11.22 TYPE 2 DIABETES MELLITUS WITH STAGE 3B CHRONIC KIDNEY DISEASE, UNSPECIFIED WHETHER LONG TERM INSULIN USE (HCC): ICD-10-CM

## 2022-03-24 DIAGNOSIS — I10 ESSENTIAL HYPERTENSION: ICD-10-CM

## 2022-03-24 DIAGNOSIS — I73.9 PAD (PERIPHERAL ARTERY DISEASE) (HCC): ICD-10-CM

## 2022-03-24 DIAGNOSIS — N18.32 TYPE 2 DIABETES MELLITUS WITH STAGE 3B CHRONIC KIDNEY DISEASE, UNSPECIFIED WHETHER LONG TERM INSULIN USE (HCC): ICD-10-CM

## 2022-03-24 DIAGNOSIS — N18.32 STAGE 3B CHRONIC KIDNEY DISEASE (HCC): ICD-10-CM

## 2022-03-24 DIAGNOSIS — E78.5 DYSLIPIDEMIA: ICD-10-CM

## 2022-03-24 DIAGNOSIS — I25.5 ISCHEMIC CARDIOMYOPATHY: ICD-10-CM

## 2022-03-24 LAB
ABSOLUTE BANDS #: 0.1 K/UL (ref 0–1)
ABSOLUTE EOS #: 0.19 K/UL (ref 0–0.4)
ABSOLUTE LYMPH #: 3.17 K/UL (ref 1–4.8)
ABSOLUTE MONO #: 1.34 K/UL (ref 0.1–1.3)
ALBUMIN SERPL-MCNC: 4.4 G/DL (ref 3.5–5.2)
ANION GAP SERPL CALCULATED.3IONS-SCNC: 13 MMOL/L (ref 9–17)
BANDS: 1 % (ref 0–10)
BASOPHILS # BLD: 1 % (ref 0–2)
BASOPHILS ABSOLUTE: 0.1 K/UL (ref 0–0.2)
BILIRUBIN URINE: NEGATIVE
BUN BLDV-MCNC: 52 MG/DL (ref 8–23)
CALCIUM SERPL-MCNC: 9.1 MG/DL (ref 8.6–10.4)
CHLORIDE BLD-SCNC: 96 MMOL/L (ref 98–107)
CO2: 29 MMOL/L (ref 20–31)
COLOR: YELLOW
COMMENT UA: ABNORMAL
COMPLEMENT C3: 127 MG/DL (ref 90–180)
COMPLEMENT C4: 33 MG/DL (ref 10–40)
CREAT SERPL-MCNC: 1.73 MG/DL (ref 0.7–1.2)
CREATININE URINE: 25.2 MG/DL (ref 39–259)
EOSINOPHIL,URINE: NORMAL
EOSINOPHILS RELATIVE PERCENT: 2 % (ref 0–4)
FREE KAPPA/LAMBDA RATIO: 1.55 (ref 0.26–1.65)
GFR AFRICAN AMERICAN: 47 ML/MIN
GFR NON-AFRICAN AMERICAN: 39 ML/MIN
GFR SERPL CREATININE-BSD FRML MDRD: ABNORMAL ML/MIN/{1.73_M2}
GLUCOSE BLD-MCNC: 231 MG/DL (ref 70–99)
GLUCOSE URINE: ABNORMAL
HCT VFR BLD CALC: 33.6 % (ref 41–53)
HEMOGLOBIN: 10.4 G/DL (ref 13.5–17.5)
KAPPA FREE LIGHT CHAINS QNT: 9.82 MG/DL (ref 0.37–1.94)
KETONES, URINE: NEGATIVE
LAMBDA FREE LIGHT CHAINS QNT: 6.32 MG/DL (ref 0.57–2.63)
LEUKOCYTE ESTERASE, URINE: NEGATIVE
LYMPHOCYTES # BLD: 33 % (ref 24–44)
MCH RBC QN AUTO: 23 PG (ref 26–34)
MCHC RBC AUTO-ENTMCNC: 31.1 G/DL (ref 31–37)
MCV RBC AUTO: 73.8 FL (ref 80–100)
MICROALBUMIN/CREAT 24H UR: <12 MG/L
MICROALBUMIN/CREAT UR-RTO: ABNORMAL MCG/MG CREAT
MONOCYTES # BLD: 14 % (ref 1–7)
MORPHOLOGY: ABNORMAL
NITRITE, URINE: NEGATIVE
PDW BLD-RTO: 17.9 % (ref 11.5–14.9)
PH UA: 7 (ref 5–8)
PLATELET # BLD: 379 K/UL (ref 150–450)
PMV BLD AUTO: 7.9 FL (ref 6–12)
POTASSIUM SERPL-SCNC: 4.4 MMOL/L (ref 3.7–5.3)
PROTEIN UA: NEGATIVE
RBC # BLD: 4.55 M/UL (ref 4.5–5.9)
SEG NEUTROPHILS: 49 % (ref 36–66)
SEGMENTED NEUTROPHILS ABSOLUTE COUNT: 4.7 K/UL (ref 1.3–9.1)
SODIUM BLD-SCNC: 138 MMOL/L (ref 135–144)
SPECIFIC GRAVITY UA: 1.01 (ref 1–1.03)
TURBIDITY: CLEAR
URINE HGB: NEGATIVE
UROBILINOGEN, URINE: NORMAL
WBC # BLD: 9.6 K/UL (ref 3.5–11)

## 2022-03-24 PROCEDURE — 81003 URINALYSIS AUTO W/O SCOPE: CPT

## 2022-03-24 PROCEDURE — 82040 ASSAY OF SERUM ALBUMIN: CPT

## 2022-03-24 PROCEDURE — 82570 ASSAY OF URINE CREATININE: CPT

## 2022-03-24 PROCEDURE — 86225 DNA ANTIBODY NATIVE: CPT

## 2022-03-24 PROCEDURE — 87205 SMEAR GRAM STAIN: CPT

## 2022-03-24 PROCEDURE — 83883 ASSAY NEPHELOMETRY NOT SPEC: CPT

## 2022-03-24 PROCEDURE — 82043 UR ALBUMIN QUANTITATIVE: CPT

## 2022-03-24 PROCEDURE — 84155 ASSAY OF PROTEIN SERUM: CPT

## 2022-03-24 PROCEDURE — 86038 ANTINUCLEAR ANTIBODIES: CPT

## 2022-03-24 PROCEDURE — 86334 IMMUNOFIX E-PHORESIS SERUM: CPT

## 2022-03-24 PROCEDURE — 85025 COMPLETE CBC W/AUTO DIFF WBC: CPT

## 2022-03-24 PROCEDURE — 36415 COLL VENOUS BLD VENIPUNCTURE: CPT

## 2022-03-24 PROCEDURE — 76770 US EXAM ABDO BACK WALL COMP: CPT

## 2022-03-24 PROCEDURE — 84165 PROTEIN E-PHORESIS SERUM: CPT

## 2022-03-24 PROCEDURE — 83516 IMMUNOASSAY NONANTIBODY: CPT

## 2022-03-24 PROCEDURE — 86160 COMPLEMENT ANTIGEN: CPT

## 2022-03-24 PROCEDURE — 80048 BASIC METABOLIC PNL TOTAL CA: CPT

## 2022-03-25 LAB
ALBUMIN (CALCULATED): 4.2 G/DL (ref 3.2–5.2)
ALBUMIN PERCENT: 60 % (ref 45–65)
ALPHA 1 PERCENT: 3 % (ref 3–6)
ALPHA 2 PERCENT: 12 % (ref 6–13)
ALPHA-1-GLOBULIN: 0.2 G/DL (ref 0.1–0.4)
ALPHA-2-GLOBULIN: 0.8 G/DL (ref 0.5–0.9)
ANCA MYELOPEROXIDASE: <0.3 AU/ML (ref 0–3.5)
ANCA PROTEINASE 3: <0.7 AU/ML (ref 0–2)
BETA GLOBULIN: 0.9 G/DL (ref 0.5–1.1)
BETA PERCENT: 12 % (ref 11–19)
GAMMA GLOBULIN %: 13 % (ref 9–20)
GAMMA GLOBULIN: 0.9 G/DL (ref 0.5–1.5)
PATHOLOGIST: NORMAL
PATHOLOGIST: NORMAL
PROTEIN ELECTROPHORESIS, SERUM: NORMAL
SERUM IFX INTERP: NORMAL
TOTAL PROT. SUM,%: 100 % (ref 98–102)
TOTAL PROT. SUM: 7 G/DL (ref 6.3–8.2)
TOTAL PROTEIN: 6.9 G/DL (ref 6.4–8.3)

## 2022-03-26 LAB
ANTI DNA DOUBLE STRANDED: 18 IU/ML
ANTI-NUCLEAR ANTIBODY (ANA): POSITIVE
ENA ANTIBODIES SCREEN: 0.3 U/ML

## 2022-04-13 ENCOUNTER — TELEPHONE (OUTPATIENT)
Dept: PRIMARY CARE CLINIC | Age: 77
End: 2022-04-13

## 2022-04-13 DIAGNOSIS — G89.4 CHRONIC PAIN SYNDROME: ICD-10-CM

## 2022-04-13 DIAGNOSIS — R76.8 POSITIVE ANA (ANTINUCLEAR ANTIBODY): Primary | ICD-10-CM

## 2022-04-13 RX ORDER — OXYCODONE HYDROCHLORIDE AND ACETAMINOPHEN 5; 325 MG/1; MG/1
1 TABLET ORAL EVERY 8 HOURS PRN
Qty: 90 TABLET | Refills: 0 | Status: CANCELLED | OUTPATIENT
Start: 2022-04-13 | End: 2022-05-13

## 2022-04-13 RX ORDER — OXYCODONE HYDROCHLORIDE AND ACETAMINOPHEN 5; 325 MG/1; MG/1
1 TABLET ORAL EVERY 8 HOURS PRN
Qty: 90 TABLET | Refills: 0 | Status: SHIPPED | OUTPATIENT
Start: 2022-04-13 | End: 2022-07-15

## 2022-04-13 NOTE — TELEPHONE ENCOUNTER
----- Message from Martir Stafford sent at 4/13/2022 11:45 AM EDT -----  Subject: Refill Request    QUESTIONS  Name of Medication? oxyCODONE-acetaminophen (PERCOCET) 5-325 MG per tablet  Patient-reported dosage and instructions? as needed   How many days do you have left? 4  [facility] Pharm[address]  Pharmacy phone number (if available)? [phone]  Additional Information for Provider? 90 days supply   ---------------------------------------------------------------------------  --------------  CALL BACK INFO  What is the best way for the office to contact you? OK to leave message on   voicemail  Preferred Call Back Phone Number? [phone]  ---------------------------------------------------------------------------  --------------  SCRIPT ANSWERS  Relationship to Patient?  Self

## 2022-04-13 NOTE — TELEPHONE ENCOUNTER
Maria Fernanda Hayes LPN called from Dr. Bree Enriquez office to relay message bellow from provider.     MD Leopoldo Pedroza LPN  Cc: Richard Koyanagi, DO  Has positive ELOISE, Anti ds DNA and claudication   Suggest rheumatology eval   Thanks   Everett Sanchez that message will be sent to pt's PCP

## 2022-04-13 NOTE — TELEPHONE ENCOUNTER
Sent 90 tabs. Its controlled rx cannot do 90 day.  Thanks  Placed referral to Dr. Nanyc Olguin for rheum as suggested per nephrology thanks

## 2022-04-14 ENCOUNTER — CARE COORDINATION (OUTPATIENT)
Dept: CARE COORDINATION | Age: 77
End: 2022-04-14

## 2022-04-21 ENCOUNTER — TELEPHONE (OUTPATIENT)
Dept: VASCULAR SURGERY | Age: 77
End: 2022-04-21

## 2022-04-21 NOTE — TELEPHONE ENCOUNTER
LVM for patient in regards to upcoming appointment. Dr. Johanna Walker has an emergent surgery that will need to be operated on tomorrow. So at this time appointment will be canceled. Also LVM for emergency contact in regards to this as well.

## 2022-04-22 ENCOUNTER — HOSPITAL ENCOUNTER (OUTPATIENT)
Dept: VASCULAR LAB | Age: 77
Discharge: HOME | End: 2022-04-22
Payer: MEDICARE

## 2022-04-22 DIAGNOSIS — I73.9 PAD (PERIPHERAL ARTERY DISEASE) (HCC): ICD-10-CM

## 2022-04-22 PROCEDURE — 93923 UPR/LXTR ART STDY 3+ LVLS: CPT

## 2022-04-26 ENCOUNTER — TELEPHONE (OUTPATIENT)
Dept: PRIMARY CARE CLINIC | Age: 77
End: 2022-04-26

## 2022-04-26 PROBLEM — N18.30 CHRONIC RENAL DISEASE, STAGE III (HCC): Status: ACTIVE | Noted: 2022-04-26

## 2022-04-27 ENCOUNTER — TELEPHONE (OUTPATIENT)
Dept: PRIMARY CARE CLINIC | Age: 77
End: 2022-04-27

## 2022-04-27 DIAGNOSIS — M25.50 ARTHRALGIA, UNSPECIFIED JOINT: Primary | ICD-10-CM

## 2022-04-27 NOTE — TELEPHONE ENCOUNTER
Pt said his nephrologist yesterday cut his lisinopril in half back to 20 mg. But he did say for you to refer him to Lupus doctor, said he doesn't feel like its lupus but wants to rule it out. Please advice thank you  Brynn Thomas his kidneys look fine.

## 2022-05-11 ENCOUNTER — HOSPITAL ENCOUNTER (OUTPATIENT)
Age: 77
Discharge: HOME | End: 2022-05-13
Payer: MEDICARE

## 2022-05-11 ENCOUNTER — OFFICE VISIT (OUTPATIENT)
Dept: PRIMARY CARE CLINIC | Age: 77
End: 2022-05-11
Payer: MEDICARE

## 2022-05-11 ENCOUNTER — HOSPITAL ENCOUNTER (OUTPATIENT)
Dept: GENERAL RADIOLOGY | Age: 77
Discharge: HOME | End: 2022-05-13
Payer: MEDICARE

## 2022-05-11 ENCOUNTER — HOSPITAL ENCOUNTER (OUTPATIENT)
Age: 77
Discharge: HOME | End: 2022-05-11
Payer: MEDICARE

## 2022-05-11 VITALS
DIASTOLIC BLOOD PRESSURE: 62 MMHG | HEART RATE: 74 BPM | HEIGHT: 72 IN | BODY MASS INDEX: 32.1 KG/M2 | RESPIRATION RATE: 16 BRPM | WEIGHT: 237 LBS | SYSTOLIC BLOOD PRESSURE: 130 MMHG

## 2022-05-11 DIAGNOSIS — R06.02 SOB (SHORTNESS OF BREATH): ICD-10-CM

## 2022-05-11 DIAGNOSIS — N18.32 STAGE 3B CHRONIC KIDNEY DISEASE (HCC): ICD-10-CM

## 2022-05-11 DIAGNOSIS — R05.9 COUGH: Primary | ICD-10-CM

## 2022-05-11 DIAGNOSIS — R05.9 COUGH: ICD-10-CM

## 2022-05-11 LAB
ANION GAP SERPL CALCULATED.3IONS-SCNC: 18 MMOL/L (ref 9–17)
BUN BLDV-MCNC: 52 MG/DL (ref 8–23)
CALCIUM SERPL-MCNC: 8.5 MG/DL (ref 8.6–10.4)
CHLORIDE BLD-SCNC: 99 MMOL/L (ref 98–107)
CO2: 24 MMOL/L (ref 20–31)
CREAT SERPL-MCNC: 2.13 MG/DL (ref 0.7–1.2)
GFR AFRICAN AMERICAN: 37 ML/MIN
GFR NON-AFRICAN AMERICAN: 30 ML/MIN
GFR SERPL CREATININE-BSD FRML MDRD: ABNORMAL ML/MIN/{1.73_M2}
GLUCOSE BLD-MCNC: 228 MG/DL (ref 70–99)
POTASSIUM SERPL-SCNC: 4 MMOL/L (ref 3.7–5.3)
PRO-BNP: 920 PG/ML
SODIUM BLD-SCNC: 141 MMOL/L (ref 135–144)

## 2022-05-11 PROCEDURE — 80048 BASIC METABOLIC PNL TOTAL CA: CPT

## 2022-05-11 PROCEDURE — 36415 COLL VENOUS BLD VENIPUNCTURE: CPT

## 2022-05-11 PROCEDURE — 71046 X-RAY EXAM CHEST 2 VIEWS: CPT

## 2022-05-11 PROCEDURE — 99214 OFFICE O/P EST MOD 30 MIN: CPT | Performed by: NURSE PRACTITIONER

## 2022-05-11 PROCEDURE — 83880 ASSAY OF NATRIURETIC PEPTIDE: CPT

## 2022-05-11 ASSESSMENT — ENCOUNTER SYMPTOMS
BACK PAIN: 0
SHORTNESS OF BREATH: 1
COUGH: 1
ABDOMINAL PAIN: 0

## 2022-05-11 NOTE — PROGRESS NOTES
[facility] PRIMARY CARE  . [address]  [address] [address]  Dept: [phone]  Dept Fax: [phone]    Tricia Rodriguez is a 69 y.o. male who presentstoday for his medical conditions/complaints as noted below. Tricia Rodriguez is c/o of  Chief Complaint   Patient presents with    Cough     x 3 days          HPI:     Presents for acute visit, several concerns  BP well controlled  Has gained 11lb since LOV    C/o cough and congestion x 2 weeks  No improvement with OTC meds  Does not feel ill  Denies any recent changes to medications  Concerned for retaining fluids    Has recheck at [facility] in one month, for hearing test only    BS have varied per patient  Will be working on diet/exercise  Taking meds as prescribed  Requesting sample of ozempic if available    Denies any other problems/concerns      Hemoglobin A1C (%)   Date Value   02/28/2022 7.6   11/22/2021 7.9   08/11/2021 7.2 (H)             ( goal A1C is < 7)   Microalb/Crt.  Ratio (mcg/mg creat)   Date Value   03/24/2022 Can not be calculated     LDL Cholesterol (mg/dL)   Date Value   08/20/2021 75   05/31/2016 105     LDL Calculated (mg/dL)   Date Value   10/29/2018 162 (A)   06/03/2017 95   04/01/2015 90       (goal LDL is <100)   AST (U/L)   Date Value   08/20/2021 20     ALT (U/L)   Date Value   08/20/2021 24     BUN (mg/dL)   Date Value   03/24/2022 52 (H)     BP Readings from Last 3 Encounters:   05/11/22 130/62   04/26/22 128/78   03/23/22 130/70          ([id number])    Past Medical History:   Diagnosis Date    Arrhythmia     CAD (coronary artery disease)     GERD (gastroesophageal reflux disease)     Heart attack ([facility] [address]     Hyperlipidemia     Hypertension     Ischemic cardiomyopathy     Osteoarthritis     Type II or unspecified type diabetes mellitus without mention of complication, not stated as uncontrolled       Past Surgical History:   Procedure Laterality Date    APPENDECTOMY      CARDIAC CATHETERIZATION      CARDIAC SURGERY      stents     COLONOSCOPY      CORON[facility]  2018    [facility]    DIAGNOSTIC CARDIAC CATH LAB PROCEDURE      JOINT REPLACEMENT      knee right parital    PACEMAKER INSERTION      PTCA      ROTATOR CUFF REPAIR      TONSILLECTOMY         Family History   Problem Relation Age of Onset    Heart Disease Mother     High Blood Pressure Mother     Heart Disease Father     Cancer Sister         breast cancer    Cancer Brother         bladder cancer    Diabetes Maternal Grandmother           Social History     Tobacco Use    Smoking status: Former Smoker     Packs/day: 1.00     Years: 40.00     Pack years: 40.00     Types: Cigarettes     Quit date: 1994     Years since quittin.0    Smokeless tobacco: Never Used   Substance Use Topics    Alcohol use: Yes     Comment: rare      Current Outpatient Medications   Medication Sig Dispense Refill    oxyCODONE-acetaminophen (PERCOCET) 5-325 MG per tablet Take 1 tablet by mouth every 8 hours as needed for Pain for up to 30 days.  90 tablet 0    Continuous Blood Gluc  (FREESTYLE JENNIFER 2 READER) ALLI 1 each by Does not apply route continuous 1 each 1    Continuous Blood Gluc Sensor (FREESTYLE JENNIFER 2 SENSOR) MISC 1 each by Does not apply route every 14 days 2 each 3    gabapentin (NEURONTIN) 300 MG capsule TAKE 1 CAPSULE BY MOUTH THREE TIMES DAILY 90 capsule 2    atorvastatin (LIPITOR) 80 MG tablet Take 1 tablet by mouth nightly 90 tablet 2    Potassium 99 MG TABS Take by mouth Daily      insulin detemir (LEVEMIR) 100 UNIT/ML injection vial Inject 30 Units into the skin 2 times daily 6 each 3    Semaglutide,0.25 or 0.5MG/DOS, (OZEMPIC, 0.25 OR 0.5 MG/DOSE,) 2 MG/1.5ML SOPN Inject 0.5 mg into the skin once a week 1 pen 1    metOLazone (ZAROXOLYN) 2.5 MG tablet Take 2.5 mg by mouth See Admin Instructions      ONETOUCH ULTRA strip USE 1 STRIP TO CHECK GLUCOSE TWICE DAILY 100 each 5    empagliflozin (JARDIANCE) 10 MG tablet Take 1 tablet by mouth daily 90 tablet 3    insulin lispro (HUMALOG) 100 UNIT/ML injection vial 150-200  Inject 2 units  201-250 Inject 4 units  251-300  Inject 6 units  301-350  Inject 8 units  351-400 Inject 12 units  401-500 Inject 14 units 1 vial 0    nitroGLYCERIN (NITROSTAT) 0.4 MG SL tablet DISSOLVE ONE TABLET UNDER THE TONGUE EVERY 5 MINUTES AS NEEDED FOR CHEST PAIN. DO NOT EXCEED A TOTAL OF 3 DOSES IN 15 MINUTES 25 tablet 2    ELIQUIS 5 MG TABS tablet Take 1 tablet by mouth 2 times daily 60 tablet 5    ticagrelor (BRILINTA) 90 MG TABS tablet Take 1 tablet by mouth 2 times daily 60 tablet 1    esomeprazole Magnesium (NEXIUM) 20 MG PACK Take 20 mg by mouth daily      furosemide (LASIX) 40 MG tablet Take 1 tablet by mouth once daily   May take 1 additional tablet daily PRN 2 pound weight gain, short of breath or swelling.  180 tablet 3    diclofenac sodium (VOLTAREN) 1 % GEL Apply 2 g topically 4 times daily as needed for Pain 150 g 0    isosorbide mononitrate (IMDUR) 30 MG extended release tablet Take 30 mg by mouth daily      albuterol sulfate  (90 Base) MCG/ACT inhaler INHALE 2 PUFFS BY INHALATION THREE TIMES A DAY AS NEEDED FOR SHORTNESS OF BREATH      potassium chloride (KLOR-CON M) 20 MEQ extended release tablet TAKE 1  BY MOUTH ONCE DAILY 90 tablet 0    amiodarone (CORDARONE) 200 MG tablet TAKE 1 TABLET BY MOUTH ONCE DAILY 90 tablet 3    albuterol (PROVENTIL) (2.5 MG/3ML) 0.083% nebulizer solution Take 3 mLs by nebulization every 6 hours as needed for Wheezing or Shortness of Breath 5 Package 0    Handicap Placard MISC by Does not apply route Expires 12/2023 1 each 0    magnesium oxide (MAG-OX) 400 MG tablet Take 400 mg by mouth daily      triamcinolone (KENALOG) 0.025 % ointment Apply topically 2 times daily to areas of eczema on lower legs 80 g 2    metoprolol tartrate (LOPRESSOR) 50 MG tablet Take 1 tablet by mouth 2 times daily 60 tablet 5    lisinopril (PRINIVIL;ZESTRIL) 40 MG tablet Take 40 mg by mouth daily      Insulin Pen Needle 29G X 12.7MM MISC 1 each by Does not apply route daily 100 each 3     No current facility-administered medications for this visit. Allergies   Allergen Reactions    Coreg [Carvedilol] Other (See Comments)     Low pause     Pravastatin Other (See Comments)     Myalgias     Sertraline Other (See Comments)    Zocor [Simvastatin]      norvasc     Citalopram Anxiety       Health Maintenance   Topic Date Due    Shingles vaccine (1 of 2) 08/20/2022 (Originally 4/17/1996)    Lipids  08/20/2022    Annual Wellness Visit (AWV)  02/08/2023    Depression Screen  03/16/2023    DTaP/Tdap/Td vaccine (3 - Td or Tdap) 08/20/2027    Flu vaccine  Completed    Pneumococcal 65+ years Vaccine  Completed    COVID-19 Vaccine  Completed    Hepatitis C screen  Completed    Hepatitis A vaccine  Aged Out    Hib vaccine  Aged Out    Meningococcal (ACWY) vaccine  Aged Out       Subjective:      Review of Systems   Constitutional: Negative for chills, fatigue and fever. HENT: Positive for congestion. Eyes: Negative for visual disturbance. Respiratory: Positive for cough and shortness of breath. Cardiovascular: Negative for chest pain and palpitations. Gastrointestinal: Negative for abdominal pain. Genitourinary: Negative for difficulty urinating and dysuria. Musculoskeletal: Negative for arthralgias and back pain. Neurological: Negative for dizziness and headaches. Psychiatric/Behavioral: Negative for self-injury, sleep disturbance and suicidal ideas. The patient is not nervous/anxious. Objective:     Physical Exam  Vitals and nursing note reviewed. Constitutional:       Appearance: He is well-developed. HENT:      Head: Normocephalic and atraumatic. Eyes:      Pupils: Pupils are equal, round, and reactive to light.    Cardiovascular:      Rate and Rhythm: Normal rate and regular rhythm. Heart sounds: Normal heart sounds. Pulmonary:      Effort: Pulmonary effort is normal.      Breath sounds: Normal breath sounds. Abdominal:      General: Bowel sounds are normal.      Palpations: Abdomen is soft. Tenderness: There is no abdominal tenderness. Musculoskeletal:         General: Normal range of motion. Cervical back: Normal range of motion. Skin:     General: Skin is warm and dry. Neurological:      Mental Status: He is alert and oriented to person, place, and time. Psychiatric:         Behavior: Behavior normal.         Thought Content: Thought content normal.         Judgment: Judgment normal.       /62   Pulse 74   Resp 16   Ht 6' (1.829 m)   Wt 237 lb (107.5 kg)   BMI 32.14 kg/m²     Assessment:       Diagnosis Orders   1. Cough  XR CHEST STANDARD (2 VW)    Brain Natriuretic Peptide    Basic Metabolic Panel   2. Stage 3b chronic kidney disease ([facility] [address]     3. SOB (shortness of breath)  XR CHEST STANDARD (2 VW)    Brain Natriuretic Peptide    Basic Metabolic Panel             Plan:      Return if symptoms worsen or fail to improve, for in june as scheduled. 1. Cough/SOB- Rx given for labs and chest xray, follow up pending results. Otherwise follow up in June as scheduled for recheck on DM. Orders Placed This Encounter   Procedures    XR CHEST STANDARD (2 VW)     Standing Status:   Future     Number of Occurrences:   1     Standing Expiration Date:   5/11/2023    Brain Natriuretic Peptide     Standing Status:   Future     Number of Occurrences:   1     Standing Expiration Date:   5/11/2023    Basic Metabolic Panel     Standing Status:   Future     Number of Occurrences:   1     Standing Expiration Date:   5/11/2023          Patient given educational materials - see patient instructions. Discussed use, benefit, and side effects of prescribed medications. All patientquestions answered. Pt voiced understanding. Reviewed health maintenance.   Instructedto continue current medications, diet and exercise. Patient agreed with treatmentplan. Follow up as directed.      Electronicallysigned by ERICK Thompson CNP on 5/11/2022 at 11:05 AM

## 2022-05-13 ENCOUNTER — INITIAL CONSULT (OUTPATIENT)
Dept: VASCULAR SURGERY | Age: 77
End: 2022-05-13
Payer: MEDICARE

## 2022-05-13 VITALS
RESPIRATION RATE: 16 BRPM | WEIGHT: 231 LBS | BODY MASS INDEX: 31.29 KG/M2 | HEART RATE: 60 BPM | DIASTOLIC BLOOD PRESSURE: 48 MMHG | SYSTOLIC BLOOD PRESSURE: 73 MMHG | HEIGHT: 72 IN | TEMPERATURE: 97.2 F | OXYGEN SATURATION: 99 %

## 2022-05-13 DIAGNOSIS — I70.213 ATHEROSCLEROSIS OF NATIVE ARTERY OF BOTH LOWER EXTREMITIES WITH INTERMITTENT CLAUDICATION (HCC): ICD-10-CM

## 2022-05-13 PROCEDURE — 99204 OFFICE O/P NEW MOD 45 MIN: CPT | Performed by: SURGERY

## 2022-05-13 ASSESSMENT — ENCOUNTER SYMPTOMS
SHORTNESS OF BREATH: 0
CHEST TIGHTNESS: 0
VOICE CHANGE: 0
TROUBLE SWALLOWING: 0
VOMITING: 0
COLOR CHANGE: 0
EYE PAIN: 0
ABDOMINAL PAIN: 0
ABDOMINAL DISTENTION: 0
COUGH: 0

## 2022-05-13 NOTE — PROGRESS NOTES
[facility]  [address] [facility] [address] [place] [address]  Dept: [phone]     Patient: Elena Isaac  :   MRN: [id number]  DOS: 2022    Referring provider:  NARESH Jordan         HPI:  Elena Isaac is a 69 y.o. male who comes to the office for the first time regarding bilateral lower extremity claudication. He has seen a vascular surgeon at an outside institution. They have recommended no surgical therapy. He is here for a second opinion. The patient denies rest pain and has never had ulceration or gangrene. He has significant coronary problems and has a total of 10 stents as per his report. He is not a surgical candidate for bypass of the coronary arteries. He has never had a stroke TIA or amaurosis fugax. He has high blood pressure and high cholesterol as well as diabetes all of which are treated. He used to smoke in the distant past.  He does have renal insufficiency with a creatinine just above 2. His potassium is stable. He does see Dr. Sherwin Oden for this. He explains that his walking distance is approximately 40 yards before he has to stop. Some days he can walk further than others. He also explains to me that his walking distance has worsened over the last several years. It has been going on for quite some time.   Past Medical History:   Diagnosis Date    Arrhythmia     CAD (coronary artery disease)     GERD (gastroesophageal reflux disease)     Heart attack ([facility] [address]     Hyperlipidemia     Hypertension     Ischemic cardiomyopathy     Osteoarthritis     Type II or unspecified type diabetes mellitus without mention of complication, not stated as uncontrolled      Family History   Problem Relation Age of Onset    Heart Disease Mother     High Blood Pressure Mother     Heart Disease Father     Cancer Sister         breast cancer    Cancer Brother         bladder cancer    Diabetes Maternal Grandmother       Social History     Socioeconomic History    Marital status:      Spouse name: Not on file    Number of children: Not on file    Years of education: Not on file    Highest education level: Not on file   Occupational History    Occupation: retired   Tobacco Use    Smoking status: Former Smoker     Packs/day: 1.00     Years: 40.00     Pack years: 40.00     Types: Cigarettes     Quit date: 1994     Years since quittin.0    Smokeless tobacco: Never Used   Vaping Use    Vaping Use: Never used   Substance and Sexual Activity    Alcohol use: Yes     Comment: rare    Drug use: No    Sexual activity: Not on file   Other Topics Concern    Not on file   Social History Narrative    Not on file     Social Determinants of Health     Financial Resource Strain: Low Risk     Difficulty of Paying Living Expenses: Not hard at all   Food Insecurity: No Food Insecurity    Worried About [address] in the Last Year: Never true    [address] [place] [address] in the Last Year: Never true   Transportation Needs: No Transportation Needs    Lack of Transportation (Medical): No    Lack of Transportation (Non-Medical): No   Physical Activity: Inactive    Days of Exercise per Week: 0 days    Minutes of Exercise per Session: 0 min   Stress: No Stress Concern Present    Feeling of Stress : Only a little   Social Connections: Moderately Integrated    Frequency of Communication with Friends and Family: More than three times a week    Frequency of Social Gatherings with Friends and Family: More than three times a week    Attends Evangelical Services: 1 to 4 times per year   [employer] [facility] [employer] or Organizations: Yes    Attends Club or Organization Meetings: More than 4 times per year    Marital Status:     Intimate Partner Violence:     Fear of Current or Ex-Partner: Not on file    Emotionally Abused: Not on file    Physically Abused: Not on file   Sandra Galeano Sexually Abused: Not on file   Housing Stability: Low Risk     Unable to Pay for Housing in the Last Year: No    Number of Places Lived in the Last Year: 1    Unstable Housing in the Last Year: No      Past Surgical History:   Procedure Laterality Date   [address]      stents 2015    COLONOSCOPY      CORONARY ANGIOPLAST[facility]ENT PLACEMENT  [facility]    DIAGNOSTIC CARDIAC CATH LAB PROCEDURE      JOINT REPLACEMENT      knee right parital    PACEMAKER INSERTION      PTCA      ROTATOR CUFF REPAIR      TONSILLECTOMY        Review of Systems   Constitutional: Negative for activity change, fever and unexpected weight change. HENT: Negative for trouble swallowing and voice change. Eyes: Negative for pain and visual disturbance. Respiratory: Negative for cough, chest tightness and shortness of breath. Cardiovascular: Negative for chest pain and palpitations. Gastrointestinal: Negative for abdominal distention, abdominal pain and vomiting. Endocrine: Negative for cold intolerance and heat intolerance. Genitourinary: Negative for dysuria, flank pain and hematuria. Musculoskeletal: Negative for joint swelling and neck pain. Skin: Negative for color change and rash. Allergic/Immunologic: Negative for immunocompromised state. Neurological: Negative for syncope, speech difficulty, weakness, numbness and headaches. Hematological: Negative for adenopathy. Psychiatric/Behavioral: Negative for behavioral problems and suicidal ideas.        Vitals:    05/13/22 0819 05/13/22 0827 05/13/22 0830   BP: (!) 71/46 Comment: unable to get a reading (!) 73/48   Site: Left Upper Arm Right Upper Arm Left Upper Arm   Position: Sitting Sitting Sitting   Cuff Size: Large Adult Medium Adult Medium Adult   Pulse: 60     Resp: 16     Temp: 97.2 °F (36.2 °C)     TempSrc: Infrared     SpO2: 99%     Weight: 231 lb (104.8 kg)     Height: 6' (1.829 m)  Comment: per patient            Physical Exam  Constitutional:       General: He is not in acute distress. HENT:      Mouth/Throat:      Mouth: Mucous membranes are moist.      Pharynx: Oropharynx is clear. Eyes:      General: No scleral icterus. Extraocular Movements: Extraocular movements intact. Conjunctiva/sclera: Conjunctivae normal.   Neck:      Thyroid: No thyroid mass or thyromegaly. Cardiovascular:      Rate and Rhythm: Normal rate and regular rhythm. Heart sounds: No murmur heard. Comments: He has no carotid bruits on either side. The carotid pulses are palpable. He has palpable radial and ulnar pulses bilaterally. His abdomen is soft nontender nondistended. I cannot palpate an aneurysm. He has palpable femoral pulses bilaterally. There are no popliteal dorsalis pedis or posterior tibial pulses in either lower extremity. His feet are warm and well-perfused without dependent rubor. He has no ulceration. There is no gangrene. He has minimal spider and reticular veins but no large ropey varicosities. He has no significant edema. There are no venous stasis changes. Pulmonary:      Effort: No respiratory distress. Breath sounds: No rales. Abdominal:      General: There is no distension. Palpations: There is no mass. Tenderness: There is no abdominal tenderness. There is no guarding. Musculoskeletal:      Cervical back: No rigidity or tenderness. Lymphadenopathy:      Cervical: No cervical adenopathy. Skin:     Coloration: Skin is not jaundiced. Findings: No rash. Neurological:      General: No focal deficit present. Mental Status: He is alert and oriented to person, place, and time. Cranial Nerves: No cranial nerve deficit. Psychiatric:         Mood and Affect: Mood normal.         Assessment:  1. Atherosclerosis of native artery of both lower extremities with intermittent claudication ([facility] [address]          Plan:   At this point I would not recommend treatment of his lower extremities. He understands that claudication is a lifestyle issue and not a limb threatening issue. He also understands that his renal function is tenuous and any dose of contrast can result in renal insufficiency that worsens or even renal failure requiring dialysis. He understands that ambulation can improve his walking distance if done on a regular basis 5 days a week for half an hour every day. I explained how to do this. He also understands that unless he has rest pain ulceration or gangrene I would not entertain going ahead with any therapy due to his renal failure risk. There is consideration being given to vein work of the lower extremity and I would refrain from any ablation techniques or sclerotherapy as he may need his great and/or short saphenous veins in the future for bypass given his disease process. He understands and agrees and we can see him back to the office at 6 months with repeat studies.     Electronically signed by:  Ada Guillen MD

## 2022-05-20 RX ORDER — NITROGLYCERIN 0.4 MG/1
TABLET SUBLINGUAL
Qty: 25 TABLET | Refills: 2 | Status: SHIPPED | OUTPATIENT
Start: 2022-05-20

## 2022-05-20 NOTE — TELEPHONE ENCOUNTER
----- Message from Faustina Machado sent at 5/20/2022 10:00 AM EDT -----  Subject: Refill Request    QUESTIONS  Name of Medication? nitroGLYCERIN (NITROSTAT) 0.4 MG SL tablet  Patient-reported dosage and instructions? 0.4 mg sl tablet 1 tablet every   5 min up to 3  How many days do you have left? 4  [facility] [address] [facility]  Pharmacy phone number (if available)? [phone]  Additional Information for Provider? Leaving town on sunday and needs   filled as soon as possible.  ---------------------------------------------------------------------------  --------------  Vanesa MENDEZ  What is the best way for the office to contact you? OK to leave message on   voicemail  Preferred Call Back Phone Number? [phone]  ---------------------------------------------------------------------------  --------------  SCRIPT ANSWERS  Relationship to Patient?  Self

## 2022-06-06 ENCOUNTER — OFFICE VISIT (OUTPATIENT)
Dept: PRIMARY CARE CLINIC | Age: 77
End: 2022-06-06
Payer: MEDICARE

## 2022-06-06 VITALS
SYSTOLIC BLOOD PRESSURE: 122 MMHG | WEIGHT: 228.4 LBS | HEIGHT: 72 IN | DIASTOLIC BLOOD PRESSURE: 74 MMHG | OXYGEN SATURATION: 98 % | RESPIRATION RATE: 13 BRPM | HEART RATE: 62 BPM | BODY MASS INDEX: 30.94 KG/M2

## 2022-06-06 DIAGNOSIS — N18.32 STAGE 3B CHRONIC KIDNEY DISEASE (HCC): ICD-10-CM

## 2022-06-06 DIAGNOSIS — E11.22 TYPE 2 DIABETES MELLITUS WITH CHRONIC KIDNEY DISEASE, WITH LONG-TERM CURRENT USE OF INSULIN, UNSPECIFIED CKD STAGE (HCC): ICD-10-CM

## 2022-06-06 DIAGNOSIS — G89.4 CHRONIC PAIN SYNDROME: ICD-10-CM

## 2022-06-06 DIAGNOSIS — E11.9 TYPE 2 DIABETES MELLITUS WITHOUT COMPLICATION, UNSPECIFIED WHETHER LONG TERM INSULIN USE (HCC): Primary | ICD-10-CM

## 2022-06-06 DIAGNOSIS — Z79.4 TYPE 2 DIABETES MELLITUS WITH CHRONIC KIDNEY DISEASE, WITH LONG-TERM CURRENT USE OF INSULIN, UNSPECIFIED CKD STAGE (HCC): ICD-10-CM

## 2022-06-06 LAB — HBA1C MFR BLD: 8.6 %

## 2022-06-06 PROCEDURE — 1123F ACP DISCUSS/DSCN MKR DOCD: CPT | Performed by: NURSE PRACTITIONER

## 2022-06-06 PROCEDURE — 3052F HG A1C>EQUAL 8.0%<EQUAL 9.0%: CPT | Performed by: NURSE PRACTITIONER

## 2022-06-06 PROCEDURE — 83036 HEMOGLOBIN GLYCOSYLATED A1C: CPT | Performed by: NURSE PRACTITIONER

## 2022-06-06 PROCEDURE — 99214 OFFICE O/P EST MOD 30 MIN: CPT | Performed by: NURSE PRACTITIONER

## 2022-06-06 RX ORDER — LORATADINE 10 MG/1
10 TABLET ORAL DAILY
Qty: 30 TABLET | Refills: 3 | Status: SHIPPED | OUTPATIENT
Start: 2022-06-06

## 2022-06-06 RX ORDER — OXYCODONE HYDROCHLORIDE AND ACETAMINOPHEN 5; 325 MG/1; MG/1
1 TABLET ORAL EVERY 8 HOURS PRN
Qty: 90 TABLET | Refills: 0 | Status: SHIPPED | OUTPATIENT
Start: 2022-06-06 | End: 2022-07-15 | Stop reason: SDUPTHER

## 2022-06-06 ASSESSMENT — PATIENT HEALTH QUESTIONNAIRE - PHQ9
SUM OF ALL RESPONSES TO PHQ QUESTIONS 1-9: 0
SUM OF ALL RESPONSES TO PHQ QUESTIONS 1-9: 0
SUM OF ALL RESPONSES TO PHQ9 QUESTIONS 1 & 2: 0
SUM OF ALL RESPONSES TO PHQ QUESTIONS 1-9: 0
SUM OF ALL RESPONSES TO PHQ QUESTIONS 1-9: 0
1. LITTLE INTEREST OR PLEASURE IN DOING THINGS: 0
2. FEELING DOWN, DEPRESSED OR HOPELESS: 0

## 2022-06-06 ASSESSMENT — ENCOUNTER SYMPTOMS
ABDOMINAL PAIN: 0
SHORTNESS OF BREATH: 0
COUGH: 0
BACK PAIN: 0

## 2022-06-06 NOTE — PROGRESS NOTES
[address] PRIMARY CARE  [address]   [address]  [address] [address]  Dept: [phone]  Dept Fax: [phone]    Meri Verma is a 69 y.o. male who presentstoday for his medical conditions/complaints as noted below. Meri Verma is c/o of  Chief Complaint   Patient presents with    Diabetes    3 Month Follow-Up           HPI:     Presents for 3 month recheck on chronic conditions  BP well controlled  Has lost 3lb since LOV    hga1c from 7.6 to 8.6  Frustrated as his sugars are all over and highs/lows do not seem to coordinate with what he is eating  Using meds as prescribed, denies any side effects  FBS today was 128  Willing to adjust ozempic dose    Follows with Dr. Tylor Feliz for cardiology  Occasional chest pain relieved with nitro  Last episode 2 weeks ago    C/o bilateral lower extremity pain and foot pain  Met with vascular and he is not a surgical candidate d/t his kidney function  Requesting refill on percocet, using up to TID prn  Last fill 4/2022    Traveling to [facility] this week and will be driving    Denies any other problems/concerns      Hemoglobin A1C (%)   Date Value   06/06/2022 8.6   02/28/2022 7.6   11/22/2021 7.9             ( goal A1C is < 7)   Microalb/Crt.  Ratio (mcg/mg creat)   Date Value   03/24/2022 Can not be calculated     LDL Cholesterol (mg/dL)   Date Value   08/20/2021 75   05/31/2016 105     LDL Calculated (mg/dL)   Date Value   10/29/2018 162 (A)   06/03/2017 95   04/01/2015 90       (goal LDL is <100)   AST (U/L)   Date Value   08/20/2021 20     ALT (U/L)   Date Value   08/20/2021 24     BUN (mg/dL)   Date Value   05/11/2022 52 (H)     BP Readings from Last 3 Encounters:   06/06/22 122/74   05/13/22 (!) 73/48   05/11/22 130/62          ([id number])    Past Medical History:   Diagnosis Date    Arrhythmia     CAD (coronary artery disease)     GERD (gastroesophageal reflux disease)     Heart attack ([facility] [address]     Hyperlipidemia     Hypertension     Ischemic cardiomyopathy     Osteoarthritis     Type II or unspecified type diabetes mellitus without mention of complication, not stated as uncontrolled       Past Surgical History:   Procedure Laterality Date    APPENDECTOMY      CARDIAC CATHETERIZATION      CARDIAC SURGERY      stents     COLONOSCOPY      CORONARY [facility]GIOPLASTY WITH STENT PLACEMENT  2018    [facility]    DIAGNOSTIC CARDIAC CATH LAB PROCEDURE      JOINT REPLACEMENT      knee right parital    PACEMAKER INSERTION      PTCA      ROTATOR CUFF REPAIR      TONSILLECTOMY         Family History   Problem Relation Age of Onset    Heart Disease Mother     High Blood Pressure Mother     Heart Disease Father     Cancer Sister         breast cancer    Cancer Brother         bladder cancer    Diabetes Maternal Grandmother           Social History     Tobacco Use    Smoking status: Former Smoker     Packs/day: 1.00     Years: 40.00     Pack years: 40.00     Types: Cigarettes     Quit date: 1994     Years since quittin.1    Smokeless tobacco: Never Used   Substance Use Topics    Alcohol use: Yes     Comment: rare      Current Outpatient Medications   Medication Sig Dispense Refill    Semaglutide, 1 MG/DOSE, 4 MG/3ML SOPN Inject 2 mg into the skin once a week 6 mL 0    nitroGLYCERIN (NITROSTAT) 0.4 MG SL tablet DISSOLVE ONE TABLET UNDER THE TONGUE EVERY 5 MINUTES AS NEEDED FOR CHEST PAIN. DO NOT EXCEED A TOTAL OF 3 DOSES IN 15 MINUTES 25 tablet 2    oxyCODONE-acetaminophen (PERCOCET) 5-325 MG per tablet Take 1 tablet by mouth every 8 hours as needed for Pain for up to 30 days.  90 tablet 0    Continuous Blood Gluc  (FREESTYLE JENNIFER 2 READER) ALLI 1 each by Does not apply route continuous 1 each 1    Continuous Blood Gluc Sensor (FREESTYLE JENNIFER 2 SENSOR) MISC 1 each by Does not apply route every 14 days 2 each 3    gabapentin (NEURONTIN) 300 MG capsule TAKE 1 CAPSULE BY MOUTH THREE TIMES DAILY 90 capsule 2    atorvastatin (LIPITOR) 80 MG tablet Take 1 tablet by mouth nightly 90 tablet 2    Potassium 99 MG TABS Take by mouth Daily      insulin detemir (LEVEMIR) 100 UNIT/ML injection vial Inject 30 Units into the skin 2 times daily 6 each 3    metOLazone (ZAROXOLYN) 2.5 MG tablet Take 2.5 mg by mouth See Admin Instructions      ONETOUCH ULTRA strip USE 1 STRIP TO CHECK GLUCOSE TWICE DAILY 100 each 5    empagliflozin (JARDIANCE) 10 MG tablet Take 1 tablet by mouth daily 90 tablet 3    insulin lispro (HUMALOG) 100 UNIT/ML injection vial 150-200  Inject 2 units  201-250 Inject 4 units  251-300  Inject 6 units  301-350  Inject 8 units  351-400 Inject 12 units  401-500 Inject 14 units 1 vial 0    ELIQUIS 5 MG TABS tablet Take 1 tablet by mouth 2 times daily 60 tablet 5    ticagrelor (BRILINTA) 90 MG TABS tablet Take 1 tablet by mouth 2 times daily 60 tablet 1    esomeprazole Magnesium (NEXIUM) 20 MG PACK Take 20 mg by mouth daily      furosemide (LASIX) 40 MG tablet Take 1 tablet by mouth once daily   May take 1 additional tablet daily PRN 2 pound weight gain, short of breath or swelling.  180 tablet 3    diclofenac sodium (VOLTAREN) 1 % GEL Apply 2 g topically 4 times daily as needed for Pain 150 g 0    isosorbide mononitrate (IMDUR) 30 MG extended release tablet Take 30 mg by mouth daily      albuterol sulfate  (90 Base) MCG/ACT inhaler INHALE 2 PUFFS BY INHALATION THREE TIMES A DAY AS NEEDED FOR SHORTNESS OF BREATH      potassium chloride (KLOR-CON M) 20 MEQ extended release tablet TAKE 1  BY MOUTH ONCE DAILY 90 tablet 0    amiodarone (CORDARONE) 200 MG tablet TAKE 1 TABLET BY MOUTH ONCE DAILY 90 tablet 3    albuterol (PROVENTIL) (2.5 MG/3ML) 0.083% nebulizer solution Take 3 mLs by nebulization every 6 hours as needed for Wheezing or Shortness of Breath 5 Package 0    Handicap Placard MISC by Does not apply route Expires 12/2023 1 each 0    magnesium oxide (MAG-OX) 400 MG tablet Take 400 mg by mouth daily      triamcinolone (KENALOG) 0.025 % ointment Apply topically 2 times daily to areas of eczema on lower legs 80 g 2    metoprolol tartrate (LOPRESSOR) 50 MG tablet Take 1 tablet by mouth 2 times daily 60 tablet 5    lisinopril (PRINIVIL;ZESTRIL) 40 MG tablet Take 40 mg by mouth daily Take 1/2 tab daily      Insulin Pen Needle 29G X 12.7MM MISC 1 each by Does not apply route daily 100 each 3     No current facility-administered medications for this visit. Allergies   Allergen Reactions    Coreg [Carvedilol] Other (See Comments)     Low pause     Pravastatin Other (See Comments)     Myalgias     Sertraline Other (See Comments)    Zocor [Simvastatin]      norvasc     Citalopram Anxiety       Health Maintenance   Topic Date Due    Shingles vaccine (1 of 2) 08/20/2022 (Originally 4/17/1996)    Lipids  08/20/2022    Annual Wellness Visit (AWV)  02/08/2023    Depression Screen  03/16/2023    DTaP/Tdap/Td vaccine (3 - Td or Tdap) 08/20/2027    Flu vaccine  Completed    Pneumococcal 65+ years Vaccine  Completed    COVID-19 Vaccine  Completed    Hepatitis C screen  Completed    Hepatitis A vaccine  Aged Out    Hib vaccine  Aged Out    Meningococcal (ACWY) vaccine  Aged Out       Subjective:      Review of Systems   Constitutional: Negative for chills, fatigue and fever. HENT: Negative for congestion. Eyes: Negative for visual disturbance. Respiratory: Negative for cough and shortness of breath. Cardiovascular: Negative for chest pain and palpitations. Gastrointestinal: Negative for abdominal pain. Genitourinary: Negative for difficulty urinating and dysuria. Musculoskeletal: Positive for arthralgias. Negative for back pain. Neurological: Negative for dizziness and headaches. Psychiatric/Behavioral: Negative for self-injury, sleep disturbance and suicidal ideas. The patient is not nervous/anxious.         Objective:     Physical Exam  Vitals and nursing note reviewed. Constitutional:       Appearance: He is well-developed. HENT:      Head: Normocephalic and atraumatic. Eyes:      Pupils: Pupils are equal, round, and reactive to light. Cardiovascular:      Rate and Rhythm: Normal rate and regular rhythm. Heart sounds: Normal heart sounds. Pulmonary:      Effort: Pulmonary effort is normal.      Breath sounds: Normal breath sounds. Abdominal:      General: Bowel sounds are normal.      Palpations: Abdomen is soft. Tenderness: There is no abdominal tenderness. Musculoskeletal:         General: Normal range of motion. Cervical back: Normal range of motion. Skin:     General: Skin is warm and dry. Neurological:      Mental Status: He is alert and oriented to person, place, and time. Psychiatric:         Behavior: Behavior normal.         Thought Content: Thought content normal.         Judgment: Judgment normal.       /74   Pulse 62   Resp 13   Ht 6' (1.829 m)   Wt 228 lb 6.4 oz (103.6 kg)   SpO2 98%   BMI 30.98 kg/m²     Assessment:       Diagnosis Orders   1. Type 2 diabetes mellitus without complication, unspecified whether long term insulin use (HCC)  POCT glycosylated hemoglobin (Hb A1C)   2. Stage 3b chronic kidney disease ([facility] [address]     3. Type 2 diabetes mellitus with chronic kidney disease, with long-term current use of insulin, unspecified CKD stage ([facility] [address]     4. Chronic pain syndrome               Plan:      Return in about 3 months (around 9/6/2022) for Diabetes. 1. DM- Hga1c 8.6. Increase ozempic to 2mg weekly. Continue other meds. Follow up in 3 months for recheck/repeat Hga1c.  2. Chronic pain- Stable. Continue percocet as needed, not a surgical candidate. Follow up in 3 months for recheck.     Orders Placed This Encounter   Procedures    POCT glycosylated hemoglobin (Hb A1C)        Orders Placed This Encounter   Medications    Semaglutide, 1 MG/DOSE, 4 MG/3ML SOPN     Sig: Inject 2 mg into the skin once a week Dispense:  6 mL     Refill:  0       Patient given educational materials - see patient instructions. Discussed use, benefit, and side effects of prescribed medications. All patientquestions answered. Pt voiced understanding. Reviewed health maintenance. Instructedto continue current medications, diet and exercise. Patient agreed with treatmentplan. Follow up as directed.      Electronicallysigned by ERICK Palafox CNP on 6/6/2022 at 10:35 AM

## 2022-06-24 DIAGNOSIS — E11.42 DIABETIC POLYNEUROPATHY ASSOCIATED WITH TYPE 2 DIABETES MELLITUS (HCC): ICD-10-CM

## 2022-06-24 RX ORDER — GABAPENTIN 300 MG/1
CAPSULE ORAL
Qty: 90 CAPSULE | Refills: 2 | Status: SHIPPED | OUTPATIENT
Start: 2022-06-24 | End: 2022-11-14

## 2022-06-24 NOTE — TELEPHONE ENCOUNTER
----- Message from Gurwinder Callejas sent at 6/24/2022  9:14 AM EDT -----  Subject: Refill Request    QUESTIONS  Name of Medication? gabapentin (NEURONTIN) 300 MG capsule  Patient-reported dosage and instructions? 3 times a day  How many days do you have left? 0  [facility] [address]  Pharmacy phone number (if available)? [phone]  ---------------------------------------------------------------------------  --------------  CALL BACK INFO  What is the best way for the office to contact you? OK to leave message on   voicemail  Preferred Call Back Phone Number? [phone]  ---------------------------------------------------------------------------  --------------  SCRIPT ANSWERS  Relationship to Patient?  Self
stated

## 2022-07-15 ENCOUNTER — HOSPITAL ENCOUNTER (OUTPATIENT)
Dept: GENERAL RADIOLOGY | Age: 77
Discharge: HOME | End: 2022-07-17
Payer: MEDICARE

## 2022-07-15 ENCOUNTER — OFFICE VISIT (OUTPATIENT)
Dept: PRIMARY CARE CLINIC | Age: 77
End: 2022-07-15
Payer: MEDICARE

## 2022-07-15 ENCOUNTER — HOSPITAL ENCOUNTER (OUTPATIENT)
Age: 77
Discharge: HOME | End: 2022-07-17
Payer: MEDICARE

## 2022-07-15 VITALS
DIASTOLIC BLOOD PRESSURE: 64 MMHG | WEIGHT: 230 LBS | HEART RATE: 74 BPM | RESPIRATION RATE: 16 BRPM | HEIGHT: 72 IN | SYSTOLIC BLOOD PRESSURE: 122 MMHG | BODY MASS INDEX: 31.15 KG/M2

## 2022-07-15 DIAGNOSIS — M79.642 LEFT HAND PAIN: ICD-10-CM

## 2022-07-15 DIAGNOSIS — R30.0 DYSURIA: Primary | ICD-10-CM

## 2022-07-15 DIAGNOSIS — G89.4 CHRONIC PAIN SYNDROME: ICD-10-CM

## 2022-07-15 DIAGNOSIS — Z51.81 ENCOUNTER FOR THERAPEUTIC DRUG LEVEL MONITORING: ICD-10-CM

## 2022-07-15 DIAGNOSIS — F11.90 CHRONIC, CONTINUOUS USE OF OPIOIDS: ICD-10-CM

## 2022-07-15 LAB
ALCOHOL URINE: NORMAL
AMPHETAMINE SCREEN, URINE: NORMAL
BARBITURATE SCREEN, URINE: NORMAL
BENZODIAZEPINE SCREEN, URINE: NORMAL
BILIRUBIN, POC: NORMAL
BLOOD URINE, POC: NORMAL
BUPRENORPHINE URINE: NORMAL
CLARITY, POC: CLEAR
COCAINE METABOLITE SCREEN URINE: NORMAL
COLOR, POC: YELLOW
FENTANYL SCREEN, URINE: NORMAL
GABAPENTIN SCREEN, URINE: NORMAL
GLUCOSE URINE, POC: >=1000
KETONES, POC: NORMAL
LEUKOCYTE EST, POC: NORMAL
MDMA URINE: NORMAL
METHADONE SCREEN, URINE: NORMAL
METHAMPHETAMINE, URINE: NORMAL
NITRITE, POC: NORMAL
OPIATE SCREEN URINE: NORMAL
OXYCODONE SCREEN URINE: NORMAL
PH, POC: 5.5
PHENCYCLIDINE SCREEN URINE: NORMAL
PROPOXYPHENE SCREEN, URINE: NORMAL
PROTEIN, POC: NORMAL
SPECIFIC GRAVITY, POC: 1.01
SYNTHETIC CANNABINOIDS(K2) SCREEN, URINE: NORMAL
THC SCREEN, URINE: NORMAL
TRAMADOL SCREEN URINE: NORMAL
TRICYCLIC ANTIDEPRESSANTS, UR: NORMAL
UROBILINOGEN, POC: 0.2

## 2022-07-15 PROCEDURE — 99214 OFFICE O/P EST MOD 30 MIN: CPT | Performed by: NURSE PRACTITIONER

## 2022-07-15 PROCEDURE — 80305 DRUG TEST PRSMV DIR OPT OBS: CPT | Performed by: NURSE PRACTITIONER

## 2022-07-15 PROCEDURE — 81002 URINALYSIS NONAUTO W/O SCOPE: CPT | Performed by: NURSE PRACTITIONER

## 2022-07-15 PROCEDURE — 73130 X-RAY EXAM OF HAND: CPT

## 2022-07-15 PROCEDURE — 1123F ACP DISCUSS/DSCN MKR DOCD: CPT | Performed by: NURSE PRACTITIONER

## 2022-07-15 RX ORDER — OXYCODONE HYDROCHLORIDE AND ACETAMINOPHEN 5; 325 MG/1; MG/1
1 TABLET ORAL EVERY 8 HOURS PRN
Qty: 90 TABLET | Refills: 0 | Status: SHIPPED | OUTPATIENT
Start: 2022-07-15 | End: 2022-09-08 | Stop reason: SDUPTHER

## 2022-07-15 ASSESSMENT — PATIENT HEALTH QUESTIONNAIRE - PHQ9
SUM OF ALL RESPONSES TO PHQ9 QUESTIONS 1 & 2: 0
SUM OF ALL RESPONSES TO PHQ QUESTIONS 1-9: 0
SUM OF ALL RESPONSES TO PHQ QUESTIONS 1-9: 0
1. LITTLE INTEREST OR PLEASURE IN DOING THINGS: 0
SUM OF ALL RESPONSES TO PHQ QUESTIONS 1-9: 0
SUM OF ALL RESPONSES TO PHQ QUESTIONS 1-9: 0
2. FEELING DOWN, DEPRESSED OR HOPELESS: 0

## 2022-07-15 ASSESSMENT — ENCOUNTER SYMPTOMS
SHORTNESS OF BREATH: 0
COUGH: 0
BACK PAIN: 1
ABDOMINAL PAIN: 0

## 2022-07-15 NOTE — PROGRESS NOTES
[facility] PRIMARY CARE  [address]  [address] [address]  Dept: [phone]  Dept Fax: [phone]    Soila Santana is a 69 y.o. male who presentstoday for his medical conditions/complaints as noted below. Soila Santana is c/o of  Chief Complaint   Patient presents with    Dysuria    Joint Pain                HPI:     Presents for acute visit with several concerns  BP well controlled  Has gained 2lb since LOV    C/o dysuria and itching since yesterday  Daughter recently changed laundry detergents  Concerned for UTI    Vision changes, following with Dr. Sherwin Cobb with injections  Redness to right eye, had injection yesterday  Next recheck in August    C/o left hand/wrist pain, using tylenol  Denies any injury  Willing to update imaging    Using percocet TID prn for chronic lower back pain  This works well for his pain, last filled 6/2022  Updated urine drug screen  Would like refill today    Denies any other problems/concerns    Hemoglobin A1C (%)   Date Value   06/06/2022 8.6   02/28/2022 7.6   11/22/2021 7.9             ( goal A1C is < 7)   Microalb/Crt.  Ratio (mcg/mg creat)   Date Value   03/24/2022 Can not be calculated     LDL Cholesterol (mg/dL)   Date Value   08/20/2021 75   05/31/2016 105     LDL Calculated (mg/dL)   Date Value   10/29/2018 162 (A)   06/03/2017 95   04/01/2015 90       (goal LDL is <100)   AST (U/L)   Date Value   08/20/2021 20     ALT (U/L)   Date Value   08/20/2021 24     BUN (mg/dL)   Date Value   05/11/2022 52 (H)     BP Readings from Last 3 Encounters:   07/15/22 122/64   06/06/22 122/74   05/13/22 (!) 73/48          ([id number])    Past Medical History:   Diagnosis Date    Arrhythmia     CAD (coronary artery disease)     GERD (gastroesophageal reflux disease)     Heart attack ([facility] [address]     Hyperlipidemia     Hypertension     Ischemic cardiomyopathy     Osteoarthritis     Type II or unspecified type diabetes mellitus without mention of complication, not stated as uncontrolled       Past Surgical History:   Procedure Laterality Date    APPENDECTOMY      CARDIAC CATHETERIZATION      CARDIAC SURGERY      stents     COLONOSCOPY      CORONARY ANGIOPLASTY WITH STENT PLACEMENT  2018    [facility]    DIAGNOSTIC CARDIAC CATH LAB PROCEDURE      JOINT REPLACEMENT      knee right parital    PACEMAKER INSERTION      PTCA      ROTATOR CUFF REPAIR      TONSILLECTOMY         Family History   Problem Relation Age of Onset    Heart Disease Mother     High Blood Pressure Mother     Heart Disease Father     Cancer Sister         breast cancer    Cancer Brother         bladder cancer    Diabetes Maternal Grandmother           Social History     Tobacco Use    Smoking status: Former     Packs/day: 1.00     Years: 40.00     Pack years: 40.00     Types: Cigarettes     Quit date: 1994     Years since quittin.2    Smokeless tobacco: Never   Substance Use Topics    Alcohol use: Yes     Comment: rare      Current Outpatient Medications   Medication Sig Dispense Refill    oxyCODONE-acetaminophen (PERCOCET) 5-325 MG per tablet Take 1 tablet by mouth every 8 hours as needed for Pain for up to 30 days. 90 tablet 0    gabapentin (NEURONTIN) 300 MG capsule TAKE 1 CAPSULE BY MOUTH THREE TIMES DAILY 90 capsule 2    Semaglutide, 1 MG/DOSE, 4 MG/3ML SOPN Inject 2 mg into the skin once a week 6 mL 0    loratadine (CLARITIN) 10 MG tablet Take 1 tablet by mouth daily 30 tablet 3    nitroGLYCERIN (NITROSTAT) 0.4 MG SL tablet DISSOLVE ONE TABLET UNDER THE TONGUE EVERY 5 MINUTES AS NEEDED FOR CHEST PAIN.   DO NOT EXCEED A TOTAL OF 3 DOSES IN 15 MINUTES 25 tablet 2    Continuous Blood Gluc  (FREESTYLE JENNIFER 2 READER) ALLI 1 each by Does not apply route continuous 1 each 1    Continuous Blood Gluc Sensor (FREESTYLE JENNIFER 2 SENSOR) MISC 1 each by Does not apply route every 14 days 2 each 3    atorvastatin (LIPITOR) 80 MG tablet Take 1 tablet by mouth nightly 90 tablet 2    Potassium 99 MG TABS Take by mouth Daily      insulin detemir (LEVEMIR) 100 UNIT/ML injection vial Inject 30 Units into the skin 2 times daily 6 each 3    metOLazone (ZAROXOLYN) 2.5 MG tablet Take 2.5 mg by mouth See Admin Instructions      ONETOUCH ULTRA strip USE 1 STRIP TO CHECK GLUCOSE TWICE DAILY 100 each 5    empagliflozin (JARDIANCE) 10 MG tablet Take 1 tablet by mouth daily 90 tablet 3    insulin lispro (HUMALOG) 100 UNIT/ML injection vial 150-200  Inject 2 units  201-250 Inject 4 units  251-300  Inject 6 units  301-350  Inject 8 units  351-400 Inject 12 units  401-500 Inject 14 units 1 vial 0    ELIQUIS 5 MG TABS tablet Take 1 tablet by mouth 2 times daily 60 tablet 5    ticagrelor (BRILINTA) 90 MG TABS tablet Take 1 tablet by mouth 2 times daily 60 tablet 1    esomeprazole Magnesium (NEXIUM) 20 MG PACK Take 20 mg by mouth daily      furosemide (LASIX) 40 MG tablet Take 1 tablet by mouth once daily   May take 1 additional tablet daily PRN 2 pound weight gain, short of breath or swelling.  180 tablet 3    diclofenac sodium (VOLTAREN) 1 % GEL Apply 2 g topically 4 times daily as needed for Pain 150 g 0    isosorbide mononitrate (IMDUR) 30 MG extended release tablet Take 30 mg by mouth daily      albuterol sulfate  (90 Base) MCG/ACT inhaler INHALE 2 PUFFS BY INHALATION THREE TIMES A DAY AS NEEDED FOR SHORTNESS OF BREATH      potassium chloride (KLOR-CON M) 20 MEQ extended release tablet TAKE 1  BY MOUTH ONCE DAILY 90 tablet 0    amiodarone (CORDARONE) 200 MG tablet TAKE 1 TABLET BY MOUTH ONCE DAILY 90 tablet 3    albuterol (PROVENTIL) (2.5 MG/3ML) 0.083% nebulizer solution Take 3 mLs by nebulization every 6 hours as needed for Wheezing or Shortness of Breath 5 Package 0    Handicap Placard MISC by Does not apply route Expires 12/2023 1 each 0    magnesium oxide (MAG-OX) 400 MG tablet Take 400 mg by mouth daily      triamcinolone (KENALOG) 0.025 % ointment Apply topically 2 times daily to areas of eczema on lower legs 80 g 2    metoprolol tartrate (LOPRESSOR) 50 MG tablet Take 1 tablet by mouth 2 times daily 60 tablet 5    lisinopril (PRINIVIL;ZESTRIL) 40 MG tablet Take 40 mg by mouth daily Take 1/2 tab daily      Insulin Pen Needle 29G X 12.7MM MISC 1 each by Does not apply route daily 100 each 3     No current facility-administered medications for this visit. Allergies   Allergen Reactions    Coreg [Carvedilol] Other (See Comments)     Low pause     Pravastatin Other (See Comments)     Myalgias     Sertraline Other (See Comments)    Zocor [Simvastatin]      norvasc     Citalopram Anxiety       Health Maintenance   Topic Date Due    Shingles vaccine (1 of 2) 08/20/2022 (Originally 4/17/1996)    Lipids  08/20/2022    Flu vaccine (1) 09/01/2022    Annual Wellness Visit (AWV)  02/08/2023    Depression Screen  06/06/2023    DTaP/Tdap/Td vaccine (3 - Td or Tdap) 08/20/2027    Pneumococcal 65+ years Vaccine  Completed    COVID-19 Vaccine  Completed    Hepatitis C screen  Completed    Hepatitis A vaccine  Aged Out    Hib vaccine  Aged Out    Meningococcal (ACWY) vaccine  Aged Out       Subjective:      Review of Systems   Constitutional:  Negative for chills, fatigue and fever. HENT:  Negative for congestion. Eyes:  Negative for visual disturbance. Respiratory:  Negative for cough and shortness of breath. Cardiovascular:  Negative for chest pain and palpitations. Gastrointestinal:  Negative for abdominal pain. Genitourinary:  Positive for dysuria. Negative for difficulty urinating. Musculoskeletal:  Positive for arthralgias and back pain. Neurological:  Negative for dizziness and headaches. Psychiatric/Behavioral:  Negative for self-injury and sleep disturbance. Objective:     Physical Exam  Vitals and nursing note reviewed. Constitutional:       Appearance: He is well-developed. HENT:      Head: Normocephalic and atraumatic.    Eyes:      Pupils: Pupils are equal, round, and reactive to light. Cardiovascular:      Rate and Rhythm: Normal rate and regular rhythm. Heart sounds: Normal heart sounds. Pulmonary:      Effort: Pulmonary effort is normal.      Breath sounds: Normal breath sounds. Abdominal:      General: Bowel sounds are normal.      Palpations: Abdomen is soft. Tenderness: There is no abdominal tenderness. Musculoskeletal:         General: Normal range of motion. Cervical back: Normal range of motion. Skin:     General: Skin is warm and dry. Neurological:      Mental Status: He is alert and oriented to person, place, and time. Psychiatric:         Behavior: Behavior normal.         Thought Content: Thought content normal.         Judgment: Judgment normal.     /64   Pulse 74   Resp 16   Ht 6' (1.829 m)   Wt 230 lb (104.3 kg)   BMI 31.19 kg/m²     Assessment:       Diagnosis Orders   1. Dysuria  POCT Urinalysis no Micro      2. Left hand pain  XR HAND LEFT (MIN 3 VIEWS)      3. Chronic, continuous use of opioids  POCT Rapid Drug Screen      4. Encounter for therapeutic drug level monitoring   POCT Rapid Drug Screen      5. Chronic pain syndrome  oxyCODONE-acetaminophen (PERCOCET) 5-325 MG per tablet                Plan:      Return if symptoms worsen or fail to improve, for in Sept as scheduled. Dysuria- UA negative for infection. Elevated glucose, encouraged diet/exercise. Continue ozempic. Change laundry detergent. Follow up as needed. 2.   Left hand pain- Rx given for xray, follow up pending results. 3.   Chronic pain syndrome- Rx given for percocet, updated urine drug screen. Follow up in Sept as scheduled.     Orders Placed This Encounter   Procedures    XR HAND LEFT (MIN 3 VIEWS)     Standing Status:   Future     Standing Expiration Date:   7/15/2023    POCT Urinalysis no Micro    POCT Rapid Drug Screen        Orders Placed This Encounter   Medications    oxyCODONE-acetaminophen (PERCOCET) 5-325 MG per tablet     Sig: Take 1 tablet by mouth every 8 hours as needed for Pain for up to 30 days. Dispense:  90 tablet     Refill:  0     Reduce doses taken as pain becomes manageable       Patient given educational materials - see patient instructions. Discussed use, benefit, and side effects of prescribed medications. All patientquestions answered. Pt voiced understanding. Reviewed health maintenance. Instructedto continue current medications, diet and exercise. Patient agreed with treatmentplan. Follow up as directed.      Electronicallysigned by ERICK Montemayor CNP on 7/15/2022 at 10:11 AM

## 2022-07-24 ENCOUNTER — APPOINTMENT (OUTPATIENT)
Dept: GENERAL RADIOLOGY | Age: 77
End: 2022-07-24
Payer: MEDICARE

## 2022-07-24 ENCOUNTER — HOSPITAL ENCOUNTER (EMERGENCY)
Age: 77
Discharge: HOME | End: 2022-07-24
Attending: STUDENT IN AN ORGANIZED HEALTH CARE EDUCATION/TRAINING PROGRAM
Payer: MEDICARE

## 2022-07-24 VITALS
SYSTOLIC BLOOD PRESSURE: 102 MMHG | HEART RATE: 66 BPM | RESPIRATION RATE: 17 BRPM | WEIGHT: 222 LBS | DIASTOLIC BLOOD PRESSURE: 71 MMHG | TEMPERATURE: 98.8 F | OXYGEN SATURATION: 99 % | HEIGHT: 72 IN | BODY MASS INDEX: 30.07 KG/M2

## 2022-07-24 DIAGNOSIS — M19.039 WRIST ARTHRITIS: Primary | ICD-10-CM

## 2022-07-24 PROCEDURE — 99283 EMERGENCY DEPT VISIT LOW MDM: CPT

## 2022-07-24 PROCEDURE — 6370000000 HC RX 637 (ALT 250 FOR IP): Performed by: STUDENT IN AN ORGANIZED HEALTH CARE EDUCATION/TRAINING PROGRAM

## 2022-07-24 PROCEDURE — 73110 X-RAY EXAM OF WRIST: CPT

## 2022-07-24 PROCEDURE — 73090 X-RAY EXAM OF FOREARM: CPT

## 2022-07-24 RX ORDER — HYDROCODONE BITARTRATE AND ACETAMINOPHEN 5; 325 MG/1; MG/1
1 TABLET ORAL ONCE
Status: COMPLETED | OUTPATIENT
Start: 2022-07-24 | End: 2022-07-24

## 2022-07-24 RX ADMIN — HYDROCODONE BITARTRATE AND ACETAMINOPHEN 1 TABLET: 5; 325 TABLET ORAL at 12:16

## 2022-07-24 ASSESSMENT — PAIN DESCRIPTION - ORIENTATION: ORIENTATION: LEFT

## 2022-07-24 ASSESSMENT — ENCOUNTER SYMPTOMS
SHORTNESS OF BREATH: 0
SORE THROAT: 0
RHINORRHEA: 0
EYE PAIN: 0
VOMITING: 0
FACIAL SWELLING: 0
COUGH: 0
DIARRHEA: 0
ABDOMINAL PAIN: 0
BACK PAIN: 0
NAUSEA: 0
COLOR CHANGE: 0
EYE REDNESS: 0

## 2022-07-24 ASSESSMENT — PAIN DESCRIPTION - LOCATION: LOCATION: ARM

## 2022-07-24 ASSESSMENT — PAIN SCALES - GENERAL: PAINLEVEL_OUTOF10: 9

## 2022-07-24 ASSESSMENT — PAIN - FUNCTIONAL ASSESSMENT: PAIN_FUNCTIONAL_ASSESSMENT: 0-10

## 2022-07-24 NOTE — ED NOTES
Mode of arrival (squad #, walk in, police, etc) : Walk in        Chief complaint(s): Left arm swelling and pain         Arrival Note (brief scenario, treatment PTA, etc). : Pt presents to the ED with continued left arm swelling and pain since 7/15. Pt was experiencing numbness and tingling in his hand associated with pain for an unknown reason; saw his pcp for an x ray which indicated no trauma only arthritic changes. Pt reports that the pain and swelling in his hand originally subsided but since Friday has worsened again. Pulses in the affected extremity are palpable and capillary refill is less than 3 seconds. Pt is A&OX4 and denies chest pain or SOB today        C= \"Have you ever felt that you should Cut down on your drinking? \"  No  A= \"Have people Annoyed you by criticizing your drinking? \"  No  G= \"Have you ever felt bad or Guilty about your drinking? \"  No  E= \"Have you ever had a drink as an Eye-opener first thing in the morning to steady your nerves or to help a hangover? \"  No      Deferred []      Reason for deferring: N/A    *If yes to two or more: probable alcohol abuse. Mesfin Britt RN  07/24/22 [id number]

## 2022-07-24 NOTE — ED PROVIDER NOTES
EMERGENCY DEPARTMENT ENCOUNTER    Pt Name: Jose Carlson  MRN: [id number]  [place] 1945  Date of evaluation: 7/24/22  CHIEF COMPLAINT       Chief Complaint   Patient presents with    Arm Pain     Left hand and wrist     HISTORY OF PRESENT ILLNESS   HPI  56-year-old male history of GERD, hypertension, hyperlipidemia, diabetes, coronary artery disease presents for evaluation with left wrist pain. Symptoms have been present for about 1 week. Symptoms are moderate. Pain is described as throbbing. No direct antecedent trauma. No overlying skin changes. Pain is worse with range of motion. No fever or chills. Symptoms are constant. Took Percocet at home with no relief. REVIEW OF SYSTEMS     Review of Systems   Constitutional:  Negative for chills and fatigue. HENT:  Negative for facial swelling, nosebleeds, rhinorrhea and sore throat. Eyes:  Negative for pain and redness. Respiratory:  Negative for cough and shortness of breath. Cardiovascular:  Negative for chest pain and leg swelling. Gastrointestinal:  Negative for abdominal pain, diarrhea, nausea and vomiting. Genitourinary:  Negative for flank pain and hematuria. Musculoskeletal:  Positive for arthralgias. Negative for back pain. Skin:  Negative for color change and rash. Neurological:  Negative for dizziness, tremors, facial asymmetry, speech difficulty, weakness and numbness.    PASTMEDICAL HISTORY     Past Medical History:   Diagnosis Date    Arrhythmia     CAD (coronary artery disease)     GERD (gastroesophageal reflux disease)     Heart attack ([facility] [address]     Hyperlipidemia     Hypertension     Ischemic cardiomyopathy     Osteoarthritis     Type II or unspecified type diabetes mellitus without mention of complication, not stated as uncontrolled      Past Problem List  Patient Active Problem List   Diagnosis Code    Lumbago M54.50    Spinal stenosis, lumbar region, without neurogenic claudication M48.061    Degeneration of lumbar or lumbosacral intervertebral disc M51.37    Type 2 diabetes mellitus without complication ([facility]) V47.5    Hyperlipidemia E78.5    Hypertension I10    Lumbar radiculopathy, chronic M54.16    Lumbar spondylosis M47.816    PVOD (pulmonary veno-occlusive disease) ([facility]) 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.0    AICD (automatic cardioverter/defibrillator) present Z95.810    Coronary artery disease involving native coronary artery of native heart without angina pectoris I25.10    Gastritis without bleeding K29.70    Paroxysmal atrial fibrillation ([facility]) I48.0    VT (ventricular tachycardia) ([facility]) I47.2    SOB (shortness of breath) R06.02    Claudication ([facility]) I73.9    Bilateral leg edema R60.0    S/P right coronary artery (RCA) stent placement Z95.5    Presence of stent in LAD coronary artery Z95.5    Mild concentric left ventricular hypertrophy (LVH) I51.7    Dilated aortic root ([facility]) I77.810    Diabetic polyneuropathy associated with type 2 diabetes mellitus ([facility]) E11.42    Chronic diastolic congestive heart failure ([facility]) I50.32    Diabetes mellitus ([facility] [address] E11.9    Long term current use of amiodarone Z79.899    REGINA (obstructive sleep apnea) G47.33    Osteoarthrosis M19.90    Chest pain R07.9    Acute on chronic systolic heart failure ([facility]) I50.23    Encounter for current long term use of antiplatelet drug H27.30    Unstable angina ([facility]) I20.0    Chronic obstructive pulmonary disease, unspecified COPD type ([facility]) J44.9    Chronic renal disease, stage III ([facility] [address] [[id number]] N18.30    Atherosclerosis of native artery of both lower extremities with intermittent claudication ([facility] [address] I70.213    Type 2 diabetes mellitus with chronic kidney disease E11.22     SURGICAL HISTORY       Past Surgical History:   Procedure Laterality Date    APPENDECTOMY      CARDIAC CATHETERIZATION      CARDIAC SURGERY      stents 2015    COLONOSCOPY      CORONARY ANGIOPLASTY WITH STENT PLACEMENT  07/2018    [facility]    DIAGNOSTIC CARDIAC CATH LAB PROCEDURE      JOINT REPLACEMENT      knee right parital    PACEMAKER INSERTION      PTCA      ROTATOR CUFF REPAIR      TONSILLECTOMY       CURRENT MEDICATIONS       Discharge Medication List as of 7/24/2022 11:48 AM        CONTINUE these medications which have NOT CHANGED    Details   oxyCODONE-acetaminophen (PERCOCET) 5-325 MG per tablet Take 1 tablet by mouth every 8 hours as needed for Pain for up to 30 days. , Disp-90 tablet, R-0Normal      gabapentin (NEURONTIN) 300 MG capsule TAKE 1 CAPSULE BY MOUTH THREE TIMES DAILY, Disp-90 capsule, R-2Normal      Semaglutide, 1 MG/DOSE, 4 MG/3ML SOPN Inject 2 mg into the skin once a week, Disp-6 mL, R-0Normal      loratadine (CLARITIN) 10 MG tablet Take 1 tablet by mouth daily, Disp-30 tablet, R-3Normal      nitroGLYCERIN (NITROSTAT) 0.4 MG SL tablet DISSOLVE ONE TABLET UNDER THE TONGUE EVERY 5 MINUTES AS NEEDED FOR CHEST PAIN.   DO NOT EXCEED A TOTAL OF 3 DOSES IN 15 MINUTES, Disp-25 tablet, R-2Normal      Continuous Blood Gluc  (FREESTYLE JENNIFER 2 READER) ALLI 1 each by Does not apply route continuous, Disp-1 each, R-1Normal      Continuous Blood Gluc Sensor (FREESTYLE JENNIFER 2 SENSOR) [facility] 1 each by Does not apply route every 14 days, Disp-2 each, R-3Normal      atorvastatin (LIPITOR) 80 MG tablet Take 1 tablet by mouth nightly, Disp-90 tablet, R-2Normal      Potassium 99 MG TABS Take by mouth DailyHistorical Med      insulin detemir (LEVEMIR) 100 UNIT/ML injection vial Inject 30 Units into the skin 2 times daily, Disp-6 each, R-3NO PRINT      metOLazone (ZAROXOLYN) 2.5 MG tablet Take 2.5 mg by mouth See Admin InstructionsHistorical Med      ONETOUCH ULTRA strip USE 1 STRIP TO CHECK GLUCOSE TWICE DAILY, Disp-100 each, R-5Normal      empagliflozin (JARDIANCE) 10 MG tablet Take 1 tablet by mouth daily, Disp-90 tablet, R-3Normal      insulin lispro (HUMALOG) 100 UNIT/ML injection vial 150-200  Inject 2 units  201-250 Inject 4 units  251-300  Inject 6 units  301-350  Inject 8 units  351-400 Inject 12 units  401-500 Inject 14 units, Disp-1 vial, R-0NO PRINT      ELIQUIS 5 MG TABS tablet Take 1 tablet by mouth 2 times daily, Disp-60 tablet, R-5, DAWPrint      ticagrelor (BRILINTA) 90 MG TABS tablet Take 1 tablet by mouth 2 times daily, Disp-60 tablet, R-1Print      esomeprazole Magnesium (NEXIUM) 20 MG PACK Take 20 mg by mouth dailyHistorical Med      furosemide (LASIX) 40 MG tablet Take 1 tablet by mouth once daily   May take 1 additional tablet daily PRN 2 pound weight gain, short of breath or swelling., Disp-180 tablet, R-3Normal      diclofenac sodium (VOLTAREN) 1 % GEL Apply 2 g topically 4 times daily as needed for Pain, Topical, 4 TIMES DAILY PRN Starting Wed 2/24/2021, Disp-150 g, R-0, Normal      isosorbide mononitrate (IMDUR) 30 MG extended release tablet Take 30 mg by mouth dailyHistorical Med      albuterol sulfate  (90 Base) MCG/ACT inhaler INHALE 2 PUFFS BY INHALATION THREE TIMES A DAY AS NEEDED FOR SHORTNESS OF BREATHHistorical Med      potassium chloride (KLOR-CON M) 20 MEQ extended release tablet TAKE 1  BY MOUTH ONCE DAILY, Disp-90 tablet,R-0Normal      amiodarone (CORDARONE) 200 MG tablet TAKE 1 TABLET BY MOUTH ONCE DAILY, Disp-90 tablet, R-3Please consider 90 day supplies to promote better adherenceNormal      albuterol (PROVENTIL) (2.5 MG/3ML) 0.083% nebulizer solution Take 3 mLs by nebulization every 6 hours as needed for Wheezing or Shortness of Breath, Disp-5 Package, R-0Whatever box is common is fine.   Please refill thru PCP or pulm in futureNormal      Handicap Placard [facility] [place] Starting Mon 12/17/2018, Disp-1 each, R-0, PrintExpires 12/2023      magnesium oxide (MAG-OX) 400 MG tablet Take 400 mg by mouth dailyHistorical Med      triamcinolone (KENALOG) 0.025 % ointment Apply topically 2 times daily to areas of eczema on lower legs, Disp-80 g, R-2, Normal      metoprolol tartrate (LOPRESSOR) 50 MG tablet Take 1 tablet by mouth 2 times daily, Disp-60 tablet, R-5Normal      lisinopril (PRINIVIL;ZESTRIL) 40 MG tablet Take 40 mg by mouth daily Take 1/2 tab dailyHistorical Med      Insulin Pen Needle 29G X 12.7MM MISC DAILY Starting 2016, Until Discontinued, Disp-100 each, R-3, Normal           ALLERGIES     is allergic to coreg [carvedilol], pravastatin, sertraline, zocor [simvastatin], and citalopram.  FAMILY HISTORY     He indicated that his mother is . He indicated that his father is . He indicated that the status of his sister is unknown. He indicated that the status of his brother is unknown. He indicated that the status of his maternal grandmother is unknown. SOCIAL HISTORY       Social History     Tobacco Use    Smoking status: Former     Packs/day: 1.00     Years: 40.00     Pack years: 40.00     Types: Cigarettes     Quit date: 1994     Years since quittin.2    Smokeless tobacco: Never   Vaping Use    Vaping Use: Never used   Substance Use Topics    Alcohol use: Yes     Comment: rare    Drug use: No     PHYSICAL EXAM     INITIAL VITALS: /71   Pulse 66   Temp 98.8 °F (37.1 °C) (Oral)   Resp 17   Ht 6' (1.829 m)   Wt 222 lb (100.7 kg)   SpO2 99%   BMI 30.11 kg/m²    Physical Exam  Constitutional:       Appearance: Normal appearance. HENT:      Head: Normocephalic and atraumatic. Nose: Nose normal.   Eyes:      Extraocular Movements: Extraocular movements intact. Pupils: Pupils are equal, round, and reactive to light. Cardiovascular:      Rate and Rhythm: Normal rate and regular rhythm. Pulmonary:      Effort: Pulmonary effort is normal. No respiratory distress. Breath sounds: Normal breath sounds. Abdominal:      General: Abdomen is flat. There is no distension. Palpations: Abdomen is soft. There is no mass. Musculoskeletal:         General: No swelling. Normal range of motion. Cervical back: Normal range of motion. No rigidity.       Comments: Discomfort with palpation of left wrist with associated swelling, IMPRESSION      1. Wrist arthritis          DISPOSITION/PLAN   DISPOSITION Decision To Discharge 07/24/2022 11:47:45 AM      PATIENT REFERRED TO:  Brendan Carson MD  [place] [id number], [address] [place] [address] [phone]    Call in 1 day    DISCHARGE MEDICATIONS:  Discharge Medication List as of 7/24/2022 11:48 AM        The care is provided during an unprecedented national emergency due to the novel coronavirus, COVID 19.   MD Salvador Renee MD  07/24/22 2434

## 2022-07-25 ENCOUNTER — OFFICE VISIT (OUTPATIENT)
Dept: PRIMARY CARE CLINIC | Age: 77
End: 2022-07-25
Payer: MEDICARE

## 2022-07-25 ENCOUNTER — HOSPITAL ENCOUNTER (OUTPATIENT)
Age: 77
Setting detail: SPECIMEN
Discharge: HOME | End: 2022-07-25

## 2022-07-25 VITALS
HEART RATE: 70 BPM | BODY MASS INDEX: 30.18 KG/M2 | OXYGEN SATURATION: 99 % | DIASTOLIC BLOOD PRESSURE: 84 MMHG | WEIGHT: 222.8 LBS | SYSTOLIC BLOOD PRESSURE: 136 MMHG | RESPIRATION RATE: 14 BRPM | HEIGHT: 72 IN

## 2022-07-25 DIAGNOSIS — M25.50 ARTHRALGIA, UNSPECIFIED JOINT: Primary | ICD-10-CM

## 2022-07-25 DIAGNOSIS — M25.50 ARTHRALGIA, UNSPECIFIED JOINT: ICD-10-CM

## 2022-07-25 PROBLEM — G89.29 CHRONIC PAIN: Status: ACTIVE | Noted: 2022-07-25

## 2022-07-25 PROBLEM — M19.049 LOCALIZED, PRIMARY OSTEOARTHRITIS OF HAND: Status: ACTIVE | Noted: 2022-07-25

## 2022-07-25 LAB — URIC ACID: 10.5 MG/DL (ref 3.4–7)

## 2022-07-25 PROCEDURE — 1123F ACP DISCUSS/DSCN MKR DOCD: CPT | Performed by: NURSE PRACTITIONER

## 2022-07-25 PROCEDURE — 99214 OFFICE O/P EST MOD 30 MIN: CPT | Performed by: NURSE PRACTITIONER

## 2022-07-25 RX ORDER — PREDNISONE 50 MG/1
50 TABLET ORAL DAILY
Qty: 5 TABLET | Refills: 0 | Status: SHIPPED | OUTPATIENT
Start: 2022-07-25 | End: 2022-07-30

## 2022-07-25 RX ORDER — ONDANSETRON 8 MG/1
8 TABLET, ORALLY DISINTEGRATING ORAL ONCE
Status: COMPLETED | OUTPATIENT
Start: 2022-07-25 | End: 2022-07-25

## 2022-07-25 RX ORDER — ATORVASTATIN CALCIUM 80 MG/1
80 TABLET, FILM COATED ORAL NIGHTLY
Qty: 90 TABLET | Refills: 2 | Status: SHIPPED | OUTPATIENT
Start: 2022-07-25

## 2022-07-25 RX ORDER — ONDANSETRON 4 MG/1
4 TABLET, FILM COATED ORAL EVERY 8 HOURS PRN
Qty: 30 TABLET | Refills: 0 | Status: SHIPPED | OUTPATIENT
Start: 2022-07-25

## 2022-07-25 RX ADMIN — ONDANSETRON 8 MG: 8 TABLET, ORALLY DISINTEGRATING ORAL at 10:32

## 2022-07-25 SDOH — ECONOMIC STABILITY: FOOD INSECURITY: WITHIN THE PAST 12 MONTHS, YOU WORRIED THAT YOUR FOOD WOULD RUN OUT BEFORE YOU GOT MONEY TO BUY MORE.: NEVER TRUE

## 2022-07-25 SDOH — ECONOMIC STABILITY: FOOD INSECURITY: WITHIN THE PAST 12 MONTHS, THE FOOD YOU BOUGHT JUST DIDN'T LAST AND YOU DIDN'T HAVE MONEY TO GET MORE.: NEVER TRUE

## 2022-07-25 ASSESSMENT — ENCOUNTER SYMPTOMS
SHORTNESS OF BREATH: 0
COUGH: 0
VOMITING: 1
NAUSEA: 1
BACK PAIN: 0
ABDOMINAL PAIN: 1

## 2022-07-25 ASSESSMENT — SOCIAL DETERMINANTS OF HEALTH (SDOH): HOW HARD IS IT FOR YOU TO PAY FOR THE VERY BASICS LIKE FOOD, HOUSING, MEDICAL CARE, AND HEATING?: NOT HARD AT ALL

## 2022-07-25 NOTE — PROGRESS NOTES
Date    APPENDECTOMY      CARDIAC CATHETERIZATION      CARDIAC SURGERY      stents     COLONOSCOPY      CORONARY ANGIOPLASTY WITH STENT PLACEMENT  2018    [facility]    DIAGNOSTIC CARDIAC CATH LAB PROCEDURE      JOINT REPLACEMENT      knee right parital    PACEMAKER INSERTION      PTCA      ROTATOR CUFF REPAIR      TONSILLECTOMY         Family History   Problem Relation Age of Onset    Heart Disease Mother     High Blood Pressure Mother     Heart Disease Father     Cancer Sister         breast cancer    Cancer Brother         bladder cancer    Diabetes Maternal Grandmother           Social History     Tobacco Use    Smoking status: Former     Packs/day: 1.00     Years: 40.00     Pack years: 40.00     Types: Cigarettes     Quit date: 1994     Years since quittin.2    Smokeless tobacco: Never   Substance Use Topics    Alcohol use: Yes     Comment: rare      Current Outpatient Medications   Medication Sig Dispense Refill    atorvastatin (LIPITOR) 80 MG tablet Take 1 tablet by mouth nightly 90 tablet 2    predniSONE (DELTASONE) 50 MG tablet Take 1 tablet by mouth in the morning for 5 days. 5 tablet 0    ondansetron (ZOFRAN) 4 MG tablet Take 1 tablet by mouth every 8 hours as needed for Nausea 30 tablet 0    oxyCODONE-acetaminophen (PERCOCET) 5-325 MG per tablet Take 1 tablet by mouth every 8 hours as needed for Pain for up to 30 days. 90 tablet 0    gabapentin (NEURONTIN) 300 MG capsule TAKE 1 CAPSULE BY MOUTH THREE TIMES DAILY 90 capsule 2    Semaglutide, 1 MG/DOSE, 4 MG/3ML SOPN Inject 2 mg into the skin once a week 6 mL 0    loratadine (CLARITIN) 10 MG tablet Take 1 tablet by mouth daily 30 tablet 3    nitroGLYCERIN (NITROSTAT) 0.4 MG SL tablet DISSOLVE ONE TABLET UNDER THE TONGUE EVERY 5 MINUTES AS NEEDED FOR CHEST PAIN.   DO NOT EXCEED A TOTAL OF 3 DOSES IN 15 MINUTES 25 tablet 2    Continuous Blood Gluc  (FREESTYLE JENNIFER 2 READER) ALLI 1 each by Does not apply route continuous 1 each 1 Continuous Blood Gluc Sensor (FREESTYLE JENNIFER 2 SENSOR) MISC 1 each by Does not apply route every 14 days 2 each 3    Potassium 99 MG TABS Take by mouth Daily      insulin detemir (LEVEMIR) 100 UNIT/ML injection vial Inject 30 Units into the skin 2 times daily 6 each 3    metOLazone (ZAROXOLYN) 2.5 MG tablet Take 2.5 mg by mouth See Admin Instructions      ONETOUCH ULTRA strip USE 1 STRIP TO CHECK GLUCOSE TWICE DAILY 100 each 5    empagliflozin (JARDIANCE) 10 MG tablet Take 1 tablet by mouth daily 90 tablet 3    insulin lispro (HUMALOG) 100 UNIT/ML injection vial 150-200  Inject 2 units  201-250 Inject 4 units  251-300  Inject 6 units  301-350  Inject 8 units  351-400 Inject 12 units  401-500 Inject 14 units 1 vial 0    ELIQUIS 5 MG TABS tablet Take 1 tablet by mouth 2 times daily 60 tablet 5    ticagrelor (BRILINTA) 90 MG TABS tablet Take 1 tablet by mouth 2 times daily 60 tablet 1    esomeprazole Magnesium (NEXIUM) 20 MG PACK Take 20 mg by mouth daily      furosemide (LASIX) 40 MG tablet Take 1 tablet by mouth once daily   May take 1 additional tablet daily PRN 2 pound weight gain, short of breath or swelling.  180 tablet 3    diclofenac sodium (VOLTAREN) 1 % GEL Apply 2 g topically 4 times daily as needed for Pain 150 g 0    isosorbide mononitrate (IMDUR) 30 MG extended release tablet Take 30 mg by mouth daily      albuterol sulfate  (90 Base) MCG/ACT inhaler INHALE 2 PUFFS BY INHALATION THREE TIMES A DAY AS NEEDED FOR SHORTNESS OF BREATH      potassium chloride (KLOR-CON M) 20 MEQ extended release tablet TAKE 1  BY MOUTH ONCE DAILY 90 tablet 0    amiodarone (CORDARONE) 200 MG tablet TAKE 1 TABLET BY MOUTH ONCE DAILY 90 tablet 3    albuterol (PROVENTIL) (2.5 MG/3ML) 0.083% nebulizer solution Take 3 mLs by nebulization every 6 hours as needed for Wheezing or Shortness of Breath 5 Package 0    Handicap Placard MISC by Does not apply route Expires 12/2023 1 each 0    magnesium oxide (MAG-OX) 400 MG tablet Take 400 mg by mouth daily      triamcinolone (KENALOG) 0.025 % ointment Apply topically 2 times daily to areas of eczema on lower legs 80 g 2    metoprolol tartrate (LOPRESSOR) 50 MG tablet Take 1 tablet by mouth 2 times daily 60 tablet 5    lisinopril (PRINIVIL;ZESTRIL) 40 MG tablet Take 40 mg by mouth daily Take 1/2 tab daily      Insulin Pen Needle 29G X 12.7MM MISC 1 each by Does not apply route daily 100 each 3     Current Facility-Administered Medications   Medication Dose Route Frequency Provider Last Rate Last Admin    ondansetron (ZOFRAN-ODT) disintegrating tablet 8 mg  8 mg Oral Once Estle Cullens, APRN - CNP         Allergies   Allergen Reactions    Coreg [Carvedilol] Other (See Comments)     Low pause     Pravastatin Other (See Comments)     Myalgias     Sertraline Other (See Comments)    Zocor [Simvastatin]      norvasc     Citalopram Anxiety       Health Maintenance   Topic Date Due    Lipids  08/20/2022    Shingles vaccine (1 of 2) 08/20/2022 (Originally 4/17/1995)    Flu vaccine (1) 09/01/2022    Annual Wellness Visit (AWV)  02/08/2023    Depression Screen  07/15/2023    DTaP/Tdap/Td vaccine (3 - Td or Tdap) 08/20/2027    Pneumococcal 65+ years Vaccine  Completed    COVID-19 Vaccine  Completed    Hepatitis C screen  Completed    Hepatitis A vaccine  Aged Out    Hib vaccine  Aged Out    Meningococcal (ACWY) vaccine  Aged Out       Subjective:      Review of Systems   Constitutional:  Negative for chills and fever. HENT:  Negative for congestion. Eyes:  Negative for visual disturbance. Respiratory:  Negative for cough and shortness of breath. Cardiovascular:  Negative for chest pain and palpitations. Gastrointestinal:  Positive for abdominal pain, nausea and vomiting. Genitourinary:  Negative for difficulty urinating and dysuria. Musculoskeletal:  Positive for arthralgias. Negative for back pain. Neurological:  Negative for dizziness and headaches.    Psychiatric/Behavioral:  Positive for sleep disturbance. Negative for self-injury and suicidal ideas. The patient is nervous/anxious. Objective:     Physical Exam  Vitals and nursing note reviewed. Constitutional:       Appearance: He is well-developed. HENT:      Head: Normocephalic and atraumatic. Eyes:      Pupils: Pupils are equal, round, and reactive to light. Cardiovascular:      Rate and Rhythm: Normal rate and regular rhythm. Heart sounds: Normal heart sounds. Pulmonary:      Effort: Pulmonary effort is normal.      Breath sounds: Normal breath sounds. Abdominal:      General: Bowel sounds are normal.      Palpations: Abdomen is soft. Tenderness: There is no abdominal tenderness. Musculoskeletal:         General: Normal range of motion. Cervical back: Normal range of motion. Skin:     General: Skin is warm and dry. Neurological:      Mental Status: He is alert and oriented to person, place, and time. Psychiatric:         Behavior: Behavior normal.         Thought Content: Thought content normal.         Judgment: Judgment normal.     /84   Pulse 70   Resp 14   Ht 6' (1.829 m)   Wt 222 lb 12.8 oz (101.1 kg)   SpO2 99%   BMI 30.22 kg/m²     Assessment:       Diagnosis Orders   1. Arthralgia, unspecified joint  Uric Acid                Plan:      Return if symptoms worsen or fail to improve. Left arm pain- Update uric acid level. Percocet not helping pain. Rx given for prednisone, discussed may raise BS levels. Follow with ortho as planned. ER for changes/worsening of pain. Nausea/vomiting- Rx given for zofran, given 8mg ODT in office. Follow up as needed/ER for worsening.     Orders Placed This Encounter   Procedures    Uric Acid     Standing Status:   Future     Standing Expiration Date:   7/25/2023        Orders Placed This Encounter   Medications    atorvastatin (LIPITOR) 80 MG tablet     Sig: Take 1 tablet by mouth nightly     Dispense:  90 tablet     Refill:  2    ondansetron (ZOFRAN-ODT) disintegrating tablet 8 mg    predniSONE (DELTASONE) 50 MG tablet     Sig: Take 1 tablet by mouth in the morning for 5 days. Dispense:  5 tablet     Refill:  0    ondansetron (ZOFRAN) 4 MG tablet     Sig: Take 1 tablet by mouth every 8 hours as needed for Nausea     Dispense:  30 tablet     Refill:  0       Patient given educational materials - see patient instructions. Discussed use, benefit, and side effects of prescribed medications. All patientquestions answered. Pt voiced understanding. Reviewed health maintenance. Instructedto continue current medications, diet and exercise. Patient agreed with treatmentplan. Follow up as directed.      Electronicallysigned by ERICK Cuevas CNP on 7/25/2022 at 10:31 AM

## 2022-07-28 ENCOUNTER — OFFICE VISIT (OUTPATIENT)
Dept: ORTHOPEDIC SURGERY | Age: 77
End: 2022-07-28
Payer: MEDICARE

## 2022-07-28 VITALS — HEIGHT: 72 IN | WEIGHT: 222 LBS | RESPIRATION RATE: 14 BRPM | BODY MASS INDEX: 30.07 KG/M2

## 2022-07-28 DIAGNOSIS — E79.0 HYPERURICEMIA: ICD-10-CM

## 2022-07-28 DIAGNOSIS — M10.9 ARTHRITIS OF LEFT WRIST DUE TO GOUT: Primary | ICD-10-CM

## 2022-07-28 PROCEDURE — 1123F ACP DISCUSS/DSCN MKR DOCD: CPT | Performed by: PHYSICIAN ASSISTANT

## 2022-07-28 PROCEDURE — 99214 OFFICE O/P EST MOD 30 MIN: CPT | Performed by: PHYSICIAN ASSISTANT

## 2022-07-28 NOTE — PROGRESS NOTES
321 [facility], 20 [facility] [address] [place], [address] [place] [address], [address] [facility]           Dept Phone: [phone]           Dept Fax:  [phone] 320 [facility]           Selvin Reaves          Dept Phone: [phone]           Dept Fax:  [phone]      Chief Compliant:  Chief Complaint   Patient presents with    Hand Pain     Left        History of Present Illness: This is a 69 y.o. male who presents to the clinic today for evaluation of 2-week history of left wrist pain. Patient reports that the pain started gradually approximately 2 weeks ago however this past Friday evening he started to have increased pain and swelling by that evening patient had significant swelling from the fingers all the way up to the mid forearm associate with severe pain and difficulty moving the hand or wrist.  He was evaluated in the ED on 7/24/2022 due to continued pain and at that point had x-rays that were negative for any acute fracture but did demonstrate diffuse osteoarthritis. He had follow-up the following day with primary care provider FRANCIE Alexander CNP who ordered a uric acid level which was actually quite elevated and patient was started on prednisone for likely gouty flare and recommended follow-up with us in orthopedics. Patient presents today stating that after being on the prednisone for 3 days his pain is \"85% improved\". Does continue to note some pain over the dorsal wrist but overall is doing much better. No known history of gout. Patient does note some redness of the dorsal aspect of the hand and wrist but denies any fever or chills.     Past History:    Current Outpatient Medications:     atorvastatin (LIPITOR) 80 MG tablet, Take 1 tablet by mouth nightly, Disp: 90 tablet, Rfl: 2    predniSONE (DELTASONE) 50 MG tablet, Take 1 tablet by mouth in the morning for 5 days. , Disp: 5 tablet, Rfl: 0    ondansetron (ZOFRAN) 4 MG tablet, Take 1 tablet by mouth every 8 hours as needed for Nausea, Disp: 30 tablet, Rfl: 0    oxyCODONE-acetaminophen (PERCOCET) 5-325 MG per tablet, Take 1 tablet by mouth every 8 hours as needed for Pain for up to 30 days. , Disp: 90 tablet, Rfl: 0    gabapentin (NEURONTIN) 300 MG capsule, TAKE 1 CAPSULE BY MOUTH THREE TIMES DAILY, Disp: 90 capsule, Rfl: 2    Semaglutide, 1 MG/DOSE, 4 MG/3ML SOPN, Inject 2 mg into the skin once a week, Disp: 6 mL, Rfl: 0    loratadine (CLARITIN) 10 MG tablet, Take 1 tablet by mouth daily, Disp: 30 tablet, Rfl: 3    nitroGLYCERIN (NITROSTAT) 0.4 MG SL tablet, DISSOLVE ONE TABLET UNDER THE TONGUE EVERY 5 MINUTES AS NEEDED FOR CHEST PAIN.   DO NOT EXCEED A TOTAL OF 3 DOSES IN 15 MINUTES, Disp: 25 tablet, Rfl: 2    Continuous Blood Gluc  (FREESTYLE JENNIFER 2 READER) ALLI, 1 each by Does not apply route continuous, Disp: 1 each, Rfl: 1    Continuous Blood Gluc Sensor (FREESTYLE JENNIFER 2 SENSOR) [facility], 1 each by Does not apply route every 14 days, Disp: 2 each, Rfl: 3    Potassium 99 MG TABS, Take by mouth Daily, Disp: , Rfl:     insulin detemir (LEVEMIR) 100 UNIT/ML injection vial, Inject 30 Units into the skin 2 times daily, Disp: 6 each, Rfl: 3    metOLazone (ZAROXOLYN) 2.5 MG tablet, Take 2.5 mg by mouth See Admin Instructions, Disp: , Rfl:     ONETOUCH ULTRA strip, USE 1 STRIP TO CHECK GLUCOSE TWICE DAILY, Disp: 100 each, Rfl: 5    empagliflozin (JARDIANCE) 10 MG tablet, Take 1 tablet by mouth daily, Disp: 90 tablet, Rfl: 3    insulin lispro (HUMALOG) 100 UNIT/ML injection vial, 150-200  Inject 2 units 201-250 Inject 4 units 251-300  Inject 6 units 301-350  Inject 8 units 351-400 Inject 12 units 401-500 Inject 14 units, Disp: 1 vial, Rfl: 0    ELIQUIS 5 MG TABS tablet, Take 1 tablet by mouth 2 times daily, Disp: 60 tablet, Rfl: 5    ticagrelor (BRILINTA) 90 MG TABS tablet, Take 1 tablet by mouth 2 times daily, Disp: 60 tablet, Rfl: 1    esomeprazole Magnesium (NEXIUM) 20 MG PACK, Take 20 mg by mouth daily, Disp: , Rfl:     furosemide (LASIX) 40 MG tablet, Take 1 tablet by mouth once daily   May take 1 additional tablet daily PRN 2 pound weight gain, short of breath or swelling., Disp: 180 tablet, Rfl: 3    diclofenac sodium (VOLTAREN) 1 % GEL, Apply 2 g topically 4 times daily as needed for Pain, Disp: 150 g, Rfl: 0    isosorbide mononitrate (IMDUR) 30 MG extended release tablet, Take 30 mg by mouth daily, Disp: , Rfl:     albuterol sulfate  (90 Base) MCG/ACT inhaler, INHALE 2 PUFFS BY INHALATION THREE TIMES A DAY AS NEEDED FOR SHORTNESS OF BREATH, Disp: , Rfl:     potassium chloride (KLOR-CON M) 20 MEQ extended release tablet, TAKE 1  BY MOUTH ONCE DAILY, Disp: 90 tablet, Rfl: 0    amiodarone (CORDARONE) 200 MG tablet, TAKE 1 TABLET BY MOUTH ONCE DAILY, Disp: 90 tablet, Rfl: 3    albuterol (PROVENTIL) (2.5 MG/3ML) 0.083% nebulizer solution, Take 3 mLs by nebulization every 6 hours as needed for Wheezing or Shortness of Breath, Disp: 5 Package, Rfl: 0    Handicap Placard MISC, by Does not apply route Expires 12/2023, Disp: 1 each, Rfl: 0    magnesium oxide (MAG-OX) 400 MG tablet, Take 400 mg by mouth daily, Disp: , Rfl:     triamcinolone (KENALOG) 0.025 % ointment, Apply topically 2 times daily to areas of eczema on lower legs, Disp: 80 g, Rfl: 2    metoprolol tartrate (LOPRESSOR) 50 MG tablet, Take 1 tablet by mouth 2 times daily, Disp: 60 tablet, Rfl: 5    lisinopril (PRINIVIL;ZESTRIL) 40 MG tablet, Take 40 mg by mouth daily Take 1/2 tab daily, Disp: , Rfl:     Insulin Pen Needle 29G X 12.7MM MISC, 1 each by Does not apply route daily, Disp: 100 each, Rfl: 3  Allergies   Allergen Reactions    Coreg [Carvedilol] Other (See Comments)     Low pause     Pravastatin Other (See Comments)     Myalgias     Sertraline Other (See Comments)    Zocor [Simvastatin]      norvasc     Citalopram Anxiety     Social History Socioeconomic History    Marital status:       Spouse name: Not on file    Number of children: Not on file    Years of education: Not on file    Highest education level: Not on file   Occupational History    Occupation: retired   Tobacco Use    Smoking status: Former     Packs/day: 1.00     Years: 40.00     Pack years: 40.00     Types: Cigarettes     Quit date: 1994     Years since quittin.2    Smokeless tobacco: Never   Vaping Use    Vaping Use: Never used   Substance and Sexual Activity    Alcohol use: Yes     Comment: rare    Drug use: No    Sexual activity: Not on file   Other Topics Concern    Not on file   Social History Narrative    Not on file     Social Determinants of Health     Financial Resource Strain: Low Risk     Difficulty of Paying Living Expenses: Not hard at all   Food Insecurity: No Food Insecurity    Worried About Running Out of Food in the Last Year: Never true    Ran Out of Food in the Last Year: Never true   Transportation Needs: Not on file   Physical Activity: Not on file   Stress: Not on file   Social Connections: Not on file   Intimate Partner Violence: Not on file   Housing Stability: Not on file     Past Medical History:   Diagnosis Date    Arrhythmia     CAD (coronary artery disease)     GERD (gastroesophageal reflux disease)     Heart attack ([facility] [address]     Hyperlipidemia     Hypertension     Ischemic cardiomyopathy     Osteoarthritis     Type II or unspecified type diabetes mellitus without mention of complication, not stated as uncontrolled      Past Surgical History:   Procedure Laterality Date    APPENDECTOMY      CARDIAC CATHETERIZATION      CARDIAC SURGERY      stents     COLONOSCOPY      CORONARY ANGIOPLASTY WITH STENT PLACEMENT  2018    [facility]    DIAGNOSTIC CARDIAC CATH LAB PROCEDURE      JOINT REPLACEMENT      knee right parital    PACEMAKER INSERTION      PTCA      ROTATOR CUFF REPAIR      TONSILLECTOMY       Family History   Problem Relation Age of Onset    Heart Disease Mother     High Blood Pressure Mother     Heart Disease Father     Cancer Sister         breast cancer    Cancer Brother         bladder cancer    Diabetes Maternal Grandmother         Review of Systems   Constitutional: Negative for fever, chills, sweats. Eyes: Negative for changes in vision, or pain. HENT: Negative for ear ache, epistaxis, or sore throat. Respiratory/Cardio: Negative for Chest pain, palpitations, SOB, or cough. Gastrointestinal: Negative for abdominal pain, N/V/D. Genitourinary: Negative for dysuria, frequency, urgency, or hematuria. Neurological: Negative for headache, numbness, or weakness. Integumentary: Negative for rash, itching, laceration, or abrasion. Musculoskeletal: Positive for Hand Pain (Left)       Physical Exam:  Constitutional: Patient is oriented to person, place, and time. Patient appears well-developed and well nourished. HENT: Negative otherwise noted  Head: Normocephalic and Atraumatic  Nose: Normal  Eyes: Conjunctivae and EOM are normal  Neck: Normal range of motion Neck supple. Respiratory/Cardio: Effort normal. No respiratory distress. Musculoskeletal:    left Hand/Wrist    Tenderness:  Mild tenderness to the extensor tendons of the hands diffusely. No tenderness of the first dorsal compartment or first [facility] joint. No tenderness to the radial styloid, DRUJ or ulnar styloid. No tenderness to the TFCC, anatomic snuffbox or scaphoid tubercle. Inspection:  Diffuse erythema about the hand with slight warmth. No evidence of abrasions or lacerations no ecchymosis. Range of Motion:      Pronation: 90     Supination: 90     Flexion: 70     Extension: 40     Hand Joints: Full range of motion of the digits does have some slight stiffness with terminal flexion.        Muscle Strength      : 4/5     Wrist Extension: 5/5     Wrist Flexion: 5/5       Sensation: normal   Phalen's Sign: Negative   Tinel's Sign (Medial Nerve): Negative Finkelstein's Test: Negative     Compartments are soft and compressible sensation intact to light touch in all distal dermatomes  Neurological: Patient is alert and oriented to person, place, and time. Normal strenght. No sensory deficit. Skin: Skin is warm and dry  Psychiatric: Behavior is normal. Thought content normal.  Nursing note and vitals reviewed. Labs and Imaging:     XR taken today:  No results found. No new x-rays taken today however previous imaging available for independent review  4 views of the left hand taken on 7/24/2022 demonstrate normal anatomic alignment. Ulnar neutral variance. Patient with moderate degenerative changes of the first [facility] joint. Some mild radiocarpal DJD otherwise no evidence of acute fracture or other acute osseous abnormality. No orders of the defined types were placed in this encounter. Assessment and Plan:  1. 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 Hyperuricemia          PLAN:  This is a 69 y.o. male who presents to the clinic today for evaluation of acute left wrist pain x2 weeks which has significantly improved after initiation of prednisone on 7/25/2022. History and examination is most concerning for gout flare of the left wrist given patient's elevated uric acid level of 10 on recent lab on 7/25/2022 and significant response to prednisone. I do question the possibility of extensor tendinitis as well but lean more towards gouty flareup. Patient with evidence of degenerative changes of the thumb and wrist but no significant pain with movement of these degenerative joints. No evidence of acute fracture or obvious infectious etiology. 1.  Given patient's elevated uric acid level he would likely benefit from initiation of a gout preventative medication i.e. allopurinol or other urate lowering medication however would recommend allowing this current flareup to resolve before initiation of this.   2.  Patient is educated I be happy to start him on allopurinol but for long-term management would defer to primary care provider. 3.  Recommend follow-up in 2 weeks for reevaluation if patient continues to do well with minimal pain we will start him on short course of allopurinol for prevention. 4.  Patient may call or return sooner for any questions or concerns otherwise we will see him back in 2 weeks. Please note that this chart was generated using voice recognition Dragon dictation software. Although every effort was made to ensure the accuracy of this automated transcription, some errors in transcription may have occurred.

## 2022-08-09 ENCOUNTER — TELEPHONE (OUTPATIENT)
Dept: PHARMACY | Facility: CLINIC | Age: 77
End: 2022-08-09

## 2022-08-09 NOTE — LETTER
[place]  [address], [address]        Marcin Banerjee   [address] [address]           08/09/22     Dear Marcin Banerjee,    We tried to reach you recently regarding your atorvastatin 80mg daily, but were unable to reach you on the telephone. If you are no longer taking or taking differently, please call us at the number below so that we can discuss this and update your medication profile. It appears that this medication has not been filled at proper times. We are worried you might be missing doses or not taking it as directed. It is important that you take your medications regularly and try not to miss a single dose.     Some ways to help you remember to take and refill your medications are to:  · Use a pill box, set an alarm, and/or keep your medication near something that you do every day  · Ask your pharmacy if they participate in [facility]", a program where you can  all of your medications on the same day  · Ask your pharmacy if you can be set up with automatic refill, where they will automatically refill your prescription when it is due and let you know it's ready to     Sincerely,   Walter Mckeon, PharmD, [facility]  Department, toll free: [phone], option 1

## 2022-08-09 NOTE — TELEPHONE ENCOUNTER
[facility] CLINICAL PHARMACY: ADHERENCE REVIEW  Identified care gap per [employer]: fills at [facility]: Statin adherence    Last Visit: 6/6/22, 7/15/22 & 7/25/22 acute visits    Patient also appears to be prescribed: ACEi, DM     Patient not found in Outcomes [facility]    [address] Records claims through 7/17/22: none    Per chart, historical lisinopril listing from 2017 - appears may get from [facility]    BP Readings from Last 3 Encounters:   07/25/22 136/84   07/24/22 102/71   07/15/22 122/64     Estimated Creatinine Clearance: 36 mL/min (A) (based on SCr of 2.13 mg/dL (H)). DIABETES ADHERENCE    Insurance Records claims through 7/17/22: none YTD - appears may get from [facility]    Lab Results   Component Value Date    LABA1C 8.6 06/06/2022    LABA1C 7.6 02/28/2022    LABA1C 7.9 11/22/2021     [address] Records claims through 7/17/22 (YTD [facility] = Filled only once; Potential Fail Date: 8/20/22 ):   Atorvastatin 80mg last filled on 3/28/22 for 90 day supply. Next refill due: 6/26/22    Per [employer] Portal and Reconciled Dispense Report: last filled on 7/25/22 for 90 day supply. Per [facility]: last picked up on 7/25/22 for 90 day supply. 2 refills remaining.      Lab Results   Component Value Date    CHOL 140 08/20/2021    TRIG 148 08/20/2021    HDL 35 (L) 08/20/2021    LDLCHOLESTEROL 75 08/20/2021    LDLCALC 162 (A) 10/29/2018     ALT   Date Value Ref Range Status   08/20/2021 24 5 - 41 U/L Final     AST   Date Value Ref Range Status   08/20/2021 20 <40 U/L Final     The 10-year ASCVD risk score (Giovana Garcia, et al., 2013) is: 53.8%    Values used to calculate the score:      Age: 69 years      Sex: Male      Is Non- : No      Diabetic: Yes      Tobacco smoker: No      Systolic Blood Pressure: 325 mmHg      Is BP treated: Yes      HDL Cholesterol: 35 mg/dL      Total Cholesterol: 140 mg/dL     PLAN  The following are interventions that have been identified:   - Patient overdue refilling atorvastatin 80mg daily (due 6/26, refilled 7/25) and active on home medication list.     Attempting to reach patient to review. Left message asking for return call. and Letter sent to patient.     Future Appointments   Date Time Provider Adeline Odonnell   8/10/2022  8:30 AM Tracee Patel, [place] [facility]   9/26/2022 11:15 AM ERICK Burgos - CNP [facility]   9/27/2022  1:30 PM Mike Palacios MD [facility] [place] None   11/18/2022 10:00 AM Janki Ledesma MD [facility] January Mckeon, PharmD, [facility]  Department, toll free: [phone], option 1     =======================================================   For Pharmacy Admin Tracking Only    Gap Closed?: Yes   Time Spent (min): 10

## 2022-08-10 ENCOUNTER — OFFICE VISIT (OUTPATIENT)
Dept: ORTHOPEDIC SURGERY | Age: 77
End: 2022-08-10
Payer: MEDICARE

## 2022-08-10 VITALS — RESPIRATION RATE: 14 BRPM | WEIGHT: 222 LBS | HEIGHT: 72 IN | BODY MASS INDEX: 30.07 KG/M2

## 2022-08-10 DIAGNOSIS — M10.9 ARTHRITIS OF LEFT WRIST DUE TO GOUT: Primary | ICD-10-CM

## 2022-08-10 PROCEDURE — 1123F ACP DISCUSS/DSCN MKR DOCD: CPT | Performed by: PHYSICIAN ASSISTANT

## 2022-08-10 PROCEDURE — 99213 OFFICE O/P EST LOW 20 MIN: CPT | Performed by: PHYSICIAN ASSISTANT

## 2022-08-10 RX ORDER — ALLOPURINOL 100 MG/1
100 TABLET ORAL DAILY
Qty: 30 TABLET | Refills: 0 | Status: SHIPPED | OUTPATIENT
Start: 2022-08-10 | End: 2022-09-07

## 2022-08-10 NOTE — PROGRESS NOTES
[address] [facility], [address] [place], [address] [facility], [address] [facility]           Dept Phone: [phone]           Dept Fax:  [address]           Selvin Reaves          Dept Phone: [phone]           Dept Fax:  [phone]      Chief Compliant:  Chief Complaint   Patient presents with    Wrist Pain     Left        History of Present Illness:  Indiana Rodriguez returns today. This is a 69 y.o. male who presents to the clinic today for follow up of Left wrist pain. Patient was initially seen on 7/28/2022. Prior to that appointment patient reported about 2 weeks of left wrist pain. Was evaluated by PCP who did order uric acid level which was increased and left wrist pain was consistent with gouty arthropathy versus extensor tendinitis. Patient was started on a Deltasone by PCP and states his pain had improved significantly. He returns today for reevaluation. We did discuss possibility of initiating gout prophylaxis however recommended waiting till current flareup had resolved. Patient returns today stating his pain has significant improved. Does note some mild disc      Review of Systems   Constitutional: Negative for fever, chills, sweats, recent illness, or recent injury. Neurological: Negative for headaches, numbness, or weakness. Integumentary: Negative for rash, itching, ecchymosis, abrasions, or laceration. Musculoskeletal: Positive for Wrist Pain (Left)       Physical Exam:  Constitutional: Patient is oriented to person, place, and time. Patient appears well-developed and well nourished. Musculoskeletal:    left Hand/Wrist    Tenderness:  Minimal tenderness to the dorsal wrist.  No tenderness of the first dorsal compartment or first [facility] joint. No tenderness to the radial styloid, DRUJ or ulnar styloid.   No tenderness to the TFCC, anatomic snuffbox or scaphoid tubercle. Range of Motion:      Pronation: 90     Supination: 90     Flexion: 70     Extension: 60     Hand Joints: Normal       Muscle Strength      : 4/5     Wrist Extension: 5/5     Wrist Flexion: 5/5       Sensation: normal   Phalen's Sign: Negative   Tinel's Sign (Medial Nerve): Negative   Finkelstein's Test: Negative     No significant warmth to palpation    Neurological: Patient is alert and oriented to person, place, and time. Normal strenght. No sensory deficit. Skin: Skin is warm and dry  Psychiatric: Behavior is normal. Thought content normal.  Nursing note and vitals reviewed. Labs and Imaging:     XR taken today:  No results found. No new x-rays taken today however previous imaging available for independent review  4 views of the left hand taken on 7/24/2022 demonstrate normal anatomic alignment. Ulnar neutral variance. Patient with moderate degenerative changes of the first [facility] joint. Some mild radiocarpal DJD otherwise no evidence of acute fracture or other acute osseous abnormality. Assessment and Plan:  1. 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 male who presents to the clinic today for follow up of left wrist pain likely credited to gouty flare given significant elevation in uric acid. The flareup did seem to improve significantly with Deltasone as prescribed by PCP. 1.  Had discussion with patient that he likely benefit from gouty prophylaxis as he is not currently taking anything. He states he does not have an appointment with primary care provider until 9/26/2022. In the meantime I will start him on allopurinol but would defer long-term management to PCP. 2.  All question concerns were addressed today  3. Allopurinol is electronically sent to patient pharmacy instructed to utilize NSAIDs if needed for flareups or increased pain.     Please note that this chart was generated using voice recognition Dragon dictation software. Although every effort was made to ensure the accuracy of this automated transcription, some errors in transcription may have occurred.

## 2022-08-25 ENCOUNTER — HOSPITAL ENCOUNTER (OUTPATIENT)
Age: 77
Discharge: HOME | End: 2022-08-25
Payer: MEDICARE

## 2022-08-25 DIAGNOSIS — I25.5 ISCHEMIC CARDIOMYOPATHY: ICD-10-CM

## 2022-08-25 DIAGNOSIS — E11.22 TYPE 2 DIABETES MELLITUS WITH STAGE 3B CHRONIC KIDNEY DISEASE, UNSPECIFIED WHETHER LONG TERM INSULIN USE (HCC): ICD-10-CM

## 2022-08-25 DIAGNOSIS — I10 ESSENTIAL HYPERTENSION: ICD-10-CM

## 2022-08-25 DIAGNOSIS — N18.32 TYPE 2 DIABETES MELLITUS WITH STAGE 3B CHRONIC KIDNEY DISEASE, UNSPECIFIED WHETHER LONG TERM INSULIN USE (HCC): ICD-10-CM

## 2022-08-25 DIAGNOSIS — N18.32 STAGE 3B CHRONIC KIDNEY DISEASE (HCC): ICD-10-CM

## 2022-08-25 LAB
ABSOLUTE EOS #: 0 K/UL (ref 0–0.4)
ABSOLUTE LYMPH #: 3.53 K/UL (ref 1–4.8)
ABSOLUTE MONO #: 1.07 K/UL (ref 0.1–1.3)
ANION GAP SERPL CALCULATED.3IONS-SCNC: 11 MMOL/L (ref 9–17)
BASOPHILS # BLD: 0 % (ref 0–2)
BASOPHILS ABSOLUTE: 0 K/UL (ref 0–0.2)
BUN BLDV-MCNC: 38 MG/DL (ref 8–23)
CALCIUM SERPL-MCNC: 9.2 MG/DL (ref 8.6–10.4)
CHLORIDE BLD-SCNC: 101 MMOL/L (ref 98–107)
CO2: 29 MMOL/L (ref 20–31)
CREAT SERPL-MCNC: 1.53 MG/DL (ref 0.7–1.2)
CREATININE URINE: 28.5 MG/DL (ref 39–259)
EOSINOPHILS RELATIVE PERCENT: 0 % (ref 0–4)
GFR AFRICAN AMERICAN: 54 ML/MIN
GFR NON-AFRICAN AMERICAN: 44 ML/MIN
GFR SERPL CREATININE-BSD FRML MDRD: ABNORMAL ML/MIN/{1.73_M2}
GLUCOSE BLD-MCNC: 125 MG/DL (ref 70–99)
HCT VFR BLD CALC: 34.7 % (ref 41–53)
HEMOGLOBIN: 10.7 G/DL (ref 13.5–17.5)
LYMPHOCYTES # BLD: 43 % (ref 24–44)
MAGNESIUM: 2.6 MG/DL (ref 1.6–2.6)
MCH RBC QN AUTO: 22.5 PG (ref 26–34)
MCHC RBC AUTO-ENTMCNC: 30.9 G/DL (ref 31–37)
MCV RBC AUTO: 72.8 FL (ref 80–100)
MONOCYTES # BLD: 13 % (ref 1–7)
MORPHOLOGY: ABNORMAL
PDW BLD-RTO: 19.3 % (ref 11.5–14.9)
PHOSPHORUS: 4 MG/DL (ref 2.5–4.5)
PLATELET # BLD: 369 K/UL (ref 150–450)
PMV BLD AUTO: 7.4 FL (ref 6–12)
POTASSIUM SERPL-SCNC: 3.8 MMOL/L (ref 3.7–5.3)
RBC # BLD: 4.76 M/UL (ref 4.5–5.9)
SEG NEUTROPHILS: 44 % (ref 36–66)
SEGMENTED NEUTROPHILS ABSOLUTE COUNT: 3.6 K/UL (ref 1.3–9.1)
SODIUM BLD-SCNC: 141 MMOL/L (ref 135–144)
TOTAL PROTEIN, URINE: <4 MG/DL
VITAMIN D 25-HYDROXY: 37.5 NG/ML
WBC # BLD: 8.2 K/UL (ref 3.5–11)

## 2022-08-25 PROCEDURE — 83970 ASSAY OF PARATHORMONE: CPT

## 2022-08-25 PROCEDURE — 84100 ASSAY OF PHOSPHORUS: CPT

## 2022-08-25 PROCEDURE — 80048 BASIC METABOLIC PNL TOTAL CA: CPT

## 2022-08-25 PROCEDURE — 82306 VITAMIN D 25 HYDROXY: CPT

## 2022-08-25 PROCEDURE — 82570 ASSAY OF URINE CREATININE: CPT

## 2022-08-25 PROCEDURE — 84156 ASSAY OF PROTEIN URINE: CPT

## 2022-08-25 PROCEDURE — 36415 COLL VENOUS BLD VENIPUNCTURE: CPT

## 2022-08-25 PROCEDURE — 85025 COMPLETE CBC W/AUTO DIFF WBC: CPT

## 2022-08-25 PROCEDURE — 83735 ASSAY OF MAGNESIUM: CPT

## 2022-08-26 LAB — PTH INTACT: 69.48 PG/ML (ref 15–65)

## 2022-08-29 ENCOUNTER — TELEPHONE (OUTPATIENT)
Dept: PRIMARY CARE CLINIC | Age: 77
End: 2022-08-29

## 2022-08-29 NOTE — TELEPHONE ENCOUNTER
Librado Helton called and his Ozempic sample 1 mg per injection and he stated that he was on a higher dose of 2 mg per shot and he would like to know what to do. Should he double up he wants to know.

## 2022-09-04 DIAGNOSIS — M10.9 ARTHRITIS OF LEFT WRIST DUE TO GOUT: ICD-10-CM

## 2022-09-07 RX ORDER — ALLOPURINOL 100 MG/1
100 TABLET ORAL DAILY
Qty: 66 TABLET | Refills: 0 | Status: SHIPPED | OUTPATIENT
Start: 2022-09-07 | End: 2022-09-26 | Stop reason: SDUPTHER

## 2022-09-08 DIAGNOSIS — G89.4 CHRONIC PAIN SYNDROME: ICD-10-CM

## 2022-09-08 RX ORDER — OXYCODONE HYDROCHLORIDE AND ACETAMINOPHEN 5; 325 MG/1; MG/1
1 TABLET ORAL EVERY 8 HOURS PRN
Qty: 90 TABLET | Refills: 0 | Status: SHIPPED | OUTPATIENT
Start: 2022-09-08 | End: 2022-10-08

## 2022-09-13 RX ORDER — BLOOD SUGAR DIAGNOSTIC
STRIP MISCELLANEOUS
Qty: 100 EACH | Refills: 0 | Status: SHIPPED | OUTPATIENT
Start: 2022-09-13

## 2022-09-26 ENCOUNTER — OFFICE VISIT (OUTPATIENT)
Dept: PRIMARY CARE CLINIC | Age: 77
End: 2022-09-26
Payer: MEDICARE

## 2022-09-26 VITALS
HEIGHT: 72 IN | HEART RATE: 62 BPM | RESPIRATION RATE: 16 BRPM | BODY MASS INDEX: 29.8 KG/M2 | OXYGEN SATURATION: 99 % | SYSTOLIC BLOOD PRESSURE: 126 MMHG | DIASTOLIC BLOOD PRESSURE: 76 MMHG | WEIGHT: 220 LBS

## 2022-09-26 DIAGNOSIS — N18.32 STAGE 3B CHRONIC KIDNEY DISEASE (HCC): ICD-10-CM

## 2022-09-26 DIAGNOSIS — Z79.4 TYPE 2 DIABETES MELLITUS WITH CHRONIC KIDNEY DISEASE, WITH LONG-TERM CURRENT USE OF INSULIN, UNSPECIFIED CKD STAGE (HCC): Primary | ICD-10-CM

## 2022-09-26 DIAGNOSIS — R23.9 SKIN CHANGE: ICD-10-CM

## 2022-09-26 DIAGNOSIS — J44.9 CHRONIC OBSTRUCTIVE PULMONARY DISEASE, UNSPECIFIED COPD TYPE (HCC): ICD-10-CM

## 2022-09-26 DIAGNOSIS — M10.9 ARTHRITIS OF LEFT WRIST DUE TO GOUT: ICD-10-CM

## 2022-09-26 DIAGNOSIS — I50.23 ACUTE ON CHRONIC SYSTOLIC HEART FAILURE (HCC): ICD-10-CM

## 2022-09-26 DIAGNOSIS — G89.4 CHRONIC PAIN SYNDROME: ICD-10-CM

## 2022-09-26 DIAGNOSIS — I47.20 VT (VENTRICULAR TACHYCARDIA): ICD-10-CM

## 2022-09-26 DIAGNOSIS — E11.9 TYPE 2 DIABETES MELLITUS WITHOUT COMPLICATION, UNSPECIFIED WHETHER LONG TERM INSULIN USE (HCC): ICD-10-CM

## 2022-09-26 DIAGNOSIS — I77.810 DILATED AORTIC ROOT (HCC): ICD-10-CM

## 2022-09-26 DIAGNOSIS — I48.0 PAROXYSMAL ATRIAL FIBRILLATION (HCC): ICD-10-CM

## 2022-09-26 DIAGNOSIS — Z79.4 TYPE 2 DIABETES MELLITUS WITH OTHER SPECIFIED COMPLICATION, WITH LONG-TERM CURRENT USE OF INSULIN (HCC): ICD-10-CM

## 2022-09-26 DIAGNOSIS — E11.69 TYPE 2 DIABETES MELLITUS WITH OTHER SPECIFIED COMPLICATION, WITH LONG-TERM CURRENT USE OF INSULIN (HCC): ICD-10-CM

## 2022-09-26 DIAGNOSIS — E11.42 DIABETIC POLYNEUROPATHY ASSOCIATED WITH TYPE 2 DIABETES MELLITUS (HCC): ICD-10-CM

## 2022-09-26 DIAGNOSIS — I27.0 PVOD (PULMONARY VENO-OCCLUSIVE DISEASE) (HCC): ICD-10-CM

## 2022-09-26 DIAGNOSIS — I73.9 CLAUDICATION (HCC): ICD-10-CM

## 2022-09-26 DIAGNOSIS — I73.9 PAD (PERIPHERAL ARTERY DISEASE) (HCC): ICD-10-CM

## 2022-09-26 DIAGNOSIS — I20.0 UNSTABLE ANGINA (HCC): ICD-10-CM

## 2022-09-26 DIAGNOSIS — I50.32 CHRONIC DIASTOLIC CONGESTIVE HEART FAILURE (HCC): ICD-10-CM

## 2022-09-26 DIAGNOSIS — E11.22 TYPE 2 DIABETES MELLITUS WITH CHRONIC KIDNEY DISEASE, WITH LONG-TERM CURRENT USE OF INSULIN, UNSPECIFIED CKD STAGE (HCC): Primary | ICD-10-CM

## 2022-09-26 LAB — HBA1C MFR BLD: 7.2 %

## 2022-09-26 PROCEDURE — 1123F ACP DISCUSS/DSCN MKR DOCD: CPT | Performed by: NURSE PRACTITIONER

## 2022-09-26 PROCEDURE — 99214 OFFICE O/P EST MOD 30 MIN: CPT | Performed by: NURSE PRACTITIONER

## 2022-09-26 PROCEDURE — 83036 HEMOGLOBIN GLYCOSYLATED A1C: CPT | Performed by: NURSE PRACTITIONER

## 2022-09-26 PROCEDURE — 3051F HG A1C>EQUAL 7.0%<8.0%: CPT | Performed by: NURSE PRACTITIONER

## 2022-09-26 RX ORDER — ALLOPURINOL 100 MG/1
100 TABLET ORAL DAILY
Qty: 90 TABLET | Refills: 3 | Status: SHIPPED | OUTPATIENT
Start: 2022-09-26

## 2022-09-26 SDOH — ECONOMIC STABILITY: FOOD INSECURITY: WITHIN THE PAST 12 MONTHS, YOU WORRIED THAT YOUR FOOD WOULD RUN OUT BEFORE YOU GOT MONEY TO BUY MORE.: NEVER TRUE

## 2022-09-26 SDOH — ECONOMIC STABILITY: FOOD INSECURITY: WITHIN THE PAST 12 MONTHS, THE FOOD YOU BOUGHT JUST DIDN'T LAST AND YOU DIDN'T HAVE MONEY TO GET MORE.: NEVER TRUE

## 2022-09-26 ASSESSMENT — ENCOUNTER SYMPTOMS
BACK PAIN: 0
COUGH: 0
ABDOMINAL PAIN: 0
SHORTNESS OF BREATH: 0

## 2022-09-26 ASSESSMENT — SOCIAL DETERMINANTS OF HEALTH (SDOH): HOW HARD IS IT FOR YOU TO PAY FOR THE VERY BASICS LIKE FOOD, HOUSING, MEDICAL CARE, AND HEATING?: NOT HARD AT ALL

## 2022-09-26 ASSESSMENT — PATIENT HEALTH QUESTIONNAIRE - PHQ9
2. FEELING DOWN, DEPRESSED OR HOPELESS: 1
1. LITTLE INTEREST OR PLEASURE IN DOING THINGS: 1
SUM OF ALL RESPONSES TO PHQ9 QUESTIONS 1 & 2: 2
SUM OF ALL RESPONSES TO PHQ QUESTIONS 1-9: 2

## 2022-09-26 NOTE — PROGRESS NOTES
[address] PRIMARY CARE  [address]   [address]  [address] [address]  Dept: [phone]  Dept Fax: [phone]    Demetrius Horta is a 69 y.o. male who presentstoday for his medical conditions/complaints as noted below. Demetrius Code is c/o of  Chief Complaint   Patient presents with    Diabetes     Discuss Ozempic, states last Ozempic was 2mg and a different color box; discuss allopurinol            HPI:     Presents for 3 month recheck on chronic conditions  BP well controlled  Has lost 2lb since LOV    Hga1c from 8.6 to 7.2  Congratulated patient  Has been using ozempic 0.5mg weekly  Tolerating well  Will continue med at current dose  Has appt with retinal specialists upcoming    Follows with nephrology- recently completed labs  Follows with Dr. Giuliana Stein for cardiology, states defibriliator will need changed in December  [facility]    Started on allopurinol per ortho, asking PCP to manage rx    Denies any other problems/concerns        Hemoglobin A1C (%)   Date Value   09/26/2022 7.2   06/06/2022 8.6   02/28/2022 7.6             ( goal A1C is < 7)   Microalb/Crt.  Ratio (mcg/mg creat)   Date Value   03/24/2022 Can not be calculated     LDL Cholesterol (mg/dL)   Date Value   08/20/2021 75   05/31/2016 105     LDL Calculated (mg/dL)   Date Value   10/29/2018 162 (A)   06/03/2017 95   04/01/2015 90       (goal LDL is <100)   AST (U/L)   Date Value   08/20/2021 20     ALT (U/L)   Date Value   08/20/2021 24     BUN (mg/dL)   Date Value   08/25/2022 38 (H)     BP Readings from Last 3 Encounters:   09/26/22 126/76   07/25/22 136/84   07/24/22 102/71          ([id number])    Past Medical History:   Diagnosis Date    Arrhythmia     CAD (coronary artery disease)     GERD (gastroesophageal reflux disease)     Heart attack ([address]     Hyperlipidemia     Hypertension     Ischemic cardiomyopathy     Osteoarthritis     Type II or unspecified type diabetes mellitus without mention of complication, not stated as uncontrolled       Past Surgical History:   Procedure Laterality Date    APPENDECTOMY      CARDIAC CATHETERIZATION      CARDIAC SURGERY      stents     COLONOSCOPY      CORONARY ANGIOPLASTY WITH STENT PLACEMENT  2018    [facility]    DIAGNOSTIC CARDIAC CATH LAB PROCEDURE      JOINT REPLACEMENT      knee right parital    PACEMAKER INSERTION      PTCA      ROTATOR CUFF REPAIR      TONSILLECTOMY         Family History   Problem Relation Age of Onset    Heart Disease Mother     High Blood Pressure Mother     Heart Disease Father     Cancer Sister         breast cancer    Cancer Brother         bladder cancer    Diabetes Maternal Grandmother           Social History     Tobacco Use    Smoking status: Former     Packs/day: 1.00     Years: 40.00     Pack years: 40.00     Types: Cigarettes     Quit date: 1994     Years since quittin.4    Smokeless tobacco: Never   Substance Use Topics    Alcohol use: Yes     Comment: rare      Current Outpatient Medications   Medication Sig Dispense Refill    allopurinol (ZYLOPRIM) 100 MG tablet Take 1 tablet by mouth daily 90 tablet 3    ONETOUCH ULTRA strip USE 1 STRIP TO CHECK GLUCOSE TWICE DAILY 100 each 0    oxyCODONE-acetaminophen (PERCOCET) 5-325 MG per tablet Take 1 tablet by mouth every 8 hours as needed for Pain for up to 30 days. 90 tablet 0    atorvastatin (LIPITOR) 80 MG tablet Take 1 tablet by mouth nightly 90 tablet 2    ondansetron (ZOFRAN) 4 MG tablet Take 1 tablet by mouth every 8 hours as needed for Nausea 30 tablet 0    gabapentin (NEURONTIN) 300 MG capsule TAKE 1 CAPSULE BY MOUTH THREE TIMES DAILY 90 capsule 2    Semaglutide, 1 MG/DOSE, 4 MG/3ML SOPN Inject 2 mg into the skin once a week 6 mL 0    loratadine (CLARITIN) 10 MG tablet Take 1 tablet by mouth daily 30 tablet 3    nitroGLYCERIN (NITROSTAT) 0.4 MG SL tablet DISSOLVE ONE TABLET UNDER THE TONGUE EVERY 5 MINUTES AS NEEDED FOR CHEST PAIN.   DO NOT EXCEED A TOTAL OF 3 DOSES IN 15 MINUTES 25 tablet 2    Continuous Blood Gluc  (FREESTYLE JENNIFER 2 READER) ALLI 1 each by Does not apply route continuous 1 each 1    Continuous Blood Gluc Sensor (FREESTYLE JENNIFER 2 SENSOR) MISC 1 each by Does not apply route every 14 days 2 each 3    Potassium 99 MG TABS Take by mouth Daily      insulin detemir (LEVEMIR) 100 UNIT/ML injection vial Inject 30 Units into the skin 2 times daily 6 each 3    metOLazone (ZAROXOLYN) 2.5 MG tablet Take 2.5 mg by mouth See Admin Instructions      empagliflozin (JARDIANCE) 10 MG tablet Take 1 tablet by mouth daily 90 tablet 3    insulin lispro (HUMALOG) 100 UNIT/ML injection vial 150-200  Inject 2 units  201-250 Inject 4 units  251-300  Inject 6 units  301-350  Inject 8 units  351-400 Inject 12 units  401-500 Inject 14 units 1 vial 0    ELIQUIS 5 MG TABS tablet Take 1 tablet by mouth 2 times daily 60 tablet 5    ticagrelor (BRILINTA) 90 MG TABS tablet Take 1 tablet by mouth 2 times daily 60 tablet 1    esomeprazole Magnesium (NEXIUM) 20 MG PACK Take 20 mg by mouth daily      furosemide (LASIX) 40 MG tablet Take 1 tablet by mouth once daily   May take 1 additional tablet daily PRN 2 pound weight gain, short of breath or swelling.  180 tablet 3    diclofenac sodium (VOLTAREN) 1 % GEL Apply 2 g topically 4 times daily as needed for Pain 150 g 0    isosorbide mononitrate (IMDUR) 30 MG extended release tablet Take 30 mg by mouth daily      albuterol sulfate  (90 Base) MCG/ACT inhaler INHALE 2 PUFFS BY INHALATION THREE TIMES A DAY AS NEEDED FOR SHORTNESS OF BREATH      potassium chloride (KLOR-CON M) 20 MEQ extended release tablet TAKE 1  BY MOUTH ONCE DAILY 90 tablet 0    amiodarone (CORDARONE) 200 MG tablet TAKE 1 TABLET BY MOUTH ONCE DAILY 90 tablet 3    albuterol (PROVENTIL) (2.5 MG/3ML) 0.083% nebulizer solution Take 3 mLs by nebulization every 6 hours as needed for Wheezing or Shortness of Breath 5 Package 0    Handicap Placard MISC by Does not apply route Expires 12/2023 1 each 0    magnesium oxide (MAG-OX) 400 MG tablet Take 400 mg by mouth daily      triamcinolone (KENALOG) 0.025 % ointment Apply topically 2 times daily to areas of eczema on lower legs 80 g 2    metoprolol tartrate (LOPRESSOR) 50 MG tablet Take 1 tablet by mouth 2 times daily 60 tablet 5    lisinopril (PRINIVIL;ZESTRIL) 40 MG tablet Take 40 mg by mouth daily Take 1/2 tab daily      Insulin Pen Needle 29G X 12.7MM MISC 1 each by Does not apply route daily 100 each 3     No current facility-administered medications for this visit. Allergies   Allergen Reactions    Coreg [Carvedilol] Other (See Comments)     Low pause     Pravastatin Other (See Comments)     Myalgias     Sertraline Other (See Comments)    Zocor [Simvastatin]      norvasc     Citalopram Anxiety       Health Maintenance   Topic Date Due    Shingles vaccine (1 of 2) Never done    Flu vaccine (1) 08/01/2022    Lipids  08/20/2022    Annual Wellness Visit (AWV)  02/08/2023    Depression Screen  09/26/2023    DTaP/Tdap/Td vaccine (3 - Td or Tdap) 08/20/2027    Pneumococcal 65+ years Vaccine  Completed    COVID-19 Vaccine  Completed    Hepatitis C screen  Completed    Hepatitis A vaccine  Aged Out    Hib vaccine  Aged Out    Meningococcal (ACWY) vaccine  Aged Out       Subjective:      Review of Systems   Constitutional:  Negative for chills and fever. HENT:  Negative for congestion. Eyes:  Negative for visual disturbance. Respiratory:  Negative for cough and shortness of breath (with exertion). Cardiovascular:  Negative for chest pain and palpitations. Gastrointestinal:  Negative for abdominal pain. Genitourinary:  Negative for difficulty urinating and dysuria. Musculoskeletal:  Positive for arthralgias. Negative for back pain. Neurological:  Negative for dizziness and headaches. Psychiatric/Behavioral:  Negative for self-injury, sleep disturbance and suicidal ideas.  The patient is not nervous/anxious. Objective:     Physical Exam  Vitals and nursing note reviewed. Constitutional:       Appearance: He is well-developed. HENT:      Head: Normocephalic and atraumatic. Eyes:      Pupils: Pupils are equal, round, and reactive to light. Cardiovascular:      Rate and Rhythm: Normal rate and regular rhythm. Heart sounds: Normal heart sounds. Pulmonary:      Effort: Pulmonary effort is normal.      Breath sounds: Normal breath sounds. Abdominal:      General: Bowel sounds are normal.      Palpations: Abdomen is soft. Tenderness: There is no abdominal tenderness. Musculoskeletal:         General: Normal range of motion. Cervical back: Normal range of motion. Skin:     General: Skin is warm and dry. Neurological:      Mental Status: He is alert and oriented to person, place, and time. Psychiatric:         Behavior: Behavior normal.         Thought Content: Thought content normal.         Judgment: Judgment normal.   /76 (Site: Right Upper Arm, Position: Sitting, Cuff Size: Medium Adult)   Pulse 62   Resp 16   Ht 6' (1.829 m)   Wt 220 lb (99.8 kg)   SpO2 99%   BMI 29.84 kg/m²     Assessment:       Diagnosis Orders   1. Type 2 diabetes mellitus with chronic kidney disease, with long-term current use of insulin, unspecified CKD stage ([facility])  POCT glycosylated hemoglobin (Hb A1C)      2. Diabetic polyneuropathy associated with type 2 diabetes mellitus ([address]        3. Type 2 diabetes mellitus without complication, unspecified whether long term insulin use (formerly [facility])        4. Stage 3b chronic kidney disease ([address]        5. PAD (peripheral artery disease) (formerly [facility])        6. Paroxysmal atrial fibrillation ([address]        7. Chronic obstructive pulmonary disease, unspecified COPD type ([address]        8. VT (ventricular tachycardia) ([address]        9. Acute on chronic systolic heart failure ([address]        10. Claudication ([address]        11. Unstable angina ([address]        12.  Chronic diastolic congestive heart failure ([facility]        13. Type 2 diabetes mellitus with other specified complication, with long-term current use of insulin ([facility]        14. Dilated aortic root ([facility]        15. PVOD (pulmonary veno-occlusive disease) ([facility]        16. Chronic pain syndrome        17. 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 Chronic conditions- Stable. Continue current meds. Sample of ozempic pen given. Follow up in 3 months for recheck/earlier if needed  Orders Placed This Encounter   Procedures    POCT glycosylated hemoglobin (Hb A1C)        Orders Placed This Encounter   Medications    allopurinol (ZYLOPRIM) 100 MG tablet     Sig: Take 1 tablet by mouth daily     Dispense:  90 tablet     Refill:  3       Patient given educational materials - see patient instructions. Discussed use, benefit, and side effects of prescribed medications. All patientquestions answered. Pt voiced understanding. Reviewed health maintenance. Instructedto continue current medications, diet and exercise. Patient agreed with treatmentplan. Follow up as directed.      Electronicallysigned by ERICK Kruger CNP on 9/26/2022 at 11:25 AM

## 2022-10-24 LAB
ALBUMIN SERPL-MCNC: 3.6 G/DL
ALP BLD-CCNC: 102 U/L
ALT SERPL-CCNC: 20 U/L
ANION GAP SERPL CALCULATED.3IONS-SCNC: NORMAL MMOL/L
AST SERPL-CCNC: 20 U/L
AVERAGE GLUCOSE: NORMAL
BASOPHILS ABSOLUTE: 0.08 /ΜL
BASOPHILS RELATIVE PERCENT: 0.8 %
BILIRUB SERPL-MCNC: 0.4 MG/DL (ref 0.1–1.4)
BUN BLDV-MCNC: 44 MG/DL
CALCIUM SERPL-MCNC: 9 MG/DL
CHLORIDE BLD-SCNC: 103 MMOL/L
CHOLESTEROL, TOTAL: 132 MG/DL
CHOLESTEROL/HDL RATIO: ABNORMAL
CO2: 28 MMOL/L
CREAT SERPL-MCNC: 1.81 MG/DL
EOSINOPHILS ABSOLUTE: 0.28 /ΜL
EOSINOPHILS RELATIVE PERCENT: 3 %
GFR CALCULATED: 38
GLUCOSE BLD-MCNC: 172 MG/DL
HBA1C MFR BLD: 8.4 %
HCT VFR BLD CALC: 35.9 % (ref 41–53)
HDLC SERPL-MCNC: 32 MG/DL (ref 35–70)
HEMOGLOBIN: 10.3 G/DL (ref 13.5–17.5)
LDL CHOLESTEROL CALCULATED: 72 MG/DL (ref 0–160)
LYMPHOCYTES ABSOLUTE: 2.61 /ΜL
LYMPHOCYTES RELATIVE PERCENT: 27.6 %
MCH RBC QN AUTO: 22 PG
MCHC RBC AUTO-ENTMCNC: 28.7 G/DL
MCV RBC AUTO: 76.5 FL
MONOCYTES ABSOLUTE: 1.21 /ΜL
MONOCYTES RELATIVE PERCENT: 12.8 %
NEUTROPHILS ABSOLUTE: 5.22 /ΜL
NEUTROPHILS RELATIVE PERCENT: 55.3 %
NONHDLC SERPL-MCNC: ABNORMAL MG/DL
PLATELET # BLD: 319 K/ΜL
PMV BLD AUTO: 10.2 FL
POTASSIUM SERPL-SCNC: 3.4 MMOL/L
PROSTATE SPECIFIC ANTIGEN: 1.61 NG/ML
RBC # BLD: 4.69 10^6/ΜL
SODIUM BLD-SCNC: 142 MMOL/L
TOTAL PROTEIN: 6.9
TRIGL SERPL-MCNC: 139 MG/DL
VITAMIN D 25-HYDROXY: 39.5
VITAMIN D2, 25 HYDROXY: NORMAL
VITAMIN D3,25 HYDROXY: NORMAL
VLDLC SERPL CALC-MCNC: ABNORMAL MG/DL
WBC # BLD: 9.45 10^3/ML

## 2022-11-10 DIAGNOSIS — G89.4 CHRONIC PAIN SYNDROME: ICD-10-CM

## 2022-11-10 RX ORDER — OXYCODONE HYDROCHLORIDE AND ACETAMINOPHEN 5; 325 MG/1; MG/1
1 TABLET ORAL EVERY 8 HOURS PRN
Qty: 90 TABLET | Refills: 0 | Status: SHIPPED | OUTPATIENT
Start: 2022-11-10 | End: 2022-12-10

## 2022-11-15 RX ORDER — LORATADINE 10 MG/1
10 TABLET ORAL DAILY
Qty: 30 TABLET | Refills: 3 | Status: SHIPPED | OUTPATIENT
Start: 2022-11-15

## 2022-11-21 ENCOUNTER — OFFICE VISIT (OUTPATIENT)
Dept: PRIMARY CARE CLINIC | Age: 77
End: 2022-11-21
Payer: MEDICARE

## 2022-11-21 ENCOUNTER — HOSPITAL ENCOUNTER (OUTPATIENT)
Age: 77
Setting detail: SPECIMEN
Discharge: HOME | End: 2022-11-21

## 2022-11-21 VITALS
WEIGHT: 238 LBS | BODY MASS INDEX: 32.23 KG/M2 | HEART RATE: 74 BPM | DIASTOLIC BLOOD PRESSURE: 70 MMHG | RESPIRATION RATE: 16 BRPM | HEIGHT: 72 IN | SYSTOLIC BLOOD PRESSURE: 132 MMHG

## 2022-11-21 DIAGNOSIS — R30.0 DYSURIA: ICD-10-CM

## 2022-11-21 DIAGNOSIS — R30.0 DYSURIA: Primary | ICD-10-CM

## 2022-11-21 DIAGNOSIS — I48.0 PAROXYSMAL ATRIAL FIBRILLATION (HCC): ICD-10-CM

## 2022-11-21 LAB
BILIRUBIN, POC: NORMAL
BLOOD URINE, POC: NORMAL
CLARITY, POC: CLEAR
COLOR, POC: YELLOW
GLUCOSE URINE, POC: >=1000
KETONES, POC: NORMAL
LEUKOCYTE EST, POC: NORMAL
NITRITE, POC: NORMAL
PH, POC: 6
PROTEIN, POC: NORMAL
SPECIFIC GRAVITY, POC: 1.01
UROBILINOGEN, POC: 0.2

## 2022-11-21 PROCEDURE — 3074F SYST BP LT 130 MM HG: CPT | Performed by: NURSE PRACTITIONER

## 2022-11-21 PROCEDURE — 1123F ACP DISCUSS/DSCN MKR DOCD: CPT | Performed by: NURSE PRACTITIONER

## 2022-11-21 PROCEDURE — 81003 URINALYSIS AUTO W/O SCOPE: CPT | Performed by: NURSE PRACTITIONER

## 2022-11-21 PROCEDURE — 3078F DIAST BP <80 MM HG: CPT | Performed by: NURSE PRACTITIONER

## 2022-11-21 PROCEDURE — 99214 OFFICE O/P EST MOD 30 MIN: CPT | Performed by: NURSE PRACTITIONER

## 2022-11-21 RX ORDER — FUROSEMIDE 40 MG/1
TABLET ORAL
Qty: 180 TABLET | Refills: 3 | Status: SHIPPED | OUTPATIENT
Start: 2022-11-21

## 2022-11-21 RX ORDER — CEPHALEXIN 500 MG/1
500 CAPSULE ORAL 4 TIMES DAILY
Qty: 28 CAPSULE | Refills: 0 | Status: SHIPPED | OUTPATIENT
Start: 2022-11-21 | End: 2022-11-28

## 2022-11-21 RX ORDER — AMIODARONE HYDROCHLORIDE 200 MG/1
TABLET ORAL
Qty: 90 TABLET | Refills: 3 | Status: SHIPPED | OUTPATIENT
Start: 2022-11-21

## 2022-11-21 ASSESSMENT — ENCOUNTER SYMPTOMS
COUGH: 0
SHORTNESS OF BREATH: 0
ABDOMINAL PAIN: 0
BACK PAIN: 1

## 2022-11-21 NOTE — PROGRESS NOTES
[address] [facility] PRIMARY CARE  [address]   [address]  [address] [address]  Dept: [phone]  Dept Fax: [phone]    Kem Rodriguez is a 69 y.o. male who presentstoday for his medical conditions/complaints as noted below. Kem Rodriguez is c/o of  Chief Complaint   Patient presents with    Dysuria       HPI:     Presents for acute visit  Increase in left lower back pain, feels different than his normal lower back pain  BP well controlled  Has gained 18lb since LOV, does have on winter coat and boats    Pain has been going on for 2 days  Concern for UTI  UA shows trace leuk and hematuria  Will send for culture and start antibiotic    Needs refill on chronic BP meds  Has been without amiodarone x 1 day  Follows with cardiology regularly    Denies any other problems/concerns      Hemoglobin A1C (%)   Date Value   10/24/2022 8.4   09/26/2022 7.2   06/06/2022 8.6             ( goal A1C is < 7)   Microalb/Crt.  Ratio (mcg/mg creat)   Date Value   03/24/2022 Can not be calculated     LDL Cholesterol (mg/dL)   Date Value   08/20/2021 75   05/31/2016 105     LDL Calculated (mg/dL)   Date Value   10/24/2022 72   10/29/2018 162 (A)   06/03/2017 95       (goal LDL is <100)   AST (U/L)   Date Value   10/24/2022 20     ALT (U/L)   Date Value   10/24/2022 20     BUN (mg/dL)   Date Value   10/24/2022 44     BP Readings from Last 3 Encounters:   09/27/22 120/62   09/26/22 126/76   07/25/22 136/84          ([id number])    Past Medical History:   Diagnosis Date    Arrhythmia     CAD (coronary artery disease)     GERD (gastroesophageal reflux disease)     Heart attack ([address]     Hyperlipidemia     Hypertension     Ischemic cardiomyopathy     Osteoarthritis     Type II or unspecified type diabetes mellitus without mention of complication, not stated as uncontrolled       Past Surgical History:   Procedure Laterality Date    APPENDECTOMY      CARDIAC CATHETERIZATION      CARDIAC SURGERY stents     COLONOSCOPY      CORONARY ANGIOPLASTY WITH STENT PLACEMENT  2018    [facility]    DIAGNOSTIC CARDIAC CATH LAB PROCEDURE      JOINT REPLACEMENT      knee right parital    PACEMAKER INSERTION      PTCA      ROTATOR CUFF REPAIR      TONSILLECTOMY         Family History   Problem Relation Age of Onset    Heart Disease Mother     High Blood Pressure Mother     Heart Disease Father     Cancer Sister         breast cancer    Cancer Brother         bladder cancer    Diabetes Maternal Grandmother           Social History     Tobacco Use    Smoking status: Former     Packs/day: 1.00     Years: 40.00     Pack years: 40.00     Types: Cigarettes     Quit date: 1994     Years since quittin.5    Smokeless tobacco: Never   Substance Use Topics    Alcohol use: Yes     Comment: rare      Current Outpatient Medications   Medication Sig Dispense Refill    cephALEXin (KEFLEX) 500 MG capsule Take 1 capsule by mouth 4 times daily for 7 days 28 capsule 0    furosemide (LASIX) 40 MG tablet Take 1 tablet by mouth once daily   May take 1 additional tablet daily PRN 2 pound weight gain, short of breath or swelling. 180 tablet 3    amiodarone (CORDARONE) 200 MG tablet TAKE 1 TABLET BY MOUTH ONCE DAILY 90 tablet 3    loratadine (CLARITIN) 10 MG tablet Take 1 tablet by mouth daily 30 tablet 3    oxyCODONE-acetaminophen (PERCOCET) 5-325 MG per tablet Take 1 tablet by mouth every 8 hours as needed for Pain for up to 30 days.  90 tablet 0    allopurinol (ZYLOPRIM) 100 MG tablet Take 1 tablet by mouth daily 90 tablet 3    ONETOUCH ULTRA strip USE 1 STRIP TO CHECK GLUCOSE TWICE DAILY 100 each 0    atorvastatin (LIPITOR) 80 MG tablet Take 1 tablet by mouth nightly 90 tablet 2    ondansetron (ZOFRAN) 4 MG tablet Take 1 tablet by mouth every 8 hours as needed for Nausea 30 tablet 0    gabapentin (NEURONTIN) 300 MG capsule TAKE 1 CAPSULE BY MOUTH THREE TIMES DAILY 90 capsule 2    Semaglutide, 1 MG/DOSE, 4 MG/3ML SOPN Inject 2 mg into the skin once a week 6 mL 0    nitroGLYCERIN (NITROSTAT) 0.4 MG SL tablet DISSOLVE ONE TABLET UNDER THE TONGUE EVERY 5 MINUTES AS NEEDED FOR CHEST PAIN.   DO NOT EXCEED A TOTAL OF 3 DOSES IN 15 MINUTES 25 tablet 2    Continuous Blood Gluc  (FREESTYLE JENNIFER 2 READER) ALLI 1 each by Does not apply route continuous 1 each 1    Continuous Blood Gluc Sensor (FREESTYLE JENNIFER 2 SENSOR) MISC 1 each by Does not apply route every 14 days 2 each 3    Potassium 99 MG TABS Take by mouth Daily      insulin detemir (LEVEMIR) 100 UNIT/ML injection vial Inject 30 Units into the skin 2 times daily 6 each 3    metOLazone (ZAROXOLYN) 2.5 MG tablet Take 2.5 mg by mouth See Admin Instructions      empagliflozin (JARDIANCE) 10 MG tablet Take 1 tablet by mouth daily 90 tablet 3    insulin lispro (HUMALOG) 100 UNIT/ML injection vial 150-200  Inject 2 units  201-250 Inject 4 units  251-300  Inject 6 units  301-350  Inject 8 units  351-400 Inject 12 units  401-500 Inject 14 units 1 vial 0    ELIQUIS 5 MG TABS tablet Take 1 tablet by mouth 2 times daily 60 tablet 5    ticagrelor (BRILINTA) 90 MG TABS tablet Take 1 tablet by mouth 2 times daily 60 tablet 1    esomeprazole Magnesium (NEXIUM) 20 MG PACK Take 20 mg by mouth daily      diclofenac sodium (VOLTAREN) 1 % GEL Apply 2 g topically 4 times daily as needed for Pain 150 g 0    isosorbide mononitrate (IMDUR) 30 MG extended release tablet Take 30 mg by mouth daily      albuterol sulfate  (90 Base) MCG/ACT inhaler INHALE 2 PUFFS BY INHALATION THREE TIMES A DAY AS NEEDED FOR SHORTNESS OF BREATH      potassium chloride (KLOR-CON M) 20 MEQ extended release tablet TAKE 1  BY MOUTH ONCE DAILY 90 tablet 0    albuterol (PROVENTIL) (2.5 MG/3ML) 0.083% nebulizer solution Take 3 mLs by nebulization every 6 hours as needed for Wheezing or Shortness of Breath 5 Package 0    Handicap Placard MISC by Does not apply route Expires 12/2023 1 each 0    magnesium oxide (MAG-OX) 400 MG tablet Take 400 mg by mouth daily      triamcinolone (KENALOG) 0.025 % ointment Apply topically 2 times daily to areas of eczema on lower legs 80 g 2    metoprolol tartrate (LOPRESSOR) 50 MG tablet Take 1 tablet by mouth 2 times daily 60 tablet 5    lisinopril (PRINIVIL;ZESTRIL) 40 MG tablet Take 40 mg by mouth daily Take 1/2 tab daily      Insulin Pen Needle 29G X 12.7MM MISC 1 each by Does not apply route daily 100 each 3     No current facility-administered medications for this visit. Allergies   Allergen Reactions    Coreg [Carvedilol] Other (See Comments)     Low pause     Pravastatin Other (See Comments)     Myalgias     Sertraline Other (See Comments)    Zocor [Simvastatin]      norvasc     Citalopram Anxiety       Health Maintenance   Topic Date Due    COVID-19 Vaccine (5 - Booster for Moderna series) 07/29/2022    Shingles vaccine (2 of 2) 12/19/2022    Annual Wellness Visit (AWV)  02/08/2023    Depression Screen  09/26/2023    Lipids  10/24/2023    DTaP/Tdap/Td vaccine (3 - Td or Tdap) 08/20/2027    Flu vaccine  Completed    Pneumococcal 65+ years Vaccine  Completed    Hepatitis C screen  Completed    Hepatitis A vaccine  Aged Out    Hib vaccine  Aged Out    Meningococcal (ACWY) vaccine  Aged Out       Subjective:      Review of Systems   Constitutional:  Negative for chills, fatigue and fever. HENT:  Negative for congestion. Eyes:  Negative for visual disturbance. Respiratory:  Negative for cough and shortness of breath. Cardiovascular:  Negative for chest pain and palpitations. Gastrointestinal:  Negative for abdominal pain. Genitourinary:  Positive for dysuria and frequency. Negative for difficulty urinating. Musculoskeletal:  Positive for back pain. Negative for arthralgias. Neurological:  Negative for dizziness and headaches. Psychiatric/Behavioral:  Negative for self-injury, sleep disturbance and suicidal ideas. The patient is not nervous/anxious.       Objective:     Physical Exam  Vitals and nursing note reviewed. Constitutional:       Appearance: He is well-developed. HENT:      Head: Normocephalic and atraumatic. Eyes:      Pupils: Pupils are equal, round, and reactive to light. Cardiovascular:      Rate and Rhythm: Normal rate and regular rhythm. Heart sounds: Normal heart sounds. Pulmonary:      Effort: Pulmonary effort is normal.      Breath sounds: Normal breath sounds. Abdominal:      General: Bowel sounds are normal.      Palpations: Abdomen is soft. Tenderness: There is no abdominal tenderness. Musculoskeletal:         General: Normal range of motion. Cervical back: Normal range of motion. Skin:     General: Skin is warm and dry. Neurological:      Mental Status: He is alert and oriented to person, place, and time. Psychiatric:         Behavior: Behavior normal.         Thought Content: Thought content normal.         Judgment: Judgment normal.     Resp 16   Ht 6' (1.829 m)   Wt 238 lb (108 kg)   BMI 32.28 kg/m²     Assessment:       Diagnosis Orders   1. Dysuria  POCT Urinalysis No Micro (Auto)    Culture, Urine                Plan:      Return if symptoms worsen or fail to improve, for as needed. Dysuria- UA positive in office, will treat with keflex and send for culture. Follow up pending results. Chronic conditions- Stable. Continue current meds, follow up [facility] as scheduled or earlier if needed. Orders Placed This Encounter   Procedures    Culture, Urine     Standing Status:   Future     Standing Expiration Date:   11/21/2023     Order Specific Question:   Specify (ex-cath, midstream, cysto, etc)?      Answer:   midstream    POCT Urinalysis No Micro (Auto)        Orders Placed This Encounter   Medications    cephALEXin (KEFLEX) 500 MG capsule     Sig: Take 1 capsule by mouth 4 times daily for 7 days     Dispense:  28 capsule     Refill:  0    furosemide (LASIX) 40 MG tablet     Sig: Take 1 tablet by mouth once daily   May take 1 additional tablet daily PRN 2 pound weight gain, short of breath or swelling. Dispense:  180 tablet     Refill:  3    amiodarone (CORDARONE) 200 MG tablet     Sig: TAKE 1 TABLET BY MOUTH ONCE DAILY     Dispense:  90 tablet     Refill:  3     Please consider 90 day supplies to promote better adherence       Patient given educational materials - see patient instructions. Discussed use, benefit, and side effects of prescribed medications. All patientquestions answered. Pt voiced understanding. Reviewed health maintenance. Instructedto continue current medications, diet and exercise. Patient agreed with treatmentplan. Follow up as directed.      Electronicallysigned by ERICK Booker CNP on 11/21/2022 at 10:23 AM

## 2022-11-22 LAB
CULTURE: NORMAL
SPECIMEN DESCRIPTION: NORMAL

## 2022-12-21 ENCOUNTER — APPOINTMENT (OUTPATIENT)
Dept: CT IMAGING | Age: 77
End: 2022-12-21
Payer: MEDICARE

## 2022-12-21 ENCOUNTER — HOSPITAL ENCOUNTER (EMERGENCY)
Age: 77
Discharge: HOME | End: 2022-12-21
Attending: EMERGENCY MEDICINE
Payer: MEDICARE

## 2022-12-21 VITALS
OXYGEN SATURATION: 98 % | DIASTOLIC BLOOD PRESSURE: 90 MMHG | RESPIRATION RATE: 12 BRPM | HEART RATE: 63 BPM | HEIGHT: 72 IN | SYSTOLIC BLOOD PRESSURE: 105 MMHG | WEIGHT: 230 LBS | BODY MASS INDEX: 31.15 KG/M2 | TEMPERATURE: 98.2 F

## 2022-12-21 DIAGNOSIS — S09.90XA CLOSED HEAD INJURY, INITIAL ENCOUNTER: ICD-10-CM

## 2022-12-21 DIAGNOSIS — S01.01XA LACERATION OF SCALP, INITIAL ENCOUNTER: Primary | ICD-10-CM

## 2022-12-21 PROCEDURE — 90715 TDAP VACCINE 7 YRS/> IM: CPT | Performed by: EMERGENCY MEDICINE

## 2022-12-21 PROCEDURE — 70450 CT HEAD/BRAIN W/O DYE: CPT

## 2022-12-21 PROCEDURE — 99284 EMERGENCY DEPT VISIT MOD MDM: CPT

## 2022-12-21 PROCEDURE — 6360000002 HC RX W HCPCS: Performed by: EMERGENCY MEDICINE

## 2022-12-21 PROCEDURE — 90471 IMMUNIZATION ADMIN: CPT | Performed by: EMERGENCY MEDICINE

## 2022-12-21 RX ADMIN — TETANUS TOXOID, REDUCED DIPHTHERIA TOXOID AND ACELLULAR PERTUSSIS VACCINE, ADSORBED 0.5 ML: 5; 2.5; 8; 8; 2.5 SUSPENSION INTRAMUSCULAR at 19:18

## 2022-12-21 ASSESSMENT — PAIN DESCRIPTION - LOCATION
LOCATION: HEAD
LOCATION: HEAD

## 2022-12-21 ASSESSMENT — ENCOUNTER SYMPTOMS
NAUSEA: 0
COUGH: 0
VOMITING: 0

## 2022-12-21 ASSESSMENT — PAIN SCALES - GENERAL
PAINLEVEL_OUTOF10: 3
PAINLEVEL_OUTOF10: 3

## 2022-12-21 ASSESSMENT — PAIN DESCRIPTION - FREQUENCY: FREQUENCY: CONTINUOUS

## 2022-12-21 ASSESSMENT — LIFESTYLE VARIABLES
HOW OFTEN DO YOU HAVE A DRINK CONTAINING ALCOHOL: NEVER
HOW MANY STANDARD DRINKS CONTAINING ALCOHOL DO YOU HAVE ON A TYPICAL DAY: PATIENT DOES NOT DRINK

## 2022-12-21 ASSESSMENT — PAIN - FUNCTIONAL ASSESSMENT
PAIN_FUNCTIONAL_ASSESSMENT: 0-10
PAIN_FUNCTIONAL_ASSESSMENT: 0-10

## 2022-12-21 ASSESSMENT — PAIN DESCRIPTION - PAIN TYPE: TYPE: ACUTE PAIN

## 2022-12-21 ASSESSMENT — PAIN DESCRIPTION - DESCRIPTORS: DESCRIPTORS: ACHING

## 2022-12-21 NOTE — ED NOTES
Mode of arrival (squad #, walk in, police, etc) : Walk in        Chief complaint(s): Fall +hit head         Arrival Note (brief scenario, treatment PTA, etc). : Pt reports to the ED from urgent care following a mechanical forward fall hitting his head. Pt denies headache but is on blood thinners. Pt is A&Ox4 on arrival and of normal mentation. Pt received several small lacerations to the top of his head from pins on his hat at the time of the fall        C= \"Have you ever felt that you should Cut down on your drinking? \"  No  A= \"Have people Annoyed you by criticizing your drinking? \"  No  G= \"Have you ever felt bad or Guilty about your drinking? \"  No  E= \"Have you ever had a drink as an Eye-opener first thing in the morning to steady your nerves or to help a hangover? \"  No      Deferred []      Reason for deferring: N/A    *If yes to two or more: probable alcohol abuse. Nj Waggoner RN  12/21/22 [id number]

## 2022-12-22 NOTE — ED PROVIDER NOTES
[address]  EMERGENCY DEPARTMENT ENCOUNTER      Pt Name: Casey Tejada  MRN: [id number]  [place] 1945  Date of evaluation: 12/21/22      CHIEF COMPLAINT       Chief Complaint   Patient presents with    Fall     +hit head    Head Laceration       HISTORY OF PRESENT ILLNESS   HPI 69 y.o. male presents with c/o head injury. The patient states that he was standing up when he hit the top of his head against the door. He says he was wearing a hat with several pins in the hat and when the hat hit the door the pins went into his scalp and cause some lacerations and bleeding. The bleeding resolved Slee, but family noticed some blood dripping all over his face and so they would make sure they brought him to the hospital right away. The patient is on Brilinta and Eliquis. He denies any headaches. He denies any other injury. No neck pain. No loss of consciousness. REVIEW OF SYSTEMS       Review of Systems   Constitutional:  Negative for fever. HENT:  Negative for nosebleeds. Eyes:  Negative for visual disturbance. Respiratory:  Negative for cough. Cardiovascular:  Negative for chest pain. Gastrointestinal:  Negative for nausea and vomiting. Musculoskeletal:  Negative for neck pain. Skin:  Positive for rash and wound. Neurological:  Negative for headaches. Hematological:  Bruises/bleeds easily. Psychiatric/Behavioral:  Negative for confusion.       PAST MEDICAL HISTORY     Past Medical History:   Diagnosis Date    Arrhythmia     CAD (coronary artery disease)     GERD (gastroesophageal reflux disease)     Heart attack ([facility] [address]     Hyperlipidemia     Hypertension     Ischemic cardiomyopathy     Osteoarthritis     Type II or unspecified type diabetes mellitus without mention of complication, not stated as uncontrolled        SURGICAL HISTORY       Past Surgical History:   Procedure Laterality Date    APPENDECTOMY      CARDIAC CATHETERIZATION      CARDIAC SURGERY      stents 2015 units  401-500 Inject 14 units    INSULIN PEN NEEDLE 29G X 12.7MM MISC    1 each by Does not apply route daily    ISOSORBIDE MONONITRATE (IMDUR) 30 MG EXTENDED RELEASE TABLET    Take 30 mg by mouth daily    LISINOPRIL (PRINIVIL;ZESTRIL) 40 MG TABLET    Take 40 mg by mouth daily Take 1/2 tab daily    LORATADINE (CLARITIN) 10 MG TABLET    Take 1 tablet by mouth daily    MAGNESIUM OXIDE (MAG-OX) 400 MG TABLET    Take 400 mg by mouth daily    METOLAZONE (ZAROXOLYN) 2.5 MG TABLET    Take 2.5 mg by mouth See Admin Instructions    METOPROLOL TARTRATE (LOPRESSOR) 50 MG TABLET    Take 1 tablet by mouth 2 times daily    NITROGLYCERIN (NITROSTAT) 0.4 MG SL TABLET    DISSOLVE ONE TABLET UNDER THE TONGUE EVERY 5 MINUTES AS NEEDED FOR CHEST PAIN. DO NOT EXCEED A TOTAL OF 3 DOSES IN 15 MINUTES    ONDANSETRON (ZOFRAN) 4 MG TABLET    Take 1 tablet by mouth every 8 hours as needed for Nausea    ONETOUCH ULTRA STRIP    USE 1 STRIP TO CHECK GLUCOSE TWICE DAILY    OXYCODONE-ACETAMINOPHEN (PERCOCET) 5-325 MG PER TABLET    Take 1 tablet by mouth every 8 hours as needed for Pain for up to 30 days. POTASSIUM 99 MG TABS    Take by mouth Daily    POTASSIUM CHLORIDE (KLOR-CON M) 20 MEQ EXTENDED RELEASE TABLET    TAKE 1  BY MOUTH ONCE DAILY    SEMAGLUTIDE, 1 MG/DOSE, 4 MG/3ML SOPN    Inject 2 mg into the skin once a week    TICAGRELOR (BRILINTA) 90 MG TABS TABLET    Take 1 tablet by mouth 2 times daily    TRIAMCINOLONE (KENALOG) 0.025 % OINTMENT    Apply topically 2 times daily to areas of eczema on lower legs       ALLERGIES     is allergic to coreg [carvedilol], pravastatin, sertraline, zocor [simvastatin], and citalopram.    FAMILY HISTORY     He indicated that his mother is . He indicated that his father is . He indicated that the status of his sister is unknown. He indicated that the status of his brother is unknown. He indicated that the status of his maternal grandmother is unknown.        SOCIAL HISTORY      reports that he quit smoking about 28 years ago. His smoking use included cigarettes. He has a 40.00 pack-year smoking history. He has never used smokeless tobacco. He reports current alcohol use. He reports that he does not use drugs. PHYSICAL EXAM     INITIAL VITALS: BP (!) 170/79   Pulse 63   Temp 98.2 °F (36.8 °C) (Oral)   Resp 12   Ht 6' (1.829 m)   Wt 230 lb (104.3 kg)   SpO2 96%   BMI 31.19 kg/m²   Gen: NAD  Head: Normocephalic, there are 3  pabrasions linearly across the scalp. No active bleeding. No gaping. Eye: Pupils equal round reactive to light, no conjunctivitis  ENT: MMM  Neck: No C-spine TTP  Heart: Regular rate and rhythm no murmurs  Lungs: Clear to auscultation bilaterally, no respiratory distress  Chest wall: No crepitus, no tenderness palpation  Abdomen: Soft, nontender, nondistended, with no peritoneal signs  Neurologic: Patient is alert and oriented x3, motor and sensation is intact in all 4 extremities, fluent speech  Extremities: There is a skin tear to the right hand, there is no bony tenderness to palpation over the hand or the snuffbox. MEDICAL DECISION MAKING:     MDM  And Emergency Department course  69 y.o. male on antiplatelet agents and Eliquis presenting after head injury with scalp abrasions. The patient has no active bleeding or large gaping wounds that require sutures. A CT scan of the head was obtained and ruled out any intracranial hemorrhage. Patient's tetanus was updated. Recommended close follow up with pcp, return to ED if symptoms worsen, and warning precautions provided. DIAGNOSTIC RESULTS   RADIOLOGY:All plain film, CT, MRI, and formal ultrasound images (except ED bedside ultrasound) are read by the radiologist and the images and interpretations are directly viewed by the emergency physician. CT HEAD WO CONTRAST   Final Result   No acute intracranial abnormality.            EMERGENCY DEPARTMENT COURSE:   Vitals:    Vitals:    12/21/22 1728 12/21/22 1731 12/21/22 1845   BP:  (!) 179/69 (!) 170/79   Pulse: 63     Resp: 18  12   Temp: 98.4 °F (36.9 °C)  98.2 °F (36.8 °C)   TempSrc: Oral  Oral   SpO2: 98%  96%   Weight: 230 lb (104.3 kg)     Height: 6' (1.829 m)         The patient was given the following medications while in the emergency department:  Orders Placed This Encounter   Medications    tetanus-diphth-acell pertussis (BOOSTRIX) injection 0.5 mL     -------------------------  CRITICAL CARE:   CONSULTS: None  PROCEDURES: Procedures     FINAL IMPRESSION      1. Laceration of scalp, initial encounter    2.  Closed head injury, initial encounter          DISPOSITION/PLAN   DISPOSITION Decision To Discharge 12/21/2022 07:49:57 PM      PATIENT REFERRED TO:  ERICK Diaz - LASHON  [address] [place] [address] [address]  [address] [facility]  [phone]      If symptoms worsen    [facility] ED  [facility], [place] [address]  [place]:  New Prescriptions    No medications on file         Hui Tesfaye MD  Attending Emergency Physician                     Hui Tesfaye MD  12/21/22 9599

## 2022-12-27 LAB
BASOPHILS ABSOLUTE: 0.1 /ΜL
BASOPHILS RELATIVE PERCENT: 1 %
BUN BLDV-MCNC: 28 MG/DL
CALCIUM SERPL-MCNC: 9 MG/DL
CHLORIDE BLD-SCNC: 107 MMOL/L
CO2: 26 MMOL/L
CREAT SERPL-MCNC: 1.39 MG/DL
EOSINOPHILS ABSOLUTE: 0.2 /ΜL
EOSINOPHILS RELATIVE PERCENT: 2 %
GFR CALCULATED: 50
GLUCOSE BLD-MCNC: 104 MG/DL
HCT VFR BLD CALC: 35.2 % (ref 41–53)
HEMOGLOBIN: 10.6 G/DL (ref 13.5–17.5)
LYMPHOCYTES ABSOLUTE: 2.4 /ΜL
LYMPHOCYTES RELATIVE PERCENT: 28 %
MCH RBC QN AUTO: 22 PG
MCHC RBC AUTO-ENTMCNC: 30.1 G/DL
MCV RBC AUTO: 73 FL
MONOCYTES ABSOLUTE: 1.2 /ΜL
MONOCYTES RELATIVE PERCENT: 14 %
NEUTROPHILS ABSOLUTE: 4.5 /ΜL
NEUTROPHILS RELATIVE PERCENT: 54 %
PLATELET # BLD: 278 K/ΜL
PMV BLD AUTO: 8 FL
POTASSIUM SERPL-SCNC: 3.6 MMOL/L
RBC # BLD: 4.81 10^6/ΜL
SODIUM BLD-SCNC: 138 MMOL/L
WBC # BLD: 8.3 10^3/ML

## 2022-12-28 ENCOUNTER — PATIENT MESSAGE (OUTPATIENT)
Dept: PRIMARY CARE CLINIC | Age: 77
End: 2022-12-28

## 2022-12-28 RX ORDER — BLOOD SUGAR DIAGNOSTIC
STRIP MISCELLANEOUS
Qty: 100 EACH | Refills: 0 | Status: SHIPPED | OUTPATIENT
Start: 2022-12-28

## 2023-01-06 ENCOUNTER — OFFICE VISIT (OUTPATIENT)
Dept: PRIMARY CARE CLINIC | Age: 78
End: 2023-01-06

## 2023-01-06 VITALS
DIASTOLIC BLOOD PRESSURE: 84 MMHG | OXYGEN SATURATION: 97 % | HEART RATE: 84 BPM | BODY MASS INDEX: 32.69 KG/M2 | WEIGHT: 241 LBS | SYSTOLIC BLOOD PRESSURE: 130 MMHG

## 2023-01-06 DIAGNOSIS — E11.9 TYPE 2 DIABETES MELLITUS WITHOUT COMPLICATION, UNSPECIFIED WHETHER LONG TERM INSULIN USE (HCC): ICD-10-CM

## 2023-01-06 DIAGNOSIS — N18.31 TYPE 2 DIABETES MELLITUS WITH STAGE 3A CHRONIC KIDNEY DISEASE, UNSPECIFIED WHETHER LONG TERM INSULIN USE (HCC): Primary | ICD-10-CM

## 2023-01-06 DIAGNOSIS — I50.32 CHRONIC DIASTOLIC CONGESTIVE HEART FAILURE (HCC): ICD-10-CM

## 2023-01-06 DIAGNOSIS — G89.4 CHRONIC PAIN SYNDROME: ICD-10-CM

## 2023-01-06 DIAGNOSIS — E11.69 TYPE 2 DIABETES MELLITUS WITH OTHER SPECIFIED COMPLICATION, WITH LONG-TERM CURRENT USE OF INSULIN (HCC): ICD-10-CM

## 2023-01-06 DIAGNOSIS — I27.0 PVOD (PULMONARY VENO-OCCLUSIVE DISEASE) (HCC): ICD-10-CM

## 2023-01-06 DIAGNOSIS — I73.9 CLAUDICATION (HCC): ICD-10-CM

## 2023-01-06 DIAGNOSIS — I50.23 ACUTE ON CHRONIC SYSTOLIC HEART FAILURE (HCC): ICD-10-CM

## 2023-01-06 DIAGNOSIS — I77.810 DILATED AORTIC ROOT (HCC): ICD-10-CM

## 2023-01-06 DIAGNOSIS — I73.9 PAD (PERIPHERAL ARTERY DISEASE) (HCC): ICD-10-CM

## 2023-01-06 DIAGNOSIS — E11.22 TYPE 2 DIABETES MELLITUS WITH CHRONIC KIDNEY DISEASE, WITH LONG-TERM CURRENT USE OF INSULIN, UNSPECIFIED CKD STAGE (HCC): ICD-10-CM

## 2023-01-06 DIAGNOSIS — E11.22 TYPE 2 DIABETES MELLITUS WITH STAGE 3A CHRONIC KIDNEY DISEASE, UNSPECIFIED WHETHER LONG TERM INSULIN USE (HCC): Primary | ICD-10-CM

## 2023-01-06 DIAGNOSIS — Z79.4 TYPE 2 DIABETES MELLITUS WITH OTHER SPECIFIED COMPLICATION, WITH LONG-TERM CURRENT USE OF INSULIN (HCC): ICD-10-CM

## 2023-01-06 DIAGNOSIS — I20.0 UNSTABLE ANGINA (HCC): ICD-10-CM

## 2023-01-06 DIAGNOSIS — Z79.4 TYPE 2 DIABETES MELLITUS WITH CHRONIC KIDNEY DISEASE, WITH LONG-TERM CURRENT USE OF INSULIN, UNSPECIFIED CKD STAGE (HCC): ICD-10-CM

## 2023-01-06 DIAGNOSIS — I48.0 PAROXYSMAL ATRIAL FIBRILLATION (HCC): ICD-10-CM

## 2023-01-06 DIAGNOSIS — J44.9 CHRONIC OBSTRUCTIVE PULMONARY DISEASE, UNSPECIFIED COPD TYPE (HCC): ICD-10-CM

## 2023-01-06 RX ORDER — OXYCODONE HYDROCHLORIDE AND ACETAMINOPHEN 5; 325 MG/1; MG/1
1 TABLET ORAL EVERY 8 HOURS PRN
Qty: 90 TABLET | Refills: 0 | Status: SHIPPED | OUTPATIENT
Start: 2023-01-06 | End: 2023-02-05

## 2023-01-06 ASSESSMENT — PATIENT HEALTH QUESTIONNAIRE - PHQ9
SUM OF ALL RESPONSES TO PHQ QUESTIONS 1-9: 2
1. LITTLE INTEREST OR PLEASURE IN DOING THINGS: 1
SUM OF ALL RESPONSES TO PHQ QUESTIONS 1-9: 2
SUM OF ALL RESPONSES TO PHQ9 QUESTIONS 1 & 2: 2
2. FEELING DOWN, DEPRESSED OR HOPELESS: 1

## 2023-01-06 ASSESSMENT — ENCOUNTER SYMPTOMS
ABDOMINAL PAIN: 0
BACK PAIN: 1
COUGH: 0
SHORTNESS OF BREATH: 0

## 2023-01-06 NOTE — PROGRESS NOTES
[address] PRIMARY CARE  [address]  [address]  [address] [address]  Dept: [phone]  Dept Fax: [phone]    Cornelius Fitzpatrick is a 69 y.o. male who presentstoday for his medical conditions/complaints as noted below. Cornelius Fitzpatrick is c/o of  Chief Complaint   Patient presents with    Diabetes         HPI:     Presents for 3 month recheck on chronic conditions  BP well controlled  Has gained 3lb since LOV    Fall in December with head laceration  Overall doing well- did not do follow d/t call center scheduling telling him no appt available  Advised to always contact the office directly if appt is needed    Recent AICD placement 12/27/22  Has appt with cardiology today for recheck    Unable to repeat HGa1c today  BS have improved at home, all under 150  Requesting sample of ozempic if available    Using percocet prn for chronic lower back pain/leg pain  Last filled 11/2022  Requesting refill, denies any side effects    Denies any other problems/concerns          Hemoglobin A1C (%)   Date Value   10/24/2022 8.4   09/26/2022 7.2   06/06/2022 8.6             ( goal A1C is < 7)   Microalb/Crt.  Ratio (mcg/mg creat)   Date Value   03/24/2022 Can not be calculated     LDL Cholesterol (mg/dL)   Date Value   08/20/2021 75   05/31/2016 105     LDL Calculated (mg/dL)   Date Value   10/24/2022 72   10/29/2018 162 (A)   06/03/2017 95       (goal LDL is <100)   AST (U/L)   Date Value   10/24/2022 20     ALT (U/L)   Date Value   10/24/2022 20     BUN (mg/dL)   Date Value   12/27/2022 28     BP Readings from Last 3 Encounters:   01/06/23 130/84   12/21/22 (!) 105/90   11/21/22 132/70          ([id number])    Past Medical History:   Diagnosis Date    Arrhythmia     CAD (coronary artery disease)     GERD (gastroesophageal reflux disease)     Heart attack ([address]     Hyperlipidemia     Hypertension     Ischemic cardiomyopathy     Osteoarthritis     Type II or unspecified type diabetes mellitus without mention of complication, not stated as uncontrolled       Past Surgical History:   Procedure Laterality Date    APPENDECTOMY      CARDIAC CATHETERIZATION      CARDIAC SURGERY      stents     COLONOSCOPY      CORONARY ANGIOPLASTY WITH STENT PLACEMENT  2018    [facility]    DIAGNOSTIC CARDIAC CATH LAB PROCEDURE      JOINT REPLACEMENT      knee right parital    PACEMAKER INSERTION      PTCA      ROTATOR CUFF REPAIR      TONSILLECTOMY         Family History   Problem Relation Age of Onset    Heart Disease Mother     High Blood Pressure Mother     Heart Disease Father     Cancer Sister         breast cancer    Cancer Brother         bladder cancer    Diabetes Maternal Grandmother           Social History     Tobacco Use    Smoking status: Former     Packs/day: 1.00     Years: 40.00     Pack years: 40.00     Types: Cigarettes     Quit date: 1994     Years since quittin.6    Smokeless tobacco: Never   Substance Use Topics    Alcohol use: Yes     Comment: rare      Current Outpatient Medications   Medication Sig Dispense Refill    ONETOUCH ULTRA strip USE 1 STRIP TO CHECK GLUCOSE TWICE DAILY 100 each 0    furosemide (LASIX) 40 MG tablet Take 1 tablet by mouth once daily   May take 1 additional tablet daily PRN 2 pound weight gain, short of breath or swelling. 180 tablet 3    amiodarone (CORDARONE) 200 MG tablet TAKE 1 TABLET BY MOUTH ONCE DAILY 90 tablet 3    loratadine (CLARITIN) 10 MG tablet Take 1 tablet by mouth daily 30 tablet 3    oxyCODONE-acetaminophen (PERCOCET) 5-325 MG per tablet Take 1 tablet by mouth every 8 hours as needed for Pain for up to 30 days. 90 tablet 0    allopurinol (ZYLOPRIM) 100 MG tablet Take 1 tablet by mouth daily 90 tablet 3    atorvastatin (LIPITOR) 80 MG tablet Take 1 tablet by mouth nightly 90 tablet 2    ondansetron (ZOFRAN) 4 MG tablet Take 1 tablet by mouth every 8 hours as needed for Nausea 30 tablet 0    gabapentin (NEURONTIN) 300 MG capsule TAKE 1  CAPSULE BY MOUTH THREE TIMES DAILY 90 capsule 2    Semaglutide, 1 MG/DOSE, 4 MG/3ML SOPN Inject 2 mg into the skin once a week 6 mL 0    nitroGLYCERIN (NITROSTAT) 0.4 MG SL tablet DISSOLVE ONE TABLET UNDER THE TONGUE EVERY 5 MINUTES AS NEEDED FOR CHEST PAIN.   DO NOT EXCEED A TOTAL OF 3 DOSES IN 15 MINUTES 25 tablet 2    Continuous Blood Gluc  (FREESTYLE JENNIFER 2 READER) ALLI 1 each by Does not apply route continuous 1 each 1    Continuous Blood Gluc Sensor (FREESTYLE JENNIFER 2 SENSOR) MISC 1 each by Does not apply route every 14 days 2 each 3    Potassium 99 MG TABS Take by mouth Daily      insulin detemir (LEVEMIR) 100 UNIT/ML injection vial Inject 30 Units into the skin 2 times daily 6 each 3    metOLazone (ZAROXOLYN) 2.5 MG tablet Take 2.5 mg by mouth See Admin Instructions      empagliflozin (JARDIANCE) 10 MG tablet Take 1 tablet by mouth daily 90 tablet 3    insulin lispro (HUMALOG) 100 UNIT/ML injection vial 150-200  Inject 2 units  201-250 Inject 4 units  251-300  Inject 6 units  301-350  Inject 8 units  351-400 Inject 12 units  401-500 Inject 14 units 1 vial 0    ELIQUIS 5 MG TABS tablet Take 1 tablet by mouth 2 times daily 60 tablet 5    ticagrelor (BRILINTA) 90 MG TABS tablet Take 1 tablet by mouth 2 times daily 60 tablet 1    esomeprazole Magnesium (NEXIUM) 20 MG PACK Take 20 mg by mouth daily      diclofenac sodium (VOLTAREN) 1 % GEL Apply 2 g topically 4 times daily as needed for Pain 150 g 0    isosorbide mononitrate (IMDUR) 30 MG extended release tablet Take 30 mg by mouth daily      albuterol sulfate  (90 Base) MCG/ACT inhaler INHALE 2 PUFFS BY INHALATION THREE TIMES A DAY AS NEEDED FOR SHORTNESS OF BREATH      potassium chloride (KLOR-CON M) 20 MEQ extended release tablet TAKE 1  BY MOUTH ONCE DAILY 90 tablet 0    albuterol (PROVENTIL) (2.5 MG/3ML) 0.083% nebulizer solution Take 3 mLs by nebulization every 6 hours as needed for Wheezing or Shortness of Breath 5 Package 0    Handicap Placard MISC by Does not apply route Expires 12/2023 1 each 0    magnesium oxide (MAG-OX) 400 MG tablet Take 400 mg by mouth daily      triamcinolone (KENALOG) 0.025 % ointment Apply topically 2 times daily to areas of eczema on lower legs 80 g 2    metoprolol tartrate (LOPRESSOR) 50 MG tablet Take 1 tablet by mouth 2 times daily 60 tablet 5    lisinopril (PRINIVIL;ZESTRIL) 40 MG tablet Take 40 mg by mouth daily Take 1/2 tab daily      Insulin Pen Needle 29G X 12.7MM MISC 1 each by Does not apply route daily 100 each 3     No current facility-administered medications for this visit. Allergies   Allergen Reactions    Coreg [Carvedilol] Other (See Comments)     Low pause     Pravastatin Other (See Comments)     Myalgias     Sertraline Other (See Comments)    Zocor [Simvastatin]      norvasc     Citalopram Anxiety       Health Maintenance   Topic Date Due    COVID-19 Vaccine (5 - Booster for Moderna series) 07/29/2022    Shingles vaccine (2 of 2) 01/06/2024 (Originally 12/19/2022)    Annual Wellness Visit (AWV)  02/08/2023    Depression Screen  09/26/2023    Lipids  10/24/2023    DTaP/Tdap/Td vaccine (4 - Td or Tdap) 12/21/2032    Flu vaccine  Completed    Pneumococcal 65+ years Vaccine  Completed    Hepatitis C screen  Completed    Hepatitis A vaccine  Aged Out    Hib vaccine  Aged Out    Meningococcal (ACWY) vaccine  Aged Out       Subjective:      Review of Systems   Constitutional:  Negative for chills and fever. HENT:  Negative for congestion. Eyes:  Negative for visual disturbance. Respiratory:  Negative for cough and shortness of breath. Cardiovascular:  Negative for chest pain and palpitations. Gastrointestinal:  Negative for abdominal pain. Genitourinary:  Negative for difficulty urinating and dysuria. Musculoskeletal:  Positive for back pain. Negative for arthralgias. Neurological:  Negative for dizziness and headaches.    Psychiatric/Behavioral:  Negative for self-injury, sleep disturbance and suicidal ideas. The patient is not nervous/anxious. Objective:     Physical Exam  Vitals and nursing note reviewed. Constitutional:       Appearance: He is well-developed. HENT:      Head: Normocephalic and atraumatic. Eyes:      Pupils: Pupils are equal, round, and reactive to light. Cardiovascular:      Rate and Rhythm: Normal rate and regular rhythm. Heart sounds: Normal heart sounds. Pulmonary:      Effort: Pulmonary effort is normal.      Breath sounds: Normal breath sounds. Abdominal:      General: Bowel sounds are normal.      Palpations: Abdomen is soft. Tenderness: There is no abdominal tenderness. Musculoskeletal:         General: Normal range of motion. Cervical back: Normal range of motion. Skin:     General: Skin is warm and dry. Neurological:      Mental Status: He is alert and oriented to person, place, and time. Psychiatric:         Behavior: Behavior normal.         Thought Content: Thought content normal.         Judgment: Judgment normal.     /84   Pulse 84   Wt 241 lb (109.3 kg)   SpO2 97%   BMI 32.69 kg/m²     Assessment:       Diagnosis Orders   1. Type 2 diabetes mellitus with stage 3a chronic kidney disease, unspecified whether long term insulin use ([address]        2. Chronic obstructive pulmonary disease, unspecified COPD type ([address]        3. Acute on chronic systolic heart failure ([address]        4. PAD (peripheral artery disease) (HCC)        5. Paroxysmal atrial fibrillation ([address]        6. Type 2 diabetes mellitus with chronic kidney disease, with long-term current use of insulin, unspecified CKD stage ([address]        7. Type 2 diabetes mellitus without complication, unspecified whether long term insulin use ([address]        8. Claudication ([address]        9. Unstable angina ([address]        10. Chronic diastolic congestive heart failure ([address]        11.  Type 2 diabetes mellitus with other specified complication, with long-term current use of insulin ([facility] [address]        12. Dilated aortic root ([facility] [address]        13. PVOD (pulmonary veno-occlusive disease) ([facility] [address]                  Plan:      Return in about 3 months (around 4/6/2023) for AWV. Chronic conditions- Stable. Continue current meds. Sample of ozempic given. Follow up in 3 months for recheck/earlier if needed       Patient given educational materials - see patient instructions. Discussed use, benefit, and side effects of prescribed medications. All patientquestions answered. Pt voiced understanding. Reviewed health maintenance. Instructedto continue current medications, diet and exercise. Patient agreed with treatmentplan. Follow up as directed.      Electronicallysigned by ERICK Celaya CNP on 1/6/2023 at 10:55 AM

## 2023-01-20 ENCOUNTER — TELEPHONE (OUTPATIENT)
Dept: PRIMARY CARE CLINIC | Age: 78
End: 2023-01-20

## 2023-01-20 LAB
BUN BLDV-MCNC: 26 MG/DL
CALCIUM SERPL-MCNC: 8.6 MG/DL
CHLORIDE BLD-SCNC: 108 MMOL/L
CO2: 27 MMOL/L
CREAT SERPL-MCNC: 1.21 MG/DL
GFR SERPL CREATININE-BSD FRML MDRD: NORMAL ML/MIN/{1.73_M2}
GLUCOSE BLD-MCNC: 97 MG/DL
POTASSIUM SERPL-SCNC: 3.9 MMOL/L
SODIUM BLD-SCNC: 141 MMOL/L

## 2023-01-20 NOTE — TELEPHONE ENCOUNTER
Patient wanted to know if he could have Ozempic samples. Please advise. He will be in town until Monday, 1/23.

## 2023-01-23 NOTE — TELEPHONE ENCOUNTER
Patient takes the 1.0 mg dosage and we have 3 boxes of 0.5 mg only. Please advise if patient should receive one of more of these samples.

## 2023-01-23 NOTE — TELEPHONE ENCOUNTER
Patient called back into office, advised that ozempic sample is ready for pickup    Last Visit Date: 1/6/2023   Next Visit Date: 4/10/2023

## 2023-02-13 ENCOUNTER — HOSPITAL ENCOUNTER (OUTPATIENT)
Age: 78
Discharge: HOME | End: 2023-02-13
Payer: MEDICARE

## 2023-02-13 DIAGNOSIS — I73.9 PAD (PERIPHERAL ARTERY DISEASE) (HCC): ICD-10-CM

## 2023-02-13 DIAGNOSIS — I10 ESSENTIAL HYPERTENSION: ICD-10-CM

## 2023-02-13 DIAGNOSIS — E11.22 TYPE 2 DIABETES MELLITUS WITH STAGE 3A CHRONIC KIDNEY DISEASE, UNSPECIFIED WHETHER LONG TERM INSULIN USE (HCC): ICD-10-CM

## 2023-02-13 DIAGNOSIS — I25.5 ISCHEMIC CARDIOMYOPATHY: ICD-10-CM

## 2023-02-13 DIAGNOSIS — N18.31 TYPE 2 DIABETES MELLITUS WITH STAGE 3A CHRONIC KIDNEY DISEASE, UNSPECIFIED WHETHER LONG TERM INSULIN USE (HCC): ICD-10-CM

## 2023-02-13 LAB
25(OH)D3 SERPL-MCNC: 55.5 NG/ML
ABSOLUTE EOS #: 0.17 K/UL (ref 0–0.4)
ABSOLUTE LYMPH #: 2.38 K/UL (ref 1–4.8)
ABSOLUTE MONO #: 0.77 K/UL (ref 0.1–1.3)
ANION GAP SERPL CALCULATED.3IONS-SCNC: 11 MMOL/L (ref 9–17)
BASOPHILS # BLD: 1 % (ref 0–2)
BASOPHILS ABSOLUTE: 0.09 K/UL (ref 0–0.2)
BUN SERPL-MCNC: 23 MG/DL (ref 8–23)
CALCIUM SERPL-MCNC: 8.9 MG/DL (ref 8.6–10.4)
CHLORIDE SERPL-SCNC: 105 MMOL/L (ref 98–107)
CO2 SERPL-SCNC: 26 MMOL/L (ref 20–31)
CREAT SERPL-MCNC: 1.16 MG/DL (ref 0.7–1.2)
CREATININE URINE: 9.6 MG/DL (ref 39–259)
EOSINOPHILS RELATIVE PERCENT: 2 % (ref 0–4)
GFR SERPL CREATININE-BSD FRML MDRD: >60 ML/MIN/1.73M2
GLUCOSE SERPL-MCNC: 212 MG/DL (ref 70–99)
HCT VFR BLD AUTO: 37 % (ref 41–53)
HGB BLD-MCNC: 11.2 G/DL (ref 13.5–17.5)
LYMPHOCYTES # BLD: 28 % (ref 24–44)
MAGNESIUM SERPL-MCNC: 2.1 MG/DL (ref 1.6–2.6)
MCH RBC QN AUTO: 21.8 PG (ref 26–34)
MCHC RBC AUTO-ENTMCNC: 30.4 G/DL (ref 31–37)
MCV RBC AUTO: 71.9 FL (ref 80–100)
MONOCYTES # BLD: 9 % (ref 1–7)
MORPHOLOGY: ABNORMAL
PDW BLD-RTO: 20.6 % (ref 11.5–14.9)
PHOSPHATE SERPL-MCNC: 3.7 MG/DL (ref 2.5–4.5)
PLATELET # BLD AUTO: 285 K/UL (ref 150–450)
PMV BLD AUTO: 7.7 FL (ref 6–12)
POTASSIUM SERPL-SCNC: 4.3 MMOL/L (ref 3.7–5.3)
PTH-INTACT SERPL-MCNC: 91.1 PG/ML (ref 14–72)
RBC # BLD: 5.15 M/UL (ref 4.5–5.9)
SEG NEUTROPHILS: 60 % (ref 36–66)
SEGMENTED NEUTROPHILS ABSOLUTE COUNT: 5.09 K/UL (ref 1.3–9.1)
SODIUM SERPL-SCNC: 142 MMOL/L (ref 135–144)
TOTAL PROTEIN, URINE: <4 MG/DL
WBC # BLD AUTO: 8.5 K/UL (ref 3.5–11)

## 2023-02-13 PROCEDURE — 82306 VITAMIN D 25 HYDROXY: CPT

## 2023-02-13 PROCEDURE — 82570 ASSAY OF URINE CREATININE: CPT

## 2023-02-13 PROCEDURE — 84100 ASSAY OF PHOSPHORUS: CPT

## 2023-02-13 PROCEDURE — 80048 BASIC METABOLIC PNL TOTAL CA: CPT

## 2023-02-13 PROCEDURE — 85025 COMPLETE CBC W/AUTO DIFF WBC: CPT

## 2023-02-13 PROCEDURE — 83970 ASSAY OF PARATHORMONE: CPT

## 2023-02-13 PROCEDURE — 83735 ASSAY OF MAGNESIUM: CPT

## 2023-02-13 PROCEDURE — 36415 COLL VENOUS BLD VENIPUNCTURE: CPT

## 2023-02-13 PROCEDURE — 84156 ASSAY OF PROTEIN URINE: CPT

## 2023-02-27 ENCOUNTER — TELEPHONE (OUTPATIENT)
Dept: PRIMARY CARE CLINIC | Age: 78
End: 2023-02-27

## 2023-02-27 NOTE — TELEPHONE ENCOUNTER
LVM notifying patient that sample can be picked up anytime during office hours and to contact office with any questions or concerns

## 2023-02-27 NOTE — TELEPHONE ENCOUNTER
Patient called requesting a sample of [address] currently has one Ozempic 2 mg sample. Writer aside for patient and informed patient that approval is needed from PCP and once approved, the office will call him back.  Patient verbalized understanding    Please advise if patient can have sample

## 2023-03-16 DIAGNOSIS — G89.4 CHRONIC PAIN SYNDROME: ICD-10-CM

## 2023-03-16 RX ORDER — OXYCODONE HYDROCHLORIDE AND ACETAMINOPHEN 5; 325 MG/1; MG/1
1 TABLET ORAL EVERY 8 HOURS PRN
Qty: 90 TABLET | Refills: 0 | Status: SHIPPED | OUTPATIENT
Start: 2023-03-16 | End: 2023-05-18 | Stop reason: SDUPTHER

## 2023-03-20 RX ORDER — BLOOD SUGAR DIAGNOSTIC
STRIP MISCELLANEOUS
Qty: 100 EACH | Refills: 0 | Status: SHIPPED | OUTPATIENT
Start: 2023-03-20

## 2023-04-10 PROBLEM — F11.20 OPIOID DEPENDENCE WITH CURRENT USE (HCC): Status: ACTIVE | Noted: 2023-04-10

## 2023-04-10 PROBLEM — D68.69 SECONDARY HYPERCOAGULABLE STATE (HCC): Status: ACTIVE | Noted: 2023-04-10

## 2023-04-24 RX ORDER — ATORVASTATIN CALCIUM 80 MG/1
80 TABLET, FILM COATED ORAL NIGHTLY
Qty: 90 TABLET | Refills: 0 | Status: SHIPPED | OUTPATIENT
Start: 2023-04-24

## 2023-05-18 DIAGNOSIS — G89.4 CHRONIC PAIN SYNDROME: ICD-10-CM

## 2023-05-18 RX ORDER — OXYCODONE HYDROCHLORIDE AND ACETAMINOPHEN 5; 325 MG/1; MG/1
1 TABLET ORAL EVERY 8 HOURS PRN
Qty: 90 TABLET | Refills: 0 | Status: SHIPPED | OUTPATIENT
Start: 2023-05-18 | End: 2023-07-21 | Stop reason: SDUPTHER

## 2023-06-22 ENCOUNTER — TELEPHONE (OUTPATIENT)
Dept: PRIMARY CARE CLINIC | Age: 78
End: 2023-06-22

## 2023-06-22 NOTE — TELEPHONE ENCOUNTER
Pt. Called today requesting a sample ozempic , pt. Verbalized understanding that sample needs to be approved by provider. There are 8 samples of ozempic once weekly   And only 1 sample of the 2mg . Please advise if pt.  Can have sample

## 2023-06-22 NOTE — TELEPHONE ENCOUNTER
Patient is on the 2mg dosage. He will be in tomorrow to , one box signed out for patient. Alert and oriented to person, place, time/situation. normal mood and affect. no apparent risk to self or others.

## 2023-06-26 RX ORDER — NITROGLYCERIN 0.4 MG/1
TABLET SUBLINGUAL
Qty: 25 TABLET | Refills: 2 | Status: SHIPPED | OUTPATIENT
Start: 2023-06-26

## 2023-07-10 RX ORDER — BLOOD SUGAR DIAGNOSTIC
STRIP MISCELLANEOUS
Qty: 100 EACH | Refills: 0 | Status: SHIPPED | OUTPATIENT
Start: 2023-07-10

## 2023-07-20 RX ORDER — ATORVASTATIN CALCIUM 80 MG/1
80 TABLET, FILM COATED ORAL NIGHTLY
Qty: 90 TABLET | Refills: 0 | Status: SHIPPED | OUTPATIENT
Start: 2023-07-20

## 2023-07-21 DIAGNOSIS — G89.4 CHRONIC PAIN SYNDROME: ICD-10-CM

## 2023-07-21 RX ORDER — OXYCODONE HYDROCHLORIDE AND ACETAMINOPHEN 5; 325 MG/1; MG/1
1 TABLET ORAL EVERY 8 HOURS PRN
Qty: 90 TABLET | Refills: 0 | Status: SHIPPED | OUTPATIENT
Start: 2023-07-21 | End: 2023-09-20 | Stop reason: SDUPTHER

## 2023-07-27 LAB
ABSOLUTE BASO #: 0.06 K/UL (ref 0–0.2)
ABSOLUTE EOS #: 0.22 K/UL (ref 0–0.5)
ABSOLUTE LYMPH #: 2.45 K/UL (ref 1–4)
ABSOLUTE MONO #: 0.99 K/UL (ref 0.2–1)
ABSOLUTE NEUT #: 4.47 K/UL (ref 1.5–7.5)
ANION GAP SERPL CALCULATED.3IONS-SCNC: 12 MEQ/L (ref 7–16)
BASOPHILS RELATIVE PERCENT: 0.7 %
BUN BLDV-MCNC: 24 MG/DL (ref 8–23)
CALCIUM SERPL-MCNC: 9.1 MG/DL (ref 8.5–10.5)
CHLORIDE BLD-SCNC: 105 MEQ/L (ref 95–107)
CO2: 28 MEQ/L (ref 19–31)
CREAT SERPL-MCNC: 1.22 MG/DL (ref 0.8–1.4)
CREATINE, URINE: 39 MG/DL
EGFR IF NONAFRICAN AMERICAN: 61 ML/MIN/1.73
EOSINOPHILS RELATIVE PERCENT: 2.7 %
GLUCOSE: 140 MG/DL (ref 70–99)
HCT VFR BLD CALC: 36.5 % (ref 40–51)
HEMOGLOBIN: 11.5 G/DL (ref 13.5–17)
LYMPHOCYTE %: 29.8 %
MAGNESIUM: 2.4 MG/DL (ref 1.6–2.6)
MCH RBC QN AUTO: 23.5 PG (ref 25–33)
MCHC RBC AUTO-ENTMCNC: 31.5 G/DL (ref 31–36)
MCV RBC AUTO: 74.6 FL (ref 80–99)
MONOCYTES # BLD: 12 %
NEUTROPHILS RELATIVE PERCENT: 54.3 %
PDW BLD-RTO: 16.4 % (ref 11.5–15)
PHOSPHORUS: 3.3 MG/DL (ref 2.5–4.5)
PLATELETS: 270 K/UL (ref 130–400)
PMV BLD AUTO: 10.5 FL (ref 9.3–13)
POTASSIUM SERPL-SCNC: 4.3 MEQ/L (ref 3.5–5.4)
PROTEIN, URINE: 8 MG/DL
RBC: 4.89 M/UL (ref 4.5–6.1)
SODIUM BLD-SCNC: 145 MEQ/L (ref 133–146)
VITAMIN D 25-HYDROXY: 39 NG/ML
WBC: 8.2 K/UL (ref 3.5–11)

## 2023-07-28 LAB — PARATHYROID HORMONE INTACT: 74 PG/ML (ref 15–65)

## 2023-08-22 RX ORDER — BLOOD SUGAR DIAGNOSTIC
STRIP MISCELLANEOUS
Qty: 100 EACH | Refills: 0 | Status: SHIPPED | OUTPATIENT
Start: 2023-08-22

## 2023-08-30 ENCOUNTER — TELEPHONE (OUTPATIENT)
Dept: PRIMARY CARE CLINIC | Age: 78
End: 2023-08-30

## 2023-09-20 DIAGNOSIS — G89.4 CHRONIC PAIN SYNDROME: ICD-10-CM

## 2023-09-20 RX ORDER — OXYCODONE HYDROCHLORIDE AND ACETAMINOPHEN 5; 325 MG/1; MG/1
1 TABLET ORAL EVERY 8 HOURS PRN
Qty: 90 TABLET | Refills: 0 | Status: SHIPPED | OUTPATIENT
Start: 2023-09-20 | End: 2023-11-20 | Stop reason: ALTCHOICE

## 2023-09-27 ENCOUNTER — TELEPHONE (OUTPATIENT)
Dept: PRIMARY CARE CLINIC | Age: 78
End: 2023-09-27

## 2023-10-05 NOTE — CARE COORDINATION
Ambulatory Care Coordination Note  1/12/2018  CM Risk Score: 3  Opal Mortality Risk Score: 10.46    ACC: Kevin Weber RN    Summary Note: Pt c/o foot pain 4th metatarsal, states the toe looks fine, we discussed looking at the web spaces too to assure no open sore, skin breakdown, he states he will check after work tonight. He will also call me back after he finds out what day he is off next week to make an appt for his foot. Otherwise he is doing well sugars are between 140-150 this week they were slightly better last week. Diabetes Assessment    Meal Planning:  Avoidance of concentrated sweets, Carb counting   How often do you test your blood sugar?:  Daily   Do you have barriers with adherence to non-pharmacologic self-management interventions?  (Nutrition/Exercise/Self-Monitoring):  No   Have you ever had to go to the ED for symptoms of low blood sugar?:  No       Do you have hyperglycemia symptoms?:  No   Do you have hypoglycemia symptoms?:  No   Last Blood Sugar Value:  133   Blood Sugar Monitoring Regimen:  Once a Day   Blood Sugar Trends:  Fluctuating       and   COPD Assessment    Does the patient understand envrionmental exposure?:  Yes  Is the patient able to verbalize Rescue vs. Long Acting medications?:  No  Does the patient have a nebulizer?:  Yes  Does the patient use a space with inhaled medications?:  No            Symptoms:   None:  Yes                   Care Coordination Interventions    Program Enrollment:  Rising Risk  Referral from Primary Care Provider:  Yes  Suggested Interventions and Community Resources  Diabetes Education:  Completed  Specialty Services Referral:  Completed         Goals Addressed             Most Recent     Care Coordination Self Management   On track (1/12/2018)             CC Self Management Goal  Patient Goal (What steps will patient take to achieve goal?):take medications as prescribed, report issues  Patient is able to discuss self-management of Requested Prescriptions     Pending Prescriptions Disp Refills    lisinopril (PRINIVIL;ZESTRIL) 5 MG tablet 30 tablet 0     Sig: Take 1 tablet by mouth daily

## 2023-10-09 DIAGNOSIS — M10.9 ARTHRITIS OF LEFT WRIST DUE TO GOUT: ICD-10-CM

## 2023-10-09 RX ORDER — ALLOPURINOL 100 MG/1
100 TABLET ORAL DAILY
Qty: 90 TABLET | Refills: 0 | Status: SHIPPED | OUTPATIENT
Start: 2023-10-09

## 2023-10-09 RX ORDER — BLOOD SUGAR DIAGNOSTIC
STRIP MISCELLANEOUS
Qty: 100 EACH | Refills: 0 | Status: SHIPPED | OUTPATIENT
Start: 2023-10-09

## 2023-10-09 RX ORDER — ATORVASTATIN CALCIUM 80 MG/1
40 TABLET, FILM COATED ORAL NIGHTLY
Qty: 90 TABLET | Refills: 3 | Status: SHIPPED | OUTPATIENT
Start: 2023-10-09

## 2023-10-18 ENCOUNTER — OFFICE VISIT (OUTPATIENT)
Dept: PRIMARY CARE CLINIC | Age: 78
End: 2023-10-18
Payer: MEDICARE

## 2023-10-18 VITALS
WEIGHT: 235 LBS | BODY MASS INDEX: 31.87 KG/M2 | HEART RATE: 66 BPM | SYSTOLIC BLOOD PRESSURE: 122 MMHG | DIASTOLIC BLOOD PRESSURE: 80 MMHG | OXYGEN SATURATION: 100 %

## 2023-10-18 DIAGNOSIS — N18.31 TYPE 2 DIABETES MELLITUS WITH STAGE 3A CHRONIC KIDNEY DISEASE, UNSPECIFIED WHETHER LONG TERM INSULIN USE (HCC): Primary | ICD-10-CM

## 2023-10-18 DIAGNOSIS — I50.23 ACUTE ON CHRONIC SYSTOLIC HEART FAILURE (HCC): ICD-10-CM

## 2023-10-18 DIAGNOSIS — E11.22 TYPE 2 DIABETES MELLITUS WITH STAGE 3A CHRONIC KIDNEY DISEASE, UNSPECIFIED WHETHER LONG TERM INSULIN USE (HCC): Primary | ICD-10-CM

## 2023-10-18 DIAGNOSIS — J06.9 UPPER RESPIRATORY TRACT INFECTION, UNSPECIFIED TYPE: ICD-10-CM

## 2023-10-18 LAB — HBA1C MFR BLD: 8.1 %

## 2023-10-18 PROCEDURE — 3052F HG A1C>EQUAL 8.0%<EQUAL 9.0%: CPT | Performed by: NURSE PRACTITIONER

## 2023-10-18 PROCEDURE — 3074F SYST BP LT 130 MM HG: CPT | Performed by: NURSE PRACTITIONER

## 2023-10-18 PROCEDURE — 99214 OFFICE O/P EST MOD 30 MIN: CPT | Performed by: NURSE PRACTITIONER

## 2023-10-18 PROCEDURE — 3079F DIAST BP 80-89 MM HG: CPT | Performed by: NURSE PRACTITIONER

## 2023-10-18 PROCEDURE — 83036 HEMOGLOBIN GLYCOSYLATED A1C: CPT | Performed by: NURSE PRACTITIONER

## 2023-10-18 PROCEDURE — 1123F ACP DISCUSS/DSCN MKR DOCD: CPT | Performed by: NURSE PRACTITIONER

## 2023-10-18 RX ORDER — AMOXICILLIN AND CLAVULANATE POTASSIUM 875; 125 MG/1; MG/1
1 TABLET, FILM COATED ORAL 2 TIMES DAILY
Qty: 20 TABLET | Refills: 0 | Status: SHIPPED | OUTPATIENT
Start: 2023-10-18 | End: 2023-10-28

## 2023-10-18 ASSESSMENT — ENCOUNTER SYMPTOMS
SINUS PRESSURE: 1
SORE THROAT: 1
ABDOMINAL PAIN: 0
COUGH: 0
BACK PAIN: 1
SINUS PAIN: 1
SHORTNESS OF BREATH: 0

## 2023-10-18 ASSESSMENT — PATIENT HEALTH QUESTIONNAIRE - PHQ9
1. LITTLE INTEREST OR PLEASURE IN DOING THINGS: 0
SUM OF ALL RESPONSES TO PHQ9 QUESTIONS 1 & 2: 0
SUM OF ALL RESPONSES TO PHQ QUESTIONS 1-9: 0
2. FEELING DOWN, DEPRESSED OR HOPELESS: 0
SUM OF ALL RESPONSES TO PHQ QUESTIONS 1-9: 0

## 2023-11-06 DIAGNOSIS — G89.4 CHRONIC PAIN SYNDROME: ICD-10-CM

## 2023-11-06 RX ORDER — OXYCODONE HYDROCHLORIDE AND ACETAMINOPHEN 5; 325 MG/1; MG/1
1 TABLET ORAL EVERY 8 HOURS PRN
Qty: 90 TABLET | Refills: 0 | Status: SHIPPED | OUTPATIENT
Start: 2023-11-06 | End: 2023-12-06

## 2023-11-20 ENCOUNTER — OFFICE VISIT (OUTPATIENT)
Dept: PRIMARY CARE CLINIC | Age: 78
End: 2023-11-20
Payer: MEDICARE

## 2023-11-20 VITALS
WEIGHT: 241 LBS | OXYGEN SATURATION: 96 % | DIASTOLIC BLOOD PRESSURE: 78 MMHG | SYSTOLIC BLOOD PRESSURE: 124 MMHG | BODY MASS INDEX: 32.64 KG/M2 | HEIGHT: 72 IN | HEART RATE: 78 BPM | RESPIRATION RATE: 14 BRPM

## 2023-11-20 DIAGNOSIS — N18.31 TYPE 2 DIABETES MELLITUS WITH STAGE 3A CHRONIC KIDNEY DISEASE, UNSPECIFIED WHETHER LONG TERM INSULIN USE (HCC): Primary | ICD-10-CM

## 2023-11-20 DIAGNOSIS — E78.5 HYPERLIPIDEMIA, UNSPECIFIED HYPERLIPIDEMIA TYPE: Chronic | ICD-10-CM

## 2023-11-20 DIAGNOSIS — E11.22 TYPE 2 DIABETES MELLITUS WITH STAGE 3A CHRONIC KIDNEY DISEASE, UNSPECIFIED WHETHER LONG TERM INSULIN USE (HCC): Primary | ICD-10-CM

## 2023-11-20 PROCEDURE — 1123F ACP DISCUSS/DSCN MKR DOCD: CPT | Performed by: NURSE PRACTITIONER

## 2023-11-20 PROCEDURE — 99214 OFFICE O/P EST MOD 30 MIN: CPT | Performed by: NURSE PRACTITIONER

## 2023-11-20 PROCEDURE — 3074F SYST BP LT 130 MM HG: CPT | Performed by: NURSE PRACTITIONER

## 2023-11-20 PROCEDURE — 3078F DIAST BP <80 MM HG: CPT | Performed by: NURSE PRACTITIONER

## 2023-11-20 PROCEDURE — 3052F HG A1C>EQUAL 8.0%<EQUAL 9.0%: CPT | Performed by: NURSE PRACTITIONER

## 2023-11-20 RX ORDER — INSULIN GLARGINE-YFGN 100 [IU]/ML
32 INJECTION, SOLUTION SUBCUTANEOUS 2 TIMES DAILY
COMMUNITY
Start: 2023-10-31

## 2023-11-20 SDOH — ECONOMIC STABILITY: FOOD INSECURITY: WITHIN THE PAST 12 MONTHS, YOU WORRIED THAT YOUR FOOD WOULD RUN OUT BEFORE YOU GOT MONEY TO BUY MORE.: NEVER TRUE

## 2023-11-20 SDOH — ECONOMIC STABILITY: FOOD INSECURITY: WITHIN THE PAST 12 MONTHS, THE FOOD YOU BOUGHT JUST DIDN'T LAST AND YOU DIDN'T HAVE MONEY TO GET MORE.: NEVER TRUE

## 2023-11-20 SDOH — ECONOMIC STABILITY: INCOME INSECURITY: HOW HARD IS IT FOR YOU TO PAY FOR THE VERY BASICS LIKE FOOD, HOUSING, MEDICAL CARE, AND HEATING?: NOT HARD AT ALL

## 2023-11-20 ASSESSMENT — PATIENT HEALTH QUESTIONNAIRE - PHQ9
SUM OF ALL RESPONSES TO PHQ QUESTIONS 1-9: 0
SUM OF ALL RESPONSES TO PHQ9 QUESTIONS 1 & 2: 0
SUM OF ALL RESPONSES TO PHQ QUESTIONS 1-9: 0
SUM OF ALL RESPONSES TO PHQ QUESTIONS 1-9: 0
1. LITTLE INTEREST OR PLEASURE IN DOING THINGS: 0
2. FEELING DOWN, DEPRESSED OR HOPELESS: 0
SUM OF ALL RESPONSES TO PHQ QUESTIONS 1-9: 0

## 2023-11-20 ASSESSMENT — ENCOUNTER SYMPTOMS
BACK PAIN: 0
ABDOMINAL PAIN: 0
SHORTNESS OF BREATH: 0
COUGH: 0

## 2023-11-20 NOTE — PROGRESS NOTES
Semaglutide, 1 MG/DOSE, 4 MG/3ML SOPN Inject 2 mg into the skin once a week 6 mL 0    Continuous Blood Gluc  (FREESTYLE JENNIFER 2 READER) ALLI 1 each by Does not apply route continuous 1 each 1    Continuous Blood Gluc Sensor (FREESTYLE JENNIFER 2 SENSOR) MISC 1 each by Does not apply route every 14 days 2 each 3    Potassium 99 MG TABS Take by mouth Daily      metOLazone (ZAROXOLYN) 2.5 MG tablet Take 1 tablet by mouth See Admin Instructions      empagliflozin (JARDIANCE) 10 MG tablet Take 1 tablet by mouth daily 90 tablet 3    ELIQUIS 5 MG TABS tablet Take 1 tablet by mouth 2 times daily 60 tablet 5    esomeprazole Magnesium (NEXIUM) 20 MG PACK Take 1 packet by mouth daily      diclofenac sodium (VOLTAREN) 1 % GEL Apply 2 g topically 4 times daily as needed for Pain 150 g 0    isosorbide mononitrate (IMDUR) 30 MG extended release tablet Take 1 tablet by mouth daily      albuterol sulfate  (90 Base) MCG/ACT inhaler INHALE 2 PUFFS BY INHALATION THREE TIMES A DAY AS NEEDED FOR SHORTNESS OF BREATH      potassium chloride (KLOR-CON M) 20 MEQ extended release tablet TAKE 1  BY MOUTH ONCE DAILY 90 tablet 0    albuterol (PROVENTIL) (2.5 MG/3ML) 0.083% nebulizer solution Take 3 mLs by nebulization every 6 hours as needed for Wheezing or Shortness of Breath 5 Package 0    Handicap Placard MISC by Does not apply route Expires 12/2023 1 each 0    magnesium oxide (MAG-OX) 400 MG tablet Take 1 tablet by mouth daily      triamcinolone (KENALOG) 0.025 % ointment Apply topically 2 times daily to areas of eczema on lower legs 80 g 2    metoprolol tartrate (LOPRESSOR) 50 MG tablet Take 1 tablet by mouth 2 times daily 60 tablet 5    lisinopril (PRINIVIL;ZESTRIL) 40 MG tablet Take 1 tablet by mouth daily Take 1/2 tab daily      Insulin Pen Needle 29G X 12.7MM MISC 1 each by Does not apply route daily 100 each 3     No current facility-administered medications for this visit.      Allergies   Allergen Reactions    Coreg

## 2023-11-27 RX ORDER — AMIODARONE HYDROCHLORIDE 200 MG/1
TABLET ORAL
Qty: 90 TABLET | Refills: 0 | Status: SHIPPED | OUTPATIENT
Start: 2023-11-27

## 2023-11-28 RX ORDER — FUROSEMIDE 40 MG/1
TABLET ORAL
Qty: 180 TABLET | Refills: 3 | Status: SHIPPED | OUTPATIENT
Start: 2023-11-28

## 2023-12-14 RX ORDER — AMOXICILLIN AND CLAVULANATE POTASSIUM 875; 125 MG/1; MG/1
1 TABLET, FILM COATED ORAL 2 TIMES DAILY
Qty: 20 TABLET | Refills: 0 | Status: SHIPPED | OUTPATIENT
Start: 2023-12-14 | End: 2023-12-24

## 2023-12-14 RX ORDER — BLOOD SUGAR DIAGNOSTIC
STRIP MISCELLANEOUS
Qty: 100 EACH | Refills: 0 | Status: SHIPPED | OUTPATIENT
Start: 2023-12-14

## 2023-12-31 NOTE — CARE COORDINATION
Needle 29G X 12.7MM MISC 1 each by Does not apply route daily 8/17/16   Darline Erwin NP   saxagliptin (ONGLYZA) 5 MG TABS tablet Take 5 mg by mouth daily    Historical Provider, MD   metFORMIN (GLUCOPHAGE) 1000 MG tablet Take 1 tablet by mouth 2 times daily (with meals) 5/7/15   Darline Erwin NP   amLODIPine (NORVASC) 10 MG tablet Take 10 mg by mouth daily    Historical Provider, MD   aspirin 81 MG tablet Take 81 mg by mouth daily    Historical Provider, MD       Future Appointments  Date Time Provider Adeline Odonnell   11/29/2017 9:45 AM SCHEDULE, [facility] [place] HEART/VASCULAR DEVICE CHECK [address] a   11/29/2017 10:00 AM Melinda Galindo MD [address] [facility] Heart a   3/28/2018 8:30 AM Jeimy Montiel CNP [facility] [address] [facility] Initial (On Arrival)

## 2024-01-17 DIAGNOSIS — M10.9 ARTHRITIS OF LEFT WRIST DUE TO GOUT: ICD-10-CM

## 2024-01-17 RX ORDER — ALLOPURINOL 100 MG/1
100 TABLET ORAL DAILY
Qty: 90 TABLET | Refills: 0 | Status: SHIPPED | OUTPATIENT
Start: 2024-01-17

## 2024-01-18 ENCOUNTER — OFFICE VISIT (OUTPATIENT)
Dept: PRIMARY CARE CLINIC | Age: 79
End: 2024-01-18

## 2024-01-18 VITALS
WEIGHT: 238.6 LBS | DIASTOLIC BLOOD PRESSURE: 84 MMHG | HEART RATE: 80 BPM | BODY MASS INDEX: 32.32 KG/M2 | RESPIRATION RATE: 14 BRPM | HEIGHT: 72 IN | SYSTOLIC BLOOD PRESSURE: 112 MMHG | OXYGEN SATURATION: 96 %

## 2024-01-18 DIAGNOSIS — Z00.00 ENCOUNTER FOR GENERAL ADULT MEDICAL EXAMINATION W/O ABNORMAL FINDINGS: Primary | ICD-10-CM

## 2024-01-18 DIAGNOSIS — F11.20 OPIOID DEPENDENCE WITH CURRENT USE (HCC): ICD-10-CM

## 2024-01-18 DIAGNOSIS — I48.0 PAROXYSMAL ATRIAL FIBRILLATION (HCC): ICD-10-CM

## 2024-01-18 DIAGNOSIS — J44.9 CHRONIC OBSTRUCTIVE PULMONARY DISEASE, UNSPECIFIED COPD TYPE (HCC): ICD-10-CM

## 2024-01-18 DIAGNOSIS — G89.4 CHRONIC PAIN SYNDROME: ICD-10-CM

## 2024-01-18 DIAGNOSIS — M25.551 RIGHT HIP PAIN: ICD-10-CM

## 2024-01-18 DIAGNOSIS — D68.69 SECONDARY HYPERCOAGULABLE STATE (HCC): ICD-10-CM

## 2024-01-18 DIAGNOSIS — I50.23 ACUTE ON CHRONIC SYSTOLIC HEART FAILURE (HCC): ICD-10-CM

## 2024-01-18 DIAGNOSIS — N18.32 STAGE 3B CHRONIC KIDNEY DISEASE (HCC): ICD-10-CM

## 2024-01-18 DIAGNOSIS — I73.9 PAD (PERIPHERAL ARTERY DISEASE) (HCC): ICD-10-CM

## 2024-01-18 DIAGNOSIS — E11.22 TYPE 2 DIABETES MELLITUS WITH STAGE 3A CHRONIC KIDNEY DISEASE, UNSPECIFIED WHETHER LONG TERM INSULIN USE (HCC): ICD-10-CM

## 2024-01-18 DIAGNOSIS — N18.31 TYPE 2 DIABETES MELLITUS WITH STAGE 3A CHRONIC KIDNEY DISEASE, UNSPECIFIED WHETHER LONG TERM INSULIN USE (HCC): ICD-10-CM

## 2024-01-18 LAB — HBA1C MFR BLD: 8.2 %

## 2024-01-18 RX ORDER — OXYCODONE HYDROCHLORIDE AND ACETAMINOPHEN 5; 325 MG/1; MG/1
1 TABLET ORAL EVERY 8 HOURS PRN
Qty: 90 TABLET | Refills: 0 | Status: SHIPPED | OUTPATIENT
Start: 2024-01-18 | End: 2024-02-17

## 2024-01-18 ASSESSMENT — PATIENT HEALTH QUESTIONNAIRE - PHQ9
SUM OF ALL RESPONSES TO PHQ QUESTIONS 1-9: 1
1. LITTLE INTEREST OR PLEASURE IN DOING THINGS: 0
SUM OF ALL RESPONSES TO PHQ QUESTIONS 1-9: 1
SUM OF ALL RESPONSES TO PHQ QUESTIONS 1-9: 1
2. FEELING DOWN, DEPRESSED OR HOPELESS: 1
SUM OF ALL RESPONSES TO PHQ QUESTIONS 1-9: 1
SUM OF ALL RESPONSES TO PHQ9 QUESTIONS 1 & 2: 1

## 2024-01-18 NOTE — PROGRESS NOTES
MD Randa   diclofenac sodium (VOLTAREN) 1 % GEL Apply 2 g topically 4 times daily as needed for Pain Yes Vega Card MD   isosorbide mononitrate (IMDUR) 30 MG extended release tablet Take 1 tablet by mouth daily Yes Randa Boyd MD   albuterol sulfate  (90 Base) MCG/ACT inhaler INHALE 2 PUFFS BY INHALATION THREE TIMES A DAY AS NEEDED FOR SHORTNESS OF BREATH Yes ProviderRanda MD   potassium chloride (KLOR-CON M) 20 MEQ extended release tablet TAKE 1  BY MOUTH ONCE DAILY Yes Ellie Osborne APRN - CNP   albuterol (PROVENTIL) (2.5 MG/3ML) 0.083% nebulizer solution Take 3 mLs by nebulization every 6 hours as needed for Wheezing or Shortness of Breath Yes Jena Gama MD Handicap Placard MISC by Does not apply route Expires 12/2023 Yes Ellie Osborne APRN - CNP   magnesium oxide (MAG-OX) 400 MG tablet Take 1 tablet by mouth daily Yes ProviderRanda MD   triamcinolone (KENALOG) 0.025 % ointment Apply topically 2 times daily to areas of eczema on lower legs Yes Zac Meza MD   metoprolol tartrate (LOPRESSOR) 50 MG tablet Take 1 tablet by mouth 2 times daily Yes Ellie Osborne APRN - CNP   lisinopril (PRINIVIL;ZESTRIL) 40 MG tablet Take 1 tablet by mouth daily Take 1/2 tab daily Yes ProviderRanda MD   Insulin Pen Needle 29G X 12.7MM MISC 1 each by Does not apply route daily Yes Kim Jiménez APRN - CNP   oxyCODONE-acetaminophen (PERCOCET) 5-325 MG per tablet Take 1 tablet by mouth every 8 hours as needed for Pain for up to 30 days.  Ellie Osborne APRN - CNP       CareTeam (Including outside providers/suppliers regularly involved in providing care):   Patient Care Team:  Ellie Osborne APRN - CNP as PCP - General (Nurse Practitioner)  Ellie Osborne APRN - CNP as PCP - Empaneled Provider  Ky Hernandez MD as Orthopedic Surgeon (Orthopedic Surgery)  Familia Robbins MD as Consulting Physician (Pain

## 2024-01-26 ENCOUNTER — HOSPITAL ENCOUNTER (OUTPATIENT)
Dept: GENERAL RADIOLOGY | Age: 79
End: 2024-01-26
Payer: MEDICARE

## 2024-01-26 ENCOUNTER — HOSPITAL ENCOUNTER (OUTPATIENT)
Age: 79
Discharge: HOME | End: 2024-01-26
Payer: MEDICARE

## 2024-01-26 ENCOUNTER — HOSPITAL ENCOUNTER (OUTPATIENT)
Age: 79
End: 2024-01-26
Payer: MEDICARE

## 2024-01-26 DIAGNOSIS — I25.5 ISCHEMIC CARDIOMYOPATHY: ICD-10-CM

## 2024-01-26 DIAGNOSIS — N18.31 TYPE 2 DIABETES MELLITUS WITH STAGE 3A CHRONIC KIDNEY DISEASE, UNSPECIFIED WHETHER LONG TERM INSULIN USE (HCC): ICD-10-CM

## 2024-01-26 DIAGNOSIS — E11.22 TYPE 2 DIABETES MELLITUS WITH STAGE 3A CHRONIC KIDNEY DISEASE, UNSPECIFIED WHETHER LONG TERM INSULIN USE (HCC): ICD-10-CM

## 2024-01-26 DIAGNOSIS — M25.551 RIGHT HIP PAIN: ICD-10-CM

## 2024-01-26 DIAGNOSIS — I10 ESSENTIAL HYPERTENSION: ICD-10-CM

## 2024-01-26 DIAGNOSIS — E78.5 DYSLIPIDEMIA: ICD-10-CM

## 2024-01-26 LAB
25(OH)D3 SERPL-MCNC: 40.9 NG/ML
ANION GAP SERPL CALCULATED.3IONS-SCNC: 10 MMOL/L (ref 9–17)
BASOPHILS # BLD: 0.08 K/UL (ref 0–0.2)
BASOPHILS NFR BLD: 1 % (ref 0–2)
BUN SERPL-MCNC: 28 MG/DL (ref 8–23)
CALCIUM SERPL-MCNC: 9 MG/DL (ref 8.6–10.4)
CHLORIDE SERPL-SCNC: 105 MMOL/L (ref 98–107)
CO2 SERPL-SCNC: 27 MMOL/L (ref 20–31)
CREAT SERPL-MCNC: 1.2 MG/DL (ref 0.7–1.2)
CREAT UR-MCNC: 48.9 MG/DL (ref 39–259)
EOSINOPHIL # BLD: 0.25 K/UL (ref 0–0.4)
EOSINOPHILS RELATIVE PERCENT: 3 % (ref 0–4)
ERYTHROCYTE [DISTWIDTH] IN BLOOD BY AUTOMATED COUNT: 18.7 % (ref 11.5–14.9)
GFR SERPL CREATININE-BSD FRML MDRD: >60 ML/MIN/1.73M2
GLUCOSE SERPL-MCNC: 129 MG/DL (ref 70–99)
HCT VFR BLD AUTO: 39.8 % (ref 41–53)
HGB BLD-MCNC: 12.3 G/DL (ref 13.5–17.5)
LYMPHOCYTES NFR BLD: 2.91 K/UL (ref 1–4.8)
LYMPHOCYTES RELATIVE PERCENT: 35 % (ref 24–44)
MAGNESIUM SERPL-MCNC: 2.4 MG/DL (ref 1.6–2.6)
MCH RBC QN AUTO: 24.5 PG (ref 26–34)
MCHC RBC AUTO-ENTMCNC: 30.9 G/DL (ref 31–37)
MCV RBC AUTO: 79.3 FL (ref 80–100)
MONOCYTES NFR BLD: 1 K/UL (ref 0.1–1.3)
MONOCYTES NFR BLD: 12 % (ref 1–7)
MORPHOLOGY: ABNORMAL
MORPHOLOGY: ABNORMAL
NEUTROPHILS NFR BLD: 49 % (ref 36–66)
NEUTS SEG NFR BLD: 4.06 K/UL (ref 1.3–9.1)
PHOSPHATE SERPL-MCNC: 3.3 MG/DL (ref 2.5–4.5)
PLATELET # BLD AUTO: 293 K/UL (ref 150–450)
PMV BLD AUTO: 8 FL (ref 6–12)
POTASSIUM SERPL-SCNC: 4.3 MMOL/L (ref 3.7–5.3)
PTH-INTACT SERPL-MCNC: 78.8 PG/ML (ref 14–72)
RBC # BLD AUTO: 5.02 M/UL (ref 4.5–5.9)
SODIUM SERPL-SCNC: 142 MMOL/L (ref 135–144)
TOTAL PROTEIN, URINE: 7 MG/DL
WBC OTHER # BLD: 8.3 K/UL (ref 3.5–11)

## 2024-01-26 PROCEDURE — 85025 COMPLETE CBC W/AUTO DIFF WBC: CPT

## 2024-01-26 PROCEDURE — 83970 ASSAY OF PARATHORMONE: CPT

## 2024-01-26 PROCEDURE — 84100 ASSAY OF PHOSPHORUS: CPT

## 2024-01-26 PROCEDURE — 73502 X-RAY EXAM HIP UNI 2-3 VIEWS: CPT

## 2024-01-26 PROCEDURE — 82570 ASSAY OF URINE CREATININE: CPT

## 2024-01-26 PROCEDURE — 36415 COLL VENOUS BLD VENIPUNCTURE: CPT

## 2024-01-26 PROCEDURE — 84156 ASSAY OF PROTEIN URINE: CPT

## 2024-01-26 PROCEDURE — 80048 BASIC METABOLIC PNL TOTAL CA: CPT

## 2024-01-26 PROCEDURE — 82306 VITAMIN D 25 HYDROXY: CPT

## 2024-01-26 PROCEDURE — 83735 ASSAY OF MAGNESIUM: CPT

## 2024-02-22 RX ORDER — AMIODARONE HYDROCHLORIDE 200 MG/1
TABLET ORAL
Qty: 90 TABLET | Refills: 0 | Status: SHIPPED | OUTPATIENT
Start: 2024-02-22

## 2024-02-22 RX ORDER — BLOOD SUGAR DIAGNOSTIC
STRIP MISCELLANEOUS
Qty: 100 EACH | Refills: 0 | Status: SHIPPED | OUTPATIENT
Start: 2024-02-22

## 2024-03-04 DIAGNOSIS — E11.42 DIABETIC POLYNEUROPATHY ASSOCIATED WITH TYPE 2 DIABETES MELLITUS (HCC): ICD-10-CM

## 2024-03-04 RX ORDER — GABAPENTIN 300 MG/1
CAPSULE ORAL
Qty: 90 CAPSULE | Refills: 0 | Status: SHIPPED | OUTPATIENT
Start: 2024-03-04 | End: 2024-04-04

## 2024-03-08 DIAGNOSIS — G89.4 CHRONIC PAIN SYNDROME: ICD-10-CM

## 2024-03-08 RX ORDER — OXYCODONE HYDROCHLORIDE AND ACETAMINOPHEN 5; 325 MG/1; MG/1
1 TABLET ORAL EVERY 8 HOURS PRN
Qty: 90 TABLET | Refills: 0 | Status: SHIPPED | OUTPATIENT
Start: 2024-03-08 | End: 2024-04-07

## 2024-03-30 DIAGNOSIS — E11.42 DIABETIC POLYNEUROPATHY ASSOCIATED WITH TYPE 2 DIABETES MELLITUS (HCC): ICD-10-CM

## 2024-03-30 RX ORDER — GABAPENTIN 300 MG/1
CAPSULE ORAL
Qty: 90 CAPSULE | Refills: 0 | Status: SHIPPED | OUTPATIENT
Start: 2024-03-30 | End: 2024-04-20

## 2024-04-12 DIAGNOSIS — M10.9 ARTHRITIS OF LEFT WRIST DUE TO GOUT: ICD-10-CM

## 2024-04-12 RX ORDER — ALLOPURINOL 100 MG/1
100 TABLET ORAL DAILY
Qty: 90 TABLET | Refills: 0 | Status: SHIPPED | OUTPATIENT
Start: 2024-04-12

## 2024-04-12 RX ORDER — BLOOD SUGAR DIAGNOSTIC
STRIP MISCELLANEOUS
Qty: 100 EACH | Refills: 0 | Status: SHIPPED | OUTPATIENT
Start: 2024-04-12

## 2024-04-17 DIAGNOSIS — N18.31 TYPE 2 DIABETES MELLITUS WITH STAGE 3A CHRONIC KIDNEY DISEASE, UNSPECIFIED WHETHER LONG TERM INSULIN USE (HCC): ICD-10-CM

## 2024-04-17 DIAGNOSIS — E11.22 TYPE 2 DIABETES MELLITUS WITH STAGE 3A CHRONIC KIDNEY DISEASE, UNSPECIFIED WHETHER LONG TERM INSULIN USE (HCC): ICD-10-CM

## 2024-04-17 DIAGNOSIS — E11.22 TYPE 2 DIABETES MELLITUS WITH STAGE 3A CHRONIC KIDNEY DISEASE, UNSPECIFIED WHETHER LONG TERM INSULIN USE (HCC): Primary | ICD-10-CM

## 2024-04-17 DIAGNOSIS — N18.31 TYPE 2 DIABETES MELLITUS WITH STAGE 3A CHRONIC KIDNEY DISEASE, UNSPECIFIED WHETHER LONG TERM INSULIN USE (HCC): Primary | ICD-10-CM

## 2024-04-17 RX ORDER — BLOOD SUGAR DIAGNOSTIC
STRIP MISCELLANEOUS
Qty: 100 EACH | Refills: 0 | Status: SHIPPED | OUTPATIENT
Start: 2024-04-17 | End: 2024-04-17 | Stop reason: SDUPTHER

## 2024-04-17 RX ORDER — BLOOD SUGAR DIAGNOSTIC
STRIP MISCELLANEOUS
Qty: 100 EACH | Refills: 0 | Status: SHIPPED | OUTPATIENT
Start: 2024-04-17

## 2024-04-18 ENCOUNTER — CARE COORDINATION (OUTPATIENT)
Dept: CARE COORDINATION | Age: 79
End: 2024-04-18

## 2024-04-18 ENCOUNTER — OFFICE VISIT (OUTPATIENT)
Dept: PRIMARY CARE CLINIC | Age: 79
End: 2024-04-18

## 2024-04-18 VITALS
OXYGEN SATURATION: 96 % | HEIGHT: 72 IN | BODY MASS INDEX: 32.34 KG/M2 | DIASTOLIC BLOOD PRESSURE: 68 MMHG | WEIGHT: 238.8 LBS | SYSTOLIC BLOOD PRESSURE: 120 MMHG | HEART RATE: 62 BPM

## 2024-04-18 DIAGNOSIS — I73.9 PAD (PERIPHERAL ARTERY DISEASE) (HCC): ICD-10-CM

## 2024-04-18 DIAGNOSIS — G89.4 CHRONIC PAIN SYNDROME: ICD-10-CM

## 2024-04-18 DIAGNOSIS — E11.22 TYPE 2 DIABETES MELLITUS WITH STAGE 3A CHRONIC KIDNEY DISEASE, UNSPECIFIED WHETHER LONG TERM INSULIN USE (HCC): Primary | ICD-10-CM

## 2024-04-18 DIAGNOSIS — I48.0 PAROXYSMAL ATRIAL FIBRILLATION (HCC): ICD-10-CM

## 2024-04-18 DIAGNOSIS — N18.31 TYPE 2 DIABETES MELLITUS WITH STAGE 3A CHRONIC KIDNEY DISEASE, UNSPECIFIED WHETHER LONG TERM INSULIN USE (HCC): Primary | ICD-10-CM

## 2024-04-18 DIAGNOSIS — I50.23 ACUTE ON CHRONIC SYSTOLIC HEART FAILURE (HCC): ICD-10-CM

## 2024-04-18 DIAGNOSIS — E11.42 DIABETIC POLYNEUROPATHY ASSOCIATED WITH TYPE 2 DIABETES MELLITUS (HCC): ICD-10-CM

## 2024-04-18 DIAGNOSIS — J44.9 CHRONIC OBSTRUCTIVE PULMONARY DISEASE, UNSPECIFIED COPD TYPE (HCC): ICD-10-CM

## 2024-04-18 DIAGNOSIS — N18.32 STAGE 3B CHRONIC KIDNEY DISEASE (HCC): ICD-10-CM

## 2024-04-18 LAB — HBA1C MFR BLD: 7.4 %

## 2024-04-18 RX ORDER — AMLODIPINE BESYLATE 5 MG/1
5 TABLET ORAL EVERY MORNING
COMMUNITY
Start: 2024-03-19

## 2024-04-18 RX ORDER — OXYCODONE HYDROCHLORIDE AND ACETAMINOPHEN 5; 325 MG/1; MG/1
1 TABLET ORAL EVERY 8 HOURS PRN
Qty: 90 TABLET | Refills: 0 | Status: SHIPPED | OUTPATIENT
Start: 2024-04-18 | End: 2024-05-18

## 2024-04-18 SDOH — ECONOMIC STABILITY: FOOD INSECURITY: WITHIN THE PAST 12 MONTHS, THE FOOD YOU BOUGHT JUST DIDN'T LAST AND YOU DIDN'T HAVE MONEY TO GET MORE.: NEVER TRUE

## 2024-04-18 SDOH — ECONOMIC STABILITY: FOOD INSECURITY: WITHIN THE PAST 12 MONTHS, YOU WORRIED THAT YOUR FOOD WOULD RUN OUT BEFORE YOU GOT MONEY TO BUY MORE.: NEVER TRUE

## 2024-04-18 SDOH — ECONOMIC STABILITY: INCOME INSECURITY: HOW HARD IS IT FOR YOU TO PAY FOR THE VERY BASICS LIKE FOOD, HOUSING, MEDICAL CARE, AND HEATING?: NOT HARD AT ALL

## 2024-04-18 ASSESSMENT — ENCOUNTER SYMPTOMS
SHORTNESS OF BREATH: 0
COUGH: 0
BACK PAIN: 1
ABDOMINAL PAIN: 0

## 2024-04-18 ASSESSMENT — PATIENT HEALTH QUESTIONNAIRE - PHQ9
SUM OF ALL RESPONSES TO PHQ QUESTIONS 1-9: 0
SUM OF ALL RESPONSES TO PHQ QUESTIONS 1-9: 0
2. FEELING DOWN, DEPRESSED OR HOPELESS: NOT AT ALL
1. LITTLE INTEREST OR PLEASURE IN DOING THINGS: NOT AT ALL
SUM OF ALL RESPONSES TO PHQ QUESTIONS 1-9: 0
SUM OF ALL RESPONSES TO PHQ QUESTIONS 1-9: 0
SUM OF ALL RESPONSES TO PHQ9 QUESTIONS 1 & 2: 0

## 2024-04-18 NOTE — CARE COORDINATION
Called patient and reviewed care management and declined.     Patient Excluded from Care Coordination? Yes     The patient will be excluded from Care Coordination for the following reason: Patient declined care coordination.

## 2024-04-18 NOTE — PROGRESS NOTES
stents     COLONOSCOPY      CORONARY ANGIOPLASTY WITH STENT PLACEMENT  2018    [facility]    DIAGNOSTIC CARDIAC CATH LAB PROCEDURE      JOINT REPLACEMENT      knee right parital    PACEMAKER INSERTION      PTCA      ROTATOR CUFF REPAIR      TONSILLECTOMY         Family History   Problem Relation Age of Onset    Heart Disease Mother     High Blood Pressure Mother     Heart Disease Father     Cancer Sister         breast cancer    Cancer Brother         bladder cancer    Diabetes Maternal Grandmother           Social History     Tobacco Use    Smoking status: Former     Current packs/day: 0.00     Average packs/day: 1 pack/day for 40.0 years (40.0 ttl pk-yrs)     Types: Cigarettes     Start date: 1954     Quit date: 1994     Years since quittin.9    Smokeless tobacco: Never   Substance Use Topics    Alcohol use: Yes     Comment: rare      Current Outpatient Medications   Medication Sig Dispense Refill    amLODIPine (NORVASC) 5 MG tablet Take 1 tablet by mouth every morning      oxyCODONE-acetaminophen (PERCOCET) 5-325 MG per tablet Take 1 tablet by mouth every 8 hours as needed for Pain for up to 30 days. 90 tablet 0    blood glucose test strips (ONETOUCH ULTRA) strip As needed. 100 each 0    allopurinol (ZYLOPRIM) 100 MG tablet Take 1 tablet by mouth once daily 90 tablet 0    gabapentin (NEURONTIN) 300 MG capsule TAKE 1 CAPSULE BY MOUTH THREE TIMES DAILY 90 capsule 0    amiodarone (CORDARONE) 200 MG tablet Take 1 tablet by mouth once daily 90 tablet 0    Handicap Placard MISC by Does not apply route Expires 2029 1 each 0    furosemide (LASIX) 40 MG tablet Take 1 tablet by mouth once daily   May take 1 additional tablet daily PRN 2 pound weight gain, short of breath or swelling. 180 tablet 3    Insulin Glargine-yfgn 100 UNIT/ML SOPN Inject 32 Units into the skin in the morning and 32 Units in the evening.      atorvastatin (LIPITOR) 80 MG tablet Take 0.5 tablets by mouth nightly 90 tablet 3

## 2024-04-29 DIAGNOSIS — E11.42 DIABETIC POLYNEUROPATHY ASSOCIATED WITH TYPE 2 DIABETES MELLITUS (HCC): ICD-10-CM

## 2024-04-29 RX ORDER — AMIODARONE HYDROCHLORIDE 200 MG/1
TABLET ORAL
Qty: 90 TABLET | Refills: 0 | Status: SHIPPED | OUTPATIENT
Start: 2024-04-29

## 2024-04-29 RX ORDER — GABAPENTIN 300 MG/1
CAPSULE ORAL
Qty: 90 CAPSULE | Refills: 0 | Status: SHIPPED | OUTPATIENT
Start: 2024-04-29 | End: 2024-05-29

## 2024-05-31 DIAGNOSIS — G89.4 CHRONIC PAIN SYNDROME: ICD-10-CM

## 2024-05-31 RX ORDER — OXYCODONE HYDROCHLORIDE AND ACETAMINOPHEN 5; 325 MG/1; MG/1
1 TABLET ORAL EVERY 8 HOURS PRN
Qty: 90 TABLET | Refills: 0 | Status: SHIPPED | OUTPATIENT
Start: 2024-05-31 | End: 2024-06-30

## 2024-05-31 NOTE — TELEPHONE ENCOUNTER
Last OV:  4/18/2024    Next OV: 7/18/2024    Pharmacy:   [facility] - [place], [place] - [address] -  [phone] - F [phone]  [facility] [address]  Phone: [phone] Fax: [phone]       Confirmed? Yes

## 2024-07-14 DIAGNOSIS — M10.9 ARTHRITIS OF LEFT WRIST DUE TO GOUT: ICD-10-CM

## 2024-07-15 RX ORDER — ALLOPURINOL 100 MG/1
100 TABLET ORAL DAILY
Qty: 90 TABLET | Refills: 0 | Status: SHIPPED | OUTPATIENT
Start: 2024-07-15

## 2024-07-18 ENCOUNTER — OFFICE VISIT (OUTPATIENT)
Dept: PRIMARY CARE CLINIC | Age: 79
End: 2024-07-18

## 2024-07-18 VITALS
HEART RATE: 71 BPM | SYSTOLIC BLOOD PRESSURE: 130 MMHG | BODY MASS INDEX: 33.09 KG/M2 | DIASTOLIC BLOOD PRESSURE: 80 MMHG | OXYGEN SATURATION: 97 % | WEIGHT: 244 LBS

## 2024-07-18 DIAGNOSIS — E78.5 HYPERLIPIDEMIA, UNSPECIFIED HYPERLIPIDEMIA TYPE: ICD-10-CM

## 2024-07-18 DIAGNOSIS — I73.9 PAD (PERIPHERAL ARTERY DISEASE) (HCC): ICD-10-CM

## 2024-07-18 DIAGNOSIS — J44.9 CHRONIC OBSTRUCTIVE PULMONARY DISEASE, UNSPECIFIED COPD TYPE (HCC): ICD-10-CM

## 2024-07-18 DIAGNOSIS — I48.0 PAROXYSMAL ATRIAL FIBRILLATION (HCC): ICD-10-CM

## 2024-07-18 DIAGNOSIS — E11.22 TYPE 2 DIABETES MELLITUS WITH STAGE 3A CHRONIC KIDNEY DISEASE, UNSPECIFIED WHETHER LONG TERM INSULIN USE (HCC): Primary | ICD-10-CM

## 2024-07-18 DIAGNOSIS — G89.4 CHRONIC PAIN SYNDROME: ICD-10-CM

## 2024-07-18 DIAGNOSIS — N18.32 STAGE 3B CHRONIC KIDNEY DISEASE (HCC): ICD-10-CM

## 2024-07-18 DIAGNOSIS — E11.42 DIABETIC POLYNEUROPATHY ASSOCIATED WITH TYPE 2 DIABETES MELLITUS (HCC): ICD-10-CM

## 2024-07-18 DIAGNOSIS — N18.31 TYPE 2 DIABETES MELLITUS WITH STAGE 3A CHRONIC KIDNEY DISEASE, UNSPECIFIED WHETHER LONG TERM INSULIN USE (HCC): Primary | ICD-10-CM

## 2024-07-18 LAB — HBA1C MFR BLD: 7.7 %

## 2024-07-18 RX ORDER — OXYCODONE HYDROCHLORIDE AND ACETAMINOPHEN 5; 325 MG/1; MG/1
1 TABLET ORAL EVERY 8 HOURS PRN
Qty: 90 TABLET | Refills: 0 | Status: SHIPPED | OUTPATIENT
Start: 2024-07-18 | End: 2024-08-17

## 2024-07-18 ASSESSMENT — ENCOUNTER SYMPTOMS
ABDOMINAL PAIN: 0
COUGH: 0
SHORTNESS OF BREATH: 0
BACK PAIN: 1

## 2024-07-18 NOTE — PROGRESS NOTES
NEEDED FOR CHEST PAIN.  DO NOT EXCEED A TOTAL OF 3 DOSES IN 15 MINUTES 25 tablet 2    loratadine (CLARITIN) 10 MG tablet Take 1 tablet by mouth daily 30 tablet 3    Semaglutide, 1 MG/DOSE, 4 MG/3ML SOPN Inject 2 mg into the skin once a week 6 mL 0    Continuous Blood Gluc  (FREESTYLE JENNIFER 2 READER) ALLI 1 each by Does not apply route continuous 1 each 1    Continuous Blood Gluc Sensor (FREESTYLE JENNIFER 2 SENSOR) MISC 1 each by Does not apply route every 14 days 2 each 3    Potassium 99 MG TABS Take by mouth Daily      metOLazone (ZAROXOLYN) 2.5 MG tablet Take 1 tablet by mouth See Admin Instructions      empagliflozin (JARDIANCE) 10 MG tablet Take 1 tablet by mouth daily 90 tablet 3    ELIQUIS 5 MG TABS tablet Take 1 tablet by mouth 2 times daily 60 tablet 5    esomeprazole Magnesium (NEXIUM) 20 MG PACK Take 1 packet by mouth daily      diclofenac sodium (VOLTAREN) 1 % GEL Apply 2 g topically 4 times daily as needed for Pain 150 g 0    isosorbide mononitrate (IMDUR) 30 MG extended release tablet Take 1 tablet by mouth daily      albuterol sulfate  (90 Base) MCG/ACT inhaler INHALE 2 PUFFS BY INHALATION THREE TIMES A DAY AS NEEDED FOR SHORTNESS OF BREATH      potassium chloride (KLOR-CON M) 20 MEQ extended release tablet TAKE 1  BY MOUTH ONCE DAILY 90 tablet 0    albuterol (PROVENTIL) (2.5 MG/3ML) 0.083% nebulizer solution Take 3 mLs by nebulization every 6 hours as needed for Wheezing or Shortness of Breath 5 Package 0    magnesium oxide (MAG-OX) 400 MG tablet Take 1 tablet by mouth daily      triamcinolone (KENALOG) 0.025 % ointment Apply topically 2 times daily to areas of eczema on lower legs 80 g 2    metoprolol tartrate (LOPRESSOR) 50 MG tablet Take 1 tablet by mouth 2 times daily 60 tablet 5    lisinopril (PRINIVIL;ZESTRIL) 40 MG tablet Take 1 tablet by mouth daily Take 1/2 tab daily      Insulin Pen Needle 29G X 12.7MM MISC 1 each by Does not apply route daily 100 each 3     No current

## 2024-08-09 ENCOUNTER — TELEPHONE (OUTPATIENT)
Dept: PRIMARY CARE CLINIC | Age: 79
End: 2024-08-09

## 2024-08-09 DIAGNOSIS — M25.562 ACUTE PAIN OF LEFT KNEE: Primary | ICD-10-CM

## 2024-08-09 NOTE — TELEPHONE ENCOUNTER
----- Message from Mandi Soto sent at 8/9/2024  1:48 PM EDT -----  Regarding: ECC Escalation To Practice  ECC Escalation To Practice      Type of Escalation: Red Flag Symptom  --------------------------------------------------------------------------------------------------------------------------    Information for Provider:  Patient is looking for appointment for: SWELLING  Reasons for Message: Patient disconnected     Additional Information: Patient feel 6 weeks ago and there is a swelling in his knee.   --------------------------------------------------------------------------------------------------------------------------    Relationship to Patient: Self     Call Back Info: OK to leave message on voicemail  Preferred Call Back Number: [phone]

## 2024-08-12 RX ORDER — AMIODARONE HYDROCHLORIDE 200 MG/1
TABLET ORAL
Qty: 90 TABLET | Refills: 0 | Status: SHIPPED | OUTPATIENT
Start: 2024-08-12

## 2024-08-14 NOTE — TELEPHONE ENCOUNTER
Pt informed, Pt is asking for a referral to Dr. Newton in , ortho specialist, to see him for his left knee issue, please advise

## 2024-08-15 LAB
ANION GAP SERPL CALCULATED.3IONS-SCNC: 14 MEQ/L (ref 7–16)
BASOPHILS ABSOLUTE: 0.06 K/UL (ref 0–0.2)
BASOPHILS RELATIVE PERCENT: 0.7 %
BUN BLDV-MCNC: 31 MG/DL (ref 8–23)
CALCIUM SERPL-MCNC: 8.8 MG/DL (ref 8.5–10.5)
CHLORIDE BLD-SCNC: 105 MEQ/L (ref 95–107)
CO2: 25 MEQ/L (ref 19–31)
CREAT SERPL-MCNC: 1.31 MG/DL (ref 0.8–1.4)
CREATINE, URINE: 31.3 MG/DL
EGFR IF NONAFRICAN AMERICAN: 56 ML/MIN/1.73
EOSINOPHILS ABSOLUTE: 0.18 K/UL (ref 0–0.5)
EOSINOPHILS RELATIVE PERCENT: 2.1 %
GLUCOSE: 197 MG/DL (ref 70–99)
HCT VFR BLD CALC: 42.3 % (ref 40–51)
HEMOGLOBIN: 12.9 G/DL (ref 13.5–17)
IMMATURE GRANS (ABS): 0.03
IMMATURE GRANULOCYTES %: 0.4 %
LYMPHOCYTES ABSOLUTE: 2.69 K/UL (ref 1–4)
LYMPHOCYTES RELATIVE PERCENT: 31.5 %
MAGNESIUM: 2.1 MG/DL (ref 1.6–2.6)
MCH RBC QN AUTO: 24.7 PG (ref 25–33)
MCHC RBC AUTO-ENTMCNC: 30.5 G/DL (ref 31–36)
MCV RBC AUTO: 81 FL (ref 80–99)
MONOCYTES ABSOLUTE: 0.9 K/UL (ref 0.2–1)
MONOCYTES RELATIVE PERCENT: 10.5 %
NEUTROPHILS ABSOLUTE: 4.69 K/UL (ref 1.5–7.5)
NEUTROPHILS RELATIVE PERCENT: 54.8 %
PDW BLD-RTO: 15.6 % (ref 11.5–15)
PHOSPHORUS: 4.2 MG/DL (ref 2.5–4.5)
PLATELET # BLD: 272 K/UL (ref 130–400)
PMV BLD AUTO: 10.2 FL (ref 9.3–13)
POTASSIUM SERPL-SCNC: 4.1 MEQ/L (ref 3.5–5.4)
PROTEIN, URINE: <4 MG/DL
RBC # BLD: 5.22 M/UL (ref 4.5–6.1)
SODIUM BLD-SCNC: 144 MEQ/L (ref 133–146)
VITAMIN D 25-HYDROXY: 43 NG/ML
WBC # BLD: 8.6 K/UL (ref 3.5–11)

## 2024-08-16 LAB — PTH INTACT: 82 PG/ML (ref 15–65)

## 2024-09-02 ENCOUNTER — TELEPHONE (OUTPATIENT)
Dept: PRIMARY CARE CLINIC | Age: 79
End: 2024-09-02

## 2024-09-02 RX ORDER — APIXABAN 5 MG/1
5 TABLET, FILM COATED ORAL
Qty: 14 TABLET | Refills: 0 | Status: SHIPPED | OUTPATIENT
Start: 2024-09-02 | End: 2024-09-09

## 2024-09-16 ENCOUNTER — HOSPITAL ENCOUNTER (OUTPATIENT)
Dept: PHYSICAL THERAPY | Age: 79
Setting detail: THERAPIES SERIES
Discharge: HOME | End: 2024-09-16
Payer: MEDICARE

## 2024-09-16 PROCEDURE — 97161 PT EVAL LOW COMPLEX 20 MIN: CPT

## 2024-09-16 PROCEDURE — 97110 THERAPEUTIC EXERCISES: CPT

## 2024-09-20 ENCOUNTER — HOSPITAL ENCOUNTER (OUTPATIENT)
Dept: PHYSICAL THERAPY | Age: 79
Setting detail: THERAPIES SERIES
Discharge: HOME | End: 2024-09-20
Payer: MEDICARE

## 2024-09-20 PROCEDURE — 97110 THERAPEUTIC EXERCISES: CPT

## 2024-09-23 ENCOUNTER — HOSPITAL ENCOUNTER (OUTPATIENT)
Dept: PHYSICAL THERAPY | Age: 79
Setting detail: THERAPIES SERIES
Discharge: HOME | End: 2024-09-23
Payer: MEDICARE

## 2024-09-23 PROCEDURE — 97110 THERAPEUTIC EXERCISES: CPT

## 2024-09-26 DIAGNOSIS — G89.4 CHRONIC PAIN SYNDROME: ICD-10-CM

## 2024-09-26 RX ORDER — OXYCODONE AND ACETAMINOPHEN 5; 325 MG/1; MG/1
1 TABLET ORAL EVERY 8 HOURS PRN
Qty: 90 TABLET | Refills: 0 | Status: SHIPPED | OUTPATIENT
Start: 2024-09-26 | End: 2024-10-26

## 2024-09-30 ENCOUNTER — HOSPITAL ENCOUNTER (OUTPATIENT)
Dept: PHYSICAL THERAPY | Age: 79
Setting detail: THERAPIES SERIES
Discharge: HOME | End: 2024-09-30
Payer: MEDICARE

## 2024-09-30 PROCEDURE — 97110 THERAPEUTIC EXERCISES: CPT

## 2024-09-30 NOTE — FLOWSHEET NOTE
[facility] Outpatient Physical Therapy   [address]   Phone: [phone]   Fax: [phone]    Physical Therapy Daily Treatment Note      Date:  2024  Patient Name:  Dharmesh Arora    :  1945  MRN: [id number]  Physician: Glenn Tate PA-C --EXTERNAL                                       Insurance: [employer] Medicare with $40 copay --BMN  CPT codes verified:[phone](1)  [id number]  - based on medical necessity   Medical Diagnosis: S80.02XA (ICD-10-CM) - Contusion of left knee, initial encounter       Rehab Codes: M62.81 , M25.562 , R26.89   Onset Date: 2024                      Next 's appt: 10/9/2024  Visit# / total visits: 4/8  Cancels/No Shows: 0/0    Precautions: Fall Risk     Subjective:    Patient states he wore a knee brace to work today because he was feeling sore. States he does not have any pain currently. Also states he has been semi-compliant with his HEP.   Pain:  [] Yes  [x] No Location: L knee Pain Rating: (0-10 scale) soreness/10  Pain altered Tx:  [x] No  [] Yes  Action:  Comments:    Objective:  INTERVENTIONS  INTERVENTIONS  Reps/ Time Weight/ Level Completed  Today Comments               MODALITIES                                    MANUAL                                  EXERCISES           Nustep 5' Lvl 4 x BLE only;  inc resistance          Seated:       HS stretch w/ strap 3x30''  x    LAQ 10x3  Red x  inc reps   Hip ADD Seated with volleyball 10x2 3 sec      HS curls 10x3 Red x  inc reps          Standing:       Knee flexion stretch on step 15x5'' 10in x    Standing Marches 10x2   x  1 UE on // bars, trying to reduce to fingertip   Mini Squats 10x2   x  BUE on // bars   Heel Raises 10x2   x     3 way hip (B) 10x ea Yellow x  added resistance   Step ups fwd/lat 10x2 ea 6 in x  inc reps               Patient Education/Home Program :   9/23: Red band for LAQ and HS curls  Access

## 2024-10-04 ENCOUNTER — HOSPITAL ENCOUNTER (OUTPATIENT)
Dept: PHYSICAL THERAPY | Age: 79
Setting detail: THERAPIES SERIES
Discharge: HOME | End: 2024-10-04
Payer: MEDICARE

## 2024-10-04 NOTE — FLOWSHEET NOTE
[facility]   Outpatient Rehabilitation & Therapy  [address] [facility] [address]  P: [phone]   F: [phone]     Physical Therapy Cancel/No Show note    Date: 10/4/2024  Patient: Dharmesh Arora  : 1945  MRN: [id number]    Visit Count:   Cancels/No Shows to date:     For today's appointment patient:    [x]  Cancelled    [] Rescheduled appointment    [] No-show     Reason given by patient:    []  Patient ill    []  Conflicting appointment    [] No transportation      [] Conflict with work    [x] No reason given    [] Weather related    [] COVID-19    [] Other:      Comments:        [x] Next appointment was confirmed    Electronically signed by: Jacque Brizuela PTA

## 2024-10-08 ENCOUNTER — HOSPITAL ENCOUNTER (OUTPATIENT)
Dept: PHYSICAL THERAPY | Age: 79
Setting detail: THERAPIES SERIES
Discharge: HOME | End: 2024-10-08
Payer: MEDICARE

## 2024-10-08 PROCEDURE — 97110 THERAPEUTIC EXERCISES: CPT

## 2024-10-08 NOTE — FLOWSHEET NOTE
[facility] Outpatient Physical Therapy   [address]   Phone: [phone]   Fax: [phone]    Physical Therapy Daily Treatment Note      Date:  10/8/2024  Patient Name:  Dharmesh Arora    :  1945  MRN: [id number]  Physician: Glenn Tate PA-C --EXTERNAL                                       Insurance: [employer] Medicare with $40 copay --BMN  CPT codes verified:[phone](1)  [id number]  - based on medical necessity   Medical Diagnosis: S80.02XA (ICD-10-CM) - Contusion of left knee, initial encounter       Rehab Codes: M62.81 , M25.562 , R26.89   Onset Date: 2024                      Next 's appt: 10/9/2024  Visit# / total visits: 5/8  Cancels/No Shows: 0/0    Precautions: Fall Risk     Subjective:    Patient states he is wearing his brace. Reports to still be wearing his brace. Was walking around [facility] prior to this and had pain up to 7/10 but now it had decreased. Does feel like therapy is helping him but he is \"not fully there yet.\"  Pain:  [] Yes  [x] No Location: L knee Pain Rating: (0-10 scale) 1-2/10  Pain altered Tx:  [x] No  [] Yes  Action:  Comments:    Objective:  INTERVENTIONS  INTERVENTIONS  Reps/ Time Weight/ Level Completed  Today Comments               MODALITIES                                    MANUAL                                  EXERCISES           Nustep 4' Lvl 5 x BLE only;  inc resistance          Seated:       HS stretch w/ strap 3x30''      LAQ 10x3  green  x  inc reps  Increase resistance 10/8   Hip ADD Seated with volleyball 10x2 3 sec x     HS curls 10x3 green x  inc reps  Increase resistance 10/8   Hip ABD 10 x 2 yellow x Added 10/8          Standing:       Knee flexion stretch on step 15x5'' 10in x    Standing Marches 10x2   x  1 UE on // bars, trying to reduce to fingertip   Mini Squats 10x2   x  BUE on // bars   Heel Raises 10x2   x     3 way hip (B) 12x ea Yellow x

## 2024-10-15 ENCOUNTER — APPOINTMENT (OUTPATIENT)
Dept: PHYSICAL THERAPY | Age: 79
End: 2024-10-15
Payer: MEDICARE

## 2024-10-16 DIAGNOSIS — M10.9 ARTHRITIS OF LEFT WRIST DUE TO GOUT: ICD-10-CM

## 2024-10-16 RX ORDER — ALLOPURINOL 100 MG/1
100 TABLET ORAL DAILY
Qty: 90 TABLET | Refills: 0 | Status: SHIPPED | OUTPATIENT
Start: 2024-10-16

## 2024-10-21 ENCOUNTER — OFFICE VISIT (OUTPATIENT)
Dept: PRIMARY CARE CLINIC | Age: 79
End: 2024-10-21

## 2024-10-21 VITALS
DIASTOLIC BLOOD PRESSURE: 68 MMHG | RESPIRATION RATE: 16 BRPM | OXYGEN SATURATION: 95 % | WEIGHT: 240.6 LBS | SYSTOLIC BLOOD PRESSURE: 104 MMHG | HEART RATE: 81 BPM | HEIGHT: 72 IN | BODY MASS INDEX: 32.59 KG/M2

## 2024-10-21 DIAGNOSIS — N18.31 TYPE 2 DIABETES MELLITUS WITH STAGE 3A CHRONIC KIDNEY DISEASE, UNSPECIFIED WHETHER LONG TERM INSULIN USE (HCC): Primary | ICD-10-CM

## 2024-10-21 DIAGNOSIS — I73.9 PAD (PERIPHERAL ARTERY DISEASE) (HCC): ICD-10-CM

## 2024-10-21 DIAGNOSIS — J44.9 CHRONIC OBSTRUCTIVE PULMONARY DISEASE, UNSPECIFIED COPD TYPE (HCC): ICD-10-CM

## 2024-10-21 DIAGNOSIS — G89.4 CHRONIC PAIN SYNDROME: ICD-10-CM

## 2024-10-21 DIAGNOSIS — I50.23 ACUTE ON CHRONIC SYSTOLIC HEART FAILURE (HCC): ICD-10-CM

## 2024-10-21 DIAGNOSIS — I48.0 PAROXYSMAL ATRIAL FIBRILLATION (HCC): ICD-10-CM

## 2024-10-21 DIAGNOSIS — E11.22 TYPE 2 DIABETES MELLITUS WITH STAGE 3A CHRONIC KIDNEY DISEASE, UNSPECIFIED WHETHER LONG TERM INSULIN USE (HCC): Primary | ICD-10-CM

## 2024-10-21 DIAGNOSIS — E78.5 HYPERLIPIDEMIA, UNSPECIFIED HYPERLIPIDEMIA TYPE: ICD-10-CM

## 2024-10-21 LAB — HBA1C MFR BLD: 7.3 %

## 2024-10-21 RX ORDER — OXYCODONE AND ACETAMINOPHEN 5; 325 MG/1; MG/1
1 TABLET ORAL EVERY 8 HOURS PRN
Qty: 90 TABLET | Refills: 0 | Status: SHIPPED | OUTPATIENT
Start: 2024-10-26 | End: 2024-11-25

## 2024-10-21 SDOH — ECONOMIC STABILITY: FOOD INSECURITY: WITHIN THE PAST 12 MONTHS, YOU WORRIED THAT YOUR FOOD WOULD RUN OUT BEFORE YOU GOT MONEY TO BUY MORE.: NEVER TRUE

## 2024-10-21 SDOH — ECONOMIC STABILITY: FOOD INSECURITY: WITHIN THE PAST 12 MONTHS, THE FOOD YOU BOUGHT JUST DIDN'T LAST AND YOU DIDN'T HAVE MONEY TO GET MORE.: NEVER TRUE

## 2024-10-21 SDOH — ECONOMIC STABILITY: INCOME INSECURITY: HOW HARD IS IT FOR YOU TO PAY FOR THE VERY BASICS LIKE FOOD, HOUSING, MEDICAL CARE, AND HEATING?: NOT HARD AT ALL

## 2024-10-21 ASSESSMENT — PATIENT HEALTH QUESTIONNAIRE - PHQ9
SUM OF ALL RESPONSES TO PHQ QUESTIONS 1-9: 0
SUM OF ALL RESPONSES TO PHQ QUESTIONS 1-9: 0
2. FEELING DOWN, DEPRESSED OR HOPELESS: NOT AT ALL
1. LITTLE INTEREST OR PLEASURE IN DOING THINGS: NOT AT ALL
SUM OF ALL RESPONSES TO PHQ9 QUESTIONS 1 & 2: 0
SUM OF ALL RESPONSES TO PHQ QUESTIONS 1-9: 0
SUM OF ALL RESPONSES TO PHQ QUESTIONS 1-9: 0

## 2024-10-21 ASSESSMENT — ENCOUNTER SYMPTOMS
ABDOMINAL PAIN: 0
SHORTNESS OF BREATH: 1
BACK PAIN: 0
COUGH: 0

## 2024-10-21 NOTE — PROGRESS NOTES
[facility]  [facility] PRIMARY CARE  [address] [place] [address] [address]  [address]  [facility] [address]  Dept: [phone]  Dept Fax: [phone]    Dharmesh Arora is a 79 y.o. male who presentstoday for his medical conditions/complaints as noted below.  Dharmesh Arora is c/o of  Chief Complaint   Patient presents with    3 Month Follow-Up    Diabetes           HPI:     Presents for 3 month recheck on chronic conditions  BP well controlled  Has lost 4lb since LOV    Notes slight increase in feelings of anxiety  Mostly related to his current health status  Reviewed LOV with cardiology:  \"AICD- Biotronik: # 2 . Checked 8/24 - no AFib, no VT or defibs. Last event 9/2016 AF shock and 3/2016 VT- ATP and shocked once. Some discomfort at pocket and significant movement of device in pocket. Did see EP re revision, but did not pursue. Now has increasd RV pacing % with decline in LVF. Agrees to EP eval for consideration of upgrade to BiV device.\"  Has appt to est with EP next Monday am    Chronic lower back pain  Managed with percocet TID prn  This helps him to function during the day  Denies any side effects with use of the medication    Hga1c from 7.7 to 7.3  Compliant with use of the medications  Denies any side effects    Denies any other problems/concerns        Hemoglobin A1C (%)   Date Value   10/21/2024 7.3   07/18/2024 7.7   04/18/2024 7.4             ( goal A1C is < 7)   No components found for: \"LABMICR\"  No components found for: \"LDLCHOLESTEROL\", \"LDLCALC\"    (goal LDL is <100)   AST (U/L)   Date Value   10/24/2022 20     ALT (U/L)   Date Value   10/24/2022 20     BUN (mg/dL)   Date Value   10/18/2024 36 (H)     BP Readings from Last 3 Encounters:   10/21/24 104/68   08/22/24 132/84   07/18/24 130/80          ([id number])    Past Medical History:   Diagnosis Date    Arrhythmia     CAD (coronary artery disease)     GERD (gastroesophageal reflux disease)     Heart attack (HCC)     Hyperlipidemia        Problem: Respiratory Impairment - Respiratory Therapy 253  Intervention: Inhaled gas administration  Description: Done  Note: Intervention Status  Done  Intervention: Respiratory support devices  Description:  Done  Note: Intervention Status  Done  Intervention: Assess respiratory muscle strength/function  Note: Intervention Status  Done     10/13/20 2036   Device Information   Vent Charges [place] Charges   $ BIPAP/CPAP Charge Treatment Complete   Ventilation Type Noninvasive   Interface Type Nasal Prong   Interface Size   (blue brooke)   Skin Integrity Intact   Skin Intervention Delivery-device repositioned   SpO2 100 %   Ion/Peds Vent Settings   FiO2 (%) 21 %   NIV Vent Mode Nasal CPAP   PEEP/CPAP/EPAP 5 cm H20   Sensitivity Type Flow   SIPAP-EPAP (Flow Setting) 7   Ion/Peds Reading Measured Values   Resp Rate Observed 62   Bubbling noted in B-CPAP? Yes   Humidity   Heater Temperature 87.8 °F (31 °C)   Equipment water level 1/2   HME in use N   Respiratory Equipment   Emergency Supplies at Bedside Resuscitation Bag;Appropriate Mask   Breath Sounds   Breath Sounds All Lobes Equal and clear   Breath Sound Assessment Bilateral breath sounds   All Lobes Breath Sounds Clear;Diminished   Resp/Chest Assessment   Chest Assessment Chest expansion symmetrical   Oral Suctioning/Secretions   Sputum source Suction   Suction Type Oral aspirator   Secretion Amount Small   Secretion Color Clear   Secretion consistency Thick   Nasal Suctioning/Secretions   Nasal Suction Type Nasopharyngeal   Saline Instilled No   Suction Type Catheter   Secretion Amount Moderate   Secretion Color White;Clear   Secretion Consistency Thin   RT Standby Management   Standby Reason RN Request   $ Counter-Staff Time per 15min 30 Min

## 2024-10-29 ENCOUNTER — APPOINTMENT (OUTPATIENT)
Dept: PHYSICAL THERAPY | Age: 79
End: 2024-10-29
Payer: MEDICARE

## 2024-11-11 RX ORDER — AMIODARONE HYDROCHLORIDE 200 MG/1
TABLET ORAL
Qty: 90 TABLET | Refills: 0 | Status: SHIPPED | OUTPATIENT
Start: 2024-11-11

## 2024-12-20 DIAGNOSIS — G89.4 CHRONIC PAIN SYNDROME: ICD-10-CM

## 2024-12-20 RX ORDER — OXYCODONE AND ACETAMINOPHEN 5; 325 MG/1; MG/1
1 TABLET ORAL EVERY 8 HOURS PRN
Qty: 90 TABLET | Refills: 0 | Status: SHIPPED | OUTPATIENT
Start: 2024-12-20 | End: 2025-01-19

## 2025-01-13 DIAGNOSIS — M10.9 ARTHRITIS OF LEFT WRIST DUE TO GOUT: ICD-10-CM

## 2025-01-13 RX ORDER — ALLOPURINOL 100 MG/1
100 TABLET ORAL DAILY
Qty: 90 TABLET | Refills: 0 | Status: SHIPPED | OUTPATIENT
Start: 2025-01-13

## 2025-01-16 LAB
BASOPHILS ABSOLUTE: 0.07 K/UL (ref 0–0.2)
BASOPHILS RELATIVE PERCENT: 0.7 % (ref 0–2)
BUN BLDV-MCNC: 40 MG/DL (ref 8–23)
CALCIUM SERPL-MCNC: 9.5 MG/DL (ref 8.6–10.5)
CHLORIDE BLD-SCNC: 106 MMOL/L (ref 96–107)
CO2: 20 MMOL/L (ref 18–32)
CREAT SERPL-MCNC: 1.56 MG/DL (ref 0.67–1.3)
EGFR IF NONAFRICAN AMERICAN: 45 ML/MIN/1.73M2
EOSINOPHILS ABSOLUTE: 0.13 K/UL (ref 0–0.8)
EOSINOPHILS RELATIVE PERCENT: 1.2 % (ref 0–5)
GLUCOSE: 333 MG/DL (ref 70–100)
HCT VFR BLD CALC: 43.6 % (ref 39–52)
HEMOGLOBIN: 13.1 G/DL (ref 13–18)
IMMATURE GRANS (ABS): 0.04 K/UL (ref 0–0.06)
IMMATURE GRANULOCYTES %: 0.4 % (ref 0–2)
LYMPHOCYTES ABSOLUTE: 3.02 K/UL (ref 0.9–5.2)
LYMPHOCYTES RELATIVE PERCENT: 28.1 % (ref 20–45)
MAGNESIUM: 2.8 MG/DL (ref 1.6–2.6)
MCH RBC QN AUTO: 25.3 PG (ref 26–32)
MCHC RBC AUTO-ENTMCNC: 30 G/DL (ref 32–35)
MCV RBC AUTO: 84 FL (ref 75–100)
MONOCYTES ABSOLUTE: 1.11 K/UL (ref 0.1–1)
MONOCYTES RELATIVE PERCENT: 10.3 % (ref 0–13)
NEUTROPHILS ABSOLUTE: 6.37 K/UL (ref 1.9–8)
NEUTROPHILS RELATIVE PERCENT: 59.3 % (ref 45–75)
PDW BLD-RTO: 15.1 % (ref 11.2–14.8)
PHOSPHORUS: 4.3 MG/DL (ref 2.5–4.5)
PLATELET # BLD: 297 THOUS/CMM (ref 140–440)
POTASSIUM SERPL-SCNC: 5.4 MMOL/L (ref 3.5–5.4)
PTH INTACT: 60 PG/ML (ref 12–65)
RBC # BLD: 5.18 MILL/CMM (ref 4.4–6.1)
SODIUM BLD-SCNC: 149 MMOL/L (ref 135–148)
VITAMIN D 25-HYDROXY: 55.4 NG/ML (ref 30–100)
WBC # BLD: 10.7 THDS/CMM (ref 3.6–11)

## 2025-01-17 LAB — PROTEIN, URINE: <4 MG/DL

## 2025-01-22 LAB
CREATINE URINE MG/24 HR: NORMAL MG/24H
CREATININE 24 HOUR UR: 2222.4 UMOL/L
CREATININE URINE: 23 MMOL/MOL CRT (ref 10–370)
Lab: NORMAL HR
TOTAL VOLUME: NORMAL ML

## 2025-01-29 ENCOUNTER — OFFICE VISIT (OUTPATIENT)
Dept: PRIMARY CARE CLINIC | Age: 80
End: 2025-01-29

## 2025-01-29 VITALS
WEIGHT: 240.1 LBS | OXYGEN SATURATION: 98 % | HEIGHT: 72 IN | BODY MASS INDEX: 32.52 KG/M2 | DIASTOLIC BLOOD PRESSURE: 72 MMHG | HEART RATE: 52 BPM | SYSTOLIC BLOOD PRESSURE: 116 MMHG

## 2025-01-29 DIAGNOSIS — I48.0 PAROXYSMAL ATRIAL FIBRILLATION (HCC): ICD-10-CM

## 2025-01-29 DIAGNOSIS — N18.32 STAGE 3B CHRONIC KIDNEY DISEASE (HCC): ICD-10-CM

## 2025-01-29 DIAGNOSIS — F11.20 OPIOID DEPENDENCE WITH CURRENT USE (HCC): ICD-10-CM

## 2025-01-29 DIAGNOSIS — N18.31 TYPE 2 DIABETES MELLITUS WITH STAGE 3A CHRONIC KIDNEY DISEASE, UNSPECIFIED WHETHER LONG TERM INSULIN USE (HCC): ICD-10-CM

## 2025-01-29 DIAGNOSIS — E11.22 TYPE 2 DIABETES MELLITUS WITH STAGE 3A CHRONIC KIDNEY DISEASE, UNSPECIFIED WHETHER LONG TERM INSULIN USE (HCC): ICD-10-CM

## 2025-01-29 DIAGNOSIS — Z00.00 ENCOUNTER FOR GENERAL ADULT MEDICAL EXAMINATION W/O ABNORMAL FINDINGS: Primary | ICD-10-CM

## 2025-01-29 DIAGNOSIS — G89.4 CHRONIC PAIN SYNDROME: ICD-10-CM

## 2025-01-29 DIAGNOSIS — I50.23 ACUTE ON CHRONIC SYSTOLIC HEART FAILURE (HCC): ICD-10-CM

## 2025-01-29 DIAGNOSIS — J44.9 CHRONIC OBSTRUCTIVE PULMONARY DISEASE, UNSPECIFIED COPD TYPE (HCC): ICD-10-CM

## 2025-01-29 LAB — HBA1C MFR BLD: 7.4 %

## 2025-01-29 RX ORDER — OXYCODONE AND ACETAMINOPHEN 5; 325 MG/1; MG/1
1 TABLET ORAL EVERY 8 HOURS PRN
Qty: 90 TABLET | Refills: 0 | Status: SHIPPED | OUTPATIENT
Start: 2025-01-29 | End: 2025-02-28

## 2025-01-29 SDOH — ECONOMIC STABILITY: FOOD INSECURITY: WITHIN THE PAST 12 MONTHS, YOU WORRIED THAT YOUR FOOD WOULD RUN OUT BEFORE YOU GOT MONEY TO BUY MORE.: NEVER TRUE

## 2025-01-29 SDOH — ECONOMIC STABILITY: FOOD INSECURITY: WITHIN THE PAST 12 MONTHS, THE FOOD YOU BOUGHT JUST DIDN'T LAST AND YOU DIDN'T HAVE MONEY TO GET MORE.: NEVER TRUE

## 2025-01-29 ASSESSMENT — PATIENT HEALTH QUESTIONNAIRE - PHQ9
2. FEELING DOWN, DEPRESSED OR HOPELESS: NOT AT ALL
SUM OF ALL RESPONSES TO PHQ9 QUESTIONS 1 & 2: 0
SUM OF ALL RESPONSES TO PHQ QUESTIONS 1-9: 0
1. LITTLE INTEREST OR PLEASURE IN DOING THINGS: NOT AT ALL
SUM OF ALL RESPONSES TO PHQ QUESTIONS 1-9: 0

## 2025-01-29 ASSESSMENT — LIFESTYLE VARIABLES
HOW OFTEN DO YOU HAVE A DRINK CONTAINING ALCOHOL: 2-4 TIMES A MONTH
HOW MANY STANDARD DRINKS CONTAINING ALCOHOL DO YOU HAVE ON A TYPICAL DAY: 1 OR 2

## 2025-01-29 NOTE — PROGRESS NOTES
Pain    Interventions - Pain:  Patient declined any further interventions or treatment      Inactivity:  On average, how many days per week do you engage in moderate to strenuous exercise (like a brisk walk)?: 1 day (!) Abnormal  On average, how many minutes do you engage in exercise at this level?: 10 min    Interventions:  Patient declined any further interventions or treatment     Abnormal BMI (obese):  Body mass index is 32.56 kg/m². (!) Abnormal    Interventions:  Patient declines any further evaluation or treatment            ADL's:   Patient reports needing help with:  Select all that apply: (!) Laundry    Interventions:  Patient declined any further interventions or treatment    Advanced Directives:  Do you have a Living Will?: (!) No    Intervention:  has NO advanced directive - not interested in additional information                     Objective   Vitals:    01/29/25 0955   BP: 116/72   Pulse: 52   SpO2: 98%   Weight: 108.9 kg (240 lb 1.6 oz)   Height: 1.829 m (6')      Body mass index is 32.56 kg/m².        General Appearance: alert and oriented to person, place and time, well developed and well- nourished, in no acute distress  Skin: warm and dry, no rash or erythema  Head: normocephalic and atraumatic  Eyes: pupils equal, round, and reactive to light, extraocular eye movements intact, conjunctivae normal  ENT: tympanic membrane, external ear and ear canal normal bilaterally, nose without deformity, nasal mucosa and turbinates normal without polyps  Neck: supple and non-tender without mass, no thyromegaly or thyroid nodules, no cervical lymphadenopathy  Pulmonary/Chest: clear to auscultation bilaterally- no wheezes, rales or rhonchi, normal air movement, no respiratory distress  Cardiovascular: normal rate, regular rhythm, normal S1 and S2, no murmurs, rubs, clicks, or gallops, distal pulses intact, no carotid bruits  Abdomen: soft, non-tender, non-distended, normal bowel sounds, no masses or

## 2025-03-10 DIAGNOSIS — G89.4 CHRONIC PAIN SYNDROME: ICD-10-CM

## 2025-03-10 RX ORDER — OXYCODONE AND ACETAMINOPHEN 5; 325 MG/1; MG/1
1 TABLET ORAL EVERY 8 HOURS PRN
Qty: 90 TABLET | Refills: 0 | Status: SHIPPED | OUTPATIENT
Start: 2025-03-10 | End: 2025-04-09

## 2025-03-10 NOTE — TELEPHONE ENCOUNTER
Last OV:  1/29/2025    Next OV: 4/30/2025    Pharmacy:   [facility] - [place], [place] - [address] -  [phone] - F [phone]  [facility] [address]  Phone: [phone] Fax: [phone]       Confirmed? Yes

## 2025-03-11 RX ORDER — ATORVASTATIN CALCIUM 80 MG/1
TABLET, FILM COATED ORAL
Qty: 45 TABLET | Refills: 0 | Status: SHIPPED | OUTPATIENT
Start: 2025-03-11

## 2025-04-17 DIAGNOSIS — G89.4 CHRONIC PAIN SYNDROME: ICD-10-CM

## 2025-04-17 RX ORDER — OXYCODONE AND ACETAMINOPHEN 5; 325 MG/1; MG/1
1 TABLET ORAL EVERY 8 HOURS PRN
Qty: 90 TABLET | Refills: 0 | Status: SHIPPED | OUTPATIENT
Start: 2025-04-17 | End: 2025-05-17

## 2025-04-23 DIAGNOSIS — M10.9 ARTHRITIS OF LEFT WRIST DUE TO GOUT: ICD-10-CM

## 2025-04-23 RX ORDER — ALLOPURINOL 100 MG/1
100 TABLET ORAL DAILY
Qty: 90 TABLET | Refills: 0 | Status: SHIPPED | OUTPATIENT
Start: 2025-04-23

## 2025-04-30 ENCOUNTER — OFFICE VISIT (OUTPATIENT)
Dept: PRIMARY CARE CLINIC | Age: 80
End: 2025-04-30
Payer: MEDICARE

## 2025-04-30 ENCOUNTER — TELEPHONE (OUTPATIENT)
Dept: PHARMACY | Facility: CLINIC | Age: 80
End: 2025-04-30

## 2025-04-30 VITALS
WEIGHT: 242.2 LBS | SYSTOLIC BLOOD PRESSURE: 118 MMHG | HEART RATE: 73 BPM | BODY MASS INDEX: 32.85 KG/M2 | DIASTOLIC BLOOD PRESSURE: 76 MMHG | OXYGEN SATURATION: 98 %

## 2025-04-30 DIAGNOSIS — I70.213 ATHEROSCLEROSIS OF NATIVE ARTERY OF BOTH LOWER EXTREMITIES WITH INTERMITTENT CLAUDICATION: ICD-10-CM

## 2025-04-30 DIAGNOSIS — N18.32 STAGE 3B CHRONIC KIDNEY DISEASE (HCC): ICD-10-CM

## 2025-04-30 DIAGNOSIS — I50.23 ACUTE ON CHRONIC SYSTOLIC HEART FAILURE (HCC): ICD-10-CM

## 2025-04-30 DIAGNOSIS — Z95.810 AICD (AUTOMATIC CARDIOVERTER/DEFIBRILLATOR) PRESENT: ICD-10-CM

## 2025-04-30 DIAGNOSIS — I48.0 PAROXYSMAL ATRIAL FIBRILLATION (HCC): ICD-10-CM

## 2025-04-30 DIAGNOSIS — E78.5 HYPERLIPIDEMIA, UNSPECIFIED HYPERLIPIDEMIA TYPE: ICD-10-CM

## 2025-04-30 DIAGNOSIS — R22.41 LOCALIZED SWELLING OF RIGHT LOWER EXTREMITY: ICD-10-CM

## 2025-04-30 DIAGNOSIS — E11.22 TYPE 2 DIABETES MELLITUS WITH STAGE 3A CHRONIC KIDNEY DISEASE, UNSPECIFIED WHETHER LONG TERM INSULIN USE (HCC): Primary | ICD-10-CM

## 2025-04-30 DIAGNOSIS — G89.4 CHRONIC PAIN SYNDROME: ICD-10-CM

## 2025-04-30 DIAGNOSIS — N18.31 TYPE 2 DIABETES MELLITUS WITH STAGE 3A CHRONIC KIDNEY DISEASE, UNSPECIFIED WHETHER LONG TERM INSULIN USE (HCC): Primary | ICD-10-CM

## 2025-04-30 LAB — HBA1C MFR BLD: 8 %

## 2025-04-30 PROCEDURE — 3078F DIAST BP <80 MM HG: CPT | Performed by: NURSE PRACTITIONER

## 2025-04-30 PROCEDURE — 1123F ACP DISCUSS/DSCN MKR DOCD: CPT | Performed by: NURSE PRACTITIONER

## 2025-04-30 PROCEDURE — 3052F HG A1C>EQUAL 8.0%<EQUAL 9.0%: CPT | Performed by: NURSE PRACTITIONER

## 2025-04-30 PROCEDURE — 99214 OFFICE O/P EST MOD 30 MIN: CPT | Performed by: NURSE PRACTITIONER

## 2025-04-30 PROCEDURE — G2211 COMPLEX E/M VISIT ADD ON: HCPCS | Performed by: NURSE PRACTITIONER

## 2025-04-30 PROCEDURE — 1159F MED LIST DOCD IN RCRD: CPT | Performed by: NURSE PRACTITIONER

## 2025-04-30 PROCEDURE — 3074F SYST BP LT 130 MM HG: CPT | Performed by: NURSE PRACTITIONER

## 2025-04-30 PROCEDURE — 83036 HEMOGLOBIN GLYCOSYLATED A1C: CPT | Performed by: NURSE PRACTITIONER

## 2025-04-30 PROCEDURE — 1160F RVW MEDS BY RX/DR IN RCRD: CPT | Performed by: NURSE PRACTITIONER

## 2025-04-30 RX ORDER — METOPROLOL SUCCINATE 50 MG/1
50 TABLET, EXTENDED RELEASE ORAL EVERY MORNING
COMMUNITY
Start: 2025-04-10

## 2025-04-30 ASSESSMENT — ENCOUNTER SYMPTOMS
ABDOMINAL PAIN: 0
COUGH: 0
BACK PAIN: 1

## 2025-04-30 NOTE — TELEPHONE ENCOUNTER
[facility] CLINICAL PHARMACY: ADHERENCE REVIEW  Identified care gap per [employer]: fills at [facility]: ACE/ARB adherence    Patient also appears to be prescribed: Statin    ASSESSMENT    ACE/ARB ADHERENCE    Insurance Records claims through 25 (Prior Year PDC = 100% - PASSED ; YTD PDC = 61%; Potential Fail Date: 25):   LISINOPRIL   TAB 40MG last filled on 25 for 30 day supply. Next refill due: 25    Prescribed si tablet/capsule daily    Per Insurer Portal: last filled on 25 for 30 day supply.     Per [facility]: will get  30 day supply ready to  since past due.    BP Readings from Last 3 Encounters:   25 102/60   25 116/72   10/21/24 104/68     Estimated Creatinine Clearance: 48 mL/min (A) (based on SCr of 1.56 mg/dL (H)).  Lab Results   Component Value Date    CREATININE 1.56 (H) 01/15/2025     Lab Results   Component Value Date    K 5.4 01/15/2025         The following are interventions that have been identified:   Patient OVERDUE refilling LISINOPRIL   TAB 40MG and active on home medication list.   Patient eligible for 100 day supply of LISINOPRIL   TAB 40MG    Attempting to reach patient to review.  Left message asking for return call. Bufyshart message sent to patient.       Last Visit: 25  Next Visit: 25    Mayra Bellamy CPhT  [facility] Clinical   [facility]  Toll Free: [phone] Option 1    For Pharmacy Admin Tracking Only    Program: [facility] [employer]  CPA in place:  No  Recommendation Provided To: Pharmacy: 1  Intervention Detail: Adherence Monitorin  Intervention Accepted By: Pharmacy: 1  Gap Closed?: No   Time Spent (min): 15

## 2025-04-30 NOTE — PROGRESS NOTES
(automatic cardioverter/defibrillator) present        8. Acute on chronic systolic heart failure (HCC)        9. Atherosclerosis of native artery of both lower extremities with intermittent claudication                  Plan:   Assessment & Plan   Return in about 3 months (around 7/30/2025) for med check.    Chronic conditions- Stable. Continue diet/exercise and current meds. Follow up in 3 months for recheck/earlier if needed.  Right lower extremity swelling- Rx given for US, follow up pending results.    Orders Placed This Encounter   Procedures    Albumin/Creatinine Ratio, Urine     Standing Status:   Future     Expected Date:   4/30/2025     Expiration Date:   4/29/2026    POCT glycosylated hemoglobin (Hb A1C)    Vascular duplex lower extremity venous right     Standing Status:   Future     Expected Date:   4/30/2025     Expiration Date:   4/30/2026          Patient given educational materials - see patient instructions.Discussed use, benefit, and side effects of prescribed medications.  All patientquestions answered. Pt voiced understanding. Reviewed health maintenance.  Instructedto continue current medications, diet and exercise.  Patient agreed with treatmentplan. Follow up as directed.     Electronicallysigned by ERICK PRÉEZ CNP on 4/30/2025 at 10:11 AM

## 2025-05-06 ENCOUNTER — HOSPITAL ENCOUNTER (OUTPATIENT)
Dept: VASCULAR LAB | Age: 80
Discharge: HOME | End: 2025-05-08
Payer: MEDICARE

## 2025-05-06 ENCOUNTER — HOSPITAL ENCOUNTER (OUTPATIENT)
Age: 80
Discharge: HOME | End: 2025-05-06
Payer: MEDICARE

## 2025-05-06 DIAGNOSIS — R22.41 LOCALIZED SWELLING OF RIGHT LOWER EXTREMITY: ICD-10-CM

## 2025-05-06 LAB
ANION GAP SERPL CALCULATED.3IONS-SCNC: 11 MMOL/L (ref 9–16)
BNP SERPL-MCNC: 2028 PG/ML (ref 0–450)
BUN SERPL-MCNC: 29 MG/DL (ref 8–23)
CALCIUM SERPL-MCNC: 9.1 MG/DL (ref 8.6–10.4)
CHLORIDE SERPL-SCNC: 102 MMOL/L (ref 98–107)
CO2 SERPL-SCNC: 29 MMOL/L (ref 20–31)
CREAT SERPL-MCNC: 1.4 MG/DL (ref 0.7–1.2)
GFR, ESTIMATED: 51 ML/MIN/1.73M2
GLUCOSE SERPL-MCNC: 114 MG/DL (ref 74–99)
POTASSIUM SERPL-SCNC: 3.8 MMOL/L (ref 3.7–5.3)
SODIUM SERPL-SCNC: 142 MMOL/L (ref 136–145)

## 2025-05-06 PROCEDURE — 93971 EXTREMITY STUDY: CPT

## 2025-05-06 PROCEDURE — 93971 EXTREMITY STUDY: CPT | Performed by: SURGERY

## 2025-05-06 PROCEDURE — 36415 COLL VENOUS BLD VENIPUNCTURE: CPT

## 2025-05-06 PROCEDURE — 83880 ASSAY OF NATRIURETIC PEPTIDE: CPT

## 2025-05-06 PROCEDURE — 80048 BASIC METABOLIC PNL TOTAL CA: CPT

## 2025-05-07 ENCOUNTER — RESULTS FOLLOW-UP (OUTPATIENT)
Dept: PRIMARY CARE CLINIC | Age: 80
End: 2025-05-07

## 2025-05-21 DIAGNOSIS — N18.31 TYPE 2 DIABETES MELLITUS WITH STAGE 3A CHRONIC KIDNEY DISEASE, UNSPECIFIED WHETHER LONG TERM INSULIN USE (HCC): ICD-10-CM

## 2025-05-21 DIAGNOSIS — E11.22 TYPE 2 DIABETES MELLITUS WITH STAGE 3A CHRONIC KIDNEY DISEASE, UNSPECIFIED WHETHER LONG TERM INSULIN USE (HCC): ICD-10-CM

## 2025-05-21 RX ORDER — BLOOD SUGAR DIAGNOSTIC
STRIP MISCELLANEOUS
Qty: 100 EACH | Refills: 0 | Status: SHIPPED | OUTPATIENT
Start: 2025-05-21

## 2025-05-29 DIAGNOSIS — G89.4 CHRONIC PAIN SYNDROME: ICD-10-CM

## 2025-05-29 RX ORDER — OXYCODONE AND ACETAMINOPHEN 5; 325 MG/1; MG/1
1 TABLET ORAL EVERY 8 HOURS PRN
Qty: 90 TABLET | Refills: 0 | Status: SHIPPED | OUTPATIENT
Start: 2025-05-29 | End: 2025-06-28

## 2025-05-29 NOTE — TELEPHONE ENCOUNTER
Last Visit Date: 4/30/2025   Next Visit Date: 7/30/2025      CONNIE, pt wanted to inform PCP his \"heart failure number\" has improved significantly, states previously it was in the 2200's and is now in the 400's.

## 2025-06-02 RX ORDER — ATORVASTATIN CALCIUM 80 MG/1
TABLET, FILM COATED ORAL
Qty: 45 TABLET | Refills: 0 | Status: SHIPPED | OUTPATIENT
Start: 2025-06-02

## 2025-07-10 DIAGNOSIS — G89.4 CHRONIC PAIN SYNDROME: ICD-10-CM

## 2025-07-10 RX ORDER — OXYCODONE AND ACETAMINOPHEN 5; 325 MG/1; MG/1
1 TABLET ORAL EVERY 8 HOURS PRN
Qty: 90 TABLET | Refills: 0 | Status: SHIPPED | OUTPATIENT
Start: 2025-07-10 | End: 2025-08-09

## 2025-07-14 DIAGNOSIS — M10.9 ARTHRITIS OF LEFT WRIST DUE TO GOUT: ICD-10-CM

## 2025-07-14 RX ORDER — ALLOPURINOL 100 MG/1
100 TABLET ORAL DAILY
Qty: 90 TABLET | Refills: 0 | Status: SHIPPED | OUTPATIENT
Start: 2025-07-14

## 2025-07-26 LAB
BASOPHILS ABSOLUTE: 0.05 K/UL (ref 0–0.2)
BASOPHILS RELATIVE PERCENT: 0.6 % (ref 0–2)
BUN BLDV-MCNC: 29 MG/DL (ref 8–23)
CALCIUM SERPL-MCNC: 9.3 MG/DL (ref 8.6–10.5)
CHLORIDE BLD-SCNC: 104 MMOL/L (ref 96–107)
CO2: 27 MMOL/L (ref 18–32)
CREAT SERPL-MCNC: 1.17 MG/DL (ref 0.67–1.3)
CREATINE, URINE: 12.2 MG/DL
EGFR IF NONAFRICAN AMERICAN: 63 ML/MIN/1.73M2
EOSINOPHILS ABSOLUTE: 0.21 K/UL (ref 0–0.8)
EOSINOPHILS RELATIVE PERCENT: 2.4 % (ref 0–5)
GLUCOSE: 150 MG/DL (ref 70–100)
HCT VFR BLD CALC: 46.1 % (ref 39–52)
HEMOGLOBIN: 13.8 G/DL (ref 13–18)
IMMATURE GRANS (ABS): 0.03 K/UL (ref 0–0.06)
IMMATURE GRANULOCYTES %: 0.3 % (ref 0–2)
LYMPHOCYTES ABSOLUTE: 2.65 K/UL (ref 0.9–5.2)
LYMPHOCYTES RELATIVE PERCENT: 30.7 % (ref 20–45)
MAGNESIUM: 2.5 MG/DL (ref 1.6–2.6)
MCH RBC QN AUTO: 24.4 PG (ref 26–32)
MCHC RBC AUTO-ENTMCNC: 29.9 G/DL (ref 32–35)
MCV RBC AUTO: 82 FL (ref 75–100)
MONOCYTES ABSOLUTE: 1.03 K/UL (ref 0.1–1)
MONOCYTES RELATIVE PERCENT: 11.9 % (ref 0–13)
NEUTROPHILS ABSOLUTE: 4.66 K/UL (ref 1.9–8)
NEUTROPHILS RELATIVE PERCENT: 54.1 % (ref 45–75)
PDW BLD-RTO: 16.1 % (ref 11.2–14.8)
PHOSPHORUS: 3.7 MG/DL (ref 2.5–4.5)
PLATELET # BLD: 288 THOUS/CMM (ref 140–440)
POTASSIUM SERPL-SCNC: 4.7 MMOL/L (ref 3.5–5.4)
PROTEIN, URINE: <4 MG/DL
PTH INTACT: 73 PG/ML (ref 12–65)
RBC # BLD: 5.65 MILL/CMM (ref 4.4–6.1)
SODIUM BLD-SCNC: 145 MMOL/L (ref 135–148)
WBC # BLD: 8.6 THDS/CMM (ref 3.6–11)

## 2025-07-27 LAB — VITAMIN D 25-HYDROXY: 69.4 NG/ML (ref 30–100)

## 2025-07-30 ENCOUNTER — HOSPITAL ENCOUNTER (OUTPATIENT)
Age: 80
Setting detail: SPECIMEN
Discharge: HOME | End: 2025-07-30

## 2025-07-30 ENCOUNTER — OFFICE VISIT (OUTPATIENT)
Dept: PRIMARY CARE CLINIC | Age: 80
End: 2025-07-30

## 2025-07-30 VITALS
BODY MASS INDEX: 32.33 KG/M2 | DIASTOLIC BLOOD PRESSURE: 82 MMHG | SYSTOLIC BLOOD PRESSURE: 128 MMHG | WEIGHT: 238.4 LBS | HEART RATE: 74 BPM | OXYGEN SATURATION: 94 %

## 2025-07-30 DIAGNOSIS — N18.31 TYPE 2 DIABETES MELLITUS WITH STAGE 3A CHRONIC KIDNEY DISEASE, UNSPECIFIED WHETHER LONG TERM INSULIN USE (HCC): ICD-10-CM

## 2025-07-30 DIAGNOSIS — R31.29 OTHER MICROSCOPIC HEMATURIA: ICD-10-CM

## 2025-07-30 DIAGNOSIS — N30.01 ACUTE CYSTITIS WITH HEMATURIA: ICD-10-CM

## 2025-07-30 DIAGNOSIS — N18.32 STAGE 3B CHRONIC KIDNEY DISEASE (HCC): ICD-10-CM

## 2025-07-30 DIAGNOSIS — I48.0 PAROXYSMAL ATRIAL FIBRILLATION (HCC): ICD-10-CM

## 2025-07-30 DIAGNOSIS — Z95.810 AICD (AUTOMATIC CARDIOVERTER/DEFIBRILLATOR) PRESENT: ICD-10-CM

## 2025-07-30 DIAGNOSIS — E11.22 TYPE 2 DIABETES MELLITUS WITH STAGE 3A CHRONIC KIDNEY DISEASE, UNSPECIFIED WHETHER LONG TERM INSULIN USE (HCC): ICD-10-CM

## 2025-07-30 DIAGNOSIS — N18.31 TYPE 2 DIABETES MELLITUS WITH STAGE 3A CHRONIC KIDNEY DISEASE, UNSPECIFIED WHETHER LONG TERM INSULIN USE (HCC): Primary | ICD-10-CM

## 2025-07-30 DIAGNOSIS — E11.22 TYPE 2 DIABETES MELLITUS WITH STAGE 3A CHRONIC KIDNEY DISEASE, UNSPECIFIED WHETHER LONG TERM INSULIN USE (HCC): Primary | ICD-10-CM

## 2025-07-30 DIAGNOSIS — G89.4 CHRONIC PAIN SYNDROME: ICD-10-CM

## 2025-07-30 DIAGNOSIS — R30.0 DYSURIA: ICD-10-CM

## 2025-07-30 DIAGNOSIS — E78.5 HYPERLIPIDEMIA, UNSPECIFIED HYPERLIPIDEMIA TYPE: ICD-10-CM

## 2025-07-30 LAB
BILIRUBIN, POC: NORMAL
BLOOD URINE, POC: NORMAL
CLARITY, POC: CLEAR
COLOR, POC: NORMAL
CREAT UR-MCNC: 14.3 MG/DL (ref 39–259)
GLUCOSE URINE, POC: >1000 MG/DL
HBA1C MFR BLD: 6.9 %
KETONES, POC: NORMAL MG/DL
LEUKOCYTE EST, POC: NORMAL
MICROALBUMIN UR-MCNC: 39 MG/L (ref 0–20)
MICROALBUMIN/CREAT UR-RTO: 273 MCG/MG CREAT (ref 0–17)
NITRITE, POC: NORMAL
PH, POC: 7
PROTEIN, POC: NORMAL MG/DL
SPECIFIC GRAVITY, POC: 1.01
UROBILINOGEN, POC: 0.2 MG/DL

## 2025-07-30 RX ORDER — CEPHALEXIN 500 MG/1
500 CAPSULE ORAL 2 TIMES DAILY
Qty: 14 CAPSULE | Refills: 0 | Status: SHIPPED | OUTPATIENT
Start: 2025-07-30 | End: 2025-08-06

## 2025-07-30 RX ORDER — AMLODIPINE BESYLATE 5 MG/1
TABLET ORAL
COMMUNITY
Start: 2025-07-14

## 2025-07-30 ASSESSMENT — ENCOUNTER SYMPTOMS
ABDOMINAL PAIN: 0
SHORTNESS OF BREATH: 0
COUGH: 0
BACK PAIN: 0

## 2025-07-30 NOTE — PROGRESS NOTES
[facility]  [facility]  [address] [place] [address] [address]  [address]  [facility] [address]  Dept: [phone]  Dept Fax: [phone]    Dharmesh Arora is a 80 y.o. male who presentstoday for his medical conditions/complaints as noted below.  Dharmesh Arora is c/o of  Chief Complaint   Patient presents with    Diabetes     3 month A1c check    Hematuria     Noticed some possible blood in urine this morning            HPI:     Presents for recheck on chronic conditions  BP well controlled  Has lost 4lb since LOV- congratulated patient     Hx of afib, continues to follow with cardiology  He had the AV node ablation and tolerated  Will continue to monitor and has follow up testing in their office in September 2025     Hga1c from 8 to 6.9  Compliant with use of medications  Denies any side effects with use    Urine dipstick completed in office, positive for small amount of Nitrites  States he does have some urgency since this morning   Blood in the urine, will treat for UTI with Kelfex  Discussed to call if continued symptoms     Chronic lower back pain/joint pain  Using percocet TID prn  This works well to control pain  Denies any side effects with use of med     [facility] recommended referral to dermatology and will schedule after cardiology testing is completed  C/o pain to left hand 4th digit to continue Hard to straighten. Does not want to focus on this until other testing is complete      SOB at baseline with no changes   Will continue to monitor      Denies any other problems/concerns             Hemoglobin A1C (%)   Date Value   07/30/2025 6.9   04/30/2025 8.0   01/29/2025 7.4             ( goal A1C is < 7)   No components found for: \"LABMICR\"  No components found for: \"LDLCHOLESTEROL\", \"LDLCALC\"    (goal LDL is <100)   AST (U/L)   Date Value   10/24/2022 20     ALT (U/L)   Date Value   10/24/2022 20     BUN (mg/dL)   Date Value   07/25/2025 29 (H)     BP Readings from Last 3 Encounters:

## 2025-07-31 LAB
MICROORGANISM SPEC CULT: NO GROWTH
SERVICE CMNT-IMP: NORMAL
SPECIMEN DESCRIPTION: NORMAL

## 2025-08-12 DIAGNOSIS — N18.31 TYPE 2 DIABETES MELLITUS WITH STAGE 3A CHRONIC KIDNEY DISEASE, UNSPECIFIED WHETHER LONG TERM INSULIN USE (HCC): ICD-10-CM

## 2025-08-12 DIAGNOSIS — E11.22 TYPE 2 DIABETES MELLITUS WITH STAGE 3A CHRONIC KIDNEY DISEASE, UNSPECIFIED WHETHER LONG TERM INSULIN USE (HCC): ICD-10-CM

## 2025-08-12 RX ORDER — BLOOD SUGAR DIAGNOSTIC
STRIP MISCELLANEOUS
Qty: 100 EACH | Refills: 0 | Status: SHIPPED | OUTPATIENT
Start: 2025-08-12

## 2025-08-15 DIAGNOSIS — G89.4 CHRONIC PAIN SYNDROME: ICD-10-CM

## 2025-08-15 RX ORDER — OXYCODONE AND ACETAMINOPHEN 5; 325 MG/1; MG/1
1 TABLET ORAL EVERY 8 HOURS PRN
Qty: 90 TABLET | Refills: 0 | Status: SHIPPED | OUTPATIENT
Start: 2025-08-15 | End: 2025-09-14

## 2025-08-21 ENCOUNTER — TELEPHONE (OUTPATIENT)
Dept: PRIMARY CARE CLINIC | Age: 80
End: 2025-08-21

## 2025-09-02 RX ORDER — ATORVASTATIN CALCIUM 80 MG/1
TABLET, FILM COATED ORAL
Qty: 45 TABLET | Refills: 0 | Status: SHIPPED | OUTPATIENT
Start: 2025-09-02